# Patient Record
Sex: FEMALE | Race: OTHER | HISPANIC OR LATINO
[De-identification: names, ages, dates, MRNs, and addresses within clinical notes are randomized per-mention and may not be internally consistent; named-entity substitution may affect disease eponyms.]

---

## 2017-01-24 ENCOUNTER — RESULT REVIEW (OUTPATIENT)
Age: 47
End: 2017-01-24

## 2017-01-25 ENCOUNTER — INPATIENT (INPATIENT)
Facility: HOSPITAL | Age: 47
LOS: 2 days | Discharge: HOME HEALTH | End: 2017-01-28
Attending: OBSTETRICS & GYNECOLOGY | Admitting: OBSTETRICS & GYNECOLOGY
Payer: COMMERCIAL

## 2017-01-25 ENCOUNTER — RESULT REVIEW (OUTPATIENT)
Age: 47
End: 2017-01-25

## 2017-01-25 VITALS
TEMPERATURE: 99 F | OXYGEN SATURATION: 100 % | RESPIRATION RATE: 20 BRPM | DIASTOLIC BLOOD PRESSURE: 51 MMHG | HEART RATE: 68 BPM | SYSTOLIC BLOOD PRESSURE: 99 MMHG

## 2017-01-25 DIAGNOSIS — N83.9 NONINFLAMMATORY DISORDER OF OVARY, FALLOPIAN TUBE AND BROAD LIGAMENT, UNSPECIFIED: ICD-10-CM

## 2017-01-25 DIAGNOSIS — K66.1 HEMOPERITONEUM: ICD-10-CM

## 2017-01-25 LAB
ALBUMIN SERPL ELPH-MCNC: 4.1 G/DL — SIGNIFICANT CHANGE UP (ref 3.3–5)
ALP SERPL-CCNC: 100 U/L — SIGNIFICANT CHANGE UP (ref 40–120)
ALT FLD-CCNC: 23 U/L — SIGNIFICANT CHANGE UP (ref 4–33)
AMYLASE P1 CFR SERPL: 39 U/L — SIGNIFICANT CHANGE UP (ref 25–125)
APPEARANCE UR: CLEAR — SIGNIFICANT CHANGE UP
APTT BLD: 29.2 SEC — SIGNIFICANT CHANGE UP (ref 27.5–37.4)
AST SERPL-CCNC: 24 U/L — SIGNIFICANT CHANGE UP (ref 4–32)
BASE EXCESS BLDV CALC-SCNC: -0.5 MMOL/L — SIGNIFICANT CHANGE UP
BASE EXCESS BLDV CALC-SCNC: 2.4 MMOL/L — SIGNIFICANT CHANGE UP
BASOPHILS # BLD AUTO: 0.01 K/UL — SIGNIFICANT CHANGE UP (ref 0–0.2)
BASOPHILS # BLD AUTO: 0.02 K/UL — SIGNIFICANT CHANGE UP (ref 0–0.2)
BASOPHILS NFR BLD AUTO: 0.1 % — SIGNIFICANT CHANGE UP (ref 0–2)
BASOPHILS NFR BLD AUTO: 0.2 % — SIGNIFICANT CHANGE UP (ref 0–2)
BILIRUB SERPL-MCNC: 0.5 MG/DL — SIGNIFICANT CHANGE UP (ref 0.2–1.2)
BILIRUB UR-MCNC: NEGATIVE — SIGNIFICANT CHANGE UP
BLD GP AB SCN SERPL QL: NEGATIVE — SIGNIFICANT CHANGE UP
BLOOD GAS VENOUS - CREATININE: 0.78 MG/DL — SIGNIFICANT CHANGE UP (ref 0.5–1.3)
BLOOD GAS VENOUS - CREATININE: 0.87 MG/DL — SIGNIFICANT CHANGE UP (ref 0.5–1.3)
BLOOD UR QL VISUAL: NEGATIVE — SIGNIFICANT CHANGE UP
BUN SERPL-MCNC: 10 MG/DL — SIGNIFICANT CHANGE UP (ref 7–23)
BUN SERPL-MCNC: 17 MG/DL — SIGNIFICANT CHANGE UP (ref 7–23)
CALCIUM SERPL-MCNC: 8.1 MG/DL — LOW (ref 8.4–10.5)
CALCIUM SERPL-MCNC: 9.1 MG/DL — SIGNIFICANT CHANGE UP (ref 8.4–10.5)
CEA SERPL-MCNC: 1 NG/ML — SIGNIFICANT CHANGE UP (ref 0.2–3.8)
CHLORIDE BLDV-SCNC: 104 MMOL/L — SIGNIFICANT CHANGE UP (ref 96–108)
CHLORIDE BLDV-SCNC: 107 MMOL/L — SIGNIFICANT CHANGE UP (ref 96–108)
CHLORIDE SERPL-SCNC: 100 MMOL/L — SIGNIFICANT CHANGE UP (ref 98–107)
CHLORIDE SERPL-SCNC: 103 MMOL/L — SIGNIFICANT CHANGE UP (ref 98–107)
CO2 SERPL-SCNC: 21 MMOL/L — LOW (ref 22–31)
CO2 SERPL-SCNC: 23 MMOL/L — SIGNIFICANT CHANGE UP (ref 22–31)
COLOR SPEC: YELLOW — SIGNIFICANT CHANGE UP
CREAT SERPL-MCNC: 0.73 MG/DL — SIGNIFICANT CHANGE UP (ref 0.5–1.3)
CREAT SERPL-MCNC: 0.91 MG/DL — SIGNIFICANT CHANGE UP (ref 0.5–1.3)
EOSINOPHIL # BLD AUTO: 0 K/UL — SIGNIFICANT CHANGE UP (ref 0–0.5)
EOSINOPHIL # BLD AUTO: 0.04 K/UL — SIGNIFICANT CHANGE UP (ref 0–0.5)
EOSINOPHIL NFR BLD AUTO: 0 % — SIGNIFICANT CHANGE UP (ref 0–6)
EOSINOPHIL NFR BLD AUTO: 0.3 % — SIGNIFICANT CHANGE UP (ref 0–6)
GAS PNL BLDV: 136 MMOL/L — SIGNIFICANT CHANGE UP (ref 136–146)
GAS PNL BLDV: 137 MMOL/L — SIGNIFICANT CHANGE UP (ref 136–146)
GLUCOSE BLDV-MCNC: 106 — HIGH (ref 70–99)
GLUCOSE BLDV-MCNC: 121 — HIGH (ref 70–99)
GLUCOSE SERPL-MCNC: 124 MG/DL — HIGH (ref 70–99)
GLUCOSE SERPL-MCNC: 130 MG/DL — HIGH (ref 70–99)
GLUCOSE UR-MCNC: NEGATIVE — SIGNIFICANT CHANGE UP
HCG SERPL-ACNC: < 5 MIU/ML — SIGNIFICANT CHANGE UP
HCO3 BLDV-SCNC: 22 MMOL/L — SIGNIFICANT CHANGE UP (ref 20–27)
HCO3 BLDV-SCNC: 25 MMOL/L — SIGNIFICANT CHANGE UP (ref 20–27)
HCT VFR BLD CALC: 36.6 % — SIGNIFICANT CHANGE UP (ref 34.5–45)
HCT VFR BLD CALC: 37.9 % — SIGNIFICANT CHANGE UP (ref 34.5–45)
HCT VFR BLD CALC: 40.2 % — SIGNIFICANT CHANGE UP (ref 34.5–45)
HCT VFR BLDV CALC: 36.6 % — SIGNIFICANT CHANGE UP (ref 34.5–45)
HCT VFR BLDV CALC: 39.1 % — SIGNIFICANT CHANGE UP (ref 34.5–45)
HGB BLD-MCNC: 11.8 G/DL — SIGNIFICANT CHANGE UP (ref 11.5–15.5)
HGB BLD-MCNC: 12.3 G/DL — SIGNIFICANT CHANGE UP (ref 11.5–15.5)
HGB BLD-MCNC: 13.1 G/DL — SIGNIFICANT CHANGE UP (ref 11.5–15.5)
HGB BLDV-MCNC: 11.9 G/DL — SIGNIFICANT CHANGE UP (ref 11.5–15.5)
HGB BLDV-MCNC: 12.7 G/DL — SIGNIFICANT CHANGE UP (ref 11.5–15.5)
IMM GRANULOCYTES NFR BLD AUTO: 0.2 % — SIGNIFICANT CHANGE UP (ref 0–1.5)
IMM GRANULOCYTES NFR BLD AUTO: 0.4 % — SIGNIFICANT CHANGE UP (ref 0–1.5)
INR BLD: 1.16 — SIGNIFICANT CHANGE UP (ref 0.87–1.18)
KETONES UR-MCNC: NEGATIVE — SIGNIFICANT CHANGE UP
LACTATE BLDV-MCNC: 1.4 MMOL/L — SIGNIFICANT CHANGE UP (ref 0.5–2)
LACTATE BLDV-MCNC: 2.1 MMOL/L — HIGH (ref 0.5–2)
LEUKOCYTE ESTERASE UR-ACNC: NEGATIVE — SIGNIFICANT CHANGE UP
LIDOCAIN IGE QN: 26.5 U/L — SIGNIFICANT CHANGE UP (ref 7–60)
LYMPHOCYTES # BLD AUTO: 0.45 K/UL — LOW (ref 1–3.3)
LYMPHOCYTES # BLD AUTO: 0.46 K/UL — LOW (ref 1–3.3)
LYMPHOCYTES # BLD AUTO: 3.8 % — LOW (ref 13–44)
LYMPHOCYTES # BLD AUTO: 5.4 % — LOW (ref 13–44)
MCHC RBC-ENTMCNC: 29.4 PG — SIGNIFICANT CHANGE UP (ref 27–34)
MCHC RBC-ENTMCNC: 29.4 PG — SIGNIFICANT CHANGE UP (ref 27–34)
MCHC RBC-ENTMCNC: 32.5 % — SIGNIFICANT CHANGE UP (ref 32–36)
MCHC RBC-ENTMCNC: 32.6 % — SIGNIFICANT CHANGE UP (ref 32–36)
MCV RBC AUTO: 90.3 FL — SIGNIFICANT CHANGE UP (ref 80–100)
MCV RBC AUTO: 90.5 FL — SIGNIFICANT CHANGE UP (ref 80–100)
MONOCYTES # BLD AUTO: 0.32 K/UL — SIGNIFICANT CHANGE UP (ref 0–0.9)
MONOCYTES # BLD AUTO: 0.39 K/UL — SIGNIFICANT CHANGE UP (ref 0–0.9)
MONOCYTES NFR BLD AUTO: 3.3 % — SIGNIFICANT CHANGE UP (ref 2–14)
MONOCYTES NFR BLD AUTO: 3.8 % — SIGNIFICANT CHANGE UP (ref 2–14)
MUCOUS THREADS # UR AUTO: SIGNIFICANT CHANGE UP
NEUTROPHILS # BLD AUTO: 11.04 K/UL — HIGH (ref 1.8–7.4)
NEUTROPHILS # BLD AUTO: 7.55 K/UL — HIGH (ref 1.8–7.4)
NEUTROPHILS NFR BLD AUTO: 90.5 % — HIGH (ref 43–77)
NEUTROPHILS NFR BLD AUTO: 92 % — HIGH (ref 43–77)
NITRITE UR-MCNC: NEGATIVE — SIGNIFICANT CHANGE UP
PCO2 BLDV: 44 MMHG — SIGNIFICANT CHANGE UP (ref 41–51)
PCO2 BLDV: 50 MMHG — SIGNIFICANT CHANGE UP (ref 41–51)
PH BLDV: 7.32 PH — SIGNIFICANT CHANGE UP (ref 7.32–7.43)
PH BLDV: 7.4 PH — SIGNIFICANT CHANGE UP (ref 7.32–7.43)
PH UR: 8.5 — HIGH (ref 4.6–8)
PLATELET # BLD AUTO: 181 K/UL — SIGNIFICANT CHANGE UP (ref 150–400)
PLATELET # BLD AUTO: 197 K/UL — SIGNIFICANT CHANGE UP (ref 150–400)
PMV BLD: 11.2 FL — SIGNIFICANT CHANGE UP (ref 7–13)
PMV BLD: 11.6 FL — SIGNIFICANT CHANGE UP (ref 7–13)
PO2 BLDV: 24 MMHG — LOW (ref 35–40)
PO2 BLDV: 28 MMHG — LOW (ref 35–40)
POTASSIUM BLDV-SCNC: 3.6 MMOL/L — SIGNIFICANT CHANGE UP (ref 3.4–4.5)
POTASSIUM BLDV-SCNC: 3.8 MMOL/L — SIGNIFICANT CHANGE UP (ref 3.4–4.5)
POTASSIUM SERPL-MCNC: 3.4 MMOL/L — LOW (ref 3.5–5.3)
POTASSIUM SERPL-MCNC: 3.9 MMOL/L — SIGNIFICANT CHANGE UP (ref 3.5–5.3)
POTASSIUM SERPL-SCNC: 3.4 MMOL/L — LOW (ref 3.5–5.3)
POTASSIUM SERPL-SCNC: 3.9 MMOL/L — SIGNIFICANT CHANGE UP (ref 3.5–5.3)
PROT SERPL-MCNC: 6.9 G/DL — SIGNIFICANT CHANGE UP (ref 6–8.3)
PROT UR-MCNC: 30 — HIGH
PROTHROM AB SERPL-ACNC: 13.3 SEC — HIGH (ref 10–13.1)
RBC # BLD: 4.19 M/UL — SIGNIFICANT CHANGE UP (ref 3.8–5.2)
RBC # BLD: 4.45 M/UL — SIGNIFICANT CHANGE UP (ref 3.8–5.2)
RBC # FLD: 13.5 % — SIGNIFICANT CHANGE UP (ref 10.3–14.5)
RBC # FLD: 13.7 % — SIGNIFICANT CHANGE UP (ref 10.3–14.5)
RBC CASTS # UR COMP ASSIST: SIGNIFICANT CHANGE UP (ref 0–?)
RH IG SCN BLD-IMP: POSITIVE — SIGNIFICANT CHANGE UP
RH IG SCN BLD-IMP: POSITIVE — SIGNIFICANT CHANGE UP
SAO2 % BLDV: 33.7 % — LOW (ref 60–85)
SAO2 % BLDV: 47.8 % — LOW (ref 60–85)
SODIUM SERPL-SCNC: 139 MMOL/L — SIGNIFICANT CHANGE UP (ref 135–145)
SODIUM SERPL-SCNC: 140 MMOL/L — SIGNIFICANT CHANGE UP (ref 135–145)
SP GR SPEC: 1.03 — SIGNIFICANT CHANGE UP (ref 1–1.03)
SQUAMOUS # UR AUTO: SIGNIFICANT CHANGE UP
UROBILINOGEN FLD QL: 1 E.U. — SIGNIFICANT CHANGE UP (ref 0.1–0.2)
WBC # BLD: 12 K/UL — HIGH (ref 3.8–10.5)
WBC # BLD: 8.35 K/UL — SIGNIFICANT CHANGE UP (ref 3.8–10.5)
WBC # FLD AUTO: 12 K/UL — HIGH (ref 3.8–10.5)
WBC # FLD AUTO: 8.35 K/UL — SIGNIFICANT CHANGE UP (ref 3.8–10.5)
WBC UR QL: SIGNIFICANT CHANGE UP (ref 0–?)

## 2017-01-25 PROCEDURE — 88331 PATH CONSLTJ SURG 1 BLK 1SPC: CPT | Mod: 26

## 2017-01-25 PROCEDURE — 74177 CT ABD & PELVIS W/CONTRAST: CPT | Mod: 26

## 2017-01-25 PROCEDURE — 71020: CPT | Mod: 26

## 2017-01-25 PROCEDURE — 88305 TISSUE EXAM BY PATHOLOGIST: CPT | Mod: 26

## 2017-01-25 PROCEDURE — 74010: CPT | Mod: 26

## 2017-01-25 PROCEDURE — 88112 CYTOPATH CELL ENHANCE TECH: CPT | Mod: 26

## 2017-01-25 PROCEDURE — 88305 TISSUE EXAM BY PATHOLOGIST: CPT | Mod: 26,59

## 2017-01-25 RX ORDER — SIMETHICONE 80 MG/1
80 TABLET, CHEWABLE ORAL THREE TIMES A DAY
Qty: 0 | Refills: 0 | Status: DISCONTINUED | OUTPATIENT
Start: 2017-01-25 | End: 2017-01-28

## 2017-01-25 RX ORDER — SODIUM CHLORIDE 9 MG/ML
1000 INJECTION, SOLUTION INTRAVENOUS
Qty: 0 | Refills: 0 | Status: DISCONTINUED | OUTPATIENT
Start: 2017-01-25 | End: 2017-01-26

## 2017-01-25 RX ORDER — ONDANSETRON 8 MG/1
4 TABLET, FILM COATED ORAL EVERY 6 HOURS
Qty: 0 | Refills: 0 | Status: DISCONTINUED | OUTPATIENT
Start: 2017-01-25 | End: 2017-01-26

## 2017-01-25 RX ORDER — NALOXONE HYDROCHLORIDE 4 MG/.1ML
0.1 SPRAY NASAL
Qty: 0 | Refills: 0 | Status: DISCONTINUED | OUTPATIENT
Start: 2017-01-25 | End: 2017-01-26

## 2017-01-25 RX ORDER — MORPHINE SULFATE 50 MG/1
4 CAPSULE, EXTENDED RELEASE ORAL ONCE
Qty: 0 | Refills: 0 | Status: DISCONTINUED | OUTPATIENT
Start: 2017-01-25 | End: 2017-01-25

## 2017-01-25 RX ORDER — FENTANYL CITRATE 50 UG/ML
50 INJECTION INTRAVENOUS
Qty: 0 | Refills: 0 | Status: DISCONTINUED | OUTPATIENT
Start: 2017-01-25 | End: 2017-01-26

## 2017-01-25 RX ORDER — SODIUM CHLORIDE 9 MG/ML
1000 INJECTION, SOLUTION INTRAVENOUS ONCE
Qty: 0 | Refills: 0 | Status: COMPLETED | OUTPATIENT
Start: 2017-01-25 | End: 2017-01-25

## 2017-01-25 RX ORDER — ONDANSETRON 8 MG/1
4 TABLET, FILM COATED ORAL ONCE
Qty: 0 | Refills: 0 | Status: COMPLETED | OUTPATIENT
Start: 2017-01-25 | End: 2017-01-25

## 2017-01-25 RX ORDER — ACETAMINOPHEN 500 MG
1000 TABLET ORAL ONCE
Qty: 0 | Refills: 0 | Status: COMPLETED | OUTPATIENT
Start: 2017-01-25 | End: 2017-01-25

## 2017-01-25 RX ORDER — HEPARIN SODIUM 5000 [USP'U]/ML
5000 INJECTION INTRAVENOUS; SUBCUTANEOUS EVERY 12 HOURS
Qty: 0 | Refills: 0 | Status: DISCONTINUED | OUTPATIENT
Start: 2017-01-25 | End: 2017-01-27

## 2017-01-25 RX ORDER — ONDANSETRON 8 MG/1
4 TABLET, FILM COATED ORAL ONCE
Qty: 0 | Refills: 0 | Status: DISCONTINUED | OUTPATIENT
Start: 2017-01-25 | End: 2017-01-28

## 2017-01-25 RX ORDER — HYDROMORPHONE HYDROCHLORIDE 2 MG/ML
30 INJECTION INTRAMUSCULAR; INTRAVENOUS; SUBCUTANEOUS
Qty: 0 | Refills: 0 | Status: DISCONTINUED | OUTPATIENT
Start: 2017-01-25 | End: 2017-01-26

## 2017-01-25 RX ORDER — HYDROMORPHONE HYDROCHLORIDE 2 MG/ML
2 INJECTION INTRAMUSCULAR; INTRAVENOUS; SUBCUTANEOUS ONCE
Qty: 0 | Refills: 0 | Status: DISCONTINUED | OUTPATIENT
Start: 2017-01-25 | End: 2017-01-25

## 2017-01-25 RX ORDER — DOCUSATE SODIUM 100 MG
100 CAPSULE ORAL THREE TIMES A DAY
Qty: 0 | Refills: 0 | Status: DISCONTINUED | OUTPATIENT
Start: 2017-01-25 | End: 2017-01-28

## 2017-01-25 RX ORDER — MORPHINE SULFATE 50 MG/1
6 CAPSULE, EXTENDED RELEASE ORAL ONCE
Qty: 0 | Refills: 0 | Status: DISCONTINUED | OUTPATIENT
Start: 2017-01-25 | End: 2017-01-25

## 2017-01-25 RX ORDER — KETOROLAC TROMETHAMINE 30 MG/ML
30 SYRINGE (ML) INJECTION EVERY 6 HOURS
Qty: 0 | Refills: 0 | Status: DISCONTINUED | OUTPATIENT
Start: 2017-01-25 | End: 2017-01-26

## 2017-01-25 RX ORDER — HYDROMORPHONE HYDROCHLORIDE 2 MG/ML
0.5 INJECTION INTRAMUSCULAR; INTRAVENOUS; SUBCUTANEOUS
Qty: 0 | Refills: 0 | Status: DISCONTINUED | OUTPATIENT
Start: 2017-01-25 | End: 2017-01-26

## 2017-01-25 RX ORDER — SODIUM CHLORIDE 9 MG/ML
1000 INJECTION INTRAMUSCULAR; INTRAVENOUS; SUBCUTANEOUS ONCE
Qty: 0 | Refills: 0 | Status: DISCONTINUED | OUTPATIENT
Start: 2017-01-25 | End: 2017-01-25

## 2017-01-25 RX ORDER — SODIUM CHLORIDE 9 MG/ML
1000 INJECTION INTRAMUSCULAR; INTRAVENOUS; SUBCUTANEOUS ONCE
Qty: 0 | Refills: 0 | Status: COMPLETED | OUTPATIENT
Start: 2017-01-25 | End: 2017-01-25

## 2017-01-25 RX ORDER — SODIUM CHLORIDE 9 MG/ML
2000 INJECTION INTRAMUSCULAR; INTRAVENOUS; SUBCUTANEOUS ONCE
Qty: 0 | Refills: 0 | Status: COMPLETED | OUTPATIENT
Start: 2017-01-25 | End: 2017-01-25

## 2017-01-25 RX ADMIN — SODIUM CHLORIDE 125 MILLILITER(S): 9 INJECTION, SOLUTION INTRAVENOUS at 21:27

## 2017-01-25 RX ADMIN — FENTANYL CITRATE 50 MICROGRAM(S): 50 INJECTION INTRAVENOUS at 21:26

## 2017-01-25 RX ADMIN — MORPHINE SULFATE 4 MILLIGRAM(S): 50 CAPSULE, EXTENDED RELEASE ORAL at 14:06

## 2017-01-25 RX ADMIN — ONDANSETRON 4 MILLIGRAM(S): 8 TABLET, FILM COATED ORAL at 11:06

## 2017-01-25 RX ADMIN — MORPHINE SULFATE 6 MILLIGRAM(S): 50 CAPSULE, EXTENDED RELEASE ORAL at 11:21

## 2017-01-25 RX ADMIN — HYDROMORPHONE HYDROCHLORIDE 2 MILLIGRAM(S): 2 INJECTION INTRAMUSCULAR; INTRAVENOUS; SUBCUTANEOUS at 15:54

## 2017-01-25 RX ADMIN — HYDROMORPHONE HYDROCHLORIDE 2 MILLIGRAM(S): 2 INJECTION INTRAMUSCULAR; INTRAVENOUS; SUBCUTANEOUS at 15:49

## 2017-01-25 RX ADMIN — MORPHINE SULFATE 6 MILLIGRAM(S): 50 CAPSULE, EXTENDED RELEASE ORAL at 11:06

## 2017-01-25 RX ADMIN — Medication 400 MILLIGRAM(S): at 21:26

## 2017-01-25 RX ADMIN — HYDROMORPHONE HYDROCHLORIDE 30 MILLILITER(S): 2 INJECTION INTRAMUSCULAR; INTRAVENOUS; SUBCUTANEOUS at 21:32

## 2017-01-25 RX ADMIN — FENTANYL CITRATE 50 MICROGRAM(S): 50 INJECTION INTRAVENOUS at 21:18

## 2017-01-25 RX ADMIN — SODIUM CHLORIDE 1000 MILLILITER(S): 9 INJECTION, SOLUTION INTRAVENOUS at 15:49

## 2017-01-25 RX ADMIN — MORPHINE SULFATE 4 MILLIGRAM(S): 50 CAPSULE, EXTENDED RELEASE ORAL at 14:15

## 2017-01-25 RX ADMIN — SODIUM CHLORIDE 666.67 MILLILITER(S): 9 INJECTION INTRAMUSCULAR; INTRAVENOUS; SUBCUTANEOUS at 11:36

## 2017-01-25 RX ADMIN — Medication 1000 MILLIGRAM(S): at 21:42

## 2017-01-25 RX ADMIN — HYDROMORPHONE HYDROCHLORIDE 30 MILLILITER(S): 2 INJECTION INTRAMUSCULAR; INTRAVENOUS; SUBCUTANEOUS at 23:16

## 2017-01-25 RX ADMIN — FENTANYL CITRATE 50 MICROGRAM(S): 50 INJECTION INTRAVENOUS at 21:42

## 2017-01-25 RX ADMIN — SODIUM CHLORIDE 2000 MILLILITER(S): 9 INJECTION INTRAMUSCULAR; INTRAVENOUS; SUBCUTANEOUS at 14:06

## 2017-01-25 NOTE — ED ADULT TRIAGE NOTE - CHIEF COMPLAINT QUOTE
c/o abdominal pain c/o feeling nauseous pt c/o "waves of severe abdominal pain" c/o abdominal pain c/o feeling nauseous pt c/o "waves of severe abdominal pain"  pt appears pale crying out in pain during triage

## 2017-01-25 NOTE — H&P ADULT. - ASSESSMENT
47yo P0 with no significant PMH who presents with acute abdomen with CTAP demonstrating hemoperitoneum, ruptured cyst, and findings concerning for malignancy. 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

## 2017-01-25 NOTE — ED PROVIDER NOTE - ABDOMINAL EXAM
POSITIVE FOR GUARDING/diffuse tenderness, with voluntary guarding. Not rigid,/FIRM/no pulsating masses/no organomegaly/tender...

## 2017-01-25 NOTE — H&P ADULT. - HISTORY OF PRESENT ILLNESS
45yo P0 LMP unknown (patient on Depo) presenting with severe abdominal pain which started at 1AM. Pt states pain started acutely and worsened over the past several hours. She admits to subjective fevers and chills, +nausea/vomiting. No CP/SOB. Pt denies any vaginal bleeding. 45yo P0 LMP unknown (patient on Depo) presenting with severe abdominal pain which started at 1AM. Pt states pain started acutely and worsened over the past several hours. Pain is now diffuse and she feels like her abdomen is hard. She says the only thing that helps her pain is to stay still- any movement makes the pain worse. She admits to subjective fevers and chills, +nausea/vomiting. No CP/SOB. Pt denies any vaginal bleeding. 45yo P0 LMP unknown (patient on Depo) with PMH of possible teratoma seen on office sono, presenting with severe abdominal pain which started at 1AM. Pt states pain started acutely and worsened over the past several hours. Pain is now diffuse and she feels like her abdomen is hard. She says the only thing that helps her pain is to stay still- any movement makes the pain worse. She admits to subjective fevers and chills, +nausea/vomiting. No CP/SOB. Pt denies any vaginal bleeding.      Pt had an abnormal mammogram 2 years ago s/p biopsy which was benign per patient. Most recent mammogram normal.  Pt denies any abnormal paps  Pt denies any family history of breast cancer, ovarian cancer, endometrial cancer

## 2017-01-25 NOTE — ED PROVIDER NOTE - PROGRESS NOTE DETAILS
PA Tiberio- Ct results:  "Moderate complex abdominal and pelvic ascites, likely hemoperitoneum from a ruptured 6 to 7 cm hemorrhagic/complex ovarian cyst. Indeterminate   appearance of both ovaries and complexity of the cyst, recommend further   evaluation with contrast-enhanced MRI to exclude superimposed neoplasm".  OBGYVEDA sagastume, called back, will see pt shortly. Patient still in pain. VS noted  systolic HR 80, abdo still very tender. CT abdo shows: "Moderate complex abdominal and pelvic ascites, likely hemoperitoneum from a ruptured 6 to 7 cm hemorrhagic/complex ovarian cyst. Indeterminate appearance of both ovaries and complexity of the cyst, recommend further evaluation with contrast-enhanced MRI to exclude superimposed neoplasm." OB consulted  systolic, pain persists, hgb was 13.1 now 11.8. Seen and admitted by OBGYN

## 2017-01-25 NOTE — ED ADULT NURSE NOTE - CHIEF COMPLAINT QUOTE
c/o abdominal pain c/o feeling nauseous pt c/o "waves of severe abdominal pain"  pt appears pale crying out in pain during triage

## 2017-01-25 NOTE — H&P ADULT. - PROBLEM SELECTOR PLAN 1
- OR for Diagnostic Laparoscopy, plan for ONC on backup given concerning radiology findings  - Monitor VS  - Pain control  - Pre-Op orders

## 2017-01-25 NOTE — ED ADULT NURSE NOTE - OBJECTIVE STATEMENT
c/o severe abdominal pain with bloating and nausea started at 1 am to day patient alert ox3 came in with severe pain, moaning and unable to stay still on the stretcher. denies vomiting . had a normal BM today . labs done as ordered. abdomen tender to touch and patient is guarding. denies fever and chills. awaiting results and re eval.

## 2017-01-25 NOTE — ED PROVIDER NOTE - OBJECTIVE STATEMENT
46F PMH vertigo, syncope, bradycardia, on topomax, meclizine and on depoprovera, c/o severe abdominal pain throughout abdomen since yesterday, Some nausea but no Vomiting or Diarrhea, no fever, no vag bleeding no dysuria.

## 2017-01-25 NOTE — ED PROVIDER NOTE - MEDICAL DECISION MAKING DETAILS
Severe diffuse abdo pain and tenderness, in non pregnant 46F. Plan: analgesia, IV fluids, pre-op labs, urgent CXR, CT abdo.

## 2017-01-25 NOTE — PRE-OP CHECKLIST - 2.
vitals on arrival in [facility] @1425 T98.7F /67 HR 97 RR18 O2 Sat 100% vitals on arrival in [facility] @1625 T98.7F /67 HR 97 RR18 O2 Sat 100%

## 2017-01-26 LAB
BACTERIA UR CULT: SIGNIFICANT CHANGE UP
BASOPHILS # BLD AUTO: 0 K/UL — SIGNIFICANT CHANGE UP (ref 0–0.2)
BASOPHILS NFR BLD AUTO: 0 % — SIGNIFICANT CHANGE UP (ref 0–2)
CANCER AG125 SERPL-ACNC: 186 U/ML — HIGH
CANCER AG19-9 SERPL-ACNC: < 1 U/ML — SIGNIFICANT CHANGE UP
EOSINOPHIL # BLD AUTO: 0 K/UL — SIGNIFICANT CHANGE UP (ref 0–0.5)
EOSINOPHIL NFR BLD AUTO: 0 % — SIGNIFICANT CHANGE UP (ref 0–6)
HCT VFR BLD CALC: 33.2 % — LOW (ref 34.5–45)
HGB BLD-MCNC: 10.8 G/DL — LOW (ref 11.5–15.5)
IMM GRANULOCYTES NFR BLD AUTO: 0.2 % — SIGNIFICANT CHANGE UP (ref 0–1.5)
LYMPHOCYTES # BLD AUTO: 0.29 K/UL — LOW (ref 1–3.3)
LYMPHOCYTES # BLD AUTO: 3 % — LOW (ref 13–44)
MCHC RBC-ENTMCNC: 29.5 PG — SIGNIFICANT CHANGE UP (ref 27–34)
MCHC RBC-ENTMCNC: 32.5 % — SIGNIFICANT CHANGE UP (ref 32–36)
MCV RBC AUTO: 90.7 FL — SIGNIFICANT CHANGE UP (ref 80–100)
MONOCYTES # BLD AUTO: 0.18 K/UL — SIGNIFICANT CHANGE UP (ref 0–0.9)
MONOCYTES NFR BLD AUTO: 1.9 % — LOW (ref 2–14)
NEUTROPHILS # BLD AUTO: 9.08 K/UL — HIGH (ref 1.8–7.4)
NEUTROPHILS NFR BLD AUTO: 94.9 % — HIGH (ref 43–77)
PLATELET # BLD AUTO: 197 K/UL — SIGNIFICANT CHANGE UP (ref 150–400)
PMV BLD: 11.7 FL — SIGNIFICANT CHANGE UP (ref 7–13)
RBC # BLD: 3.66 M/UL — LOW (ref 3.8–5.2)
RBC # FLD: 13.7 % — SIGNIFICANT CHANGE UP (ref 10.3–14.5)
SPECIMEN SOURCE: SIGNIFICANT CHANGE UP
WBC # BLD: 9.57 K/UL — SIGNIFICANT CHANGE UP (ref 3.8–10.5)
WBC # FLD AUTO: 9.57 K/UL — SIGNIFICANT CHANGE UP (ref 3.8–10.5)

## 2017-01-26 RX ORDER — OXYCODONE HYDROCHLORIDE 5 MG/1
1 TABLET ORAL
Qty: 20 | Refills: 0 | OUTPATIENT
Start: 2017-01-26

## 2017-01-26 RX ORDER — ACETAMINOPHEN 500 MG
1 TABLET ORAL
Qty: 0 | Refills: 0 | COMMUNITY
Start: 2017-01-26

## 2017-01-26 RX ORDER — TOPIRAMATE 25 MG
25 TABLET ORAL
Qty: 0 | Refills: 0 | Status: DISCONTINUED | OUTPATIENT
Start: 2017-01-26 | End: 2017-01-28

## 2017-01-26 RX ORDER — ACETAMINOPHEN 500 MG
1000 TABLET ORAL ONCE
Qty: 0 | Refills: 0 | Status: DISCONTINUED | OUTPATIENT
Start: 2017-01-26 | End: 2017-01-28

## 2017-01-26 RX ORDER — OXYCODONE HYDROCHLORIDE 5 MG/1
10 TABLET ORAL
Qty: 0 | Refills: 0 | Status: DISCONTINUED | OUTPATIENT
Start: 2017-01-26 | End: 2017-01-28

## 2017-01-26 RX ORDER — HYDROMORPHONE HYDROCHLORIDE 2 MG/ML
0.8 INJECTION INTRAMUSCULAR; INTRAVENOUS; SUBCUTANEOUS EVERY 4 HOURS
Qty: 0 | Refills: 0 | Status: DISCONTINUED | OUTPATIENT
Start: 2017-01-26 | End: 2017-01-27

## 2017-01-26 RX ORDER — KETOROLAC TROMETHAMINE 30 MG/ML
15 SYRINGE (ML) INJECTION EVERY 6 HOURS
Qty: 0 | Refills: 0 | Status: DISCONTINUED | OUTPATIENT
Start: 2017-01-26 | End: 2017-01-27

## 2017-01-26 RX ORDER — ACETAMINOPHEN 500 MG
2 TABLET ORAL
Qty: 0 | Refills: 0 | COMMUNITY
Start: 2017-01-26

## 2017-01-26 RX ORDER — ACETAMINOPHEN 500 MG
650 TABLET ORAL EVERY 6 HOURS
Qty: 0 | Refills: 0 | Status: COMPLETED | OUTPATIENT
Start: 2017-01-27 | End: 2017-01-28

## 2017-01-26 RX ORDER — MECLIZINE HCL 12.5 MG
12.5 TABLET ORAL DAILY
Qty: 0 | Refills: 0 | Status: DISCONTINUED | OUTPATIENT
Start: 2017-01-26 | End: 2017-01-28

## 2017-01-26 RX ORDER — MECLIZINE HCL 12.5 MG
10 TABLET ORAL DAILY
Qty: 0 | Refills: 0 | Status: DISCONTINUED | OUTPATIENT
Start: 2017-01-26 | End: 2017-01-26

## 2017-01-26 RX ADMIN — Medication 30 MILLIGRAM(S): at 00:34

## 2017-01-26 RX ADMIN — HEPARIN SODIUM 5000 UNIT(S): 5000 INJECTION INTRAVENOUS; SUBCUTANEOUS at 16:17

## 2017-01-26 RX ADMIN — HEPARIN SODIUM 5000 UNIT(S): 5000 INJECTION INTRAVENOUS; SUBCUTANEOUS at 03:24

## 2017-01-26 RX ADMIN — Medication 15 MILLIGRAM(S): at 18:17

## 2017-01-26 RX ADMIN — Medication 25 MILLIGRAM(S): at 18:18

## 2017-01-26 RX ADMIN — Medication 30 MILLIGRAM(S): at 00:49

## 2017-01-26 RX ADMIN — SIMETHICONE 80 MILLIGRAM(S): 80 TABLET, CHEWABLE ORAL at 18:17

## 2017-01-26 RX ADMIN — Medication 30 MILLIGRAM(S): at 06:15

## 2017-01-26 RX ADMIN — Medication 30 MILLIGRAM(S): at 06:30

## 2017-01-26 RX ADMIN — SIMETHICONE 80 MILLIGRAM(S): 80 TABLET, CHEWABLE ORAL at 06:16

## 2017-01-26 RX ADMIN — OXYCODONE HYDROCHLORIDE 10 MILLIGRAM(S): 5 TABLET ORAL at 21:47

## 2017-01-26 RX ADMIN — Medication 15 MILLIGRAM(S): at 12:50

## 2017-01-26 RX ADMIN — SIMETHICONE 80 MILLIGRAM(S): 80 TABLET, CHEWABLE ORAL at 21:47

## 2017-01-26 RX ADMIN — Medication 15 MILLIGRAM(S): at 12:14

## 2017-01-26 RX ADMIN — HYDROMORPHONE HYDROCHLORIDE 30 MILLILITER(S): 2 INJECTION INTRAMUSCULAR; INTRAVENOUS; SUBCUTANEOUS at 07:13

## 2017-01-26 RX ADMIN — Medication 100 MILLIGRAM(S): at 21:49

## 2017-01-26 RX ADMIN — OXYCODONE HYDROCHLORIDE 10 MILLIGRAM(S): 5 TABLET ORAL at 22:30

## 2017-01-26 RX ADMIN — SODIUM CHLORIDE 125 MILLILITER(S): 9 INJECTION, SOLUTION INTRAVENOUS at 07:13

## 2017-01-26 NOTE — DISCHARGE NOTE ADULT - CONDITIONS AT DISCHARGE
vss ,patient evaluated by PT  for ambulation ,home with PT,denies any distress ,tolerating diet ,voiding without trouble

## 2017-01-26 NOTE — BRIEF OPERATIVE NOTE - PRE-OP DX
Generalized abdominal pain  01/26/2017    Active  Liz Preciado  Pelvic mass  01/26/2017    Active  Liz Preciado

## 2017-01-26 NOTE — DISCHARGE NOTE ADULT - PATIENT PORTAL LINK FT
“You can access the Follow[employer] Patient Portal, offered by [facility], by registering with the following website: [email or web address]”

## 2017-01-26 NOTE — PROVIDER CONTACT NOTE (OTHER) - BACKGROUND
S/p Ex Lap Left salpingectomy, ovarian mass, abdominal pain, history of hemoperitoneum, tonsillectomy, bradycardia, nausea and vomiting, vertigo.

## 2017-01-26 NOTE — DISCHARGE NOTE ADULT - INSTRUCTIONS
call md IF HAVING TEMPERATURE ABOVE 101,if having excruciating abdominal pain  nopt relieved by medication ,if not tolerating diet ,or nauseous and vomitting ,if redness or discharge noted from incision site

## 2017-01-26 NOTE — DISCHARGE NOTE ADULT - CARE PLAN
Principal Discharge DX:	Ovarian mass, left  Goal:	recovery  Instructions for follow-up, activity and diet:	Regular diet as tolerated, regular activity as tolerated, no heavy lifting for first two weeks.  Nothing per vagina: no intercourse, tampons or douching.  Call your provider if you experience fevers, chills, worsening abdominal pain, inability to urinate or worsening vaginal bleeding.  Follow up with your provider in 2 weeks.  Secondary Diagnosis:	Hemoperitoneum

## 2017-01-26 NOTE — BRIEF OPERATIVE NOTE - PROCEDURE
Laparoscopy, diagnostic, for peritoneal fluid  01/26/2017  Converted to mini-lap for specimen removal, LSO, evacuation of 700cc of peritoneal fluid  Active  [email or web address]

## 2017-01-26 NOTE — BRIEF OPERATIVE NOTE - OPERATION/FINDINGS
Large amount of peritoneal fluid, enlarged left ovary grossly normal in appearance adherent to anterior uterine surface, small uterus with grossly normal appearing right adnexa, liver, upper abdomen/diaphragm

## 2017-01-26 NOTE — DISCHARGE NOTE ADULT - HOSPITAL COURSE
Pt was admitted with acute abdomen and ruptured L ovarian mass, with findings on CTA/P suspicious for neoplasm. Patient had uncomplicated dx lsc and LSO w/ drainage of 1L of ascites. Frozen pathology showed findings suspicious for malignancy .  Please see operative note for details.  During postoperative course patient's vitals were stable, vaginal bleeding appropriate, and pain well controlled.  Post operation day one hematocrit was 40.2->36.6->37.9->33.2.  On day of discharge patient was ambulating, her pain controlled with oral medications, had adequate oral intake, and was voiding freely.  Discharge instructions and precautions were given.  Will have close follow up with Dr. Moe. Pt was admitted with acute abdomen and ruptured L ovarian mass, with findings on CTA/P suspicious for neoplasm. Patient had uncomplicated dx lsc converted to Ex-Lap and LSO w/ drainage of 1L of ascites. Frozen pathology showed findings suspicious for malignancy .  Please see operative note for details.  During postoperative course patient's vitals were stable, vaginal bleeding appropriate, and pain well controlled.  Post operation day one hematocrit was 40.2->36.6->37.9->33.2.  On day of discharge patient was ambulating, her pain controlled with oral medications, had adequate oral intake, and was voiding freely.  Discharge instructions and precautions were given. Pt discharged on Lovenox. Will have close follow up with Dr. Moe and Dr. Hanley.

## 2017-01-26 NOTE — DISCHARGE NOTE ADULT - CARE PROVIDER_API CALL
Jacqueline Moe), Gynecology Obstetrics  Gynecology Obstetrics and Gynecology  [address]  [place] [address]  Phone: [phone]  Fax: [phone]

## 2017-01-26 NOTE — DISCHARGE NOTE ADULT - MEDICATION SUMMARY - MEDICATIONS TO TAKE
I will START or STAY ON the medications listed below when I get home from the hospital:    Motrin 600 mg oral tablet  -- 1 tab(s) by mouth every 6 hours  -- Indication: For pain    acetaminophen 325 mg oral tablet  -- 2 tab(s) by mouth every 6 hours  -- Indication: For pain    oxyCODONE 10 mg oral tablet  -- 1 tab(s) by mouth every 6 hours, As Needed, Moderate and Severe Pain MDD:4 tabs  -- Indication: For pain    Topamax  --  by mouth   -- Indication: For Home med    meclizine  --  by mouth   -- Indication: For Home med I will START or STAY ON the medications listed below when I get home from the hospital:    Motrin 600 mg oral tablet  -- 1 tab(s) by mouth every 6 hours  -- Indication: For pain    acetaminophen 325 mg oral tablet  -- 2 tab(s) by mouth every 6 hours  -- Indication: For pain    oxyCODONE 10 mg oral tablet  -- 1 tab(s) by mouth every 6 hours, As Needed, Moderate and Severe Pain MDD:4 tabs  -- Indication: For pain    Lovenox 40 mg/0.4 mL injectable solution  -- 40 milligram(s) injectable once a day  -- It is very important that you take or use this exactly as directed.  Do not skip doses or discontinue unless directed by your doctor.    -- Indication: For Clot prevention    Topamax  --  by mouth   -- Indication: For Home med    meclizine  --  by mouth   -- Indication: For Home med

## 2017-01-26 NOTE — DISCHARGE NOTE ADULT - HOME CARE AGENCY
[facility] [phone]. Initial visit will be day after discharge home. A nurse will call prior to home visit

## 2017-01-27 LAB
BASOPHILS # BLD AUTO: 0.01 K/UL — SIGNIFICANT CHANGE UP (ref 0–0.2)
BASOPHILS NFR BLD AUTO: 0.2 % — SIGNIFICANT CHANGE UP (ref 0–2)
EOSINOPHIL # BLD AUTO: 0.03 K/UL — SIGNIFICANT CHANGE UP (ref 0–0.5)
EOSINOPHIL NFR BLD AUTO: 0.5 % — SIGNIFICANT CHANGE UP (ref 0–6)
HCT VFR BLD CALC: 29.1 % — LOW (ref 34.5–45)
HGB BLD-MCNC: 9.4 G/DL — LOW (ref 11.5–15.5)
IMM GRANULOCYTES NFR BLD AUTO: 0.2 % — SIGNIFICANT CHANGE UP (ref 0–1.5)
LYMPHOCYTES # BLD AUTO: 0.96 K/UL — LOW (ref 1–3.3)
LYMPHOCYTES # BLD AUTO: 15 % — SIGNIFICANT CHANGE UP (ref 13–44)
MCHC RBC-ENTMCNC: 29.5 PG — SIGNIFICANT CHANGE UP (ref 27–34)
MCHC RBC-ENTMCNC: 32.3 % — SIGNIFICANT CHANGE UP (ref 32–36)
MCV RBC AUTO: 91.2 FL — SIGNIFICANT CHANGE UP (ref 80–100)
MONOCYTES # BLD AUTO: 0.35 K/UL — SIGNIFICANT CHANGE UP (ref 0–0.9)
MONOCYTES NFR BLD AUTO: 5.5 % — SIGNIFICANT CHANGE UP (ref 2–14)
NEUTROPHILS # BLD AUTO: 5.05 K/UL — SIGNIFICANT CHANGE UP (ref 1.8–7.4)
NEUTROPHILS NFR BLD AUTO: 78.6 % — HIGH (ref 43–77)
PLATELET # BLD AUTO: 144 K/UL — LOW (ref 150–400)
PMV BLD: 12.2 FL — SIGNIFICANT CHANGE UP (ref 7–13)
RBC # BLD: 3.19 M/UL — LOW (ref 3.8–5.2)
RBC # FLD: 13.9 % — SIGNIFICANT CHANGE UP (ref 10.3–14.5)
WBC # BLD: 6.41 K/UL — SIGNIFICANT CHANGE UP (ref 3.8–10.5)
WBC # FLD AUTO: 6.41 K/UL — SIGNIFICANT CHANGE UP (ref 3.8–10.5)

## 2017-01-27 RX ORDER — ENOXAPARIN SODIUM 100 MG/ML
40 INJECTION SUBCUTANEOUS
Qty: 20 | Refills: 0 | OUTPATIENT
Start: 2017-01-27 | End: 2017-02-16

## 2017-01-27 RX ORDER — ENOXAPARIN SODIUM 100 MG/ML
40 INJECTION SUBCUTANEOUS DAILY
Qty: 0 | Refills: 0 | Status: DISCONTINUED | OUTPATIENT
Start: 2017-01-27 | End: 2017-01-28

## 2017-01-27 RX ORDER — DIPHENHYDRAMINE HCL 50 MG
25 CAPSULE ORAL EVERY 6 HOURS
Qty: 0 | Refills: 0 | Status: DISCONTINUED | OUTPATIENT
Start: 2017-01-27 | End: 2017-01-28

## 2017-01-27 RX ADMIN — Medication 12.5 MILLIGRAM(S): at 13:14

## 2017-01-27 RX ADMIN — OXYCODONE HYDROCHLORIDE 10 MILLIGRAM(S): 5 TABLET ORAL at 03:25

## 2017-01-27 RX ADMIN — Medication 15 MILLIGRAM(S): at 18:02

## 2017-01-27 RX ADMIN — OXYCODONE HYDROCHLORIDE 10 MILLIGRAM(S): 5 TABLET ORAL at 02:41

## 2017-01-27 RX ADMIN — Medication 15 MILLIGRAM(S): at 18:31

## 2017-01-27 RX ADMIN — Medication 650 MILLIGRAM(S): at 18:31

## 2017-01-27 RX ADMIN — Medication 15 MILLIGRAM(S): at 00:11

## 2017-01-27 RX ADMIN — OXYCODONE HYDROCHLORIDE 10 MILLIGRAM(S): 5 TABLET ORAL at 16:29

## 2017-01-27 RX ADMIN — Medication 650 MILLIGRAM(S): at 05:19

## 2017-01-27 RX ADMIN — Medication 650 MILLIGRAM(S): at 13:14

## 2017-01-27 RX ADMIN — Medication 650 MILLIGRAM(S): at 00:11

## 2017-01-27 RX ADMIN — Medication 25 MILLIGRAM(S): at 18:02

## 2017-01-27 RX ADMIN — Medication 100 MILLIGRAM(S): at 15:59

## 2017-01-27 RX ADMIN — HEPARIN SODIUM 5000 UNIT(S): 5000 INJECTION INTRAVENOUS; SUBCUTANEOUS at 02:41

## 2017-01-27 RX ADMIN — ENOXAPARIN SODIUM 40 MILLIGRAM(S): 100 INJECTION SUBCUTANEOUS at 13:14

## 2017-01-27 RX ADMIN — Medication 25 MILLIGRAM(S): at 05:21

## 2017-01-27 RX ADMIN — OXYCODONE HYDROCHLORIDE 10 MILLIGRAM(S): 5 TABLET ORAL at 15:59

## 2017-01-27 RX ADMIN — Medication 650 MILLIGRAM(S): at 06:00

## 2017-01-27 RX ADMIN — SIMETHICONE 80 MILLIGRAM(S): 80 TABLET, CHEWABLE ORAL at 22:53

## 2017-01-27 RX ADMIN — Medication 650 MILLIGRAM(S): at 13:44

## 2017-01-27 RX ADMIN — Medication 15 MILLIGRAM(S): at 13:44

## 2017-01-27 RX ADMIN — Medication 15 MILLIGRAM(S): at 13:14

## 2017-01-27 RX ADMIN — Medication 650 MILLIGRAM(S): at 18:01

## 2017-01-27 RX ADMIN — Medication 15 MILLIGRAM(S): at 00:30

## 2017-01-27 RX ADMIN — SIMETHICONE 80 MILLIGRAM(S): 80 TABLET, CHEWABLE ORAL at 13:23

## 2017-01-27 RX ADMIN — Medication 650 MILLIGRAM(S): at 00:50

## 2017-01-27 RX ADMIN — Medication 15 MILLIGRAM(S): at 05:35

## 2017-01-27 RX ADMIN — SIMETHICONE 80 MILLIGRAM(S): 80 TABLET, CHEWABLE ORAL at 05:19

## 2017-01-27 RX ADMIN — Medication 15 MILLIGRAM(S): at 05:19

## 2017-01-27 RX ADMIN — Medication 25 MILLIGRAM(S): at 08:47

## 2017-01-28 VITALS
OXYGEN SATURATION: 100 % | SYSTOLIC BLOOD PRESSURE: 100 MMHG | RESPIRATION RATE: 16 BRPM | DIASTOLIC BLOOD PRESSURE: 53 MMHG | HEART RATE: 63 BPM | TEMPERATURE: 98 F

## 2017-01-28 LAB
BASOPHILS # BLD AUTO: 0.01 K/UL — SIGNIFICANT CHANGE UP (ref 0–0.2)
BASOPHILS NFR BLD AUTO: 0.2 % — SIGNIFICANT CHANGE UP (ref 0–2)
EOSINOPHIL # BLD AUTO: 0.16 K/UL — SIGNIFICANT CHANGE UP (ref 0–0.5)
EOSINOPHIL NFR BLD AUTO: 3.8 % — SIGNIFICANT CHANGE UP (ref 0–6)
HCT VFR BLD CALC: 32.1 % — LOW (ref 34.5–45)
HGB BLD-MCNC: 10.5 G/DL — LOW (ref 11.5–15.5)
IMM GRANULOCYTES NFR BLD AUTO: 0.2 % — SIGNIFICANT CHANGE UP (ref 0–1.5)
LYMPHOCYTES # BLD AUTO: 1.04 K/UL — SIGNIFICANT CHANGE UP (ref 1–3.3)
LYMPHOCYTES # BLD AUTO: 24.5 % — SIGNIFICANT CHANGE UP (ref 13–44)
MCHC RBC-ENTMCNC: 30.2 PG — SIGNIFICANT CHANGE UP (ref 27–34)
MCHC RBC-ENTMCNC: 32.7 % — SIGNIFICANT CHANGE UP (ref 32–36)
MCV RBC AUTO: 92.2 FL — SIGNIFICANT CHANGE UP (ref 80–100)
MONOCYTES # BLD AUTO: 0.26 K/UL — SIGNIFICANT CHANGE UP (ref 0–0.9)
MONOCYTES NFR BLD AUTO: 6.1 % — SIGNIFICANT CHANGE UP (ref 2–14)
NEUTROPHILS # BLD AUTO: 2.77 K/UL — SIGNIFICANT CHANGE UP (ref 1.8–7.4)
NEUTROPHILS NFR BLD AUTO: 65.2 % — SIGNIFICANT CHANGE UP (ref 43–77)
PLATELET # BLD AUTO: 132 K/UL — LOW (ref 150–400)
PMV BLD: 11.5 FL — SIGNIFICANT CHANGE UP (ref 7–13)
RBC # BLD: 3.48 M/UL — LOW (ref 3.8–5.2)
RBC # FLD: 14.1 % — SIGNIFICANT CHANGE UP (ref 10.3–14.5)
WBC # BLD: 4.25 K/UL — SIGNIFICANT CHANGE UP (ref 3.8–10.5)
WBC # FLD AUTO: 4.25 K/UL — SIGNIFICANT CHANGE UP (ref 3.8–10.5)

## 2017-01-28 RX ADMIN — Medication 650 MILLIGRAM(S): at 06:30

## 2017-01-28 RX ADMIN — Medication 650 MILLIGRAM(S): at 01:16

## 2017-01-28 RX ADMIN — Medication 650 MILLIGRAM(S): at 00:46

## 2017-01-28 RX ADMIN — OXYCODONE HYDROCHLORIDE 10 MILLIGRAM(S): 5 TABLET ORAL at 17:10

## 2017-01-28 RX ADMIN — SIMETHICONE 80 MILLIGRAM(S): 80 TABLET, CHEWABLE ORAL at 06:00

## 2017-01-28 RX ADMIN — SIMETHICONE 80 MILLIGRAM(S): 80 TABLET, CHEWABLE ORAL at 14:08

## 2017-01-28 RX ADMIN — Medication 650 MILLIGRAM(S): at 13:40

## 2017-01-28 RX ADMIN — Medication 12.5 MILLIGRAM(S): at 12:52

## 2017-01-28 RX ADMIN — ENOXAPARIN SODIUM 40 MILLIGRAM(S): 100 INJECTION SUBCUTANEOUS at 12:52

## 2017-01-28 RX ADMIN — Medication 25 MILLIGRAM(S): at 06:00

## 2017-01-28 RX ADMIN — Medication 100 MILLIGRAM(S): at 06:06

## 2017-01-28 RX ADMIN — Medication 650 MILLIGRAM(S): at 06:00

## 2017-01-28 RX ADMIN — Medication 650 MILLIGRAM(S): at 12:52

## 2017-01-28 RX ADMIN — OXYCODONE HYDROCHLORIDE 10 MILLIGRAM(S): 5 TABLET ORAL at 01:19

## 2017-01-28 RX ADMIN — OXYCODONE HYDROCHLORIDE 10 MILLIGRAM(S): 5 TABLET ORAL at 08:24

## 2017-01-28 RX ADMIN — Medication 650 MILLIGRAM(S): at 17:09

## 2017-01-28 RX ADMIN — OXYCODONE HYDROCHLORIDE 10 MILLIGRAM(S): 5 TABLET ORAL at 00:49

## 2017-01-28 NOTE — PHYSICAL THERAPY INITIAL EVALUATION ADULT - ADDITIONAL COMMENTS
Patient lives in a home with ELOY; patient lives with her wife and son. Patient's family has been helping her prior to admission

## 2017-01-28 NOTE — PHYSICAL THERAPY INITIAL EVALUATION ADULT - MD ORDER
s/p Laparoscopy, diagnostic, for peritoneal fluid, Converted to mini-lap for specimen removal, LSO, evacuation of 700cc of peritoneal fluid

## 2017-01-30 LAB
BACTERIA BLD CULT: SIGNIFICANT CHANGE UP
BACTERIA BLD CULT: SIGNIFICANT CHANGE UP
NON-GYNECOLOGICAL CYTOLOGY STUDY: SIGNIFICANT CHANGE UP

## 2017-01-31 ENCOUNTER — EMERGENCY (EMERGENCY)
Facility: HOSPITAL | Age: 47
LOS: 1 days | Discharge: HOME | End: 2017-01-31
Attending: EMERGENCY MEDICINE | Admitting: EMERGENCY MEDICINE
Payer: COMMERCIAL

## 2017-01-31 VITALS
RESPIRATION RATE: 16 BRPM | OXYGEN SATURATION: 100 % | DIASTOLIC BLOOD PRESSURE: 61 MMHG | HEART RATE: 65 BPM | SYSTOLIC BLOOD PRESSURE: 108 MMHG

## 2017-01-31 VITALS
OXYGEN SATURATION: 99 % | DIASTOLIC BLOOD PRESSURE: 73 MMHG | RESPIRATION RATE: 18 BRPM | HEART RATE: 73 BPM | SYSTOLIC BLOOD PRESSURE: 114 MMHG | TEMPERATURE: 98 F

## 2017-01-31 DIAGNOSIS — Z90.721 ACQUIRED ABSENCE OF OVARIES, UNILATERAL: Chronic | ICD-10-CM

## 2017-01-31 LAB
ALBUMIN SERPL ELPH-MCNC: 3.9 G/DL — SIGNIFICANT CHANGE UP (ref 3.3–5)
ALP SERPL-CCNC: 156 U/L — HIGH (ref 40–120)
ALT FLD-CCNC: 108 U/L — HIGH (ref 4–33)
APTT BLD: 41.8 SEC — HIGH (ref 27.5–37.4)
AST SERPL-CCNC: 83 U/L — HIGH (ref 4–32)
BASE EXCESS BLDV CALC-SCNC: 1.6 MMOL/L — SIGNIFICANT CHANGE UP
BASOPHILS # BLD AUTO: 0.04 K/UL — SIGNIFICANT CHANGE UP (ref 0–0.2)
BASOPHILS NFR BLD AUTO: 0.8 % — SIGNIFICANT CHANGE UP (ref 0–2)
BILIRUB SERPL-MCNC: 0.4 MG/DL — SIGNIFICANT CHANGE UP (ref 0.2–1.2)
BLOOD GAS VENOUS - CREATININE: 0.96 MG/DL — SIGNIFICANT CHANGE UP (ref 0.5–1.3)
BUN SERPL-MCNC: 17 MG/DL — SIGNIFICANT CHANGE UP (ref 7–23)
CALCIUM SERPL-MCNC: 9.2 MG/DL — SIGNIFICANT CHANGE UP (ref 8.4–10.5)
CHLORIDE BLDV-SCNC: 104 MMOL/L — SIGNIFICANT CHANGE UP (ref 96–108)
CHLORIDE SERPL-SCNC: 99 MMOL/L — SIGNIFICANT CHANGE UP (ref 98–107)
CO2 SERPL-SCNC: 26 MMOL/L — SIGNIFICANT CHANGE UP (ref 22–31)
CREAT SERPL-MCNC: 0.95 MG/DL — SIGNIFICANT CHANGE UP (ref 0.5–1.3)
EOSINOPHIL # BLD AUTO: 0.28 K/UL — SIGNIFICANT CHANGE UP (ref 0–0.5)
EOSINOPHIL NFR BLD AUTO: 5.9 % — SIGNIFICANT CHANGE UP (ref 0–6)
GAS PNL BLDV: 136 MMOL/L — SIGNIFICANT CHANGE UP (ref 136–146)
GLUCOSE BLDV-MCNC: 85 — SIGNIFICANT CHANGE UP (ref 70–99)
GLUCOSE SERPL-MCNC: 84 MG/DL — SIGNIFICANT CHANGE UP (ref 70–99)
HCO3 BLDV-SCNC: 24 MMOL/L — SIGNIFICANT CHANGE UP (ref 20–27)
HCT VFR BLD CALC: 38.1 % — SIGNIFICANT CHANGE UP (ref 34.5–45)
HCT VFR BLDV CALC: 40.2 % — SIGNIFICANT CHANGE UP (ref 34.5–45)
HGB BLD-MCNC: 12.4 G/DL — SIGNIFICANT CHANGE UP (ref 11.5–15.5)
HGB BLDV-MCNC: 13.1 G/DL — SIGNIFICANT CHANGE UP (ref 11.5–15.5)
IMM GRANULOCYTES NFR BLD AUTO: 1.5 % — SIGNIFICANT CHANGE UP (ref 0–1.5)
INR BLD: 1.11 — SIGNIFICANT CHANGE UP (ref 0.87–1.18)
LACTATE BLDV-MCNC: 0.8 MMOL/L — SIGNIFICANT CHANGE UP (ref 0.5–2)
LYMPHOCYTES # BLD AUTO: 1.22 K/UL — SIGNIFICANT CHANGE UP (ref 1–3.3)
LYMPHOCYTES # BLD AUTO: 25.9 % — SIGNIFICANT CHANGE UP (ref 13–44)
MCHC RBC-ENTMCNC: 29.2 PG — SIGNIFICANT CHANGE UP (ref 27–34)
MCHC RBC-ENTMCNC: 32.5 % — SIGNIFICANT CHANGE UP (ref 32–36)
MCV RBC AUTO: 89.6 FL — SIGNIFICANT CHANGE UP (ref 80–100)
MONOCYTES # BLD AUTO: 0.3 K/UL — SIGNIFICANT CHANGE UP (ref 0–0.9)
MONOCYTES NFR BLD AUTO: 6.4 % — SIGNIFICANT CHANGE UP (ref 2–14)
NEUTROPHILS # BLD AUTO: 2.8 K/UL — SIGNIFICANT CHANGE UP (ref 1.8–7.4)
NEUTROPHILS NFR BLD AUTO: 59.5 % — SIGNIFICANT CHANGE UP (ref 43–77)
PCO2 BLDV: 51 MMHG — SIGNIFICANT CHANGE UP (ref 41–51)
PH BLDV: 7.34 PH — SIGNIFICANT CHANGE UP (ref 7.32–7.43)
PLATELET # BLD AUTO: 224 K/UL — SIGNIFICANT CHANGE UP (ref 150–400)
PMV BLD: 11.9 FL — SIGNIFICANT CHANGE UP (ref 7–13)
PO2 BLDV: < 24 MMHG — LOW (ref 35–40)
POTASSIUM BLDV-SCNC: 3.9 MMOL/L — SIGNIFICANT CHANGE UP (ref 3.4–4.5)
POTASSIUM SERPL-MCNC: 4.2 MMOL/L — SIGNIFICANT CHANGE UP (ref 3.5–5.3)
POTASSIUM SERPL-SCNC: 4.2 MMOL/L — SIGNIFICANT CHANGE UP (ref 3.5–5.3)
PROT SERPL-MCNC: 7.2 G/DL — SIGNIFICANT CHANGE UP (ref 6–8.3)
PROTHROM AB SERPL-ACNC: 12.7 SEC — SIGNIFICANT CHANGE UP (ref 10–13.1)
RBC # BLD: 4.25 M/UL — SIGNIFICANT CHANGE UP (ref 3.8–5.2)
RBC # FLD: 13.6 % — SIGNIFICANT CHANGE UP (ref 10.3–14.5)
SAO2 % BLDV: 27 % — LOW (ref 60–85)
SODIUM SERPL-SCNC: 140 MMOL/L — SIGNIFICANT CHANGE UP (ref 135–145)
WBC # BLD: 4.71 K/UL — SIGNIFICANT CHANGE UP (ref 3.8–10.5)
WBC # FLD AUTO: 4.71 K/UL — SIGNIFICANT CHANGE UP (ref 3.8–10.5)

## 2017-01-31 PROCEDURE — 74177 CT ABD & PELVIS W/CONTRAST: CPT | Mod: 26

## 2017-01-31 PROCEDURE — 99285 EMERGENCY DEPT VISIT HI MDM: CPT

## 2017-01-31 RX ORDER — HYDROMORPHONE HYDROCHLORIDE 2 MG/ML
1 INJECTION INTRAMUSCULAR; INTRAVENOUS; SUBCUTANEOUS ONCE
Qty: 0 | Refills: 0 | Status: DISCONTINUED | OUTPATIENT
Start: 2017-01-31 | End: 2017-01-31

## 2017-01-31 RX ORDER — DOCUSATE SODIUM 100 MG
1 CAPSULE ORAL
Qty: 14 | Refills: 0 | OUTPATIENT
Start: 2017-01-31 | End: 2017-02-14

## 2017-01-31 RX ORDER — IBUPROFEN 200 MG
1 TABLET ORAL
Qty: 60 | Refills: 0 | OUTPATIENT
Start: 2017-01-31 | End: 2017-02-10

## 2017-01-31 RX ORDER — ONDANSETRON 8 MG/1
4 TABLET, FILM COATED ORAL ONCE
Qty: 0 | Refills: 0 | Status: COMPLETED | OUTPATIENT
Start: 2017-01-31 | End: 2017-01-31

## 2017-01-31 RX ORDER — SODIUM CHLORIDE 9 MG/ML
1000 INJECTION INTRAMUSCULAR; INTRAVENOUS; SUBCUTANEOUS ONCE
Qty: 0 | Refills: 0 | Status: COMPLETED | OUTPATIENT
Start: 2017-01-31 | End: 2017-01-31

## 2017-01-31 RX ORDER — ACETAMINOPHEN 500 MG
2 TABLET ORAL
Qty: 120 | Refills: 0 | OUTPATIENT
Start: 2017-01-31 | End: 2017-02-10

## 2017-01-31 RX ORDER — ACETAMINOPHEN 500 MG
1000 TABLET ORAL ONCE
Qty: 0 | Refills: 0 | Status: COMPLETED | OUTPATIENT
Start: 2017-01-31 | End: 2017-01-31

## 2017-01-31 RX ORDER — IBUPROFEN 200 MG
600 TABLET ORAL ONCE
Qty: 0 | Refills: 0 | Status: COMPLETED | OUTPATIENT
Start: 2017-01-31 | End: 2017-01-31

## 2017-01-31 RX ORDER — POLYETHYLENE GLYCOL 3350 17 G/17G
17 POWDER, FOR SOLUTION ORAL
Qty: 238 | Refills: 0 | OUTPATIENT
Start: 2017-01-31 | End: 2017-02-14

## 2017-01-31 RX ORDER — FENTANYL CITRATE 50 UG/ML
50 INJECTION INTRAVENOUS ONCE
Qty: 0 | Refills: 0 | Status: DISCONTINUED | OUTPATIENT
Start: 2017-01-31 | End: 2017-01-31

## 2017-01-31 RX ADMIN — Medication 400 MILLIGRAM(S): at 14:00

## 2017-01-31 RX ADMIN — SODIUM CHLORIDE 6000 MILLILITER(S): 9 INJECTION INTRAMUSCULAR; INTRAVENOUS; SUBCUTANEOUS at 11:37

## 2017-01-31 RX ADMIN — Medication 1000 MILLIGRAM(S): at 14:26

## 2017-01-31 RX ADMIN — FENTANYL CITRATE 50 MICROGRAM(S): 50 INJECTION INTRAVENOUS at 12:00

## 2017-01-31 RX ADMIN — ONDANSETRON 4 MILLIGRAM(S): 8 TABLET, FILM COATED ORAL at 11:46

## 2017-01-31 RX ADMIN — FENTANYL CITRATE 50 MICROGRAM(S): 50 INJECTION INTRAVENOUS at 11:46

## 2017-01-31 RX ADMIN — SODIUM CHLORIDE 2000 MILLILITER(S): 9 INJECTION INTRAMUSCULAR; INTRAVENOUS; SUBCUTANEOUS at 11:46

## 2017-01-31 RX ADMIN — Medication 600 MILLIGRAM(S): at 16:01

## 2017-01-31 NOTE — ED PROVIDER NOTE - OBJECTIVE STATEMENT
46yof 1 wk post of left salpingoopherectomy 46yof 1 wk post of left salpingoopherectomy after 46yof 1 wk post op from left salpingoopherectomy p/w worsening abdominal pain and no bowel movement since surgery. Pt has had persistent sharp abdominal pain since surgery that is transiently relieved by oxycodone but never full resolves, primarily RLQ but endorses diffuse abdominal tenderness as well. Incisions are all clean and intact with no purulent drainage. No fevers. Scant vaginal spotting that is unchanged. No weakness, lightheadedness, chest pain, or dyspnea on exertion. Has been able to tolerate PO but feels very bloated and has early satiety. Passing small amount of flatus. No nausea or vomiting.

## 2017-01-31 NOTE — ED PROVIDER NOTE - PROGRESS NOTE DETAILS
ELEANOR EP: 45 y/o female with hx of burst hemorrhagic cyst with cancerous tissue here with constipation and abd pain. Patient had surgery 7 days ago and reports last BM 6 days ago. Patient is taking Percocet forp ain. Exam shows firm exam tenderness diffusely. Denies vomting, nausea, melena, BRBPR, urinary symptoms. Consider SBO, post op pain, constipation. Plan CBC, CMP, CT abd, coags, OBGYN consult, pain control, Reassess. No obstruction on CT. Pain improved, wants to eat and feels well enough to go home. Gyn aware of CT results, awaiting to discuss w/ GynOnc attending. - MD Fabián Cleared by gynsusi to IN home. - MD Fabián

## 2017-01-31 NOTE — ED PROVIDER NOTE - MEDICAL DECISION MAKING DETAILS
46yof s/p ex-lap for left sapingo-oopherectomy, possible teratoma, p/w persistent post op pain and no bowel movement. Concern for SBO vs ileus. Low suspicion for post op infection given absence of fever and systemic symptoms. CT A/P to evaluate for SBO, free fluid or post op complication. Gyn aware.

## 2017-01-31 NOTE — ED ADULT NURSE REASSESSMENT NOTE - NS ED NURSE REASSESS COMMENT FT1
break coverage: pt c/o of pain 9/10. MD Lockwood aware. Pending medication from pharmacy will cont to monitor.

## 2017-01-31 NOTE — ED PROVIDER NOTE - ABDOMINAL EXAM
tender.../nondistended/lower transverse incision clean, dry, no purulence; laparoscopic incisions clean dry and intact./soft

## 2017-01-31 NOTE — ED ADULT NURSE NOTE - OBJECTIVE STATEMENT
received pt A&Ox3 in no apparent distress at this time. #20g IVL to R AC, bloods drawn and sent to the lab. no s/s infiltration noted at this time. IVF infusing as per MD order. cardiac monitor in place. family at bedside. dispo pending

## 2017-01-31 NOTE — ED ADULT TRIAGE NOTE - CHIEF COMPLAINT QUOTE
pt c/o right sided abd pain.  pt is s/p abd surgery on wednesday for ruptured ovarian cyst and was found to have ovarian ca.  pt has not had a BM in 1 week

## 2017-02-03 LAB — SURGICAL PATHOLOGY STUDY: SIGNIFICANT CHANGE UP

## 2017-02-07 ENCOUNTER — APPOINTMENT (OUTPATIENT)
Dept: GYNECOLOGIC ONCOLOGY | Facility: CLINIC | Age: 47
End: 2017-02-07

## 2017-02-07 VITALS
WEIGHT: 140 LBS | BODY MASS INDEX: 28.22 KG/M2 | HEIGHT: 59 IN | SYSTOLIC BLOOD PRESSURE: 100 MMHG | HEART RATE: 72 BPM | DIASTOLIC BLOOD PRESSURE: 60 MMHG

## 2017-02-07 DIAGNOSIS — F17.200 NICOTINE DEPENDENCE, UNSPECIFIED, UNCOMPLICATED: ICD-10-CM

## 2017-02-13 LAB — CORE LAB BIOPSY: NORMAL

## 2017-02-17 ENCOUNTER — OUTPATIENT (OUTPATIENT)
Dept: OUTPATIENT SERVICES | Facility: HOSPITAL | Age: 47
LOS: 1 days | End: 2017-02-17
Payer: COMMERCIAL

## 2017-02-17 VITALS
OXYGEN SATURATION: 99 % | DIASTOLIC BLOOD PRESSURE: 70 MMHG | TEMPERATURE: 98 F | HEART RATE: 64 BPM | HEIGHT: 58.25 IN | WEIGHT: 143.96 LBS | RESPIRATION RATE: 14 BRPM | SYSTOLIC BLOOD PRESSURE: 102 MMHG

## 2017-02-17 DIAGNOSIS — R97.1 ELEVATED CANCER ANTIGEN 125 [CA 125]: ICD-10-CM

## 2017-02-17 DIAGNOSIS — R42 DIZZINESS AND GIDDINESS: ICD-10-CM

## 2017-02-17 DIAGNOSIS — C56.9 MALIGNANT NEOPLASM OF UNSPECIFIED OVARY: ICD-10-CM

## 2017-02-17 DIAGNOSIS — Z90.721 ACQUIRED ABSENCE OF OVARIES, UNILATERAL: Chronic | ICD-10-CM

## 2017-02-17 LAB
ALBUMIN SERPL ELPH-MCNC: 4.1 G/DL — SIGNIFICANT CHANGE UP (ref 3.3–5)
ALP SERPL-CCNC: 99 U/L — SIGNIFICANT CHANGE UP (ref 40–120)
ALT FLD-CCNC: 57 U/L — HIGH (ref 4–33)
APTT BLD: 28.6 SEC — SIGNIFICANT CHANGE UP (ref 27.5–37.4)
AST SERPL-CCNC: 33 U/L — HIGH (ref 4–32)
BILIRUB SERPL-MCNC: 0.2 MG/DL — SIGNIFICANT CHANGE UP (ref 0.2–1.2)
BLD GP AB SCN SERPL QL: NEGATIVE — SIGNIFICANT CHANGE UP
BUN SERPL-MCNC: 15 MG/DL — SIGNIFICANT CHANGE UP (ref 7–23)
CALCIUM SERPL-MCNC: 8.7 MG/DL — SIGNIFICANT CHANGE UP (ref 8.4–10.5)
CHLORIDE SERPL-SCNC: 104 MMOL/L — SIGNIFICANT CHANGE UP (ref 98–107)
CO2 SERPL-SCNC: 23 MMOL/L — SIGNIFICANT CHANGE UP (ref 22–31)
CREAT SERPL-MCNC: 0.92 MG/DL — SIGNIFICANT CHANGE UP (ref 0.5–1.3)
GLUCOSE SERPL-MCNC: 78 MG/DL — SIGNIFICANT CHANGE UP (ref 70–99)
HCT VFR BLD CALC: 40.1 % — SIGNIFICANT CHANGE UP (ref 34.5–45)
HGB BLD-MCNC: 12.8 G/DL — SIGNIFICANT CHANGE UP (ref 11.5–15.5)
INR BLD: 0.97 — SIGNIFICANT CHANGE UP (ref 0.87–1.18)
MCHC RBC-ENTMCNC: 29.3 PG — SIGNIFICANT CHANGE UP (ref 27–34)
MCHC RBC-ENTMCNC: 31.9 % — LOW (ref 32–36)
MCV RBC AUTO: 91.8 FL — SIGNIFICANT CHANGE UP (ref 80–100)
PLATELET # BLD AUTO: 106 K/UL — LOW (ref 150–400)
PMV BLD: 12.3 FL — SIGNIFICANT CHANGE UP (ref 7–13)
POTASSIUM SERPL-MCNC: 4 MMOL/L — SIGNIFICANT CHANGE UP (ref 3.5–5.3)
POTASSIUM SERPL-SCNC: 4 MMOL/L — SIGNIFICANT CHANGE UP (ref 3.5–5.3)
PROT SERPL-MCNC: 6.8 G/DL — SIGNIFICANT CHANGE UP (ref 6–8.3)
PROTHROM AB SERPL-ACNC: 11 SEC — SIGNIFICANT CHANGE UP (ref 10–13.1)
RBC # BLD: 4.37 M/UL — SIGNIFICANT CHANGE UP (ref 3.8–5.2)
RBC # FLD: 13.9 % — SIGNIFICANT CHANGE UP (ref 10.3–14.5)
RH IG SCN BLD-IMP: POSITIVE — SIGNIFICANT CHANGE UP
SODIUM SERPL-SCNC: 143 MMOL/L — SIGNIFICANT CHANGE UP (ref 135–145)
WBC # BLD: 4.53 K/UL — SIGNIFICANT CHANGE UP (ref 3.8–10.5)
WBC # FLD AUTO: 4.53 K/UL — SIGNIFICANT CHANGE UP (ref 3.8–10.5)

## 2017-02-17 PROCEDURE — 93010 ELECTROCARDIOGRAM REPORT: CPT

## 2017-02-17 RX ORDER — MECLIZINE HCL 12.5 MG
1 TABLET ORAL
Qty: 0 | Refills: 0 | COMMUNITY

## 2017-02-17 RX ORDER — SODIUM CHLORIDE 9 MG/ML
1000 INJECTION, SOLUTION INTRAVENOUS
Qty: 0 | Refills: 0 | Status: DISCONTINUED | OUTPATIENT
Start: 2017-02-28 | End: 2017-02-28

## 2017-02-17 RX ORDER — TOPIRAMATE 25 MG
0 TABLET ORAL
Qty: 0 | Refills: 0 | COMMUNITY

## 2017-02-17 RX ORDER — MECLIZINE HCL 12.5 MG
0 TABLET ORAL
Qty: 0 | Refills: 0 | COMMUNITY

## 2017-02-17 NOTE — H&P PST ADULT - CARDIOVASCULAR SYMPTOMS
Pt reports occasional dyspnea when waking fmore than short distances since her surgery 1/25/17/dyspnea on exertion

## 2017-02-17 NOTE — H&P PST ADULT - FAMILY HISTORY
Father  Still living? Unknown  Family history of prostate cancer in father, Age at diagnosis: Age Unknown     Grandparent  Still living? No  Family history of ovarian cancer, Age at diagnosis: Age Unknown

## 2017-02-17 NOTE — H&P PST ADULT - MUSCULOSKELETAL COMMENTS
Pt reports she is walking with a walker since surgery 1/25/17 for support ambulating with walker "for support"

## 2017-02-17 NOTE — H&P PST ADULT - SYMPTOMS
none Pt reports she gets occasional dyspnea when walking more than short distances since her recent surgery.  Denies CP

## 2017-02-17 NOTE — H&P PST ADULT - ASSESSMENT
45 y/o female, LMP unknown (patient on Depo)  presented to [facility] ED with  severe abdominal pain 1/25/17.  Dx with acute abdomen, ruptured left ovarian cyst. Left oophorectomy, malignant on pathology. Now scheduled for Robotic Laparoscopic Left Salpingo oophorectomy, possible Laparoscopic hysterectomy, possible staging 2/28/17. 47 y/o female, LMP unknown (patient on Depo)  presented to [facility] ED with  severe abdominal pain 1/25/17.  Dx with acute abdomen, ruptured left ovarian cyst. Left oophorectomy, malignant on pathology. Now scheduled for Robotic Laparoscopic Right Salpingo oophorectomy, possible Laparoscopic hysterectomy, possible staging 2/28/17.

## 2017-02-17 NOTE — H&P PST ADULT - HISTORY OF PRESENT ILLNESS
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  Dx with ruptured ovarian cyst.  Left oophorectomy, malignant on pathology. Now scheduled for Robotic Laparoscopic Left Salpingo oophorectomy, possible Laparoscopic hysterectomy, possible staging 2/28/17. 45 y/o female, LMP unknown (patient on Depo)  presented to [facility] ED with  severe abdominal pain 1/25/17.  Dx with acute abdomen, ruptured left ovarian cyst. Left oophorectomy, malignant on pathology. Now scheduled for Robotic Laparoscopic Left Salpingo oophorectomy, possible Laparoscopic hysterectomy, possible staging 2/28/17. 45 y/o female, LMP unknown (patient on Depo)  presented to [facility] ED with  severe abdominal pain 1/25/17.  Dx with acute abdomen, ruptured left ovarian cyst. Left oophorectomy, malignant on pathology. Now scheduled for Robotic Laparoscopic Right Salpingo oophorectomy, possible Laparoscopic hysterectomy, possible staging 2/28/17.

## 2017-02-17 NOTE — H&P PST ADULT - GENITOURINARY COMMENTS
45 y/o female, LMP unknown (patient on Depo)  presented to [facility] ED with  severe abdominal pain 1/25/17.  Dx with acute abdomen, ruptured left ovarian cyst. Left oophorectomy, malignant on pathology. Now scheduled for Robotic Laparoscopic Right Salpingo oophorectomy, possible Laparoscopic hysterectomy, possible staging 2/28/17.

## 2017-02-17 NOTE — H&P PST ADULT - NEUROLOGICAL COMMENTS
Hx of dizziness with occasional syncope started 2012.  Pt had cardiology work-up 2015 which was negative.  Dx with vertigo by Dr. Gregorio, Neuro. Takes Topamax and meclizine with improvement. Last vertigo episode was 5 days ago.

## 2017-02-17 NOTE — H&P PST ADULT - NSANTHOSAYNRD_GEN_A_CORE
No. [place] screening performed.  STOP BANG Legend: 0-2 = LOW Risk; 3-4 = INTERMEDIATE Risk; 5-8 = HIGH Risk

## 2017-02-17 NOTE — H&P PST ADULT - PROBLEM SELECTOR PLAN 1
Robotic Laparoscopic Left Salpingo oophorectomy, possible Laparoscopic hysterectomy, possible staging 2/28/17.     CBC CMP PT/PTT T&S EKG    Pt reports Last dose Lovenox (post op oophorectomy) was today 2/17/17.    Pt reports she will see PMD for celarance, requested on chart Robotic Laparoscopic Right  Salpingo oophorectomy, possible Laparoscopic hysterectomy, possible staging 2/28/17.     CBC CMP PT/PTT T&S EKG    Pt reports Last dose Lovenox (post op oophorectomy) was today 2/17/17.    Pt reports she will see PMD for celarance, requested on chart

## 2017-02-17 NOTE — H&P PST ADULT - PSH
History of Tonsillectomy    S/P unilateral salpingo-oophorectomy History of Tonsillectomy    S/P unilateral salpingo-oophorectomy  1/25/17 History of Tonsillectomy    S/P unilateral salpingo-oophorectomy  1/25/17 (L)

## 2017-02-27 ENCOUNTER — RESULT REVIEW (OUTPATIENT)
Age: 47
End: 2017-02-27

## 2017-02-28 ENCOUNTER — TRANSCRIPTION ENCOUNTER (OUTPATIENT)
Age: 47
End: 2017-02-28

## 2017-02-28 ENCOUNTER — INPATIENT (INPATIENT)
Facility: HOSPITAL | Age: 47
LOS: 0 days | Discharge: HOME HEALTH | End: 2017-03-01
Attending: OBSTETRICS & GYNECOLOGY | Admitting: OBSTETRICS & GYNECOLOGY
Payer: COMMERCIAL

## 2017-02-28 ENCOUNTER — APPOINTMENT (OUTPATIENT)
Dept: GYNECOLOGIC ONCOLOGY | Facility: HOSPITAL | Age: 47
End: 2017-02-28

## 2017-02-28 VITALS
HEART RATE: 65 BPM | TEMPERATURE: 99 F | WEIGHT: 143.96 LBS | OXYGEN SATURATION: 100 % | HEIGHT: 58.25 IN | DIASTOLIC BLOOD PRESSURE: 67 MMHG | SYSTOLIC BLOOD PRESSURE: 120 MMHG | RESPIRATION RATE: 16 BRPM

## 2017-02-28 DIAGNOSIS — R97.1 ELEVATED CANCER ANTIGEN 125 [CA 125]: ICD-10-CM

## 2017-02-28 DIAGNOSIS — Z90.721 ACQUIRED ABSENCE OF OVARIES, UNILATERAL: Chronic | ICD-10-CM

## 2017-02-28 LAB — HCG UR QL: NEGATIVE — SIGNIFICANT CHANGE UP

## 2017-02-28 PROCEDURE — 88341 IMHCHEM/IMCYTCHM EA ADD ANTB: CPT | Mod: 26

## 2017-02-28 PROCEDURE — 88342 IMHCHEM/IMCYTCHM 1ST ANTB: CPT | Mod: 26,59

## 2017-02-28 PROCEDURE — 58571 TLH W/T/O 250 G OR LESS: CPT

## 2017-02-28 PROCEDURE — 88112 CYTOPATH CELL ENHANCE TECH: CPT | Mod: 26

## 2017-02-28 PROCEDURE — 38572 LAPAROSCOPY LYMPHADENECTOMY: CPT

## 2017-02-28 PROCEDURE — 88305 TISSUE EXAM BY PATHOLOGIST: CPT | Mod: 26,59

## 2017-02-28 PROCEDURE — 88309 TISSUE EXAM BY PATHOLOGIST: CPT | Mod: 26

## 2017-02-28 PROCEDURE — 88331 PATH CONSLTJ SURG 1 BLK 1SPC: CPT | Mod: 26

## 2017-02-28 PROCEDURE — S2900 ROBOTIC SURGICAL SYSTEM: CPT | Mod: NC

## 2017-02-28 PROCEDURE — 88307 TISSUE EXAM BY PATHOLOGIST: CPT | Mod: 26

## 2017-02-28 PROCEDURE — 88342 IMHCHEM/IMCYTCHM 1ST ANTB: CPT | Mod: 26

## 2017-02-28 PROCEDURE — 49255 REMOVAL OF OMENTUM: CPT

## 2017-02-28 PROCEDURE — 88305 TISSUE EXAM BY PATHOLOGIST: CPT | Mod: 26

## 2017-02-28 RX ORDER — HEPARIN SODIUM 5000 [USP'U]/ML
5000 INJECTION INTRAVENOUS; SUBCUTANEOUS ONCE
Qty: 0 | Refills: 0 | Status: COMPLETED | OUTPATIENT
Start: 2017-02-28 | End: 2017-02-28

## 2017-02-28 RX ORDER — DOCUSATE SODIUM 100 MG
100 CAPSULE ORAL THREE TIMES A DAY
Qty: 0 | Refills: 0 | Status: DISCONTINUED | OUTPATIENT
Start: 2017-02-28 | End: 2017-03-01

## 2017-02-28 RX ORDER — OXYCODONE HYDROCHLORIDE 5 MG/1
5 TABLET ORAL
Qty: 0 | Refills: 0 | Status: DISCONTINUED | OUTPATIENT
Start: 2017-02-28 | End: 2017-03-01

## 2017-02-28 RX ORDER — ONDANSETRON 8 MG/1
4 TABLET, FILM COATED ORAL ONCE
Qty: 0 | Refills: 0 | Status: DISCONTINUED | OUTPATIENT
Start: 2017-02-28 | End: 2017-02-28

## 2017-02-28 RX ORDER — METOCLOPRAMIDE HCL 10 MG
10 TABLET ORAL ONCE
Qty: 0 | Refills: 0 | Status: DISCONTINUED | OUTPATIENT
Start: 2017-02-28 | End: 2017-02-28

## 2017-02-28 RX ORDER — ACETAMINOPHEN 500 MG
1000 TABLET ORAL ONCE
Qty: 0 | Refills: 0 | Status: COMPLETED | OUTPATIENT
Start: 2017-02-28 | End: 2018-01-27

## 2017-02-28 RX ORDER — HYDROMORPHONE HYDROCHLORIDE 2 MG/ML
0.5 INJECTION INTRAMUSCULAR; INTRAVENOUS; SUBCUTANEOUS
Qty: 0 | Refills: 0 | Status: DISCONTINUED | OUTPATIENT
Start: 2017-02-28 | End: 2017-02-28

## 2017-02-28 RX ORDER — MECLIZINE HCL 12.5 MG
25 TABLET ORAL DAILY
Qty: 0 | Refills: 0 | Status: DISCONTINUED | OUTPATIENT
Start: 2017-02-28 | End: 2017-03-01

## 2017-02-28 RX ORDER — HYDROMORPHONE HYDROCHLORIDE 2 MG/ML
1 INJECTION INTRAMUSCULAR; INTRAVENOUS; SUBCUTANEOUS
Qty: 0 | Refills: 0 | Status: DISCONTINUED | OUTPATIENT
Start: 2017-02-28 | End: 2017-02-28

## 2017-02-28 RX ORDER — OXYCODONE HYDROCHLORIDE 5 MG/1
5 TABLET ORAL EVERY 4 HOURS
Qty: 0 | Refills: 0 | Status: DISCONTINUED | OUTPATIENT
Start: 2017-02-28 | End: 2017-03-01

## 2017-02-28 RX ORDER — LIDOCAINE HCL 20 MG/ML
5 VIAL (ML) INJECTION
Qty: 0 | Refills: 0 | Status: DISCONTINUED | OUTPATIENT
Start: 2017-02-28 | End: 2017-03-01

## 2017-02-28 RX ORDER — HEPARIN SODIUM 5000 [USP'U]/ML
5000 INJECTION INTRAVENOUS; SUBCUTANEOUS EVERY 8 HOURS
Qty: 0 | Refills: 0 | Status: DISCONTINUED | OUTPATIENT
Start: 2017-02-28 | End: 2017-03-01

## 2017-02-28 RX ORDER — SIMETHICONE 80 MG/1
80 TABLET, CHEWABLE ORAL AT BEDTIME
Qty: 0 | Refills: 0 | Status: DISCONTINUED | OUTPATIENT
Start: 2017-02-28 | End: 2017-03-01

## 2017-02-28 RX ORDER — KETOROLAC TROMETHAMINE 30 MG/ML
30 SYRINGE (ML) INJECTION EVERY 6 HOURS
Qty: 0 | Refills: 0 | Status: DISCONTINUED | OUTPATIENT
Start: 2017-02-28 | End: 2017-03-01

## 2017-02-28 RX ORDER — SODIUM CHLORIDE 9 MG/ML
1000 INJECTION, SOLUTION INTRAVENOUS
Qty: 0 | Refills: 0 | Status: DISCONTINUED | OUTPATIENT
Start: 2017-02-28 | End: 2017-03-01

## 2017-02-28 RX ORDER — TOPIRAMATE 25 MG
25 TABLET ORAL DAILY
Qty: 0 | Refills: 0 | Status: DISCONTINUED | OUTPATIENT
Start: 2017-02-28 | End: 2017-03-01

## 2017-02-28 RX ADMIN — Medication 30 MILLIGRAM(S): at 23:47

## 2017-02-28 RX ADMIN — SODIUM CHLORIDE 125 MILLILITER(S): 9 INJECTION, SOLUTION INTRAVENOUS at 18:45

## 2017-02-28 RX ADMIN — OXYCODONE HYDROCHLORIDE 5 MILLIGRAM(S): 5 TABLET ORAL at 21:21

## 2017-02-28 RX ADMIN — OXYCODONE HYDROCHLORIDE 5 MILLIGRAM(S): 5 TABLET ORAL at 23:13

## 2017-02-28 RX ADMIN — OXYCODONE HYDROCHLORIDE 5 MILLIGRAM(S): 5 TABLET ORAL at 22:17

## 2017-02-28 RX ADMIN — Medication 30 MILLIGRAM(S): at 23:32

## 2017-02-28 RX ADMIN — OXYCODONE HYDROCHLORIDE 5 MILLIGRAM(S): 5 TABLET ORAL at 21:02

## 2017-02-28 RX ADMIN — HEPARIN SODIUM 5000 UNIT(S): 5000 INJECTION INTRAVENOUS; SUBCUTANEOUS at 21:35

## 2017-02-28 RX ADMIN — SODIUM CHLORIDE 30 MILLILITER(S): 9 INJECTION, SOLUTION INTRAVENOUS at 11:16

## 2017-02-28 RX ADMIN — SIMETHICONE 80 MILLIGRAM(S): 80 TABLET, CHEWABLE ORAL at 22:17

## 2017-02-28 RX ADMIN — HYDROMORPHONE HYDROCHLORIDE 1 MILLIGRAM(S): 2 INJECTION INTRAMUSCULAR; INTRAVENOUS; SUBCUTANEOUS at 18:45

## 2017-02-28 RX ADMIN — HEPARIN SODIUM 5000 UNIT(S): 5000 INJECTION INTRAVENOUS; SUBCUTANEOUS at 11:12

## 2017-02-28 RX ADMIN — HYDROMORPHONE HYDROCHLORIDE 1 MILLIGRAM(S): 2 INJECTION INTRAMUSCULAR; INTRAVENOUS; SUBCUTANEOUS at 18:32

## 2017-02-28 NOTE — ASU PREOP CHECKLIST - 1.
SCIP measures initiated - Form on Chart / colorectal/ Abdominal Hysterectomy Surgery Process Checklist

## 2017-02-28 NOTE — BRIEF OPERATIVE NOTE - OPERATION/FINDINGS
Anteverted uterus, surgically absent left ovary, residual left fallopian tube and IP ligament, grossly normal appearing right adnexa, Omental adhesion to anterior abdominal wall, left and right diaphragm peritoneum lesion, bladder peritoneum nodule, dark sigmoid colon nodule, cul-de-sac nodule, right obturator lymph node dark in appearance which was sent to Frozen (benign). Grossly normal appearing small bowel

## 2017-02-28 NOTE — BRIEF OPERATIVE NOTE - SPECIMENS
Uterus, cervix, right fallopian tube and ovary, left adnexal remnant, left diaphragm peritoneum, right diaphragm peritoneum, blader peritoneum nodule, omentum, right obturator lymph node to frozen, right pelvic lymph node, left common and para-aortic lymph node, sigmoid mesenteric lymph node, left pelvic lymph node, right para-aortic lymph node, cul-de-sac nodule, sigmoid colon nodule

## 2017-02-28 NOTE — BRIEF OPERATIVE NOTE - PROCEDURE
BSO  02/28/2017    Active  [facility]  Omentectomy, laparoscopic, robot-assisted  02/28/2017    Active  [facility]  Hysterectomy, robot-assisted, laparoscopic, with lymphadenectomy  02/28/2017    Active  [facility]

## 2017-03-01 ENCOUNTER — TRANSCRIPTION ENCOUNTER (OUTPATIENT)
Age: 47
End: 2017-03-01

## 2017-03-01 VITALS
HEART RATE: 87 BPM | OXYGEN SATURATION: 96 % | RESPIRATION RATE: 17 BRPM | SYSTOLIC BLOOD PRESSURE: 125 MMHG | TEMPERATURE: 98 F | DIASTOLIC BLOOD PRESSURE: 74 MMHG

## 2017-03-01 LAB
BUN SERPL-MCNC: 11 MG/DL — SIGNIFICANT CHANGE UP (ref 7–23)
CALCIUM SERPL-MCNC: 8.7 MG/DL — SIGNIFICANT CHANGE UP (ref 8.4–10.5)
CHLORIDE SERPL-SCNC: 103 MMOL/L — SIGNIFICANT CHANGE UP (ref 98–107)
CO2 SERPL-SCNC: 25 MMOL/L — SIGNIFICANT CHANGE UP (ref 22–31)
CREAT SERPL-MCNC: 0.83 MG/DL — SIGNIFICANT CHANGE UP (ref 0.5–1.3)
GLUCOSE SERPL-MCNC: 143 MG/DL — HIGH (ref 70–99)
HCT VFR BLD CALC: 34.8 % — SIGNIFICANT CHANGE UP (ref 34.5–45)
HGB BLD-MCNC: 11.3 G/DL — LOW (ref 11.5–15.5)
MAGNESIUM SERPL-MCNC: 1.8 MG/DL — SIGNIFICANT CHANGE UP (ref 1.6–2.6)
MCHC RBC-ENTMCNC: 29.4 PG — SIGNIFICANT CHANGE UP (ref 27–34)
MCHC RBC-ENTMCNC: 32.5 % — SIGNIFICANT CHANGE UP (ref 32–36)
MCV RBC AUTO: 90.4 FL — SIGNIFICANT CHANGE UP (ref 80–100)
PHOSPHATE SERPL-MCNC: 4 MG/DL — SIGNIFICANT CHANGE UP (ref 2.5–4.5)
PLATELET # BLD AUTO: 177 K/UL — SIGNIFICANT CHANGE UP (ref 150–400)
PMV BLD: 11.7 FL — SIGNIFICANT CHANGE UP (ref 7–13)
POTASSIUM SERPL-MCNC: 4.7 MMOL/L — SIGNIFICANT CHANGE UP (ref 3.5–5.3)
POTASSIUM SERPL-SCNC: 4.7 MMOL/L — SIGNIFICANT CHANGE UP (ref 3.5–5.3)
RBC # BLD: 3.85 M/UL — SIGNIFICANT CHANGE UP (ref 3.8–5.2)
RBC # FLD: 13.4 % — SIGNIFICANT CHANGE UP (ref 10.3–14.5)
SODIUM SERPL-SCNC: 141 MMOL/L — SIGNIFICANT CHANGE UP (ref 135–145)
WBC # BLD: 6.88 K/UL — SIGNIFICANT CHANGE UP (ref 3.8–10.5)
WBC # FLD AUTO: 6.88 K/UL — SIGNIFICANT CHANGE UP (ref 3.8–10.5)

## 2017-03-01 RX ORDER — KETOROLAC TROMETHAMINE 30 MG/ML
1 SYRINGE (ML) INJECTION
Qty: 21 | Refills: 0 | OUTPATIENT
Start: 2017-03-01 | End: 2017-03-08

## 2017-03-01 RX ORDER — MAGNESIUM HYDROXIDE 400 MG/1
15 TABLET, CHEWABLE ORAL
Qty: 0 | Refills: 0 | COMMUNITY

## 2017-03-01 RX ORDER — OXYCODONE HYDROCHLORIDE 5 MG/1
1 TABLET ORAL
Qty: 20 | Refills: 0 | OUTPATIENT
Start: 2017-03-01

## 2017-03-01 RX ORDER — IBUPROFEN 200 MG
1 TABLET ORAL
Qty: 0 | Refills: 0 | COMMUNITY

## 2017-03-01 RX ORDER — SODIUM CHLORIDE 9 MG/ML
3 INJECTION INTRAMUSCULAR; INTRAVENOUS; SUBCUTANEOUS EVERY 8 HOURS
Qty: 0 | Refills: 0 | Status: DISCONTINUED | OUTPATIENT
Start: 2017-03-01 | End: 2017-03-01

## 2017-03-01 RX ORDER — KETOROLAC TROMETHAMINE 30 MG/ML
10 SYRINGE (ML) INJECTION
Qty: 20 | Refills: 0 | OUTPATIENT
Start: 2017-03-01

## 2017-03-01 RX ORDER — ACETAMINOPHEN 500 MG
1000 TABLET ORAL ONCE
Qty: 0 | Refills: 0 | Status: COMPLETED | OUTPATIENT
Start: 2017-03-01 | End: 2017-03-01

## 2017-03-01 RX ADMIN — Medication 1000 MILLIGRAM(S): at 03:15

## 2017-03-01 RX ADMIN — OXYCODONE HYDROCHLORIDE 5 MILLIGRAM(S): 5 TABLET ORAL at 09:17

## 2017-03-01 RX ADMIN — OXYCODONE HYDROCHLORIDE 5 MILLIGRAM(S): 5 TABLET ORAL at 13:16

## 2017-03-01 RX ADMIN — OXYCODONE HYDROCHLORIDE 5 MILLIGRAM(S): 5 TABLET ORAL at 08:47

## 2017-03-01 RX ADMIN — Medication 30 MILLIGRAM(S): at 11:24

## 2017-03-01 RX ADMIN — HEPARIN SODIUM 5000 UNIT(S): 5000 INJECTION INTRAVENOUS; SUBCUTANEOUS at 05:28

## 2017-03-01 RX ADMIN — OXYCODONE HYDROCHLORIDE 5 MILLIGRAM(S): 5 TABLET ORAL at 12:46

## 2017-03-01 RX ADMIN — OXYCODONE HYDROCHLORIDE 5 MILLIGRAM(S): 5 TABLET ORAL at 05:28

## 2017-03-01 RX ADMIN — OXYCODONE HYDROCHLORIDE 5 MILLIGRAM(S): 5 TABLET ORAL at 06:28

## 2017-03-01 RX ADMIN — SODIUM CHLORIDE 125 MILLILITER(S): 9 INJECTION, SOLUTION INTRAVENOUS at 08:47

## 2017-03-01 RX ADMIN — Medication 30 MILLIGRAM(S): at 05:43

## 2017-03-01 RX ADMIN — OXYCODONE HYDROCHLORIDE 5 MILLIGRAM(S): 5 TABLET ORAL at 03:31

## 2017-03-01 RX ADMIN — Medication 400 MILLIGRAM(S): at 03:00

## 2017-03-01 RX ADMIN — Medication 25 MILLIGRAM(S): at 11:09

## 2017-03-01 RX ADMIN — Medication 30 MILLIGRAM(S): at 05:28

## 2017-03-01 RX ADMIN — SODIUM CHLORIDE 3 MILLILITER(S): 9 INJECTION INTRAMUSCULAR; INTRAVENOUS; SUBCUTANEOUS at 15:14

## 2017-03-01 RX ADMIN — Medication 30 MILLIGRAM(S): at 11:09

## 2017-03-01 RX ADMIN — OXYCODONE HYDROCHLORIDE 5 MILLIGRAM(S): 5 TABLET ORAL at 04:25

## 2017-03-01 NOTE — DISCHARGE NOTE ADULT - MEDICATION SUMMARY - MEDICATIONS TO TAKE
I will START or STAY ON the medications listed below when I get home from the hospital:    oxyCODONE 5 mg oral tablet  -- 1 tab(s) by mouth every 6 hours, As Needed -for severe pain MDD:4  -- Caution federal law prohibits the transfer of this drug to any person other  than the person for whom it was prescribed.  It is very important that you take or use this exactly as directed.  Do not skip doses or discontinue unless directed by your doctor.  May cause drowsiness.  Alcohol may intensify this effect.  Use care when operating dangerous machinery.  This prescription cannot be refilled.  Using more of this medication than prescribed may cause serious breathing problems.    -- Indication: For pain    Motrin 600 mg oral tablet  -- 1 tab(s) by mouth every 6 hours  -- Indication: For pain    acetaminophen 325 mg oral tablet  -- 1 tab(s) by mouth every 6 hours, As Needed  -- Indication: For pain    ibuprofen 600 mg oral tablet  -- 1 tab(s) by mouth every 6 hours, As Needed last dose 2/18  -- Indication: For Same as motrin    Topamax 25 mg oral tablet  -- 1 tab(s) by mouth 2 times a day  -- Indication: For home med    meclizine 12.5 mg oral tablet  -- 1 tab(s) by mouth once a day, As Needed  -- Indication: For home med    Colace 100 mg oral capsule  -- 1 cap(s) by mouth 3 times a day  -- Indication: For home med    MiraLax oral powder for reconstitution  -- 17 gram(s) by mouth once a day  -- Dilute this medication with liquid before administration.  It is very important that you take or use this exactly as directed.  Do not skip doses or discontinue unless directed by your doctor.    -- Indication: For home med I will START or STAY ON the medications listed below when I get home from the hospital:    oxyCODONE 5 mg oral tablet  -- 1 tab(s) by mouth every 6 hours, As Needed -for severe pain MDD:4  -- Caution federal law prohibits the transfer of this drug to any person other  than the person for whom it was prescribed.  It is very important that you take or use this exactly as directed.  Do not skip doses or discontinue unless directed by your doctor.  May cause drowsiness.  Alcohol may intensify this effect.  Use care when operating dangerous machinery.  This prescription cannot be refilled.  Using more of this medication than prescribed may cause serious breathing problems.    -- Indication: For pain    ketorolac 10 mg oral tablet  -- 1 tab(s) by mouth every 8 hours, As Needed MDD:3  -- It is very important that you take or use this exactly as directed.  Do not skip doses or discontinue unless directed by your doctor.  May cause drowsiness or dizziness.  Obtain medical advice before taking any non-prescription drugs as some may affect the action of this medication.  Take with food or milk.    -- Indication: For pain    acetaminophen 325 mg oral tablet  -- 1 tab(s) by mouth every 6 hours, As Needed  -- Indication: For pain    Topamax 25 mg oral tablet  -- 1 tab(s) by mouth 2 times a day  -- Indication: For home med    meclizine 12.5 mg oral tablet  -- 1 tab(s) by mouth once a day, As Needed  -- Indication: For home med    Colace 100 mg oral capsule  -- 1 cap(s) by mouth 3 times a day  -- Indication: For home med

## 2017-03-01 NOTE — DISCHARGE NOTE ADULT - INSTRUCTIONS
Regular Diet as Constance  Activity as Constance Eat well balanced diet ,drink 8-10 glasses of water each day .IF temp above 100.4,bleeding from the surgical site notify MD

## 2017-03-01 NOTE — DISCHARGE NOTE ADULT - HOSPITAL COURSE
Patient is a 47 y/o F admitted on 2/28/17 for scheduled surgery for Ovarian Cancer. Patient underwent uncomplicated RA TLH, BSO, PPALND, Omentectomy  with Dr. CLAIRE SHORT X. See operative report for full details. Post operative course was uncomplicated. WBC trended: X. Hct trended: X. At time of discharge patient pain well controlled, tolerating regular diet, ambulating and vital signs stable. Patient will have close interval follow up with Dr. NICHOLS Patient is a 47 y/o F admitted on 2/28/17 for scheduled surgery for Ovarian Cancer. Patient underwent uncomplicated RA TLH, BSO, PPALND, Omentectomy  with Dr. Hanley . See operative report for full details. Post operative course was uncomplicated. WBC trended: 4.53->6.88. Hct trended: 40.1->34.8. At time of discharge patient pain well controlled, tolerating regular diet, ambulating and vital signs stable. Patient will have close interval follow up with Dr. Hanley..

## 2017-03-01 NOTE — DISCHARGE NOTE ADULT - MEDICATION SUMMARY - MEDICATIONS TO STOP TAKING
I will STOP taking the medications listed below when I get home from the hospital:    Lovenox 40 mg/0.4 mL injectable solution  -- 40 milligram(s) injectable once a day  -- It is very important that you take or use this exactly as directed.  Do not skip doses or discontinue unless directed by your doctor.    ibuprofen 600 mg oral tablet  -- 1 tab(s) by mouth every 6 hours, As Needed last dose 2/18 I will STOP taking the medications listed below when I get home from the hospital:    Motrin 600 mg oral tablet  -- 1 tab(s) by mouth every 6 hours    Lovenox 40 mg/0.4 mL injectable solution  -- 40 milligram(s) injectable once a day  -- It is very important that you take or use this exactly as directed.  Do not skip doses or discontinue unless directed by your doctor.    ibuprofen 600 mg oral tablet  -- 1 tab(s) by mouth every 6 hours, As Needed last dose 2/18

## 2017-03-01 NOTE — DISCHARGE NOTE ADULT - PATIENT PORTAL LINK FT
“You can access the Follow[employer] Patient Portal, offered by [facility], by registering with the following website: [email or web address]”

## 2017-03-01 NOTE — DISCHARGE NOTE ADULT - CARE PROVIDER_API CALL
Osiris Hanley), Gynecologic Oncology; Obstetrics and Gynecology  [address] [place] [address]  Phone: [phone]  Fax: [phone]

## 2017-03-01 NOTE — DISCHARGE NOTE ADULT - CARE PROVIDERS DIRECT ADDRESSES
[email or web address]

## 2017-03-01 NOTE — DISCHARGE NOTE ADULT - PLAN OF CARE
return to baseline state of health regular diet, ambulating, pain control Return to activities of daily living.

## 2017-03-01 NOTE — DISCHARGE NOTE ADULT - CARE PLAN
Principal Discharge DX:	Malignant neoplasm of ovary, unspecified laterality  Goal:	return to baseline state of health  Instructions for follow-up, activity and diet:	regular diet, ambulating, pain control Principal Discharge DX:	Malignant neoplasm of ovary, unspecified laterality  Goal:	Return to activities of daily living.  Instructions for follow-up, activity and diet:	regular diet, ambulating, pain control

## 2017-03-06 LAB — NON-GYNECOLOGICAL CYTOLOGY STUDY: SIGNIFICANT CHANGE UP

## 2017-03-08 ENCOUNTER — APPOINTMENT (OUTPATIENT)
Dept: GYNECOLOGIC ONCOLOGY | Facility: CLINIC | Age: 47
End: 2017-03-08

## 2017-03-08 LAB — SURGICAL PATHOLOGY STUDY: SIGNIFICANT CHANGE UP

## 2017-03-09 LAB
ANION GAP SERPL CALC-SCNC: 13 MMOL/L
BACTERIA UR CULT: NORMAL
BASOPHILS # BLD AUTO: 0.03 K/UL
BASOPHILS NFR BLD AUTO: 0.5 %
BUN SERPL-MCNC: 17 MG/DL
CALCIUM SERPL-MCNC: 9.4 MG/DL
CHLORIDE SERPL-SCNC: 104 MMOL/L
CO2 SERPL-SCNC: 22 MMOL/L
CREAT SERPL-MCNC: 0.95 MG/DL
EOSINOPHIL # BLD AUTO: 0.31 K/UL
EOSINOPHIL NFR BLD AUTO: 5.4 %
GLUCOSE SERPL-MCNC: 98 MG/DL
HCT VFR BLD CALC: 36.8 %
HGB BLD-MCNC: 12 G/DL
IMM GRANULOCYTES NFR BLD AUTO: 0.5 %
LYMPHOCYTES # BLD AUTO: 0.73 K/UL
LYMPHOCYTES NFR BLD AUTO: 12.7 %
MAN DIFF?: NORMAL
MCHC RBC-ENTMCNC: 29.1 PG
MCHC RBC-ENTMCNC: 32.6 GM/DL
MCV RBC AUTO: 89.3 FL
MONOCYTES # BLD AUTO: 0.29 K/UL
MONOCYTES NFR BLD AUTO: 5 %
NEUTROPHILS # BLD AUTO: 4.38 K/UL
NEUTROPHILS NFR BLD AUTO: 75.9 %
PLATELET # BLD AUTO: 317 K/UL
POTASSIUM SERPL-SCNC: 4.8 MMOL/L
RBC # BLD: 4.12 M/UL
RBC # FLD: 13.5 %
SODIUM SERPL-SCNC: 139 MMOL/L
WBC # FLD AUTO: 5.77 K/UL

## 2017-03-11 ENCOUNTER — EMERGENCY (EMERGENCY)
Facility: HOSPITAL | Age: 47
LOS: 1 days | Discharge: HOME | End: 2017-03-11
Attending: EMERGENCY MEDICINE | Admitting: EMERGENCY MEDICINE
Payer: COMMERCIAL

## 2017-03-11 VITALS
HEART RATE: 67 BPM | DIASTOLIC BLOOD PRESSURE: 76 MMHG | TEMPERATURE: 98 F | OXYGEN SATURATION: 100 % | RESPIRATION RATE: 15 BRPM | SYSTOLIC BLOOD PRESSURE: 114 MMHG

## 2017-03-11 DIAGNOSIS — Z90.721 ACQUIRED ABSENCE OF OVARIES, UNILATERAL: Chronic | ICD-10-CM

## 2017-03-11 PROCEDURE — 99285 EMERGENCY DEPT VISIT HI MDM: CPT | Mod: 25

## 2017-03-11 NOTE — ED ADULT TRIAGE NOTE - CHIEF COMPLAINT QUOTE
Pt c/o rectal bleeding that started today.  Large amount with clots noted.  Denies any use of blood thinners.  Hysterectomy 2/28/17.  Chemo to start 3/21/17.  Pt c/o pain to surgical site, normal to surgery.  PMHx:  ovarian CA, vertigo

## 2017-03-12 VITALS
DIASTOLIC BLOOD PRESSURE: 46 MMHG | SYSTOLIC BLOOD PRESSURE: 98 MMHG | TEMPERATURE: 99 F | HEART RATE: 51 BPM | RESPIRATION RATE: 16 BRPM | OXYGEN SATURATION: 100 %

## 2017-03-12 LAB
ALBUMIN SERPL ELPH-MCNC: 3.7 G/DL — SIGNIFICANT CHANGE UP (ref 3.3–5)
ALP SERPL-CCNC: 92 U/L — SIGNIFICANT CHANGE UP (ref 40–120)
ALT FLD-CCNC: 23 U/L — SIGNIFICANT CHANGE UP (ref 4–33)
APPEARANCE UR: CLEAR — SIGNIFICANT CHANGE UP
APTT BLD: 29.2 SEC — SIGNIFICANT CHANGE UP (ref 27.5–37.4)
AST SERPL-CCNC: 18 U/L — SIGNIFICANT CHANGE UP (ref 4–32)
BASOPHILS # BLD AUTO: 0.02 K/UL — SIGNIFICANT CHANGE UP (ref 0–0.2)
BASOPHILS # BLD AUTO: 0.04 K/UL — SIGNIFICANT CHANGE UP (ref 0–0.2)
BASOPHILS NFR BLD AUTO: 0.3 % — SIGNIFICANT CHANGE UP (ref 0–2)
BASOPHILS NFR BLD AUTO: 0.7 % — SIGNIFICANT CHANGE UP (ref 0–2)
BILIRUB SERPL-MCNC: 0.2 MG/DL — SIGNIFICANT CHANGE UP (ref 0.2–1.2)
BILIRUB UR-MCNC: NEGATIVE — SIGNIFICANT CHANGE UP
BLD GP AB SCN SERPL QL: NEGATIVE — SIGNIFICANT CHANGE UP
BLOOD UR QL VISUAL: NEGATIVE — SIGNIFICANT CHANGE UP
BUN SERPL-MCNC: 17 MG/DL — SIGNIFICANT CHANGE UP (ref 7–23)
CALCIUM SERPL-MCNC: 8.2 MG/DL — LOW (ref 8.4–10.5)
CHLORIDE SERPL-SCNC: 103 MMOL/L — SIGNIFICANT CHANGE UP (ref 98–107)
CO2 SERPL-SCNC: 23 MMOL/L — SIGNIFICANT CHANGE UP (ref 22–31)
COLOR SPEC: SIGNIFICANT CHANGE UP
CREAT SERPL-MCNC: 0.88 MG/DL — SIGNIFICANT CHANGE UP (ref 0.5–1.3)
EOSINOPHIL # BLD AUTO: 0.01 K/UL — SIGNIFICANT CHANGE UP (ref 0–0.5)
EOSINOPHIL # BLD AUTO: 0.07 K/UL — SIGNIFICANT CHANGE UP (ref 0–0.5)
EOSINOPHIL NFR BLD AUTO: 0.2 % — SIGNIFICANT CHANGE UP (ref 0–6)
EOSINOPHIL NFR BLD AUTO: 1.3 % — SIGNIFICANT CHANGE UP (ref 0–6)
GLUCOSE SERPL-MCNC: 92 MG/DL — SIGNIFICANT CHANGE UP (ref 70–99)
GLUCOSE UR-MCNC: NEGATIVE — SIGNIFICANT CHANGE UP
HCT VFR BLD CALC: 34.7 % — SIGNIFICANT CHANGE UP (ref 34.5–45)
HCT VFR BLD CALC: 34.8 % — SIGNIFICANT CHANGE UP (ref 34.5–45)
HGB BLD-MCNC: 11.3 G/DL — LOW (ref 11.5–15.5)
HGB BLD-MCNC: 11.4 G/DL — LOW (ref 11.5–15.5)
IMM GRANULOCYTES NFR BLD AUTO: 0.8 % — SIGNIFICANT CHANGE UP (ref 0–1.5)
IMM GRANULOCYTES NFR BLD AUTO: 0.9 % — SIGNIFICANT CHANGE UP (ref 0–1.5)
INR BLD: 1.01 — SIGNIFICANT CHANGE UP (ref 0.87–1.18)
KETONES UR-MCNC: NEGATIVE — SIGNIFICANT CHANGE UP
LEUKOCYTE ESTERASE UR-ACNC: NEGATIVE — SIGNIFICANT CHANGE UP
LYMPHOCYTES # BLD AUTO: 0.71 K/UL — LOW (ref 1–3.3)
LYMPHOCYTES # BLD AUTO: 0.84 K/UL — LOW (ref 1–3.3)
LYMPHOCYTES # BLD AUTO: 10.8 % — LOW (ref 13–44)
LYMPHOCYTES # BLD AUTO: 15 % — SIGNIFICANT CHANGE UP (ref 13–44)
MCHC RBC-ENTMCNC: 29 PG — SIGNIFICANT CHANGE UP (ref 27–34)
MCHC RBC-ENTMCNC: 29.8 PG — SIGNIFICANT CHANGE UP (ref 27–34)
MCHC RBC-ENTMCNC: 32.5 % — SIGNIFICANT CHANGE UP (ref 32–36)
MCHC RBC-ENTMCNC: 32.9 % — SIGNIFICANT CHANGE UP (ref 32–36)
MCV RBC AUTO: 89.2 FL — SIGNIFICANT CHANGE UP (ref 80–100)
MCV RBC AUTO: 90.6 FL — SIGNIFICANT CHANGE UP (ref 80–100)
MONOCYTES # BLD AUTO: 0.14 K/UL — SIGNIFICANT CHANGE UP (ref 0–0.9)
MONOCYTES # BLD AUTO: 0.23 K/UL — SIGNIFICANT CHANGE UP (ref 0–0.9)
MONOCYTES NFR BLD AUTO: 2.1 % — SIGNIFICANT CHANGE UP (ref 2–14)
MONOCYTES NFR BLD AUTO: 4.1 % — SIGNIFICANT CHANGE UP (ref 2–14)
MUCOUS THREADS # UR AUTO: SIGNIFICANT CHANGE UP
NEUTROPHILS # BLD AUTO: 4.37 K/UL — SIGNIFICANT CHANGE UP (ref 1.8–7.4)
NEUTROPHILS # BLD AUTO: 5.67 K/UL — SIGNIFICANT CHANGE UP (ref 1.8–7.4)
NEUTROPHILS NFR BLD AUTO: 78 % — HIGH (ref 43–77)
NEUTROPHILS NFR BLD AUTO: 85.8 % — HIGH (ref 43–77)
NITRITE UR-MCNC: NEGATIVE — SIGNIFICANT CHANGE UP
PH UR: 6 — SIGNIFICANT CHANGE UP (ref 4.6–8)
PLATELET # BLD AUTO: 272 K/UL — SIGNIFICANT CHANGE UP (ref 150–400)
PLATELET # BLD AUTO: 277 K/UL — SIGNIFICANT CHANGE UP (ref 150–400)
PMV BLD: 11.3 FL — SIGNIFICANT CHANGE UP (ref 7–13)
PMV BLD: 11.4 FL — SIGNIFICANT CHANGE UP (ref 7–13)
POTASSIUM SERPL-MCNC: 4.7 MMOL/L — SIGNIFICANT CHANGE UP (ref 3.5–5.3)
POTASSIUM SERPL-SCNC: 4.7 MMOL/L — SIGNIFICANT CHANGE UP (ref 3.5–5.3)
PROT SERPL-MCNC: 6.3 G/DL — SIGNIFICANT CHANGE UP (ref 6–8.3)
PROT UR-MCNC: 10 — SIGNIFICANT CHANGE UP
PROTHROM AB SERPL-ACNC: 11.5 SEC — SIGNIFICANT CHANGE UP (ref 10–13.1)
RBC # BLD: 3.83 M/UL — SIGNIFICANT CHANGE UP (ref 3.8–5.2)
RBC # BLD: 3.9 M/UL — SIGNIFICANT CHANGE UP (ref 3.8–5.2)
RBC # FLD: 13.9 % — SIGNIFICANT CHANGE UP (ref 10.3–14.5)
RBC # FLD: 14 % — SIGNIFICANT CHANGE UP (ref 10.3–14.5)
RH IG SCN BLD-IMP: POSITIVE — SIGNIFICANT CHANGE UP
SODIUM SERPL-SCNC: 143 MMOL/L — SIGNIFICANT CHANGE UP (ref 135–145)
SP GR SPEC: 1.02 — SIGNIFICANT CHANGE UP (ref 1–1.03)
SQUAMOUS # UR AUTO: SIGNIFICANT CHANGE UP
UROBILINOGEN FLD QL: NORMAL E.U. — SIGNIFICANT CHANGE UP (ref 0.1–0.2)
WBC # BLD: 5.6 K/UL — SIGNIFICANT CHANGE UP (ref 3.8–10.5)
WBC # BLD: 6.6 K/UL — SIGNIFICANT CHANGE UP (ref 3.8–10.5)
WBC # FLD AUTO: 5.6 K/UL — SIGNIFICANT CHANGE UP (ref 3.8–10.5)
WBC # FLD AUTO: 6.6 K/UL — SIGNIFICANT CHANGE UP (ref 3.8–10.5)

## 2017-03-12 PROCEDURE — 74177 CT ABD & PELVIS W/CONTRAST: CPT | Mod: 26

## 2017-03-12 RX ORDER — PIPERACILLIN AND TAZOBACTAM 4; .5 G/20ML; G/20ML
3.38 INJECTION, POWDER, LYOPHILIZED, FOR SOLUTION INTRAVENOUS ONCE
Qty: 0 | Refills: 0 | Status: DISCONTINUED | OUTPATIENT
Start: 2017-03-12 | End: 2017-03-12

## 2017-03-12 RX ORDER — MORPHINE SULFATE 50 MG/1
4 CAPSULE, EXTENDED RELEASE ORAL ONCE
Qty: 0 | Refills: 0 | Status: DISCONTINUED | OUTPATIENT
Start: 2017-03-12 | End: 2017-03-12

## 2017-03-12 RX ORDER — DIPHENHYDRAMINE HCL 50 MG
25 CAPSULE ORAL ONCE
Qty: 0 | Refills: 0 | Status: COMPLETED | OUTPATIENT
Start: 2017-03-12 | End: 2017-03-12

## 2017-03-12 RX ORDER — VANCOMYCIN HCL 1 G
1000 VIAL (EA) INTRAVENOUS ONCE
Qty: 0 | Refills: 0 | Status: DISCONTINUED | OUTPATIENT
Start: 2017-03-12 | End: 2017-03-12

## 2017-03-12 RX ADMIN — MORPHINE SULFATE 4 MILLIGRAM(S): 50 CAPSULE, EXTENDED RELEASE ORAL at 01:44

## 2017-03-12 RX ADMIN — Medication 25 MILLIGRAM(S): at 02:25

## 2017-03-12 RX ADMIN — MORPHINE SULFATE 4 MILLIGRAM(S): 50 CAPSULE, EXTENDED RELEASE ORAL at 02:00

## 2017-03-12 NOTE — ED PROVIDER NOTE - OBJECTIVE STATEMENT
46 F s/p ALDA and staging for ovarian CA, complaining of increased pelvic/abdominal pain, also labial pain / swelling, also episode of bright red blood in stool today.  Patient had surgery on 2/28, Dr. Hanley.  Patient also states that she had discolorations to abdomen as well.  No fever/chills.

## 2017-03-12 NOTE — ED PROVIDER NOTE - PROGRESS NOTE DETAILS
surgery consulted given CT findings, GYN also aware, coming to see patient.  TORI PGY4 Seen by surgery and OBGYN, not concerning exam or CT per surgery and OB.  Stable for DC home and will FU with her GYNONC this week.  TORI PGY4

## 2017-03-12 NOTE — ED ADULT NURSE NOTE - OBJECTIVE STATEMENT
alert s/p ALDA in feb c/o large amount of rectal bleeding x 1 tonight states she has hematoma on left side of vagina   abd is tender to touch with positive bowel sounds steri strips intact

## 2017-03-12 NOTE — ED PROVIDER NOTE - DETAILS:
46F presents with abdominal pain and rectal bleeding.  Recent diagnosis of stage 2 ovarian cancer s/p ALDA and b/l salpingo-oophorectomy with staging 2wks ago.  Reports over the last several days had had increased abdominal pain and labial swelling.  Was diagnosed with a UTI earlier in the week and started on cipro but then noted some rash over abdominal wall so PCP stopped the antibiotics.  No dysuria or hematuria.  No n/v.  Today one episode of rectal bleeding, large blood amount of blood.  No lightheadedness or palpitations, no SOB or CP. On exam well appearing, nad, mmm, lungs clear, rrr, abd faint ecchymosis over L abd, incision well healed, no surrounding erythema, no crepitus + mild diffuse abd ttp, L labia mild swelling, rectal no hemorrhoid, no stool in vault, no blood on exam, no erythema, no edema, 2+ pulses, no focal neuro deficits.  CT abd with inflammation in soft tissue with small subcutaneous gas, seen by surgery and ob/gyn, thought to be due to robotic surgery, unlikely to be nec fasc given patient appears well without other indicators of infection.  No further rectal bleeding in ED.  Repeat hct reassuring.  Will d/c home with close followup.  Given GI followup for rectal bleeding.

## 2017-03-13 LAB
BACTERIA UR CULT: SIGNIFICANT CHANGE UP
SPECIMEN SOURCE: SIGNIFICANT CHANGE UP

## 2017-03-15 ENCOUNTER — TRANSCRIPTION ENCOUNTER (OUTPATIENT)
Age: 47
End: 2017-03-15

## 2017-03-17 LAB
BACTERIA BLD CULT: SIGNIFICANT CHANGE UP
BACTERIA BLD CULT: SIGNIFICANT CHANGE UP

## 2017-03-21 ENCOUNTER — APPOINTMENT (OUTPATIENT)
Dept: GYNECOLOGIC ONCOLOGY | Facility: CLINIC | Age: 47
End: 2017-03-21

## 2017-03-21 DIAGNOSIS — R55 SYNCOPE AND COLLAPSE: ICD-10-CM

## 2017-03-21 DIAGNOSIS — R30.0 DYSURIA: ICD-10-CM

## 2017-03-21 DIAGNOSIS — R39.9 UNSPECIFIED SYMPTOMS AND SIGNS INVOLVING THE GENITOURINARY SYSTEM: ICD-10-CM

## 2017-03-21 RX ORDER — CIPROFLOXACIN HYDROCHLORIDE 500 MG/1
500 TABLET, FILM COATED ORAL
Qty: 14 | Refills: 0 | Status: DISCONTINUED | COMMUNITY
Start: 2017-03-08 | End: 2017-03-21

## 2017-03-21 RX ORDER — MECLIZINE HYDROCHLORIDE 25 MG/1
TABLET ORAL
Refills: 0 | Status: DISCONTINUED | COMMUNITY
End: 2017-03-21

## 2017-03-21 RX ORDER — ENOXAPARIN SODIUM 300 MG/3ML
INJECTION INTRAVENOUS; SUBCUTANEOUS
Refills: 0 | Status: DISCONTINUED | COMMUNITY
End: 2017-03-21

## 2017-03-21 RX ORDER — IBUPROFEN 600 MG/1
600 TABLET, FILM COATED ORAL
Refills: 0 | Status: DISCONTINUED | COMMUNITY
End: 2017-03-21

## 2017-03-23 ENCOUNTER — RX RENEWAL (OUTPATIENT)
Age: 47
End: 2017-03-23

## 2017-03-27 ENCOUNTER — RESULT REVIEW (OUTPATIENT)
Age: 47
End: 2017-03-27

## 2017-03-27 ENCOUNTER — OUTPATIENT (OUTPATIENT)
Dept: OUTPATIENT SERVICES | Facility: HOSPITAL | Age: 47
LOS: 1 days | Discharge: HOME | End: 2017-03-27

## 2017-03-27 DIAGNOSIS — C56.9 MALIGNANT NEOPLASM OF UNSPECIFIED OVARY: ICD-10-CM

## 2017-03-27 DIAGNOSIS — Z90.721 ACQUIRED ABSENCE OF OVARIES, UNILATERAL: Chronic | ICD-10-CM

## 2017-03-28 ENCOUNTER — APPOINTMENT (OUTPATIENT)
Dept: HEMATOLOGY ONCOLOGY | Facility: CLINIC | Age: 47
End: 2017-03-28

## 2017-03-28 VITALS
HEIGHT: 59.06 IN | BODY MASS INDEX: 28.44 KG/M2 | WEIGHT: 141.1 LBS | OXYGEN SATURATION: 97 % | TEMPERATURE: 98.6 F | HEART RATE: 59 BPM | DIASTOLIC BLOOD PRESSURE: 70 MMHG | SYSTOLIC BLOOD PRESSURE: 110 MMHG | RESPIRATION RATE: 16 BRPM

## 2017-03-28 DIAGNOSIS — R42 DIZZINESS AND GIDDINESS: ICD-10-CM

## 2017-03-28 LAB
HCT VFR BLD CALC: 37.4 % — SIGNIFICANT CHANGE UP (ref 34.5–45)
HGB BLD-MCNC: 12.8 G/DL — SIGNIFICANT CHANGE UP (ref 11.5–15.5)
MCHC RBC-ENTMCNC: 30.2 PG — SIGNIFICANT CHANGE UP (ref 27–34)
MCHC RBC-ENTMCNC: 34.2 G/DL — SIGNIFICANT CHANGE UP (ref 32–36)
MCV RBC AUTO: 88.3 FL — SIGNIFICANT CHANGE UP (ref 80–100)
PLATELET # BLD AUTO: 105 K/UL — LOW (ref 150–400)
RBC # BLD: 4.23 M/UL — SIGNIFICANT CHANGE UP (ref 3.8–5.2)
RBC # FLD: 12.1 % — SIGNIFICANT CHANGE UP (ref 10.3–14.5)
WBC # BLD: 5.5 K/UL — SIGNIFICANT CHANGE UP (ref 3.8–10.5)
WBC # FLD AUTO: 5.5 K/UL — SIGNIFICANT CHANGE UP (ref 3.8–10.5)

## 2017-03-30 ENCOUNTER — APPOINTMENT (OUTPATIENT)
Dept: INFUSION THERAPY | Facility: HOSPITAL | Age: 47
End: 2017-03-30

## 2017-04-03 DIAGNOSIS — C56.2 MALIGNANT NEOPLASM OF LEFT OVARY: ICD-10-CM

## 2017-04-04 ENCOUNTER — FORM ENCOUNTER (OUTPATIENT)
Age: 47
End: 2017-04-04

## 2017-04-05 ENCOUNTER — RESULT REVIEW (OUTPATIENT)
Age: 47
End: 2017-04-05

## 2017-04-05 ENCOUNTER — OUTPATIENT (OUTPATIENT)
Dept: OUTPATIENT SERVICES | Facility: HOSPITAL | Age: 47
LOS: 1 days | End: 2017-04-05
Payer: COMMERCIAL

## 2017-04-05 DIAGNOSIS — Z90.721 ACQUIRED ABSENCE OF OVARIES, UNILATERAL: Chronic | ICD-10-CM

## 2017-04-05 DIAGNOSIS — C56.9 MALIGNANT NEOPLASM OF UNSPECIFIED OVARY: ICD-10-CM

## 2017-04-05 PROCEDURE — 77001 FLUOROGUIDE FOR VEIN DEVICE: CPT | Mod: 26

## 2017-04-05 PROCEDURE — C1894: CPT

## 2017-04-05 PROCEDURE — 76937 US GUIDE VASCULAR ACCESS: CPT | Mod: 26

## 2017-04-05 PROCEDURE — C1769: CPT

## 2017-04-05 PROCEDURE — 36561 INSERT TUNNELED CV CATH: CPT

## 2017-04-05 PROCEDURE — 76937 US GUIDE VASCULAR ACCESS: CPT

## 2017-04-05 PROCEDURE — 77001 FLUOROGUIDE FOR VEIN DEVICE: CPT

## 2017-04-05 PROCEDURE — C1788: CPT

## 2017-04-06 ENCOUNTER — LABORATORY RESULT (OUTPATIENT)
Age: 47
End: 2017-04-06

## 2017-04-06 ENCOUNTER — APPOINTMENT (OUTPATIENT)
Dept: INFUSION THERAPY | Facility: HOSPITAL | Age: 47
End: 2017-04-06

## 2017-04-06 LAB
HCT VFR BLD CALC: 37.4 % — SIGNIFICANT CHANGE UP (ref 34.5–45)
HGB BLD-MCNC: 12.7 G/DL — SIGNIFICANT CHANGE UP (ref 11.5–15.5)
MCHC RBC-ENTMCNC: 30.1 PG — SIGNIFICANT CHANGE UP (ref 27–34)
MCHC RBC-ENTMCNC: 33.8 G/DL — SIGNIFICANT CHANGE UP (ref 32–36)
MCV RBC AUTO: 88.8 FL — SIGNIFICANT CHANGE UP (ref 80–100)
PLATELET # BLD AUTO: 198 K/UL — SIGNIFICANT CHANGE UP (ref 150–400)
RBC # BLD: 4.21 M/UL — SIGNIFICANT CHANGE UP (ref 3.8–5.2)
RBC # FLD: 12 % — SIGNIFICANT CHANGE UP (ref 10.3–14.5)
WBC # BLD: 4.6 K/UL — SIGNIFICANT CHANGE UP (ref 3.8–10.5)
WBC # FLD AUTO: 4.6 K/UL — SIGNIFICANT CHANGE UP (ref 3.8–10.5)

## 2017-04-07 DIAGNOSIS — R11.2 NAUSEA WITH VOMITING, UNSPECIFIED: ICD-10-CM

## 2017-04-07 DIAGNOSIS — Z51.11 ENCOUNTER FOR ANTINEOPLASTIC CHEMOTHERAPY: ICD-10-CM

## 2017-04-10 DIAGNOSIS — Z45.2 ENCOUNTER FOR ADJUSTMENT AND MANAGEMENT OF VASCULAR ACCESS DEVICE: ICD-10-CM

## 2017-04-10 DIAGNOSIS — C56.9 MALIGNANT NEOPLASM OF UNSPECIFIED OVARY: ICD-10-CM

## 2017-04-12 ENCOUNTER — RESULT REVIEW (OUTPATIENT)
Age: 47
End: 2017-04-12

## 2017-04-13 ENCOUNTER — RX RENEWAL (OUTPATIENT)
Age: 47
End: 2017-04-13

## 2017-04-13 ENCOUNTER — APPOINTMENT (OUTPATIENT)
Dept: INFUSION THERAPY | Facility: HOSPITAL | Age: 47
End: 2017-04-13

## 2017-04-13 LAB
BASOPHILS # BLD AUTO: 0 K/UL — SIGNIFICANT CHANGE UP (ref 0–0.2)
BASOPHILS NFR BLD AUTO: 0.5 % — SIGNIFICANT CHANGE UP (ref 0–2)
EOSINOPHIL # BLD AUTO: 0.2 K/UL — SIGNIFICANT CHANGE UP (ref 0–0.5)
EOSINOPHIL NFR BLD AUTO: 5.3 % — SIGNIFICANT CHANGE UP (ref 0–6)
HCT VFR BLD CALC: 40.8 % — SIGNIFICANT CHANGE UP (ref 34.5–45)
HGB BLD-MCNC: 13.8 G/DL — SIGNIFICANT CHANGE UP (ref 11.5–15.5)
LYMPHOCYTES # BLD AUTO: 0.7 K/UL — LOW (ref 1–3.3)
LYMPHOCYTES # BLD AUTO: 21.9 % — SIGNIFICANT CHANGE UP (ref 13–44)
MCHC RBC-ENTMCNC: 29.4 PG — SIGNIFICANT CHANGE UP (ref 27–34)
MCHC RBC-ENTMCNC: 33.7 G/DL — SIGNIFICANT CHANGE UP (ref 32–36)
MCV RBC AUTO: 87.2 FL — SIGNIFICANT CHANGE UP (ref 80–100)
MONOCYTES # BLD AUTO: 0.1 K/UL — SIGNIFICANT CHANGE UP (ref 0–0.9)
MONOCYTES NFR BLD AUTO: 2.2 % — SIGNIFICANT CHANGE UP (ref 2–14)
NEUTROPHILS # BLD AUTO: 2.2 K/UL — SIGNIFICANT CHANGE UP (ref 1.8–7.4)
NEUTROPHILS NFR BLD AUTO: 70.1 % — SIGNIFICANT CHANGE UP (ref 43–77)
PLATELET # BLD AUTO: 135 K/UL — LOW (ref 150–400)
RBC # BLD: 4.68 M/UL — SIGNIFICANT CHANGE UP (ref 3.8–5.2)
RBC # FLD: 11.8 % — SIGNIFICANT CHANGE UP (ref 10.3–14.5)
WBC # BLD: 3.2 K/UL — LOW (ref 3.8–10.5)
WBC # FLD AUTO: 3.2 K/UL — LOW (ref 3.8–10.5)

## 2017-04-19 ENCOUNTER — RESULT REVIEW (OUTPATIENT)
Age: 47
End: 2017-04-19

## 2017-04-20 ENCOUNTER — APPOINTMENT (OUTPATIENT)
Dept: HEMATOLOGY ONCOLOGY | Facility: CLINIC | Age: 47
End: 2017-04-20

## 2017-04-20 ENCOUNTER — APPOINTMENT (OUTPATIENT)
Dept: INFUSION THERAPY | Facility: HOSPITAL | Age: 47
End: 2017-04-20

## 2017-04-20 ENCOUNTER — LABORATORY RESULT (OUTPATIENT)
Age: 47
End: 2017-04-20

## 2017-04-20 LAB
BASOPHILS # BLD AUTO: 0 K/UL — SIGNIFICANT CHANGE UP (ref 0–0.2)
BASOPHILS NFR BLD AUTO: 1 % — SIGNIFICANT CHANGE UP (ref 0–2)
EOSINOPHIL # BLD AUTO: 0 K/UL — SIGNIFICANT CHANGE UP (ref 0–0.5)
EOSINOPHIL NFR BLD AUTO: 2 % — SIGNIFICANT CHANGE UP (ref 0–6)
HCT VFR BLD CALC: 34.9 % — SIGNIFICANT CHANGE UP (ref 34.5–45)
HGB BLD-MCNC: 12 G/DL — SIGNIFICANT CHANGE UP (ref 11.5–15.5)
LYMPHOCYTES # BLD AUTO: 0.6 K/UL — LOW (ref 1–3.3)
LYMPHOCYTES # BLD AUTO: 36 % — SIGNIFICANT CHANGE UP (ref 13–44)
MCHC RBC-ENTMCNC: 29.5 PG — SIGNIFICANT CHANGE UP (ref 27–34)
MCHC RBC-ENTMCNC: 34.3 G/DL — SIGNIFICANT CHANGE UP (ref 32–36)
MCV RBC AUTO: 86 FL — SIGNIFICANT CHANGE UP (ref 80–100)
MONOCYTES # BLD AUTO: 0.1 K/UL — SIGNIFICANT CHANGE UP (ref 0–0.9)
MONOCYTES NFR BLD AUTO: 6 % — SIGNIFICANT CHANGE UP (ref 2–14)
NEUTROPHILS # BLD AUTO: 0.8 K/UL — LOW (ref 1.8–7.4)
NEUTROPHILS NFR BLD AUTO: 55 % — SIGNIFICANT CHANGE UP (ref 43–77)
PLAT MORPH BLD: NORMAL — SIGNIFICANT CHANGE UP
PLATELET # BLD AUTO: 132 K/UL — LOW (ref 150–400)
RBC # BLD: 4.06 M/UL — SIGNIFICANT CHANGE UP (ref 3.8–5.2)
RBC # FLD: 11.4 % — SIGNIFICANT CHANGE UP (ref 10.3–14.5)
RBC BLD AUTO: SIGNIFICANT CHANGE UP
WBC # BLD: 1.6 K/UL — LOW (ref 3.8–10.5)
WBC # FLD AUTO: 1.6 K/UL — LOW (ref 3.8–10.5)

## 2017-04-20 RX ORDER — AZITHROMYCIN 250 MG/1
250 TABLET, FILM COATED ORAL
Qty: 6 | Refills: 0 | Status: COMPLETED | COMMUNITY
Start: 2017-04-06 | End: 2017-04-20

## 2017-04-20 RX ORDER — KETOROLAC TROMETHAMINE 10 MG/1
10 TABLET, FILM COATED ORAL
Qty: 20 | Refills: 0 | Status: COMPLETED | COMMUNITY
Start: 2017-03-01

## 2017-04-20 RX ORDER — MEDROXYPROGESTERONE ACETATE 150 MG/ML
150 INJECTION, SUSPENSION INTRAMUSCULAR
Qty: 1 | Refills: 0 | Status: COMPLETED | COMMUNITY
Start: 2016-11-16

## 2017-04-20 RX ORDER — METHYLPREDNISOLONE 4 MG/1
4 TABLET ORAL
Qty: 21 | Refills: 0 | Status: COMPLETED | COMMUNITY
Start: 2017-03-09

## 2017-04-20 RX ORDER — FAMCICLOVIR 500 MG/1
500 TABLET, FILM COATED ORAL
Qty: 60 | Refills: 0 | Status: COMPLETED | COMMUNITY
Start: 2016-11-28

## 2017-04-20 RX ORDER — ENOXAPARIN SODIUM 100 MG/ML
40 INJECTION SUBCUTANEOUS
Qty: 8 | Refills: 0 | Status: COMPLETED | COMMUNITY
Start: 2017-01-27

## 2017-04-20 RX ORDER — MEGESTROL ACETATE 125 MG/ML
625 SUSPENSION ORAL
Qty: 150 | Refills: 0 | Status: COMPLETED | COMMUNITY
Start: 2017-03-10

## 2017-04-25 ENCOUNTER — OUTPATIENT (OUTPATIENT)
Dept: OUTPATIENT SERVICES | Facility: HOSPITAL | Age: 47
LOS: 1 days | Discharge: HOME | End: 2017-04-25

## 2017-04-25 DIAGNOSIS — C56.2 MALIGNANT NEOPLASM OF LEFT OVARY: ICD-10-CM

## 2017-04-25 DIAGNOSIS — Z90.721 ACQUIRED ABSENCE OF OVARIES, UNILATERAL: Chronic | ICD-10-CM

## 2017-04-26 ENCOUNTER — RESULT REVIEW (OUTPATIENT)
Age: 47
End: 2017-04-26

## 2017-04-27 ENCOUNTER — APPOINTMENT (OUTPATIENT)
Dept: INFUSION THERAPY | Facility: HOSPITAL | Age: 47
End: 2017-04-27

## 2017-04-27 LAB
BASOPHILS # BLD AUTO: 0 K/UL — SIGNIFICANT CHANGE UP (ref 0–0.2)
BASOPHILS NFR BLD AUTO: 1 % — SIGNIFICANT CHANGE UP (ref 0–2)
EOSINOPHIL # BLD AUTO: 0.1 K/UL — SIGNIFICANT CHANGE UP (ref 0–0.5)
EOSINOPHIL NFR BLD AUTO: 2 % — SIGNIFICANT CHANGE UP (ref 0–6)
HCT VFR BLD CALC: 33.9 % — LOW (ref 34.5–45)
HGB BLD-MCNC: 11.8 G/DL — SIGNIFICANT CHANGE UP (ref 11.5–15.5)
LYMPHOCYTES # BLD AUTO: 0.7 K/UL — LOW (ref 1–3.3)
LYMPHOCYTES # BLD AUTO: 43 % — SIGNIFICANT CHANGE UP (ref 13–44)
MCHC RBC-ENTMCNC: 30.1 PG — SIGNIFICANT CHANGE UP (ref 27–34)
MCHC RBC-ENTMCNC: 34.9 G/DL — SIGNIFICANT CHANGE UP (ref 32–36)
MCV RBC AUTO: 86.2 FL — SIGNIFICANT CHANGE UP (ref 80–100)
MONOCYTES # BLD AUTO: 0.1 K/UL — SIGNIFICANT CHANGE UP (ref 0–0.9)
MONOCYTES NFR BLD AUTO: 8 % — SIGNIFICANT CHANGE UP (ref 2–14)
NEUTROPHILS # BLD AUTO: 0.8 K/UL — LOW (ref 1.8–7.4)
NEUTROPHILS NFR BLD AUTO: 46 % — SIGNIFICANT CHANGE UP (ref 43–77)
PLAT MORPH BLD: NORMAL — SIGNIFICANT CHANGE UP
PLATELET # BLD AUTO: 75 K/UL — LOW (ref 150–400)
RBC # BLD: 3.93 M/UL — SIGNIFICANT CHANGE UP (ref 3.8–5.2)
RBC # FLD: 12.2 % — SIGNIFICANT CHANGE UP (ref 10.3–14.5)
RBC BLD AUTO: SIGNIFICANT CHANGE UP
WBC # BLD: 1.7 K/UL — LOW (ref 3.8–10.5)
WBC # FLD AUTO: 1.7 K/UL — LOW (ref 3.8–10.5)

## 2017-05-04 ENCOUNTER — RESULT REVIEW (OUTPATIENT)
Age: 47
End: 2017-05-04

## 2017-05-04 ENCOUNTER — APPOINTMENT (OUTPATIENT)
Dept: INFUSION THERAPY | Facility: HOSPITAL | Age: 47
End: 2017-05-04

## 2017-05-04 LAB
BASOPHILS # BLD AUTO: 0 K/UL — SIGNIFICANT CHANGE UP (ref 0–0.2)
EOSINOPHIL # BLD AUTO: 0 K/UL — SIGNIFICANT CHANGE UP (ref 0–0.5)
EOSINOPHIL NFR BLD AUTO: 1 % — SIGNIFICANT CHANGE UP (ref 0–6)
HCT VFR BLD CALC: 35.7 % — SIGNIFICANT CHANGE UP (ref 34.5–45)
HGB BLD-MCNC: 12 G/DL — SIGNIFICANT CHANGE UP (ref 11.5–15.5)
LYMPHOCYTES # BLD AUTO: 0.7 K/UL — LOW (ref 1–3.3)
LYMPHOCYTES # BLD AUTO: 38 % — SIGNIFICANT CHANGE UP (ref 13–44)
MCHC RBC-ENTMCNC: 29.4 PG — SIGNIFICANT CHANGE UP (ref 27–34)
MCHC RBC-ENTMCNC: 33.6 G/DL — SIGNIFICANT CHANGE UP (ref 32–36)
MCV RBC AUTO: 87.5 FL — SIGNIFICANT CHANGE UP (ref 80–100)
MONOCYTES # BLD AUTO: 0.2 K/UL — SIGNIFICANT CHANGE UP (ref 0–0.9)
MONOCYTES NFR BLD AUTO: 11 % — SIGNIFICANT CHANGE UP (ref 2–14)
NEUTROPHILS # BLD AUTO: 0.9 K/UL — LOW (ref 1.8–7.4)
NEUTROPHILS NFR BLD AUTO: 50 % — SIGNIFICANT CHANGE UP (ref 43–77)
PLAT MORPH BLD: NORMAL — SIGNIFICANT CHANGE UP
PLATELET # BLD AUTO: 249 K/UL — SIGNIFICANT CHANGE UP (ref 150–400)
RBC # BLD: 4.08 M/UL — SIGNIFICANT CHANGE UP (ref 3.8–5.2)
RBC # FLD: 13.5 % — SIGNIFICANT CHANGE UP (ref 10.3–14.5)
RBC BLD AUTO: SIGNIFICANT CHANGE UP
WBC # BLD: 2 K/UL — LOW (ref 3.8–10.5)
WBC # FLD AUTO: 2 K/UL — LOW (ref 3.8–10.5)

## 2017-05-05 ENCOUNTER — APPOINTMENT (OUTPATIENT)
Dept: INFUSION THERAPY | Facility: HOSPITAL | Age: 47
End: 2017-05-05

## 2017-05-05 DIAGNOSIS — Z51.11 ENCOUNTER FOR ANTINEOPLASTIC CHEMOTHERAPY: ICD-10-CM

## 2017-05-05 DIAGNOSIS — R11.2 NAUSEA WITH VOMITING, UNSPECIFIED: ICD-10-CM

## 2017-05-06 ENCOUNTER — APPOINTMENT (OUTPATIENT)
Dept: INFUSION THERAPY | Facility: HOSPITAL | Age: 47
End: 2017-05-06

## 2017-05-08 DIAGNOSIS — Z51.89 ENCOUNTER FOR OTHER SPECIFIED AFTERCARE: ICD-10-CM

## 2017-05-09 DIAGNOSIS — D70.9 NEUTROPENIA, UNSPECIFIED: ICD-10-CM

## 2017-05-11 ENCOUNTER — APPOINTMENT (OUTPATIENT)
Dept: HEMATOLOGY ONCOLOGY | Facility: CLINIC | Age: 47
End: 2017-05-11

## 2017-05-11 ENCOUNTER — RESULT REVIEW (OUTPATIENT)
Age: 47
End: 2017-05-11

## 2017-05-11 ENCOUNTER — APPOINTMENT (OUTPATIENT)
Dept: INFUSION THERAPY | Facility: HOSPITAL | Age: 47
End: 2017-05-11

## 2017-05-11 LAB
BASOPHILS # BLD AUTO: 0 K/UL — SIGNIFICANT CHANGE UP (ref 0–0.2)
BASOPHILS NFR BLD AUTO: 0.4 % — SIGNIFICANT CHANGE UP (ref 0–2)
EOSINOPHIL # BLD AUTO: 0.1 K/UL — SIGNIFICANT CHANGE UP (ref 0–0.5)
EOSINOPHIL NFR BLD AUTO: 3.1 % — SIGNIFICANT CHANGE UP (ref 0–6)
HCT VFR BLD CALC: 34 % — LOW (ref 34.5–45)
HGB BLD-MCNC: 11.9 G/DL — SIGNIFICANT CHANGE UP (ref 11.5–15.5)
LYMPHOCYTES # BLD AUTO: 0.9 K/UL — LOW (ref 1–3.3)
LYMPHOCYTES # BLD AUTO: 32.1 % — SIGNIFICANT CHANGE UP (ref 13–44)
MCHC RBC-ENTMCNC: 30.3 PG — SIGNIFICANT CHANGE UP (ref 27–34)
MCHC RBC-ENTMCNC: 35.1 G/DL — SIGNIFICANT CHANGE UP (ref 32–36)
MCV RBC AUTO: 86.3 FL — SIGNIFICANT CHANGE UP (ref 80–100)
MONOCYTES # BLD AUTO: 0.3 K/UL — SIGNIFICANT CHANGE UP (ref 0–0.9)
MONOCYTES NFR BLD AUTO: 9.3 % — SIGNIFICANT CHANGE UP (ref 2–14)
NEUTROPHILS # BLD AUTO: 1.6 K/UL — LOW (ref 1.8–7.4)
NEUTROPHILS NFR BLD AUTO: 55.1 % — SIGNIFICANT CHANGE UP (ref 43–77)
PLATELET # BLD AUTO: 198 K/UL — SIGNIFICANT CHANGE UP (ref 150–400)
RBC # BLD: 3.94 M/UL — SIGNIFICANT CHANGE UP (ref 3.8–5.2)
RBC # FLD: 13.2 % — SIGNIFICANT CHANGE UP (ref 10.3–14.5)
WBC # BLD: 2.8 K/UL — LOW (ref 3.8–10.5)
WBC # FLD AUTO: 2.8 K/UL — LOW (ref 3.8–10.5)

## 2017-05-18 ENCOUNTER — LABORATORY RESULT (OUTPATIENT)
Age: 47
End: 2017-05-18

## 2017-05-18 ENCOUNTER — RESULT REVIEW (OUTPATIENT)
Age: 47
End: 2017-05-18

## 2017-05-18 ENCOUNTER — APPOINTMENT (OUTPATIENT)
Dept: INFUSION THERAPY | Facility: HOSPITAL | Age: 47
End: 2017-05-18

## 2017-05-18 ENCOUNTER — APPOINTMENT (OUTPATIENT)
Dept: HEMATOLOGY ONCOLOGY | Facility: CLINIC | Age: 47
End: 2017-05-18

## 2017-05-18 VITALS
HEART RATE: 72 BPM | TEMPERATURE: 98.1 F | DIASTOLIC BLOOD PRESSURE: 63 MMHG | RESPIRATION RATE: 18 BRPM | SYSTOLIC BLOOD PRESSURE: 94 MMHG

## 2017-05-18 LAB
BASOPHILS # BLD AUTO: 0 K/UL — SIGNIFICANT CHANGE UP (ref 0–0.2)
BASOPHILS NFR BLD AUTO: 0.2 % — SIGNIFICANT CHANGE UP (ref 0–2)
EOSINOPHIL # BLD AUTO: 0.1 K/UL — SIGNIFICANT CHANGE UP (ref 0–0.5)
EOSINOPHIL NFR BLD AUTO: 4 % — SIGNIFICANT CHANGE UP (ref 0–6)
HCT VFR BLD CALC: 34.8 % — SIGNIFICANT CHANGE UP (ref 34.5–45)
HGB BLD-MCNC: 12.3 G/DL — SIGNIFICANT CHANGE UP (ref 11.5–15.5)
LYMPHOCYTES # BLD AUTO: 1 K/UL — SIGNIFICANT CHANGE UP (ref 1–3.3)
LYMPHOCYTES # BLD AUTO: 32.5 % — SIGNIFICANT CHANGE UP (ref 13–44)
MCHC RBC-ENTMCNC: 30.5 PG — SIGNIFICANT CHANGE UP (ref 27–34)
MCHC RBC-ENTMCNC: 35.5 G/DL — SIGNIFICANT CHANGE UP (ref 32–36)
MCV RBC AUTO: 86.1 FL — SIGNIFICANT CHANGE UP (ref 80–100)
MONOCYTES # BLD AUTO: 0.4 K/UL — SIGNIFICANT CHANGE UP (ref 0–0.9)
MONOCYTES NFR BLD AUTO: 13 % — SIGNIFICANT CHANGE UP (ref 2–14)
NEUTROPHILS # BLD AUTO: 1.5 K/UL — LOW (ref 1.8–7.4)
NEUTROPHILS NFR BLD AUTO: 50.3 % — SIGNIFICANT CHANGE UP (ref 43–77)
PLATELET # BLD AUTO: 133 K/UL — LOW (ref 150–400)
RBC # BLD: 4.04 M/UL — SIGNIFICANT CHANGE UP (ref 3.8–5.2)
RBC # FLD: 13.4 % — SIGNIFICANT CHANGE UP (ref 10.3–14.5)
WBC # BLD: 3 K/UL — LOW (ref 3.8–10.5)
WBC # FLD AUTO: 3 K/UL — LOW (ref 3.8–10.5)

## 2017-05-23 ENCOUNTER — EMERGENCY (EMERGENCY)
Facility: HOSPITAL | Age: 47
LOS: 1 days | Discharge: HOME | End: 2017-05-23
Attending: EMERGENCY MEDICINE | Admitting: EMERGENCY MEDICINE
Payer: COMMERCIAL

## 2017-05-23 VITALS
OXYGEN SATURATION: 100 % | TEMPERATURE: 99 F | HEART RATE: 87 BPM | RESPIRATION RATE: 16 BRPM | SYSTOLIC BLOOD PRESSURE: 111 MMHG | DIASTOLIC BLOOD PRESSURE: 73 MMHG

## 2017-05-23 DIAGNOSIS — Z90.721 ACQUIRED ABSENCE OF OVARIES, UNILATERAL: Chronic | ICD-10-CM

## 2017-05-23 LAB
ALBUMIN SERPL ELPH-MCNC: 3.9 G/DL — SIGNIFICANT CHANGE UP (ref 3.3–5)
ALP SERPL-CCNC: 99 U/L — SIGNIFICANT CHANGE UP (ref 40–120)
ALT FLD-CCNC: 40 U/L — HIGH (ref 4–33)
AST SERPL-CCNC: 29 U/L — SIGNIFICANT CHANGE UP (ref 4–32)
BASE EXCESS BLDV CALC-SCNC: 5 MMOL/L — SIGNIFICANT CHANGE UP
BASOPHILS # BLD AUTO: 0.01 K/UL — SIGNIFICANT CHANGE UP (ref 0–0.2)
BASOPHILS NFR BLD AUTO: 0.5 % — SIGNIFICANT CHANGE UP (ref 0–2)
BILIRUB SERPL-MCNC: 0.2 MG/DL — SIGNIFICANT CHANGE UP (ref 0.2–1.2)
BLOOD GAS VENOUS - CREATININE: 0.74 MG/DL — SIGNIFICANT CHANGE UP (ref 0.5–1.3)
BUN SERPL-MCNC: 14 MG/DL — SIGNIFICANT CHANGE UP (ref 7–23)
CALCIUM SERPL-MCNC: 9.1 MG/DL — SIGNIFICANT CHANGE UP (ref 8.4–10.5)
CHLORIDE BLDV-SCNC: 103 MMOL/L — SIGNIFICANT CHANGE UP (ref 96–108)
CHLORIDE SERPL-SCNC: 103 MMOL/L — SIGNIFICANT CHANGE UP (ref 98–107)
CO2 SERPL-SCNC: 27 MMOL/L — SIGNIFICANT CHANGE UP (ref 22–31)
CREAT SERPL-MCNC: 0.83 MG/DL — SIGNIFICANT CHANGE UP (ref 0.5–1.3)
EOSINOPHIL # BLD AUTO: 0.01 K/UL — SIGNIFICANT CHANGE UP (ref 0–0.5)
EOSINOPHIL NFR BLD AUTO: 0.5 % — SIGNIFICANT CHANGE UP (ref 0–6)
GAS PNL BLDV: 140 MMOL/L — SIGNIFICANT CHANGE UP (ref 136–146)
GLUCOSE BLDV-MCNC: 97 — SIGNIFICANT CHANGE UP (ref 70–99)
GLUCOSE SERPL-MCNC: 92 MG/DL — SIGNIFICANT CHANGE UP (ref 70–99)
HCO3 BLDV-SCNC: 27 MMOL/L — SIGNIFICANT CHANGE UP (ref 20–27)
HCT VFR BLD CALC: 33.7 % — LOW (ref 34.5–45)
HCT VFR BLDV CALC: 31.1 % — LOW (ref 34.5–45)
HGB BLD-MCNC: 11.1 G/DL — LOW (ref 11.5–15.5)
HGB BLDV-MCNC: 10.1 G/DL — LOW (ref 11.5–15.5)
IMM GRANULOCYTES NFR BLD AUTO: 1.1 % — SIGNIFICANT CHANGE UP (ref 0–1.5)
LACTATE BLDV-MCNC: 1.4 MMOL/L — SIGNIFICANT CHANGE UP (ref 0.5–2)
LYMPHOCYTES # BLD AUTO: 0.72 K/UL — LOW (ref 1–3.3)
LYMPHOCYTES # BLD AUTO: 38.1 % — SIGNIFICANT CHANGE UP (ref 13–44)
MCHC RBC-ENTMCNC: 29.4 PG — SIGNIFICANT CHANGE UP (ref 27–34)
MCHC RBC-ENTMCNC: 32.9 % — SIGNIFICANT CHANGE UP (ref 32–36)
MCV RBC AUTO: 89.4 FL — SIGNIFICANT CHANGE UP (ref 80–100)
MONOCYTES # BLD AUTO: 0.2 K/UL — SIGNIFICANT CHANGE UP (ref 0–0.9)
MONOCYTES NFR BLD AUTO: 10.6 % — SIGNIFICANT CHANGE UP (ref 2–14)
NEUTROPHILS # BLD AUTO: 0.93 K/UL — LOW (ref 1.8–7.4)
NEUTROPHILS NFR BLD AUTO: 49.2 % — SIGNIFICANT CHANGE UP (ref 43–77)
PCO2 BLDV: 55 MMHG — HIGH (ref 41–51)
PH BLDV: 7.36 PH — SIGNIFICANT CHANGE UP (ref 7.32–7.43)
PLATELET # BLD AUTO: 50 K/UL — LOW (ref 150–400)
PMV BLD: 12.5 FL — SIGNIFICANT CHANGE UP (ref 7–13)
PO2 BLDV: < 24 MMHG — LOW (ref 35–40)
POTASSIUM BLDV-SCNC: 4.1 MMOL/L — SIGNIFICANT CHANGE UP (ref 3.4–4.5)
POTASSIUM SERPL-MCNC: 4.5 MMOL/L — SIGNIFICANT CHANGE UP (ref 3.5–5.3)
POTASSIUM SERPL-SCNC: 4.5 MMOL/L — SIGNIFICANT CHANGE UP (ref 3.5–5.3)
PROT SERPL-MCNC: 6.7 G/DL — SIGNIFICANT CHANGE UP (ref 6–8.3)
RBC # BLD: 3.77 M/UL — LOW (ref 3.8–5.2)
RBC # FLD: 14.8 % — HIGH (ref 10.3–14.5)
SAO2 % BLDV: 20.5 % — LOW (ref 60–85)
SODIUM SERPL-SCNC: 142 MMOL/L — SIGNIFICANT CHANGE UP (ref 135–145)
WBC # BLD: 1.89 K/UL — LOW (ref 3.8–10.5)
WBC # FLD AUTO: 1.89 K/UL — LOW (ref 3.8–10.5)

## 2017-05-23 PROCEDURE — 71010: CPT | Mod: 26

## 2017-05-23 PROCEDURE — 99284 EMERGENCY DEPT VISIT MOD MDM: CPT

## 2017-05-23 RX ORDER — SODIUM CHLORIDE 9 MG/ML
1000 INJECTION INTRAMUSCULAR; INTRAVENOUS; SUBCUTANEOUS ONCE
Qty: 0 | Refills: 0 | Status: COMPLETED | OUTPATIENT
Start: 2017-05-23 | End: 2017-05-23

## 2017-05-23 RX ADMIN — SODIUM CHLORIDE 2000 MILLILITER(S): 9 INJECTION INTRAMUSCULAR; INTRAVENOUS; SUBCUTANEOUS at 23:02

## 2017-05-23 NOTE — ED ADULT TRIAGE NOTE - CHIEF COMPLAINT QUOTE
C/o productive cough, increased lethargy with sob and chest pain since Sunday. Last chemo Thursday, currently being treated Ovarian CA with mets to bladder and colon.      650mg Tylenol at 6pm

## 2017-05-23 NOTE — ED PROVIDER NOTE - SHIFT CHANGE DETAILS
I have received sign out on this patient, briefly: 46 F h/o ovarian carcinoma c/o cough and SOB; on chemo; leukopenic but not neutropenic; currently awaiting viral rvp - if positive for a viral infection, consider dc if patient feeling better.  -Willy

## 2017-05-23 NOTE — ED PROVIDER NOTE - PROGRESS NOTE DETAILS
Pt feeling better, found to have + parainfluenza viral on panel.  Fever, symptmos c/w parainflu infection.  Rsults explained to patient, who would like to go home, but is concerned about infection interfering with chemotherpay schedule.  Explained to patient that she must call her oncologist tomorrow, and d/w onc plan: likely to depend on fever and symptoms then.  jackie Newman

## 2017-05-23 NOTE — ED PROVIDER NOTE - ATTENDING CONTRIBUTION TO CARE
Shaista Shaista-47 y/o female hx ovarian CA (dx jan '17 - currently undergoing chemotherapy) presents to ED c/o cough fever sob x 2 days. Pt. states 3-4 days ago started off with uri symptoms - nasal congestion, sore throat, runny nose which has progressed to prodcutive cough with yellow sputum.  Souse with similar uri sx  pe ncat  s1s2 rrr lungs cta  abd soft  imp fever cough likely uri, will ro neutropeni or pna ---signed out to dr dumont

## 2017-05-24 ENCOUNTER — OUTPATIENT (OUTPATIENT)
Dept: OUTPATIENT SERVICES | Facility: HOSPITAL | Age: 47
LOS: 1 days | Discharge: HOME | End: 2017-05-24

## 2017-05-24 VITALS
SYSTOLIC BLOOD PRESSURE: 103 MMHG | DIASTOLIC BLOOD PRESSURE: 63 MMHG | RESPIRATION RATE: 18 BRPM | TEMPERATURE: 98 F | HEART RATE: 80 BPM | OXYGEN SATURATION: 100 %

## 2017-05-24 DIAGNOSIS — C56.9 MALIGNANT NEOPLASM OF UNSPECIFIED OVARY: ICD-10-CM

## 2017-05-24 DIAGNOSIS — Z90.721 ACQUIRED ABSENCE OF OVARIES, UNILATERAL: Chronic | ICD-10-CM

## 2017-05-24 LAB
B PERT DNA SPEC QL NAA+PROBE: SIGNIFICANT CHANGE UP
BASOPHILS NFR SPEC: 0 % — SIGNIFICANT CHANGE UP (ref 0–2)
C PNEUM DNA SPEC QL NAA+PROBE: NOT DETECTED — SIGNIFICANT CHANGE UP
EOSINOPHIL NFR FLD: 0 % — SIGNIFICANT CHANGE UP (ref 0–6)
FLUAV H1 2009 PAND RNA SPEC QL NAA+PROBE: NOT DETECTED — SIGNIFICANT CHANGE UP
FLUAV H1 RNA SPEC QL NAA+PROBE: NOT DETECTED — SIGNIFICANT CHANGE UP
FLUAV H3 RNA SPEC QL NAA+PROBE: NOT DETECTED — SIGNIFICANT CHANGE UP
FLUAV SUBTYP SPEC NAA+PROBE: SIGNIFICANT CHANGE UP
FLUBV RNA SPEC QL NAA+PROBE: NOT DETECTED — SIGNIFICANT CHANGE UP
HADV DNA SPEC QL NAA+PROBE: NOT DETECTED — SIGNIFICANT CHANGE UP
HCOV 229E RNA SPEC QL NAA+PROBE: NOT DETECTED — SIGNIFICANT CHANGE UP
HCOV HKU1 RNA SPEC QL NAA+PROBE: NOT DETECTED — SIGNIFICANT CHANGE UP
HCOV NL63 RNA SPEC QL NAA+PROBE: NOT DETECTED — SIGNIFICANT CHANGE UP
HCOV OC43 RNA SPEC QL NAA+PROBE: NOT DETECTED — SIGNIFICANT CHANGE UP
HMPV RNA SPEC QL NAA+PROBE: NOT DETECTED — SIGNIFICANT CHANGE UP
HPIV1 RNA SPEC QL NAA+PROBE: POSITIVE — HIGH
HPIV2 RNA SPEC QL NAA+PROBE: NOT DETECTED — SIGNIFICANT CHANGE UP
HPIV3 RNA SPEC QL NAA+PROBE: NOT DETECTED — SIGNIFICANT CHANGE UP
HPIV4 RNA SPEC QL NAA+PROBE: NOT DETECTED — SIGNIFICANT CHANGE UP
LYMPHOCYTES NFR SPEC AUTO: 40 % — SIGNIFICANT CHANGE UP (ref 13–44)
M PNEUMO DNA SPEC QL NAA+PROBE: NOT DETECTED — SIGNIFICANT CHANGE UP
MACROCYTES BLD QL: SLIGHT — SIGNIFICANT CHANGE UP
MANUAL SMEAR VERIFICATION: SIGNIFICANT CHANGE UP
MONOCYTES NFR BLD: 8 % — SIGNIFICANT CHANGE UP (ref 2–9)
NEUTROPHIL AB SER-ACNC: 52 % — SIGNIFICANT CHANGE UP (ref 43–77)
PLATELET CLUMP BLD QL SMEAR: SIGNIFICANT CHANGE UP
PLATELET COUNT - ESTIMATE: SIGNIFICANT CHANGE UP
POLYCHROMASIA BLD QL SMEAR: SLIGHT — SIGNIFICANT CHANGE UP
RSV RNA SPEC QL NAA+PROBE: NOT DETECTED — SIGNIFICANT CHANGE UP
RV+EV RNA SPEC QL NAA+PROBE: NOT DETECTED — SIGNIFICANT CHANGE UP
SPECIMEN SOURCE: SIGNIFICANT CHANGE UP
SPECIMEN SOURCE: SIGNIFICANT CHANGE UP

## 2017-05-25 ENCOUNTER — APPOINTMENT (OUTPATIENT)
Dept: INFUSION THERAPY | Facility: HOSPITAL | Age: 47
End: 2017-05-25

## 2017-05-25 ENCOUNTER — RESULT REVIEW (OUTPATIENT)
Age: 47
End: 2017-05-25

## 2017-05-25 LAB
EOSINOPHIL # BLD AUTO: 0.1 K/UL — SIGNIFICANT CHANGE UP (ref 0–0.5)
EOSINOPHIL NFR BLD AUTO: 1 % — SIGNIFICANT CHANGE UP (ref 0–6)
HCT VFR BLD CALC: 35 % — SIGNIFICANT CHANGE UP (ref 34.5–45)
HGB BLD-MCNC: 12.1 G/DL — SIGNIFICANT CHANGE UP (ref 11.5–15.5)
LYMPHOCYTES # BLD AUTO: 0.6 K/UL — LOW (ref 1–3.3)
LYMPHOCYTES # BLD AUTO: 43 % — SIGNIFICANT CHANGE UP (ref 13–44)
MCHC RBC-ENTMCNC: 30.3 PG — SIGNIFICANT CHANGE UP (ref 27–34)
MCHC RBC-ENTMCNC: 34.6 G/DL — SIGNIFICANT CHANGE UP (ref 32–36)
MCV RBC AUTO: 87.6 FL — SIGNIFICANT CHANGE UP (ref 80–100)
MONOCYTES # BLD AUTO: 0.2 K/UL — SIGNIFICANT CHANGE UP (ref 0–0.9)
MONOCYTES NFR BLD AUTO: 9 % — SIGNIFICANT CHANGE UP (ref 2–14)
NEUTROPHILS # BLD AUTO: 0.7 K/UL — LOW (ref 1.8–7.4)
NEUTROPHILS NFR BLD AUTO: 47 % — SIGNIFICANT CHANGE UP (ref 43–77)
PLAT MORPH BLD: NORMAL — SIGNIFICANT CHANGE UP
PLATELET # BLD AUTO: 69 K/UL — LOW (ref 150–400)
RBC # BLD: 4 M/UL — SIGNIFICANT CHANGE UP (ref 3.8–5.2)
RBC # FLD: 14.1 % — SIGNIFICANT CHANGE UP (ref 10.3–14.5)
RBC BLD AUTO: SIGNIFICANT CHANGE UP
WBC # BLD: 1.7 K/UL — LOW (ref 3.8–10.5)
WBC # FLD AUTO: 1.7 K/UL — LOW (ref 3.8–10.5)

## 2017-05-28 LAB
BACTERIA BLD CULT: SIGNIFICANT CHANGE UP
BACTERIA BLD CULT: SIGNIFICANT CHANGE UP

## 2017-06-01 ENCOUNTER — RESULT REVIEW (OUTPATIENT)
Age: 47
End: 2017-06-01

## 2017-06-01 ENCOUNTER — APPOINTMENT (OUTPATIENT)
Dept: INFUSION THERAPY | Facility: HOSPITAL | Age: 47
End: 2017-06-01

## 2017-06-01 LAB
HCT VFR BLD CALC: 34.3 % — LOW (ref 34.5–45)
HGB BLD-MCNC: 11.9 G/DL — SIGNIFICANT CHANGE UP (ref 11.5–15.5)
MCHC RBC-ENTMCNC: 30.6 PG — SIGNIFICANT CHANGE UP (ref 27–34)
MCHC RBC-ENTMCNC: 34.6 G/DL — SIGNIFICANT CHANGE UP (ref 32–36)
MCV RBC AUTO: 88.4 FL — SIGNIFICANT CHANGE UP (ref 80–100)
PLATELET # BLD AUTO: 136 K/UL — LOW (ref 150–400)
RBC # BLD: 3.88 M/UL — SIGNIFICANT CHANGE UP (ref 3.8–5.2)
RBC # FLD: 15.1 % — HIGH (ref 10.3–14.5)
WBC # BLD: 4.8 K/UL — SIGNIFICANT CHANGE UP (ref 3.8–10.5)
WBC # FLD AUTO: 4.8 K/UL — SIGNIFICANT CHANGE UP (ref 3.8–10.5)

## 2017-06-02 DIAGNOSIS — Z51.11 ENCOUNTER FOR ANTINEOPLASTIC CHEMOTHERAPY: ICD-10-CM

## 2017-06-02 DIAGNOSIS — R11.2 NAUSEA WITH VOMITING, UNSPECIFIED: ICD-10-CM

## 2017-06-08 ENCOUNTER — RESULT REVIEW (OUTPATIENT)
Age: 47
End: 2017-06-08

## 2017-06-08 ENCOUNTER — APPOINTMENT (OUTPATIENT)
Dept: INFUSION THERAPY | Facility: HOSPITAL | Age: 47
End: 2017-06-08

## 2017-06-08 LAB
BASO STIPL BLD QL SMEAR: PRESENT — SIGNIFICANT CHANGE UP
BASOPHILS # BLD AUTO: 0 K/UL — SIGNIFICANT CHANGE UP (ref 0–0.2)
EOSINOPHIL # BLD AUTO: 0.1 K/UL — SIGNIFICANT CHANGE UP (ref 0–0.5)
EOSINOPHIL NFR BLD AUTO: 4 % — SIGNIFICANT CHANGE UP (ref 0–6)
HCT VFR BLD CALC: 29.8 % — LOW (ref 34.5–45)
HGB BLD-MCNC: 10.3 G/DL — LOW (ref 11.5–15.5)
LYMPHOCYTES # BLD AUTO: 0.6 K/UL — LOW (ref 1–3.3)
LYMPHOCYTES # BLD AUTO: 33 % — SIGNIFICANT CHANGE UP (ref 13–44)
MCHC RBC-ENTMCNC: 30.2 PG — SIGNIFICANT CHANGE UP (ref 27–34)
MCHC RBC-ENTMCNC: 34.5 G/DL — SIGNIFICANT CHANGE UP (ref 32–36)
MCV RBC AUTO: 87.5 FL — SIGNIFICANT CHANGE UP (ref 80–100)
MONOCYTES # BLD AUTO: 0.1 K/UL — SIGNIFICANT CHANGE UP (ref 0–0.9)
MONOCYTES NFR BLD AUTO: 5 % — SIGNIFICANT CHANGE UP (ref 2–14)
NEUTROPHILS # BLD AUTO: 1.2 K/UL — LOW (ref 1.8–7.4)
NEUTROPHILS NFR BLD AUTO: 58 % — SIGNIFICANT CHANGE UP (ref 43–77)
PLAT MORPH BLD: NORMAL — SIGNIFICANT CHANGE UP
PLATELET # BLD AUTO: 130 K/UL — LOW (ref 150–400)
RBC # BLD: 3.4 M/UL — LOW (ref 3.8–5.2)
RBC # FLD: 14.6 % — HIGH (ref 10.3–14.5)
RBC BLD AUTO: SIGNIFICANT CHANGE UP
WBC # BLD: 2 K/UL — LOW (ref 3.8–10.5)
WBC # FLD AUTO: 2 K/UL — LOW (ref 3.8–10.5)

## 2017-06-15 ENCOUNTER — LABORATORY RESULT (OUTPATIENT)
Age: 47
End: 2017-06-15

## 2017-06-15 ENCOUNTER — RESULT REVIEW (OUTPATIENT)
Age: 47
End: 2017-06-15

## 2017-06-15 ENCOUNTER — APPOINTMENT (OUTPATIENT)
Dept: HEMATOLOGY ONCOLOGY | Facility: CLINIC | Age: 47
End: 2017-06-15

## 2017-06-15 ENCOUNTER — APPOINTMENT (OUTPATIENT)
Dept: INFUSION THERAPY | Facility: HOSPITAL | Age: 47
End: 2017-06-15

## 2017-06-15 LAB
BASOPHILS # BLD AUTO: 0 K/UL — SIGNIFICANT CHANGE UP (ref 0–0.2)
BASOPHILS NFR BLD AUTO: 1 % — SIGNIFICANT CHANGE UP (ref 0–2)
EOSINOPHIL # BLD AUTO: 0 K/UL — SIGNIFICANT CHANGE UP (ref 0–0.5)
EOSINOPHIL NFR BLD AUTO: 4 % — SIGNIFICANT CHANGE UP (ref 0–6)
HCT VFR BLD CALC: 27.9 % — LOW (ref 34.5–45)
HGB BLD-MCNC: 9.9 G/DL — LOW (ref 11.5–15.5)
LYMPHOCYTES # BLD AUTO: 0.5 K/UL — LOW (ref 1–3.3)
LYMPHOCYTES # BLD AUTO: 43 % — SIGNIFICANT CHANGE UP (ref 13–44)
MCHC RBC-ENTMCNC: 31.5 PG — SIGNIFICANT CHANGE UP (ref 27–34)
MCHC RBC-ENTMCNC: 35.6 G/DL — SIGNIFICANT CHANGE UP (ref 32–36)
MCV RBC AUTO: 88.6 FL — SIGNIFICANT CHANGE UP (ref 80–100)
MONOCYTES # BLD AUTO: 0.2 K/UL — SIGNIFICANT CHANGE UP (ref 0–0.9)
MONOCYTES NFR BLD AUTO: 16 % — HIGH (ref 2–14)
NEUTROPHILS # BLD AUTO: 0.4 K/UL — LOW (ref 1.8–7.4)
NEUTROPHILS NFR BLD AUTO: 36 % — LOW (ref 43–77)
PLAT MORPH BLD: NORMAL — SIGNIFICANT CHANGE UP
PLATELET # BLD AUTO: 107 K/UL — LOW (ref 150–400)
RBC # BLD: 3.15 M/UL — LOW (ref 3.8–5.2)
RBC # FLD: 15.3 % — HIGH (ref 10.3–14.5)
RBC BLD AUTO: SIGNIFICANT CHANGE UP
WBC # BLD: 1.1 K/UL — LOW (ref 3.8–10.5)
WBC # FLD AUTO: 1.1 K/UL — LOW (ref 3.8–10.5)

## 2017-06-16 ENCOUNTER — APPOINTMENT (OUTPATIENT)
Dept: INFUSION THERAPY | Facility: HOSPITAL | Age: 47
End: 2017-06-16

## 2017-06-19 DIAGNOSIS — Z51.89 ENCOUNTER FOR OTHER SPECIFIED AFTERCARE: ICD-10-CM

## 2017-06-22 ENCOUNTER — RESULT REVIEW (OUTPATIENT)
Age: 47
End: 2017-06-22

## 2017-06-22 ENCOUNTER — APPOINTMENT (OUTPATIENT)
Dept: INFUSION THERAPY | Facility: HOSPITAL | Age: 47
End: 2017-06-22

## 2017-06-22 LAB
BASOPHILS # BLD AUTO: 0 K/UL — SIGNIFICANT CHANGE UP (ref 0–0.2)
EOSINOPHIL # BLD AUTO: 0.1 K/UL — SIGNIFICANT CHANGE UP (ref 0–0.5)
EOSINOPHIL NFR BLD AUTO: 2 % — SIGNIFICANT CHANGE UP (ref 0–6)
HCT VFR BLD CALC: 28.3 % — LOW (ref 34.5–45)
HGB BLD-MCNC: 10.1 G/DL — LOW (ref 11.5–15.5)
LYMPHOCYTES # BLD AUTO: 0.7 K/UL — LOW (ref 1–3.3)
LYMPHOCYTES # BLD AUTO: 37 % — SIGNIFICANT CHANGE UP (ref 13–44)
MCHC RBC-ENTMCNC: 32.3 PG — SIGNIFICANT CHANGE UP (ref 27–34)
MCHC RBC-ENTMCNC: 35.5 G/DL — SIGNIFICANT CHANGE UP (ref 32–36)
MCV RBC AUTO: 91 FL — SIGNIFICANT CHANGE UP (ref 80–100)
METAMYELOCYTES # FLD: 1 % — HIGH (ref 0–0)
MONOCYTES # BLD AUTO: 0.3 K/UL — SIGNIFICANT CHANGE UP (ref 0–0.9)
MONOCYTES NFR BLD AUTO: 16 % — HIGH (ref 2–14)
MYELOCYTES NFR BLD: 2 % — HIGH (ref 0–0)
NEUTROPHILS # BLD AUTO: 0.7 K/UL — LOW (ref 1.8–7.4)
NEUTROPHILS NFR BLD AUTO: 42 % — LOW (ref 43–77)
PLAT MORPH BLD: NORMAL — SIGNIFICANT CHANGE UP
PLATELET # BLD AUTO: 73 K/UL — LOW (ref 150–400)
RBC # BLD: 3.12 M/UL — LOW (ref 3.8–5.2)
RBC # FLD: 16.9 % — HIGH (ref 10.3–14.5)
RBC BLD AUTO: SIGNIFICANT CHANGE UP
WBC # BLD: 1.8 K/UL — LOW (ref 3.8–10.5)
WBC # FLD AUTO: 1.8 K/UL — LOW (ref 3.8–10.5)

## 2017-06-23 ENCOUNTER — OUTPATIENT (OUTPATIENT)
Dept: OUTPATIENT SERVICES | Facility: HOSPITAL | Age: 47
LOS: 1 days | Discharge: HOME | End: 2017-06-23

## 2017-06-23 DIAGNOSIS — C56.2 MALIGNANT NEOPLASM OF LEFT OVARY: ICD-10-CM

## 2017-06-23 DIAGNOSIS — Z90.721 ACQUIRED ABSENCE OF OVARIES, UNILATERAL: Chronic | ICD-10-CM

## 2017-06-29 ENCOUNTER — RESULT REVIEW (OUTPATIENT)
Age: 47
End: 2017-06-29

## 2017-06-29 ENCOUNTER — APPOINTMENT (OUTPATIENT)
Dept: INFUSION THERAPY | Facility: HOSPITAL | Age: 47
End: 2017-06-29

## 2017-06-29 ENCOUNTER — LABORATORY RESULT (OUTPATIENT)
Age: 47
End: 2017-06-29

## 2017-06-29 ENCOUNTER — APPOINTMENT (OUTPATIENT)
Dept: HEMATOLOGY ONCOLOGY | Facility: CLINIC | Age: 47
End: 2017-06-29

## 2017-06-29 VITALS
OXYGEN SATURATION: 100 % | WEIGHT: 138.23 LBS | DIASTOLIC BLOOD PRESSURE: 75 MMHG | BODY MASS INDEX: 27.87 KG/M2 | HEART RATE: 69 BPM | RESPIRATION RATE: 16 BRPM | TEMPERATURE: 98.6 F | SYSTOLIC BLOOD PRESSURE: 114 MMHG

## 2017-06-29 LAB
BASOPHILS # BLD AUTO: 0 K/UL — SIGNIFICANT CHANGE UP (ref 0–0.2)
BASOPHILS NFR BLD AUTO: 0.8 % — SIGNIFICANT CHANGE UP (ref 0–2)
EOSINOPHIL # BLD AUTO: 0.1 K/UL — SIGNIFICANT CHANGE UP (ref 0–0.5)
EOSINOPHIL NFR BLD AUTO: 1.6 % — SIGNIFICANT CHANGE UP (ref 0–6)
HCT VFR BLD CALC: 33.9 % — LOW (ref 34.5–45)
HGB BLD-MCNC: 11.9 G/DL — SIGNIFICANT CHANGE UP (ref 11.5–15.5)
LYMPHOCYTES # BLD AUTO: 0.8 K/UL — LOW (ref 1–3.3)
LYMPHOCYTES # BLD AUTO: 21.7 % — SIGNIFICANT CHANGE UP (ref 13–44)
MCHC RBC-ENTMCNC: 33 PG — SIGNIFICANT CHANGE UP (ref 27–34)
MCHC RBC-ENTMCNC: 35.2 G/DL — SIGNIFICANT CHANGE UP (ref 32–36)
MCV RBC AUTO: 93.6 FL — SIGNIFICANT CHANGE UP (ref 80–100)
MONOCYTES # BLD AUTO: 0.5 K/UL — SIGNIFICANT CHANGE UP (ref 0–0.9)
MONOCYTES NFR BLD AUTO: 12.6 % — SIGNIFICANT CHANGE UP (ref 2–14)
NEUTROPHILS # BLD AUTO: 2.3 K/UL — SIGNIFICANT CHANGE UP (ref 1.8–7.4)
NEUTROPHILS NFR BLD AUTO: 63.3 % — SIGNIFICANT CHANGE UP (ref 43–77)
PLATELET # BLD AUTO: 168 K/UL — SIGNIFICANT CHANGE UP (ref 150–400)
RBC # BLD: 3.62 M/UL — LOW (ref 3.8–5.2)
RBC # FLD: 17.1 % — HIGH (ref 10.3–14.5)
WBC # BLD: 3.6 K/UL — LOW (ref 3.8–10.5)
WBC # FLD AUTO: 3.6 K/UL — LOW (ref 3.8–10.5)

## 2017-06-30 DIAGNOSIS — Z51.11 ENCOUNTER FOR ANTINEOPLASTIC CHEMOTHERAPY: ICD-10-CM

## 2017-06-30 DIAGNOSIS — R11.2 NAUSEA WITH VOMITING, UNSPECIFIED: ICD-10-CM

## 2017-07-06 ENCOUNTER — RESULT REVIEW (OUTPATIENT)
Age: 47
End: 2017-07-06

## 2017-07-06 ENCOUNTER — APPOINTMENT (OUTPATIENT)
Dept: HEMATOLOGY ONCOLOGY | Facility: CLINIC | Age: 47
End: 2017-07-06

## 2017-07-06 VITALS
HEART RATE: 86 BPM | WEIGHT: 138.67 LBS | TEMPERATURE: 99.3 F | DIASTOLIC BLOOD PRESSURE: 75 MMHG | OXYGEN SATURATION: 98 % | SYSTOLIC BLOOD PRESSURE: 113 MMHG | BODY MASS INDEX: 27.96 KG/M2 | RESPIRATION RATE: 16 BRPM

## 2017-07-06 LAB
EOSINOPHIL # BLD AUTO: 0 K/UL — SIGNIFICANT CHANGE UP (ref 0–0.5)
EOSINOPHIL NFR BLD AUTO: 1 % — SIGNIFICANT CHANGE UP (ref 0–6)
HCT VFR BLD CALC: 30.2 % — LOW (ref 34.5–45)
HGB BLD-MCNC: 10.7 G/DL — LOW (ref 11.5–15.5)
LYMPHOCYTES # BLD AUTO: 0.6 K/UL — LOW (ref 1–3.3)
LYMPHOCYTES # BLD AUTO: 42 % — SIGNIFICANT CHANGE UP (ref 13–44)
MCHC RBC-ENTMCNC: 33.3 PG — SIGNIFICANT CHANGE UP (ref 27–34)
MCHC RBC-ENTMCNC: 35.3 G/DL — SIGNIFICANT CHANGE UP (ref 32–36)
MCV RBC AUTO: 94.2 FL — SIGNIFICANT CHANGE UP (ref 80–100)
MONOCYTES # BLD AUTO: 0.2 K/UL — SIGNIFICANT CHANGE UP (ref 0–0.9)
MONOCYTES NFR BLD AUTO: 5 % — SIGNIFICANT CHANGE UP (ref 2–14)
NEUTROPHILS # BLD AUTO: 1 K/UL — LOW (ref 1.8–7.4)
NEUTROPHILS NFR BLD AUTO: 52 % — SIGNIFICANT CHANGE UP (ref 43–77)
PLAT MORPH BLD: NORMAL — SIGNIFICANT CHANGE UP
PLATELET # BLD AUTO: 158 K/UL — SIGNIFICANT CHANGE UP (ref 150–400)
RBC # BLD: 3.2 M/UL — LOW (ref 3.8–5.2)
RBC # FLD: 16.1 % — HIGH (ref 10.3–14.5)
RBC BLD AUTO: SIGNIFICANT CHANGE UP
WBC # BLD: 2 K/UL — LOW (ref 3.8–10.5)
WBC # FLD AUTO: 2 K/UL — LOW (ref 3.8–10.5)

## 2017-07-07 LAB
ALBUMIN SERPL ELPH-MCNC: 4.2 G/DL
ALP BLD-CCNC: 99 U/L
ALT SERPL-CCNC: 62 U/L
ANION GAP SERPL CALC-SCNC: 13 MMOL/L
AST SERPL-CCNC: 31 U/L
BILIRUB SERPL-MCNC: 0.2 MG/DL
BUN SERPL-MCNC: 12 MG/DL
CALCIUM SERPL-MCNC: 9.4 MG/DL
CANCER AG125 SERPL-ACNC: 41 U/ML
CHLORIDE SERPL-SCNC: 104 MMOL/L
CO2 SERPL-SCNC: 25 MMOL/L
CREAT SERPL-MCNC: 0.8 MG/DL
GLUCOSE SERPL-MCNC: 90 MG/DL
POTASSIUM SERPL-SCNC: 4.4 MMOL/L
PROT SERPL-MCNC: 6.7 G/DL
SODIUM SERPL-SCNC: 142 MMOL/L

## 2017-07-20 ENCOUNTER — APPOINTMENT (OUTPATIENT)
Dept: INFUSION THERAPY | Facility: HOSPITAL | Age: 47
End: 2017-07-20

## 2017-07-20 ENCOUNTER — OTHER (OUTPATIENT)
Age: 47
End: 2017-07-20

## 2017-07-20 ENCOUNTER — RESULT REVIEW (OUTPATIENT)
Age: 47
End: 2017-07-20

## 2017-07-20 LAB
BASOPHILS # BLD AUTO: 0 K/UL — SIGNIFICANT CHANGE UP (ref 0–0.2)
BASOPHILS NFR BLD AUTO: 1 % — SIGNIFICANT CHANGE UP (ref 0–2)
EOSINOPHIL # BLD AUTO: 0.1 K/UL — SIGNIFICANT CHANGE UP (ref 0–0.5)
EOSINOPHIL NFR BLD AUTO: 1.7 % — SIGNIFICANT CHANGE UP (ref 0–6)
HCT VFR BLD CALC: 36.7 % — SIGNIFICANT CHANGE UP (ref 34.5–45)
HGB BLD-MCNC: 12.6 G/DL — SIGNIFICANT CHANGE UP (ref 11.5–15.5)
LYMPHOCYTES # BLD AUTO: 1.1 K/UL — SIGNIFICANT CHANGE UP (ref 1–3.3)
LYMPHOCYTES # BLD AUTO: 29.9 % — SIGNIFICANT CHANGE UP (ref 13–44)
MCHC RBC-ENTMCNC: 32.2 PG — SIGNIFICANT CHANGE UP (ref 27–34)
MCHC RBC-ENTMCNC: 34.3 G/DL — SIGNIFICANT CHANGE UP (ref 32–36)
MCV RBC AUTO: 93.9 FL — SIGNIFICANT CHANGE UP (ref 80–100)
MONOCYTES # BLD AUTO: 0.5 K/UL — SIGNIFICANT CHANGE UP (ref 0–0.9)
MONOCYTES NFR BLD AUTO: 12.4 % — SIGNIFICANT CHANGE UP (ref 2–14)
NEUTROPHILS # BLD AUTO: 2.1 K/UL — SIGNIFICANT CHANGE UP (ref 1.8–7.4)
NEUTROPHILS NFR BLD AUTO: 55 % — SIGNIFICANT CHANGE UP (ref 43–77)
PLATELET # BLD AUTO: 134 K/UL — LOW (ref 150–400)
RBC # BLD: 3.91 M/UL — SIGNIFICANT CHANGE UP (ref 3.8–5.2)
RBC # FLD: 16.6 % — HIGH (ref 10.3–14.5)
WBC # BLD: 3.8 K/UL — SIGNIFICANT CHANGE UP (ref 3.8–10.5)
WBC # FLD AUTO: 3.8 K/UL — SIGNIFICANT CHANGE UP (ref 3.8–10.5)

## 2017-07-24 ENCOUNTER — APPOINTMENT (OUTPATIENT)
Dept: HEMATOLOGY ONCOLOGY | Facility: CLINIC | Age: 47
End: 2017-07-24

## 2017-07-28 ENCOUNTER — OUTPATIENT (OUTPATIENT)
Dept: OUTPATIENT SERVICES | Facility: HOSPITAL | Age: 47
LOS: 1 days | Discharge: HOME | End: 2017-07-28

## 2017-07-28 DIAGNOSIS — C56.2 MALIGNANT NEOPLASM OF LEFT OVARY: ICD-10-CM

## 2017-07-28 DIAGNOSIS — Z90.721 ACQUIRED ABSENCE OF OVARIES, UNILATERAL: Chronic | ICD-10-CM

## 2017-08-03 ENCOUNTER — APPOINTMENT (OUTPATIENT)
Dept: HEMATOLOGY ONCOLOGY | Facility: CLINIC | Age: 47
End: 2017-08-03
Payer: COMMERCIAL

## 2017-08-03 ENCOUNTER — RESULT REVIEW (OUTPATIENT)
Age: 47
End: 2017-08-03

## 2017-08-03 VITALS
TEMPERATURE: 98.2 F | OXYGEN SATURATION: 96 % | HEART RATE: 68 BPM | RESPIRATION RATE: 16 BRPM | WEIGHT: 141.54 LBS | SYSTOLIC BLOOD PRESSURE: 118 MMHG | BODY MASS INDEX: 28.53 KG/M2 | DIASTOLIC BLOOD PRESSURE: 70 MMHG

## 2017-08-03 LAB
ALBUMIN SERPL ELPH-MCNC: 4.5 G/DL
ALP BLD-CCNC: 106 U/L
ALT SERPL-CCNC: 40 U/L
ANION GAP SERPL CALC-SCNC: 16 MMOL/L
AST SERPL-CCNC: 19 U/L
BASOPHILS # BLD AUTO: 0 K/UL — SIGNIFICANT CHANGE UP (ref 0–0.2)
BILIRUB SERPL-MCNC: <0.2 MG/DL
BUN SERPL-MCNC: 18 MG/DL
CALCIUM SERPL-MCNC: 9.6 MG/DL
CHLORIDE SERPL-SCNC: 103 MMOL/L
CO2 SERPL-SCNC: 23 MMOL/L
CREAT SERPL-MCNC: 0.94 MG/DL
EOSINOPHIL # BLD AUTO: 0.1 K/UL — SIGNIFICANT CHANGE UP (ref 0–0.5)
EOSINOPHIL NFR BLD AUTO: 3 % — SIGNIFICANT CHANGE UP (ref 0–6)
GLUCOSE SERPL-MCNC: 92 MG/DL
HCT VFR BLD CALC: 32.5 % — LOW (ref 34.5–45)
HGB BLD-MCNC: 11.1 G/DL — LOW (ref 11.5–15.5)
LYMPHOCYTES # BLD AUTO: 0.6 K/UL — LOW (ref 1–3.3)
LYMPHOCYTES # BLD AUTO: 38 % — SIGNIFICANT CHANGE UP (ref 13–44)
MCHC RBC-ENTMCNC: 33 PG — SIGNIFICANT CHANGE UP (ref 27–34)
MCHC RBC-ENTMCNC: 34.2 G/DL — SIGNIFICANT CHANGE UP (ref 32–36)
MCV RBC AUTO: 96.7 FL — SIGNIFICANT CHANGE UP (ref 80–100)
MONOCYTES # BLD AUTO: 0.3 K/UL — SIGNIFICANT CHANGE UP (ref 0–0.9)
MONOCYTES NFR BLD AUTO: 11 % — SIGNIFICANT CHANGE UP (ref 2–14)
NEUTROPHILS # BLD AUTO: 0.9 K/UL — LOW (ref 1.8–7.4)
NEUTROPHILS NFR BLD AUTO: 48 % — SIGNIFICANT CHANGE UP (ref 43–77)
PLAT MORPH BLD: NORMAL — SIGNIFICANT CHANGE UP
PLATELET # BLD AUTO: 124 K/UL — LOW (ref 150–400)
POTASSIUM SERPL-SCNC: 4.5 MMOL/L
PROT SERPL-MCNC: 7.3 G/DL
RBC # BLD: 3.36 M/UL — LOW (ref 3.8–5.2)
RBC # FLD: 15.9 % — HIGH (ref 10.3–14.5)
RBC BLD AUTO: SIGNIFICANT CHANGE UP
SODIUM SERPL-SCNC: 142 MMOL/L
WBC # BLD: 1.8 K/UL — LOW (ref 3.8–10.5)
WBC # FLD AUTO: 1.8 K/UL — LOW (ref 3.8–10.5)

## 2017-08-03 PROCEDURE — 99214 OFFICE O/P EST MOD 30 MIN: CPT

## 2017-08-04 LAB — CANCER AG125 SERPL-ACNC: 20 U/ML

## 2017-08-10 ENCOUNTER — RESULT REVIEW (OUTPATIENT)
Age: 47
End: 2017-08-10

## 2017-08-10 ENCOUNTER — APPOINTMENT (OUTPATIENT)
Dept: INFUSION THERAPY | Facility: HOSPITAL | Age: 47
End: 2017-08-10

## 2017-08-10 LAB
BASOPHILS NFR BLD AUTO: 1 % — SIGNIFICANT CHANGE UP (ref 0–2)
EOSINOPHIL # BLD AUTO: 0.2 K/UL — SIGNIFICANT CHANGE UP (ref 0–0.5)
HCT VFR BLD CALC: 39.7 % — SIGNIFICANT CHANGE UP (ref 34.5–45)
HGB BLD-MCNC: 12.9 G/DL — SIGNIFICANT CHANGE UP (ref 11.5–15.5)
LYMPHOCYTES # BLD AUTO: 0.8 K/UL — LOW (ref 1–3.3)
LYMPHOCYTES # BLD AUTO: 42 % — SIGNIFICANT CHANGE UP (ref 13–44)
MCHC RBC-ENTMCNC: 31.3 PG — SIGNIFICANT CHANGE UP (ref 27–34)
MCHC RBC-ENTMCNC: 32.4 G/DL — SIGNIFICANT CHANGE UP (ref 32–36)
MCV RBC AUTO: 96.6 FL — SIGNIFICANT CHANGE UP (ref 80–100)
MONOCYTES # BLD AUTO: 0.5 K/UL — SIGNIFICANT CHANGE UP (ref 0–0.9)
MONOCYTES NFR BLD AUTO: 18 % — HIGH (ref 2–14)
NEUTROPHILS # BLD AUTO: 1.2 K/UL — LOW (ref 1.8–7.4)
NEUTROPHILS NFR BLD AUTO: 39 % — LOW (ref 43–77)
PLAT MORPH BLD: NORMAL — SIGNIFICANT CHANGE UP
PLATELET # BLD AUTO: 99 K/UL — LOW (ref 150–400)
RBC # BLD: 4.11 M/UL — SIGNIFICANT CHANGE UP (ref 3.8–5.2)
RBC # FLD: 13.6 % — SIGNIFICANT CHANGE UP (ref 10.3–14.5)
RBC BLD AUTO: SIGNIFICANT CHANGE UP
WBC # BLD: 2.6 K/UL — LOW (ref 3.8–10.5)
WBC # FLD AUTO: 2.6 K/UL — LOW (ref 3.8–10.5)

## 2017-08-11 DIAGNOSIS — R11.2 NAUSEA WITH VOMITING, UNSPECIFIED: ICD-10-CM

## 2017-08-11 DIAGNOSIS — Z51.11 ENCOUNTER FOR ANTINEOPLASTIC CHEMOTHERAPY: ICD-10-CM

## 2017-08-14 ENCOUNTER — APPOINTMENT (OUTPATIENT)
Dept: HEMATOLOGY ONCOLOGY | Facility: CLINIC | Age: 47
End: 2017-08-14

## 2017-08-17 ENCOUNTER — RESULT REVIEW (OUTPATIENT)
Age: 47
End: 2017-08-17

## 2017-08-17 ENCOUNTER — APPOINTMENT (OUTPATIENT)
Dept: HEMATOLOGY ONCOLOGY | Facility: CLINIC | Age: 47
End: 2017-08-17
Payer: COMMERCIAL

## 2017-08-17 ENCOUNTER — APPOINTMENT (OUTPATIENT)
Dept: HEMATOLOGY ONCOLOGY | Facility: CLINIC | Age: 47
End: 2017-08-17

## 2017-08-17 VITALS
OXYGEN SATURATION: 98 % | RESPIRATION RATE: 16 BRPM | BODY MASS INDEX: 28 KG/M2 | HEART RATE: 74 BPM | TEMPERATURE: 98.3 F | DIASTOLIC BLOOD PRESSURE: 70 MMHG | SYSTOLIC BLOOD PRESSURE: 110 MMHG | WEIGHT: 138.89 LBS

## 2017-08-17 LAB
BASOPHILS # BLD AUTO: 0 K/UL — SIGNIFICANT CHANGE UP (ref 0–0.2)
EOSINOPHIL # BLD AUTO: 0 K/UL — SIGNIFICANT CHANGE UP (ref 0–0.5)
EOSINOPHIL NFR BLD AUTO: 1 % — SIGNIFICANT CHANGE UP (ref 0–6)
HCT VFR BLD CALC: 32.3 % — LOW (ref 34.5–45)
HGB BLD-MCNC: 10.9 G/DL — LOW (ref 11.5–15.5)
LYMPHOCYTES # BLD AUTO: 0.7 K/UL — LOW (ref 1–3.3)
LYMPHOCYTES # BLD AUTO: 39 % — SIGNIFICANT CHANGE UP (ref 13–44)
MCHC RBC-ENTMCNC: 32.8 PG — SIGNIFICANT CHANGE UP (ref 27–34)
MCHC RBC-ENTMCNC: 33.9 G/DL — SIGNIFICANT CHANGE UP (ref 32–36)
MCV RBC AUTO: 96.9 FL — SIGNIFICANT CHANGE UP (ref 80–100)
MONOCYTES # BLD AUTO: 0.1 K/UL — SIGNIFICANT CHANGE UP (ref 0–0.9)
MONOCYTES NFR BLD AUTO: 6 % — SIGNIFICANT CHANGE UP (ref 2–14)
NEUTROPHILS # BLD AUTO: 1 K/UL — LOW (ref 1.8–7.4)
NEUTROPHILS NFR BLD AUTO: 53 % — SIGNIFICANT CHANGE UP (ref 43–77)
NEUTS BAND # BLD: 1 % — SIGNIFICANT CHANGE UP (ref 0–8)
PLAT MORPH BLD: NORMAL — SIGNIFICANT CHANGE UP
PLATELET # BLD AUTO: 122 K/UL — LOW (ref 150–400)
RBC # BLD: 3.33 M/UL — LOW (ref 3.8–5.2)
RBC # FLD: 14.4 % — SIGNIFICANT CHANGE UP (ref 10.3–14.5)
RBC BLD AUTO: SIGNIFICANT CHANGE UP
WBC # BLD: 1.8 K/UL — LOW (ref 3.8–10.5)
WBC # FLD AUTO: 1.8 K/UL — LOW (ref 3.8–10.5)

## 2017-08-17 PROCEDURE — 99213 OFFICE O/P EST LOW 20 MIN: CPT

## 2017-08-18 LAB
ALBUMIN SERPL ELPH-MCNC: 4.5 G/DL
ALP BLD-CCNC: 98 U/L
ALT SERPL-CCNC: 34 U/L
ANION GAP SERPL CALC-SCNC: 15 MMOL/L
AST SERPL-CCNC: 26 U/L
BILIRUB SERPL-MCNC: <0.2 MG/DL
BUN SERPL-MCNC: 11 MG/DL
CALCIUM SERPL-MCNC: 9.3 MG/DL
CANCER AG125 SERPL-ACNC: 18 U/ML
CHLORIDE SERPL-SCNC: 105 MMOL/L
CO2 SERPL-SCNC: 25 MMOL/L
CREAT SERPL-MCNC: 0.78 MG/DL
GLUCOSE SERPL-MCNC: 93 MG/DL
POTASSIUM SERPL-SCNC: 4.8 MMOL/L
PROT SERPL-MCNC: 7.4 G/DL
SODIUM SERPL-SCNC: 145 MMOL/L

## 2017-08-19 ENCOUNTER — EMERGENCY (EMERGENCY)
Facility: HOSPITAL | Age: 47
LOS: 1 days | Discharge: HOME | End: 2017-08-19
Attending: EMERGENCY MEDICINE | Admitting: EMERGENCY MEDICINE
Payer: COMMERCIAL

## 2017-08-19 VITALS
SYSTOLIC BLOOD PRESSURE: 129 MMHG | OXYGEN SATURATION: 100 % | DIASTOLIC BLOOD PRESSURE: 85 MMHG | RESPIRATION RATE: 18 BRPM | TEMPERATURE: 98 F | HEART RATE: 70 BPM

## 2017-08-19 VITALS
SYSTOLIC BLOOD PRESSURE: 118 MMHG | OXYGEN SATURATION: 99 % | HEART RATE: 62 BPM | RESPIRATION RATE: 14 BRPM | DIASTOLIC BLOOD PRESSURE: 63 MMHG

## 2017-08-19 DIAGNOSIS — Z90.721 ACQUIRED ABSENCE OF OVARIES, UNILATERAL: Chronic | ICD-10-CM

## 2017-08-19 LAB
ALBUMIN SERPL ELPH-MCNC: 4.3 G/DL — SIGNIFICANT CHANGE UP (ref 3.3–5)
ALP SERPL-CCNC: 91 U/L — SIGNIFICANT CHANGE UP (ref 40–120)
ALT FLD-CCNC: 27 U/L — SIGNIFICANT CHANGE UP (ref 4–33)
ANISOCYTOSIS BLD QL: SLIGHT — SIGNIFICANT CHANGE UP
AST SERPL-CCNC: 24 U/L — SIGNIFICANT CHANGE UP (ref 4–32)
BASE EXCESS BLDV CALC-SCNC: 1.1 MMOL/L — SIGNIFICANT CHANGE UP
BASOPHILS # BLD AUTO: 0.02 K/UL — SIGNIFICANT CHANGE UP (ref 0–0.2)
BASOPHILS NFR BLD AUTO: 0.6 % — SIGNIFICANT CHANGE UP (ref 0–2)
BASOPHILS NFR SPEC: 0 % — SIGNIFICANT CHANGE UP (ref 0–2)
BILIRUB SERPL-MCNC: < 0.2 MG/DL — LOW (ref 0.2–1.2)
BLOOD GAS VENOUS - CREATININE: 0.87 MG/DL — SIGNIFICANT CHANGE UP (ref 0.5–1.3)
BUN SERPL-MCNC: 13 MG/DL — SIGNIFICANT CHANGE UP (ref 7–23)
CALCIUM SERPL-MCNC: 9.8 MG/DL — SIGNIFICANT CHANGE UP (ref 8.4–10.5)
CHLORIDE BLDV-SCNC: 106 MMOL/L — SIGNIFICANT CHANGE UP (ref 96–108)
CHLORIDE SERPL-SCNC: 104 MMOL/L — SIGNIFICANT CHANGE UP (ref 98–107)
CO2 SERPL-SCNC: 25 MMOL/L — SIGNIFICANT CHANGE UP (ref 22–31)
CREAT SERPL-MCNC: 0.82 MG/DL — SIGNIFICANT CHANGE UP (ref 0.5–1.3)
EOSINOPHIL # BLD AUTO: 0.02 K/UL — SIGNIFICANT CHANGE UP (ref 0–0.5)
EOSINOPHIL NFR BLD AUTO: 0.6 % — SIGNIFICANT CHANGE UP (ref 0–6)
EOSINOPHIL NFR FLD: 0 % — SIGNIFICANT CHANGE UP (ref 0–6)
GAS PNL BLDV: 141 MMOL/L — SIGNIFICANT CHANGE UP (ref 136–146)
GLUCOSE BLDV-MCNC: 93 — SIGNIFICANT CHANGE UP (ref 70–99)
GLUCOSE SERPL-MCNC: 92 MG/DL — SIGNIFICANT CHANGE UP (ref 70–99)
HCO3 BLDV-SCNC: 25 MMOL/L — SIGNIFICANT CHANGE UP (ref 20–27)
HCT VFR BLD CALC: 31.4 % — LOW (ref 34.5–45)
HCT VFR BLDV CALC: 32.9 % — LOW (ref 34.5–45)
HGB BLD-MCNC: 10.5 G/DL — LOW (ref 11.5–15.5)
HGB BLDV-MCNC: 10.7 G/DL — LOW (ref 11.5–15.5)
IMM GRANULOCYTES # BLD AUTO: 0.06 # — SIGNIFICANT CHANGE UP
IMM GRANULOCYTES NFR BLD AUTO: 1.9 % — HIGH (ref 0–1.5)
LACTATE BLDV-MCNC: 1 MMOL/L — SIGNIFICANT CHANGE UP (ref 0.5–2)
LYMPHOCYTES # BLD AUTO: 0.85 K/UL — LOW (ref 1–3.3)
LYMPHOCYTES # BLD AUTO: 27.1 % — SIGNIFICANT CHANGE UP (ref 13–44)
LYMPHOCYTES NFR SPEC AUTO: 23 % — SIGNIFICANT CHANGE UP (ref 13–44)
MACROCYTES BLD QL: SLIGHT — SIGNIFICANT CHANGE UP
MANUAL SMEAR VERIFICATION: SIGNIFICANT CHANGE UP
MCHC RBC-ENTMCNC: 32 PG — SIGNIFICANT CHANGE UP (ref 27–34)
MCHC RBC-ENTMCNC: 33.4 % — SIGNIFICANT CHANGE UP (ref 32–36)
MCV RBC AUTO: 95.7 FL — SIGNIFICANT CHANGE UP (ref 80–100)
MONOCYTES # BLD AUTO: 0.23 K/UL — SIGNIFICANT CHANGE UP (ref 0–0.9)
MONOCYTES NFR BLD AUTO: 7.3 % — SIGNIFICANT CHANGE UP (ref 2–14)
MONOCYTES NFR BLD: 6 % — SIGNIFICANT CHANGE UP (ref 2–9)
NEUTROPHIL AB SER-ACNC: 70 % — SIGNIFICANT CHANGE UP (ref 43–77)
NEUTROPHILS # BLD AUTO: 1.96 K/UL — SIGNIFICANT CHANGE UP (ref 1.8–7.4)
NEUTROPHILS NFR BLD AUTO: 62.5 % — SIGNIFICANT CHANGE UP (ref 43–77)
NRBC # FLD: 0 — SIGNIFICANT CHANGE UP
PCO2 BLDV: 47 MMHG — SIGNIFICANT CHANGE UP (ref 41–51)
PH BLDV: 7.36 PH — SIGNIFICANT CHANGE UP (ref 7.32–7.43)
PLATELET # BLD AUTO: 108 K/UL — LOW (ref 150–400)
PLATELET CLUMP BLD QL SMEAR: SIGNIFICANT CHANGE UP
PLATELET COUNT - ESTIMATE: SIGNIFICANT CHANGE UP
PMV BLD: 13.5 FL — HIGH (ref 7–13)
PO2 BLDV: 40 MMHG — SIGNIFICANT CHANGE UP (ref 35–40)
POLYCHROMASIA BLD QL SMEAR: SLIGHT — SIGNIFICANT CHANGE UP
POTASSIUM BLDV-SCNC: 3.4 MMOL/L — SIGNIFICANT CHANGE UP (ref 3.4–4.5)
POTASSIUM SERPL-MCNC: 3.7 MMOL/L — SIGNIFICANT CHANGE UP (ref 3.5–5.3)
POTASSIUM SERPL-SCNC: 3.7 MMOL/L — SIGNIFICANT CHANGE UP (ref 3.5–5.3)
PROT SERPL-MCNC: 7 G/DL — SIGNIFICANT CHANGE UP (ref 6–8.3)
RBC # BLD: 3.28 M/UL — LOW (ref 3.8–5.2)
RBC # FLD: 13.1 % — SIGNIFICANT CHANGE UP (ref 10.3–14.5)
SAO2 % BLDV: 65.2 % — SIGNIFICANT CHANGE UP (ref 60–85)
SODIUM SERPL-SCNC: 142 MMOL/L — SIGNIFICANT CHANGE UP (ref 135–145)
VARIANT LYMPHS # BLD: 1 % — SIGNIFICANT CHANGE UP
WBC # BLD: 3.14 K/UL — LOW (ref 3.8–10.5)
WBC # FLD AUTO: 3.14 K/UL — LOW (ref 3.8–10.5)

## 2017-08-19 PROCEDURE — 99285 EMERGENCY DEPT VISIT HI MDM: CPT

## 2017-08-19 PROCEDURE — 70496 CT ANGIOGRAPHY HEAD: CPT | Mod: 26

## 2017-08-19 PROCEDURE — 70498 CT ANGIOGRAPHY NECK: CPT | Mod: 26,52

## 2017-08-19 NOTE — ED PROVIDER NOTE - MEDICAL DECISION MAKING DETAILS
46F h/o ovarian ca p/w difficulty speaking, decreased responsiveness, last normal yesterday, concerning for medication adverse effect vs TIA vs electrolyte abn  -labs, CT, FSG

## 2017-08-19 NOTE — ED PROVIDER NOTE - OBJECTIVE STATEMENT
46F h/o ovarian CA (dx jan '17, last chemo 8/10/17, carbo/taxol) presenting with medication adverse effect. Has chronic knee pain, went back to work this week for first time since diagnosis and has been feeling exhausted. Pt took gabapentin 300mg last night, took gabapentin extra 300mg along with tylenol 975mg today. Denies F, CP/SOB, abd pain, N/V/D. 46F h/o ovarian CA (dx jan '17, last chemo 8/10/17, carbo/taxol) presenting with decreased responsiveness. Has chronic knee pain, went back to work this week for first time since diagnosis and has been feeling exhausted. Pt took gabapentin 300mg last night, took gabapentin extra 300mg along with tylenol 975mg today. Found to have difficulty speaking and decreased responsiveness this morning at 11am, last normal yesterday. Symptoms improving, pt now AOx3 and conversational. Denies F, CP/SOB, abd pain, N/V/D.

## 2017-08-19 NOTE — ED ADULT TRIAGE NOTE - CHIEF COMPLAINT QUOTE
pt BIBA from home, as per EMS, pt took a total of 600mg of gabapentin today to "help with her chronic pain"  pt is prescribed 300mg once a day.  pt is drowsy, denies SI.  pt is awake and alert in triage

## 2017-08-19 NOTE — ED ADULT NURSE NOTE - OBJECTIVE STATEMENT
47 y/o female pt accompanied by wife, received in tr A, aox3, ambulatory, presents to the ed after taking extra doses of Gabapentin today. As per wife, pt went to work yesterday, came back home with severe pain, pt sts pain was "all over her body" and took an extra dose of Gabapentin 300mg at 2am, another 300mg at 7am with Tylenol 975mg, prescribed dose is 300mg of Gabapentin at bedtime. Pt was found to be unresponsive with "eyes rolled back" by the neighbor who went to check on her. Pt awake and responsive in the ed but falls asleep easily during the interview, denies any pain at this time, no other complaints offered. Pt with ovarian CA, last chemo 8/10. 18G IV placed by EMS on R hand. MD swain ongoing, VS as noted, NAD, wife at bedside, will cont ot monitor.

## 2017-08-19 NOTE — ED PROVIDER NOTE - ENMT, MLM
Patient is resting comfortably.   Airway patent, Nasal mucosa clear. Mouth with normal mucosa. Throat has no vesicles, no oropharyngeal exudates and uvula is midline.

## 2017-08-19 NOTE — ED PROVIDER NOTE - ATTENDING CONTRIBUTION TO CARE
I performed a face to face bedside interview with patient regarding history of present illness, review of symptoms and past medical history. I completed an independent physical exam.  I have discussed patient's plan of care.   I agree with note as stated above, having amended the EMR as needed to reflect my findings. I have discussed the assessment and plan of care.  This includes during the time I functioned as the attending physician for this patient.  Attending Contribution to Care: agree with plan of resident. pt p/w symptoms of possible gabapentin OD with noticed slurred speech by wife. pt abcd2 at 2. pt cta neg. will f/u with neuro if symptoms return. pt most likely OD with symptomatic resolution

## 2017-08-19 NOTE — ED PROVIDER NOTE - PROGRESS NOTE DETAILS
Labs and imaging unremarkable, ambulating in ED, back to baseline per family, will d/c and advise PMD f/u

## 2017-09-13 ENCOUNTER — FORM ENCOUNTER (OUTPATIENT)
Age: 47
End: 2017-09-13

## 2017-09-14 ENCOUNTER — APPOINTMENT (OUTPATIENT)
Dept: RADIOLOGY | Facility: IMAGING CENTER | Age: 47
End: 2017-09-14
Payer: COMMERCIAL

## 2017-09-14 ENCOUNTER — OUTPATIENT (OUTPATIENT)
Dept: OUTPATIENT SERVICES | Facility: HOSPITAL | Age: 47
LOS: 1 days | End: 2017-09-14
Payer: COMMERCIAL

## 2017-09-14 ENCOUNTER — APPOINTMENT (OUTPATIENT)
Dept: CT IMAGING | Facility: IMAGING CENTER | Age: 47
End: 2017-09-14
Payer: COMMERCIAL

## 2017-09-14 DIAGNOSIS — Z90.721 ACQUIRED ABSENCE OF OVARIES, UNILATERAL: Chronic | ICD-10-CM

## 2017-09-14 DIAGNOSIS — C56.2 MALIGNANT NEOPLASM OF LEFT OVARY: ICD-10-CM

## 2017-09-14 PROCEDURE — 71020: CPT | Mod: 26

## 2017-09-14 PROCEDURE — 74177 CT ABD & PELVIS W/CONTRAST: CPT

## 2017-09-14 PROCEDURE — 74177 CT ABD & PELVIS W/CONTRAST: CPT | Mod: 26

## 2017-09-14 PROCEDURE — 71046 X-RAY EXAM CHEST 2 VIEWS: CPT

## 2017-09-19 ENCOUNTER — OUTPATIENT (OUTPATIENT)
Dept: OUTPATIENT SERVICES | Facility: HOSPITAL | Age: 47
LOS: 1 days | Discharge: HOME | End: 2017-09-19

## 2017-09-19 DIAGNOSIS — Z90.721 ACQUIRED ABSENCE OF OVARIES, UNILATERAL: Chronic | ICD-10-CM

## 2017-09-19 DIAGNOSIS — C56.2 MALIGNANT NEOPLASM OF LEFT OVARY: ICD-10-CM

## 2017-09-21 ENCOUNTER — APPOINTMENT (OUTPATIENT)
Dept: INFUSION THERAPY | Facility: HOSPITAL | Age: 47
End: 2017-09-21

## 2017-09-21 ENCOUNTER — LABORATORY RESULT (OUTPATIENT)
Age: 47
End: 2017-09-21

## 2017-09-21 ENCOUNTER — RESULT REVIEW (OUTPATIENT)
Age: 47
End: 2017-09-21

## 2017-09-21 ENCOUNTER — APPOINTMENT (OUTPATIENT)
Dept: HEMATOLOGY ONCOLOGY | Facility: CLINIC | Age: 47
End: 2017-09-21
Payer: COMMERCIAL

## 2017-09-21 VITALS
DIASTOLIC BLOOD PRESSURE: 72 MMHG | RESPIRATION RATE: 16 BRPM | TEMPERATURE: 97.8 F | SYSTOLIC BLOOD PRESSURE: 103 MMHG | HEART RATE: 59 BPM | BODY MASS INDEX: 28.31 KG/M2 | WEIGHT: 140.43 LBS | OXYGEN SATURATION: 100 %

## 2017-09-21 LAB
HCT VFR BLD CALC: 35.2 % — SIGNIFICANT CHANGE UP (ref 34.5–45)
HGB BLD-MCNC: 12 G/DL — SIGNIFICANT CHANGE UP (ref 11.5–15.5)
LG PLATELETS BLD QL AUTO: SLIGHT — SIGNIFICANT CHANGE UP
LYMPHOCYTES # BLD AUTO: 38 % — SIGNIFICANT CHANGE UP (ref 13–44)
MCHC RBC-ENTMCNC: 32.6 PG — SIGNIFICANT CHANGE UP (ref 27–34)
MCHC RBC-ENTMCNC: 34.2 G/DL — SIGNIFICANT CHANGE UP (ref 32–36)
MCV RBC AUTO: 95.5 FL — SIGNIFICANT CHANGE UP (ref 80–100)
METAMYELOCYTES # FLD: 3 % — HIGH (ref 0–0)
MONOCYTES NFR BLD AUTO: 4 % — SIGNIFICANT CHANGE UP (ref 2–14)
MYELOCYTES NFR BLD: 2 % — HIGH (ref 0–0)
NEUTROPHILS # BLD AUTO: LOW K/UL (ref 1.8–7.4)
NEUTROPHILS NFR BLD AUTO: 52 % — SIGNIFICANT CHANGE UP (ref 43–77)
NEUTS BAND # BLD: 1 % — SIGNIFICANT CHANGE UP (ref 0–8)
PLAT MORPH BLD: NORMAL — SIGNIFICANT CHANGE UP
PLATELET # BLD AUTO: SIGNIFICANT CHANGE UP K/UL (ref 150–400)
RBC # BLD: 3.68 M/UL — LOW (ref 3.8–5.2)
RBC # FLD: 12.4 % — SIGNIFICANT CHANGE UP (ref 10.3–14.5)
RBC BLD AUTO: SIGNIFICANT CHANGE UP
WBC # BLD: 2.8 K/UL — LOW (ref 3.8–10.5)
WBC # FLD AUTO: 2.8 K/UL — LOW (ref 3.8–10.5)

## 2017-09-21 PROCEDURE — 99214 OFFICE O/P EST MOD 30 MIN: CPT

## 2017-09-21 RX ORDER — AMOXICILLIN 875 MG/1
875 TABLET, FILM COATED ORAL
Qty: 14 | Refills: 0 | Status: COMPLETED | COMMUNITY
Start: 2017-08-29

## 2017-09-21 RX ORDER — ERYTHROMYCIN 5 MG/G
5 OINTMENT OPHTHALMIC
Qty: 4 | Refills: 0 | Status: COMPLETED | COMMUNITY
Start: 2017-08-29

## 2017-10-09 ENCOUNTER — APPOINTMENT (OUTPATIENT)
Dept: GERIATRICS | Facility: CLINIC | Age: 47
End: 2017-10-09
Payer: COMMERCIAL

## 2017-10-09 VITALS
HEART RATE: 74 BPM | SYSTOLIC BLOOD PRESSURE: 120 MMHG | RESPIRATION RATE: 16 BRPM | TEMPERATURE: 97.9 F | DIASTOLIC BLOOD PRESSURE: 70 MMHG | WEIGHT: 141.07 LBS | OXYGEN SATURATION: 98 % | BODY MASS INDEX: 28.44 KG/M2

## 2017-10-09 PROCEDURE — 99204 OFFICE O/P NEW MOD 45 MIN: CPT

## 2017-10-09 RX ORDER — GABAPENTIN 300 MG/1
300 CAPSULE ORAL
Qty: 30 | Refills: 3 | Status: DISCONTINUED | COMMUNITY
Start: 2017-05-18 | End: 2017-10-09

## 2017-10-09 RX ORDER — OXYCODONE 5 MG/1
5 TABLET ORAL
Qty: 45 | Refills: 0 | Status: DISCONTINUED | COMMUNITY
Start: 2017-04-13 | End: 2017-10-09

## 2017-10-09 RX ORDER — GABAPENTIN 600 MG/1
600 TABLET, COATED ORAL
Qty: 180 | Refills: 3 | Status: DISCONTINUED | COMMUNITY
Start: 2017-07-14 | End: 2017-10-09

## 2017-10-09 RX ORDER — ALPRAZOLAM 0.25 MG/1
0.25 TABLET ORAL
Qty: 90 | Refills: 0 | Status: DISCONTINUED | COMMUNITY
Start: 2017-06-15 | End: 2017-10-09

## 2017-10-09 RX ORDER — GABAPENTIN 100 MG/1
100 CAPSULE ORAL
Qty: 30 | Refills: 1 | Status: DISCONTINUED | COMMUNITY
Start: 2017-05-04 | End: 2017-10-09

## 2017-10-09 RX ORDER — GABAPENTIN 300 MG/1
300 CAPSULE ORAL
Qty: 60 | Refills: 0 | Status: DISCONTINUED | COMMUNITY
Start: 2017-06-15 | End: 2017-10-09

## 2017-10-09 RX ORDER — NORTRIPTYLINE HYDROCHLORIDE 10 MG/1
10 CAPSULE ORAL
Qty: 30 | Refills: 0 | Status: DISCONTINUED | COMMUNITY
Start: 2017-01-01 | End: 2017-10-09

## 2017-10-09 RX ORDER — ESCITALOPRAM OXALATE 20 MG/1
20 TABLET ORAL
Qty: 90 | Refills: 0 | Status: DISCONTINUED | COMMUNITY
Start: 2016-11-17 | End: 2017-10-09

## 2017-10-09 RX ORDER — OXYCODONE 10 MG/1
10 TABLET ORAL
Refills: 0 | Status: DISCONTINUED | COMMUNITY
End: 2017-10-09

## 2017-11-22 ENCOUNTER — OUTPATIENT (OUTPATIENT)
Dept: OUTPATIENT SERVICES | Facility: HOSPITAL | Age: 47
LOS: 1 days | Discharge: HOME | End: 2017-11-22

## 2017-11-22 DIAGNOSIS — Z90.721 ACQUIRED ABSENCE OF OVARIES, UNILATERAL: Chronic | ICD-10-CM

## 2017-11-22 DIAGNOSIS — C56.2 MALIGNANT NEOPLASM OF LEFT OVARY: ICD-10-CM

## 2017-11-29 ENCOUNTER — LABORATORY RESULT (OUTPATIENT)
Age: 47
End: 2017-11-29

## 2017-11-29 ENCOUNTER — RESULT REVIEW (OUTPATIENT)
Age: 47
End: 2017-11-29

## 2017-11-29 ENCOUNTER — APPOINTMENT (OUTPATIENT)
Dept: INFUSION THERAPY | Facility: HOSPITAL | Age: 47
End: 2017-11-29

## 2017-11-29 ENCOUNTER — APPOINTMENT (OUTPATIENT)
Dept: HEMATOLOGY ONCOLOGY | Facility: CLINIC | Age: 47
End: 2017-11-29
Payer: COMMERCIAL

## 2017-11-29 VITALS
WEIGHT: 139.99 LBS | RESPIRATION RATE: 16 BRPM | OXYGEN SATURATION: 97 % | BODY MASS INDEX: 28.22 KG/M2 | HEART RATE: 65 BPM | SYSTOLIC BLOOD PRESSURE: 109 MMHG | DIASTOLIC BLOOD PRESSURE: 75 MMHG | TEMPERATURE: 98.7 F

## 2017-11-29 LAB
BASOPHILS # BLD AUTO: 0 K/UL — SIGNIFICANT CHANGE UP (ref 0–0.2)
BASOPHILS NFR BLD AUTO: 1.3 % — SIGNIFICANT CHANGE UP (ref 0–2)
EOSINOPHIL # BLD AUTO: 0.1 K/UL — SIGNIFICANT CHANGE UP (ref 0–0.5)
EOSINOPHIL NFR BLD AUTO: 5 % — SIGNIFICANT CHANGE UP (ref 0–6)
HCT VFR BLD CALC: 37.1 % — SIGNIFICANT CHANGE UP (ref 34.5–45)
HGB BLD-MCNC: 12.7 G/DL — SIGNIFICANT CHANGE UP (ref 11.5–15.5)
LYMPHOCYTES # BLD AUTO: 0.8 K/UL — LOW (ref 1–3.3)
LYMPHOCYTES # BLD AUTO: 27.5 % — SIGNIFICANT CHANGE UP (ref 13–44)
MCHC RBC-ENTMCNC: 31.9 PG — SIGNIFICANT CHANGE UP (ref 27–34)
MCHC RBC-ENTMCNC: 34.3 G/DL — SIGNIFICANT CHANGE UP (ref 32–36)
MCV RBC AUTO: 93.3 FL — SIGNIFICANT CHANGE UP (ref 80–100)
MONOCYTES # BLD AUTO: 0.2 K/UL — SIGNIFICANT CHANGE UP (ref 0–0.9)
MONOCYTES NFR BLD AUTO: 6.3 % — SIGNIFICANT CHANGE UP (ref 2–14)
NEUTROPHILS # BLD AUTO: 1.7 K/UL — LOW (ref 1.8–7.4)
NEUTROPHILS NFR BLD AUTO: 59.8 % — SIGNIFICANT CHANGE UP (ref 43–77)
PLATELET # BLD AUTO: SIGNIFICANT CHANGE UP K/UL (ref 150–400)
RBC # BLD: 3.98 M/UL — SIGNIFICANT CHANGE UP (ref 3.8–5.2)
RBC # FLD: 12 % — SIGNIFICANT CHANGE UP (ref 10.3–14.5)
WBC # BLD: 2.8 K/UL — LOW (ref 3.8–10.5)
WBC # FLD AUTO: 2.8 K/UL — LOW (ref 3.8–10.5)

## 2017-11-29 PROCEDURE — 99214 OFFICE O/P EST MOD 30 MIN: CPT

## 2017-11-29 RX ORDER — ONDANSETRON 8 MG/1
8 TABLET ORAL
Qty: 30 | Refills: 2 | Status: DISCONTINUED | COMMUNITY
Start: 2017-03-29 | End: 2017-11-29

## 2017-11-29 RX ORDER — APREPITANT 80 MG/1
80 CAPSULE ORAL
Qty: 2 | Refills: 5 | Status: DISCONTINUED | COMMUNITY
Start: 2017-03-29 | End: 2017-11-29

## 2017-11-29 RX ORDER — PROCHLORPERAZINE MALEATE 10 MG/1
10 TABLET ORAL EVERY 6 HOURS
Qty: 30 | Refills: 2 | Status: DISCONTINUED | COMMUNITY
Start: 2017-03-29 | End: 2017-11-29

## 2017-12-15 ENCOUNTER — FORM ENCOUNTER (OUTPATIENT)
Age: 47
End: 2017-12-15

## 2017-12-16 ENCOUNTER — APPOINTMENT (OUTPATIENT)
Dept: CT IMAGING | Facility: IMAGING CENTER | Age: 47
End: 2017-12-16
Payer: COMMERCIAL

## 2017-12-16 ENCOUNTER — OUTPATIENT (OUTPATIENT)
Dept: OUTPATIENT SERVICES | Facility: HOSPITAL | Age: 47
LOS: 1 days | End: 2017-12-16
Payer: COMMERCIAL

## 2017-12-16 DIAGNOSIS — C56.2 MALIGNANT NEOPLASM OF LEFT OVARY: ICD-10-CM

## 2017-12-16 DIAGNOSIS — Z90.721 ACQUIRED ABSENCE OF OVARIES, UNILATERAL: Chronic | ICD-10-CM

## 2017-12-16 PROCEDURE — 74177 CT ABD & PELVIS W/CONTRAST: CPT | Mod: 26

## 2017-12-16 PROCEDURE — 74177 CT ABD & PELVIS W/CONTRAST: CPT

## 2017-12-16 PROCEDURE — 74176 CT ABD & PELVIS W/O CONTRAST: CPT

## 2017-12-23 NOTE — ED PROVIDER NOTE - ATTENDING CONTRIBUTION TO CARE
23-Dec-2017 17:46
TRI Romero MD: Patient seen and evaluated s/p surgery for ruptured ovarian cyst and diagnosed with ovarian cancer, presents with persistent pain and no BM, passing flatus. no vomiting able to eat, using oxycodone. PE diffuse tenderness with guarding, will get Gyn eval and CT of abd, pain control, labs, reassess.

## 2018-01-17 ENCOUNTER — APPOINTMENT (OUTPATIENT)
Dept: GYNECOLOGIC ONCOLOGY | Facility: CLINIC | Age: 48
End: 2018-01-17
Payer: COMMERCIAL

## 2018-01-17 ENCOUNTER — FORM ENCOUNTER (OUTPATIENT)
Age: 48
End: 2018-01-17

## 2018-01-17 VITALS
DIASTOLIC BLOOD PRESSURE: 76 MMHG | SYSTOLIC BLOOD PRESSURE: 114 MMHG | WEIGHT: 140 LBS | BODY MASS INDEX: 28.22 KG/M2 | HEIGHT: 59 IN

## 2018-01-17 DIAGNOSIS — Z92.89 PERSONAL HISTORY OF OTHER MEDICAL TREATMENT: ICD-10-CM

## 2018-01-17 DIAGNOSIS — Z86.69 PERSONAL HISTORY OF OTHER DISEASES OF THE NERVOUS SYSTEM AND SENSE ORGANS: ICD-10-CM

## 2018-01-17 DIAGNOSIS — Z87.09 PERSONAL HISTORY OF OTHER DISEASES OF THE RESPIRATORY SYSTEM: ICD-10-CM

## 2018-01-17 PROCEDURE — 99214 OFFICE O/P EST MOD 30 MIN: CPT

## 2018-01-18 ENCOUNTER — APPOINTMENT (OUTPATIENT)
Dept: ULTRASOUND IMAGING | Facility: IMAGING CENTER | Age: 48
End: 2018-01-18
Payer: COMMERCIAL

## 2018-01-18 ENCOUNTER — RESULT REVIEW (OUTPATIENT)
Age: 48
End: 2018-01-18

## 2018-01-18 ENCOUNTER — OUTPATIENT (OUTPATIENT)
Dept: OUTPATIENT SERVICES | Facility: HOSPITAL | Age: 48
LOS: 1 days | End: 2018-01-18
Payer: COMMERCIAL

## 2018-01-18 DIAGNOSIS — Z90.721 ACQUIRED ABSENCE OF OVARIES, UNILATERAL: Chronic | ICD-10-CM

## 2018-01-18 DIAGNOSIS — Z00.8 ENCOUNTER FOR OTHER GENERAL EXAMINATION: ICD-10-CM

## 2018-01-18 PROCEDURE — 10022: CPT

## 2018-01-18 PROCEDURE — 88305 TISSUE EXAM BY PATHOLOGIST: CPT

## 2018-01-18 PROCEDURE — 76942 ECHO GUIDE FOR BIOPSY: CPT | Mod: 26

## 2018-01-18 PROCEDURE — 88172 CYTP DX EVAL FNA 1ST EA SITE: CPT

## 2018-01-18 PROCEDURE — 88305 TISSUE EXAM BY PATHOLOGIST: CPT | Mod: 26

## 2018-01-18 PROCEDURE — 76942 ECHO GUIDE FOR BIOPSY: CPT

## 2018-01-18 PROCEDURE — 88173 CYTOPATH EVAL FNA REPORT: CPT

## 2018-01-18 PROCEDURE — 88173 CYTOPATH EVAL FNA REPORT: CPT | Mod: 26

## 2018-01-19 LAB — NON-GYNECOLOGICAL CYTOLOGY STUDY: SIGNIFICANT CHANGE UP

## 2018-01-25 ENCOUNTER — APPOINTMENT (OUTPATIENT)
Dept: HEMATOLOGY ONCOLOGY | Facility: CLINIC | Age: 48
End: 2018-01-25

## 2018-01-26 ENCOUNTER — RESULT REVIEW (OUTPATIENT)
Age: 48
End: 2018-01-26

## 2018-01-29 ENCOUNTER — OUTPATIENT (OUTPATIENT)
Dept: OUTPATIENT SERVICES | Facility: HOSPITAL | Age: 48
LOS: 1 days | Discharge: HOME | End: 2018-01-29

## 2018-01-29 DIAGNOSIS — C56.2 MALIGNANT NEOPLASM OF LEFT OVARY: ICD-10-CM

## 2018-01-29 DIAGNOSIS — Z90.721 ACQUIRED ABSENCE OF OVARIES, UNILATERAL: Chronic | ICD-10-CM

## 2018-02-01 ENCOUNTER — APPOINTMENT (OUTPATIENT)
Dept: INFUSION THERAPY | Facility: HOSPITAL | Age: 48
End: 2018-02-01

## 2018-02-01 ENCOUNTER — LABORATORY RESULT (OUTPATIENT)
Age: 48
End: 2018-02-01

## 2018-02-01 ENCOUNTER — RESULT REVIEW (OUTPATIENT)
Age: 48
End: 2018-02-01

## 2018-02-01 ENCOUNTER — APPOINTMENT (OUTPATIENT)
Dept: HEMATOLOGY ONCOLOGY | Facility: CLINIC | Age: 48
End: 2018-02-01
Payer: COMMERCIAL

## 2018-02-01 VITALS
TEMPERATURE: 97.1 F | SYSTOLIC BLOOD PRESSURE: 118 MMHG | HEART RATE: 65 BPM | WEIGHT: 141.1 LBS | OXYGEN SATURATION: 97 % | DIASTOLIC BLOOD PRESSURE: 70 MMHG | BODY MASS INDEX: 28.5 KG/M2 | RESPIRATION RATE: 16 BRPM

## 2018-02-01 LAB
HCT VFR BLD CALC: 37.4 % — SIGNIFICANT CHANGE UP (ref 34.5–45)
HGB BLD-MCNC: 13.2 G/DL — SIGNIFICANT CHANGE UP (ref 11.5–15.5)
MCHC RBC-ENTMCNC: 31.3 PG — SIGNIFICANT CHANGE UP (ref 27–34)
MCHC RBC-ENTMCNC: 35.2 G/DL — SIGNIFICANT CHANGE UP (ref 32–36)
MCV RBC AUTO: 88.9 FL — SIGNIFICANT CHANGE UP (ref 80–100)
PLATELET # BLD AUTO: 168 K/UL — SIGNIFICANT CHANGE UP (ref 150–400)
RBC # BLD: 4.21 M/UL — SIGNIFICANT CHANGE UP (ref 3.8–5.2)
RBC # FLD: 11.9 % — SIGNIFICANT CHANGE UP (ref 10.3–14.5)
WBC # BLD: 4.1 K/UL — SIGNIFICANT CHANGE UP (ref 3.8–10.5)
WBC # FLD AUTO: 4.1 K/UL — SIGNIFICANT CHANGE UP (ref 3.8–10.5)

## 2018-02-01 PROCEDURE — 99215 OFFICE O/P EST HI 40 MIN: CPT

## 2018-02-02 DIAGNOSIS — C56.1 MALIGNANT NEOPLASM OF RIGHT OVARY: ICD-10-CM

## 2018-02-08 NOTE — ASU PATIENT PROFILE, ADULT - FALL HARM RISK CONCLUSION
Physical Therapy Daily Treatment     Visit Count: 2   Plan of Care Dates: Initial: 2/6/2018 Through: 5/1/2018  Insurance Information:   THERAPY BENEFITS:TBD    [facility]  VISIT LIMIT: 48 VISITS PER CALENDAR YEAR FOR PT  AUTHORIZATION NEEDED: NO  NOTES: 4 VISITS USED   Next Referring Provider Visit: TBD     Referred by: Jer Ron MD  Medical Diagnosis (from order): Patellar instability of right knee [M25.361]  Treatment Diagnosis: Knee Symptoms with Pain, Impaired Motor Function/Muscle Performance and Impaired Gait/Locomotion Deficits       Date of Surgery: 4/27/17; Surgery performed: RIGHT KNEE ARTHROSCOPY WITH CHONDROPLASTY; Physician Guidelines: no  Diagnosis Precautions: none  Chart reviewed: Relevant co-morbidities, allergies, tests and medications     2 x patellar dislocation, 2 surgeries to correct- most dislocations occurred with R LE planted and rotation away.    SUBJECTIVE     Current Pain: 0/10.  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    OBJECTIVE   R calf tightness noted  Fair eccentric control of hip ER in stance  Mild pronation noted    Treatment   Therapeutic Exercise:   Stationary bike seat 5 x 6 minutes, level 3  Standing calf stretch 30 sec   SLR, glut medius A , hip abduction 2 x 15  R LE loading activities with hand on glut for awareness      Neuromuscular Reeducation:      Gait Training:      Manual Therapy:       Therapeutic Activity:       Current Home Program (not performed this date except as noted above):     Standing calf stretch against wall  SLR and hip abduction 3 sets of 10 daily- changed to 2 x 15  Glut medius A 2 x 15  Stopped side lying IR of femur  Static lunge resisting varus with orange band    ASSESSMENT   Weakness in gluts, mild pronation, needs to work more for gluteal endurance    Pain after treatment: 0/10  Result of above outlined education:  Verbalizes understanding, Demonstrates understanding and Needs reinforcement    Goals:        To be obtained by end of this plan of care:  1. Patient independent with modified and progressed home exercise program.  2. Patient will increase involved hip strength to 4+/5 to aid in normalization of gait for independent living, stair ambulation, age appropriate activities and squatting for lifting for household independent living.  3. Patient will demonstrate good running mechanics at self-selected speed with proper hip, knee, and ankle alignment  to aid in improved running tolerance and avoiding re-injury.   4. Patient will demonstrate proper squat body mechanics to aid in safe squatting for lifting for household independent living and return to age appropriate activities.        PLAN   Look at shoe wear, glut and quad strengthening in WB ing positron, posterior leg flexibility program    THERAPY DAILY BILLING   Primary Insurance:  DANNIE/AMANDA  Secondary Insurance: N/A    Evaluation Procedures:  No evaluation codes were used on this date of service    Timed Procedures:  Therapeutic Exercise, 40 minutes    Untimed Procedures:  No untimed codes were used on this date of service    Total Treatment Time: 40 minutes       Universal Safety Interventions

## 2018-02-09 ENCOUNTER — FORM ENCOUNTER (OUTPATIENT)
Age: 48
End: 2018-02-09

## 2018-02-10 ENCOUNTER — APPOINTMENT (OUTPATIENT)
Dept: NUCLEAR MEDICINE | Facility: IMAGING CENTER | Age: 48
End: 2018-02-10
Payer: COMMERCIAL

## 2018-02-10 ENCOUNTER — OUTPATIENT (OUTPATIENT)
Dept: OUTPATIENT SERVICES | Facility: HOSPITAL | Age: 48
LOS: 1 days | End: 2018-02-10
Payer: COMMERCIAL

## 2018-02-10 DIAGNOSIS — C56.2 MALIGNANT NEOPLASM OF LEFT OVARY: ICD-10-CM

## 2018-02-10 DIAGNOSIS — Z90.721 ACQUIRED ABSENCE OF OVARIES, UNILATERAL: Chronic | ICD-10-CM

## 2018-02-10 PROCEDURE — 78815 PET IMAGE W/CT SKULL-THIGH: CPT | Mod: 26,PS

## 2018-02-10 PROCEDURE — A9552: CPT

## 2018-02-10 PROCEDURE — 78815 PET IMAGE W/CT SKULL-THIGH: CPT

## 2018-02-16 ENCOUNTER — APPOINTMENT (OUTPATIENT)
Dept: GYNECOLOGIC ONCOLOGY | Facility: CLINIC | Age: 48
End: 2018-02-16
Payer: COMMERCIAL

## 2018-02-16 VITALS
DIASTOLIC BLOOD PRESSURE: 80 MMHG | BODY MASS INDEX: 28.22 KG/M2 | HEIGHT: 59 IN | SYSTOLIC BLOOD PRESSURE: 120 MMHG | WEIGHT: 140 LBS

## 2018-02-16 PROCEDURE — 99214 OFFICE O/P EST MOD 30 MIN: CPT

## 2018-03-06 ENCOUNTER — APPOINTMENT (OUTPATIENT)
Dept: GYNECOLOGIC ONCOLOGY | Facility: CLINIC | Age: 48
End: 2018-03-06

## 2018-03-08 ENCOUNTER — OUTPATIENT (OUTPATIENT)
Dept: OUTPATIENT SERVICES | Facility: HOSPITAL | Age: 48
LOS: 1 days | End: 2018-03-08

## 2018-03-08 ENCOUNTER — OTHER (OUTPATIENT)
Age: 48
End: 2018-03-08

## 2018-03-08 VITALS
RESPIRATION RATE: 16 BRPM | TEMPERATURE: 97 F | WEIGHT: 139.99 LBS | OXYGEN SATURATION: 98 % | HEIGHT: 59 IN | HEART RATE: 60 BPM | SYSTOLIC BLOOD PRESSURE: 120 MMHG | DIASTOLIC BLOOD PRESSURE: 70 MMHG

## 2018-03-08 DIAGNOSIS — Z98.890 OTHER SPECIFIED POSTPROCEDURAL STATES: Chronic | ICD-10-CM

## 2018-03-08 DIAGNOSIS — Z90.721 ACQUIRED ABSENCE OF OVARIES, UNILATERAL: Chronic | ICD-10-CM

## 2018-03-08 DIAGNOSIS — C56.2 MALIGNANT NEOPLASM OF LEFT OVARY: ICD-10-CM

## 2018-03-08 DIAGNOSIS — Z95.828 PRESENCE OF OTHER VASCULAR IMPLANTS AND GRAFTS: Chronic | ICD-10-CM

## 2018-03-08 DIAGNOSIS — C56.9 MALIGNANT NEOPLASM OF UNSPECIFIED OVARY: ICD-10-CM

## 2018-03-08 LAB
BLD GP AB SCN SERPL QL: NEGATIVE — SIGNIFICANT CHANGE UP
BUN SERPL-MCNC: 15 MG/DL — SIGNIFICANT CHANGE UP (ref 7–23)
CALCIUM SERPL-MCNC: 9 MG/DL — SIGNIFICANT CHANGE UP (ref 8.4–10.5)
CHLORIDE SERPL-SCNC: 103 MMOL/L — SIGNIFICANT CHANGE UP (ref 98–107)
CO2 SERPL-SCNC: 27 MMOL/L — SIGNIFICANT CHANGE UP (ref 22–31)
CREAT SERPL-MCNC: 0.93 MG/DL — SIGNIFICANT CHANGE UP (ref 0.5–1.3)
GLUCOSE SERPL-MCNC: 81 MG/DL — SIGNIFICANT CHANGE UP (ref 70–99)
HCT VFR BLD CALC: 40.9 % — SIGNIFICANT CHANGE UP (ref 34.5–45)
HGB BLD-MCNC: 13 G/DL — SIGNIFICANT CHANGE UP (ref 11.5–15.5)
MANUAL SMEAR VERIFICATION: SIGNIFICANT CHANGE UP
MCHC RBC-ENTMCNC: 28.8 PG — SIGNIFICANT CHANGE UP (ref 27–34)
MCHC RBC-ENTMCNC: 31.8 % — LOW (ref 32–36)
MCV RBC AUTO: 90.5 FL — SIGNIFICANT CHANGE UP (ref 80–100)
NRBC # FLD: 0 — SIGNIFICANT CHANGE UP
PLATELET # BLD AUTO: 65 K/UL — LOW (ref 150–400)
PLATELET CLUMP BLD QL SMEAR: SIGNIFICANT CHANGE UP
PLATELET COUNT - ESTIMATE: NORMAL — SIGNIFICANT CHANGE UP
PMV BLD: 11.9 FL — SIGNIFICANT CHANGE UP (ref 7–13)
POTASSIUM SERPL-MCNC: 4.2 MMOL/L — SIGNIFICANT CHANGE UP (ref 3.5–5.3)
POTASSIUM SERPL-SCNC: 4.2 MMOL/L — SIGNIFICANT CHANGE UP (ref 3.5–5.3)
RBC # BLD: 4.52 M/UL — SIGNIFICANT CHANGE UP (ref 3.8–5.2)
RBC # FLD: 12.9 % — SIGNIFICANT CHANGE UP (ref 10.3–14.5)
RH IG SCN BLD-IMP: POSITIVE — SIGNIFICANT CHANGE UP
SODIUM SERPL-SCNC: 143 MMOL/L — SIGNIFICANT CHANGE UP (ref 135–145)
WBC # BLD: 3.83 K/UL — SIGNIFICANT CHANGE UP (ref 3.8–10.5)
WBC # FLD AUTO: 3.83 K/UL — SIGNIFICANT CHANGE UP (ref 3.8–10.5)

## 2018-03-08 RX ORDER — DOCUSATE SODIUM 100 MG
1 CAPSULE ORAL
Qty: 0 | Refills: 0 | COMMUNITY

## 2018-03-08 RX ORDER — SODIUM CHLORIDE 9 MG/ML
1000 INJECTION, SOLUTION INTRAVENOUS
Qty: 0 | Refills: 0 | Status: DISCONTINUED | OUTPATIENT
Start: 2018-03-09 | End: 2018-03-09

## 2018-03-08 RX ORDER — MECLIZINE HCL 12.5 MG
1 TABLET ORAL
Qty: 0 | Refills: 0 | COMMUNITY

## 2018-03-08 NOTE — H&P PST ADULT - FAMILY HISTORY
Family history of prostate cancer in father     Grandparent  Still living? No  Family history of ovarian cancer, Age at diagnosis: Age Unknown

## 2018-03-08 NOTE — H&P PST ADULT - MUSCULOSKELETAL
details… detailed exam no calf tenderness/normal strength ROM intact/normal strength/no joint erythema/no joint warmth/no calf tenderness/no joint swelling

## 2018-03-08 NOTE — H&P PST ADULT - RS GEN PE MLT RESP DETAILS PC
respirations non-labored/no rales/no rhonchi/clear to auscultation bilaterally/no wheezes no rhonchi/no wheezes/good air movement/respirations non-labored/no chest wall tenderness/no intercostal retractions/airway patent/breath sounds equal/clear to auscultation bilaterally/no rales

## 2018-03-08 NOTE — H&P PST ADULT - HISTORY OF PRESENT ILLNESS
47 year old female presents to presurgical testing with diagnosis of malignant neoplasm of left ovary scheduled for resection of abdominal wall for 3/9/18. Pt with Hx fo recurrent cancer 6 months after completion of chemotherapy, with 2 abdominal wall metastasis on CT scan, s/p biopsy. Pt with Hx of ovarian cancer s/p left ovarian salpingectomy in 2017, and total laparoscopic hysterectomy right salpingo oophorectomy in 2/28/17. 47 year old female presents to presurgical testing with diagnosis of malignant neoplasm of left ovary scheduled for resection of abdominal wall for 3/9/18. Pt with Hx fo recurrent cancer 6 months after completion of chemotherapy, with 2 abdominal wall metastasis on CT scan, s/p biopsy. Pt with Hx of ovarian cancer s/p left salpingo oophorectomy in 2017, and total laparoscopic hysterectomy right salpingo oophorectomy in 2/28/17.

## 2018-03-08 NOTE — H&P PST ADULT - NEGATIVE NEUROLOGICAL SYMPTOMS
no focal seizures/no syncope/no transient paralysis/no paresthesias/no tremors/no headache/no weakness/no generalized seizures/no loss of sensation

## 2018-03-08 NOTE — H&P PST ADULT - NEGATIVE OPHTHALMOLOGIC SYMPTOMS
no blurred vision L/no blurred vision R/no discharge R/no diplopia/no pain L/no discharge L/no pain R/no loss of vision L/no loss of vision R

## 2018-03-08 NOTE — H&P PST ADULT - PROBLEM SELECTOR PLAN 1
Robotic Laparoscopic Right  Salpingo oophorectomy, possible Laparoscopic hysterectomy, possible staging 2/28/17.     CBC CMP PT/PTT T&S EKG    Pt reports Last dose Lovenox (post op oophorectomy) was today 2/17/17.    Pt reports she will see PMD for celarance, requested on chart Pt is scheduled for resection of abdominal wall for 3/9/18. Preop instructions, pepcid, surgical scrub provided. Pt stated understanding. Pt instructed to take cymbalta lexapro and topamax on the morning of procedure for anxiety Pt is scheduled for resection of abdominal wall for 3/9/18. Preop instructions, pepcid, surgical scrub provided. Pt stated understanding. Pt instructed to take cymbalta lexapro and topamax on the morning of procedure for anxiety. Case discussed with Dr Cuevas anesthesiologist

## 2018-03-08 NOTE — ASU PATIENT PROFILE, ADULT - PSH
H/O right breast biopsy  2015  H/O: hysterectomy  robotic total laparoscopic hysterectomy, right salpingo oophorectomy 2/2017  History of Tonsillectomy    Port-a-cath in place  Right 4/2017  S/P unilateral salpingo-oophorectomy  1/25/17 (L)

## 2018-03-08 NOTE — H&P PST ADULT - NEGATIVE ENMT SYMPTOMS
no ear pain/no hearing difficulty/no tinnitus/no sinus symptoms/no nose bleeds/no throat pain/no dysphagia

## 2018-03-08 NOTE — H&P PST ADULT - PSH
H/O right breast biopsy  2015  H/O: hysterectomy  robotic total laparoscopic hysterectomy, right salpingo oophorectomy 2/2017  History of Tonsillectomy    S/P unilateral salpingo-oophorectomy  1/25/17 (L) H/O right breast biopsy  2015  H/O: hysterectomy  robotic total laparoscopic hysterectomy, right salpingo oophorectomy 2/2017  History of Tonsillectomy    Port-a-cath in place  Right 4/2017  S/P unilateral salpingo-oophorectomy  1/25/17 (L)

## 2018-03-09 ENCOUNTER — TRANSCRIPTION ENCOUNTER (OUTPATIENT)
Age: 48
End: 2018-03-09

## 2018-03-09 ENCOUNTER — INPATIENT (INPATIENT)
Facility: HOSPITAL | Age: 48
LOS: 0 days | Discharge: HOME | End: 2018-03-10
Attending: OBSTETRICS & GYNECOLOGY | Admitting: OBSTETRICS & GYNECOLOGY
Payer: COMMERCIAL

## 2018-03-09 ENCOUNTER — RESULT REVIEW (OUTPATIENT)
Age: 48
End: 2018-03-09

## 2018-03-09 ENCOUNTER — APPOINTMENT (OUTPATIENT)
Dept: GYNECOLOGIC ONCOLOGY | Facility: HOSPITAL | Age: 48
End: 2018-03-09

## 2018-03-09 VITALS
RESPIRATION RATE: 17 BRPM | SYSTOLIC BLOOD PRESSURE: 116 MMHG | HEIGHT: 59 IN | HEART RATE: 64 BPM | OXYGEN SATURATION: 99 % | TEMPERATURE: 98 F | DIASTOLIC BLOOD PRESSURE: 70 MMHG | WEIGHT: 139.99 LBS

## 2018-03-09 DIAGNOSIS — Z98.890 OTHER SPECIFIED POSTPROCEDURAL STATES: Chronic | ICD-10-CM

## 2018-03-09 DIAGNOSIS — C56.2 MALIGNANT NEOPLASM OF LEFT OVARY: ICD-10-CM

## 2018-03-09 DIAGNOSIS — Z90.721 ACQUIRED ABSENCE OF OVARIES, UNILATERAL: Chronic | ICD-10-CM

## 2018-03-09 DIAGNOSIS — Z95.828 PRESENCE OF OTHER VASCULAR IMPLANTS AND GRAFTS: Chronic | ICD-10-CM

## 2018-03-09 LAB
HCT VFR BLD CALC: 39.7 % — SIGNIFICANT CHANGE UP (ref 34.5–45)
HGB BLD-MCNC: 13.4 G/DL — SIGNIFICANT CHANGE UP (ref 11.5–15.5)
MANUAL SMEAR VERIFICATION: SIGNIFICANT CHANGE UP
MCHC RBC-ENTMCNC: 30.7 PG — SIGNIFICANT CHANGE UP (ref 27–34)
MCHC RBC-ENTMCNC: 33.8 % — SIGNIFICANT CHANGE UP (ref 32–36)
MCV RBC AUTO: 90.8 FL — SIGNIFICANT CHANGE UP (ref 80–100)
NRBC # FLD: 0 — SIGNIFICANT CHANGE UP
PLATELET # BLD AUTO: 121 K/UL — LOW (ref 150–400)
PLATELET CLUMP BLD QL SMEAR: SIGNIFICANT CHANGE UP
PLATELET COUNT - ESTIMATE: NORMAL — SIGNIFICANT CHANGE UP
PMV BLD: 11.4 FL — SIGNIFICANT CHANGE UP (ref 7–13)
RBC # BLD: 4.37 M/UL — SIGNIFICANT CHANGE UP (ref 3.8–5.2)
RBC # FLD: 12.9 % — SIGNIFICANT CHANGE UP (ref 10.3–14.5)
WBC # BLD: 3.15 K/UL — LOW (ref 3.8–10.5)
WBC # FLD AUTO: 3.15 K/UL — LOW (ref 3.8–10.5)

## 2018-03-09 PROCEDURE — 88305 TISSUE EXAM BY PATHOLOGIST: CPT | Mod: 26

## 2018-03-09 PROCEDURE — 22904 RADICAL RESECT ABD TUMOR<5CM: CPT

## 2018-03-09 PROCEDURE — 88341 IMHCHEM/IMCYTCHM EA ADD ANTB: CPT | Mod: 26

## 2018-03-09 PROCEDURE — 88307 TISSUE EXAM BY PATHOLOGIST: CPT | Mod: 26

## 2018-03-09 PROCEDURE — 88342 IMHCHEM/IMCYTCHM 1ST ANTB: CPT | Mod: 26

## 2018-03-09 PROCEDURE — 88331 PATH CONSLTJ SURG 1 BLK 1SPC: CPT | Mod: 26

## 2018-03-09 RX ORDER — ACETAMINOPHEN 500 MG
3 TABLET ORAL
Qty: 0 | Refills: 0 | COMMUNITY
Start: 2018-03-09

## 2018-03-09 RX ORDER — HYDROMORPHONE HYDROCHLORIDE 2 MG/ML
0.5 INJECTION INTRAMUSCULAR; INTRAVENOUS; SUBCUTANEOUS
Qty: 0 | Refills: 0 | Status: DISCONTINUED | OUTPATIENT
Start: 2018-03-09 | End: 2018-03-09

## 2018-03-09 RX ORDER — ACETAMINOPHEN 500 MG
975 TABLET ORAL EVERY 6 HOURS
Qty: 0 | Refills: 0 | Status: DISCONTINUED | OUTPATIENT
Start: 2018-03-09 | End: 2018-03-10

## 2018-03-09 RX ORDER — NORTRIPTYLINE HYDROCHLORIDE 10 MG/1
1 CAPSULE ORAL
Qty: 0 | Refills: 0 | COMMUNITY

## 2018-03-09 RX ORDER — IBUPROFEN 200 MG
1 TABLET ORAL
Qty: 0 | Refills: 0 | COMMUNITY
Start: 2018-03-09

## 2018-03-09 RX ORDER — OXYCODONE HYDROCHLORIDE 5 MG/1
5 TABLET ORAL
Qty: 0 | Refills: 0 | Status: DISCONTINUED | OUTPATIENT
Start: 2018-03-09 | End: 2018-03-10

## 2018-03-09 RX ORDER — FLUTICASONE PROPIONATE 220 MCG
1 AEROSOL WITH ADAPTER (GRAM) INHALATION
Qty: 0 | Refills: 0 | COMMUNITY

## 2018-03-09 RX ORDER — ENOXAPARIN SODIUM 100 MG/ML
40 INJECTION SUBCUTANEOUS DAILY
Qty: 0 | Refills: 0 | Status: DISCONTINUED | OUTPATIENT
Start: 2018-03-09 | End: 2018-03-10

## 2018-03-09 RX ORDER — DULOXETINE HYDROCHLORIDE 30 MG/1
1 CAPSULE, DELAYED RELEASE ORAL
Qty: 0 | Refills: 0 | COMMUNITY

## 2018-03-09 RX ORDER — HEPARIN SODIUM 5000 [USP'U]/ML
5000 INJECTION INTRAVENOUS; SUBCUTANEOUS ONCE
Qty: 0 | Refills: 0 | Status: COMPLETED | OUTPATIENT
Start: 2018-03-09 | End: 2018-03-09

## 2018-03-09 RX ORDER — ESCITALOPRAM OXALATE 10 MG/1
1 TABLET, FILM COATED ORAL
Qty: 0 | Refills: 0 | COMMUNITY

## 2018-03-09 RX ORDER — MECLIZINE HCL 12.5 MG
1 TABLET ORAL
Qty: 0 | Refills: 0 | COMMUNITY

## 2018-03-09 RX ORDER — TOPIRAMATE 25 MG
1 TABLET ORAL
Qty: 0 | Refills: 0 | COMMUNITY

## 2018-03-09 RX ORDER — SODIUM CHLORIDE 9 MG/ML
1000 INJECTION, SOLUTION INTRAVENOUS
Qty: 0 | Refills: 0 | Status: DISCONTINUED | OUTPATIENT
Start: 2018-03-09 | End: 2018-03-10

## 2018-03-09 RX ORDER — OXYCODONE HYDROCHLORIDE 5 MG/1
1 TABLET ORAL
Qty: 8 | Refills: 0 | OUTPATIENT
Start: 2018-03-09 | End: 2018-03-10

## 2018-03-09 RX ORDER — IBUPROFEN 200 MG
600 TABLET ORAL EVERY 6 HOURS
Qty: 0 | Refills: 0 | Status: DISCONTINUED | OUTPATIENT
Start: 2018-03-09 | End: 2018-03-10

## 2018-03-09 RX ORDER — ONDANSETRON 8 MG/1
4 TABLET, FILM COATED ORAL ONCE
Qty: 0 | Refills: 0 | Status: DISCONTINUED | OUTPATIENT
Start: 2018-03-09 | End: 2018-03-09

## 2018-03-09 RX ADMIN — SODIUM CHLORIDE 125 MILLILITER(S): 9 INJECTION, SOLUTION INTRAVENOUS at 22:44

## 2018-03-09 RX ADMIN — OXYCODONE HYDROCHLORIDE 5 MILLIGRAM(S): 5 TABLET ORAL at 23:10

## 2018-03-09 RX ADMIN — SODIUM CHLORIDE 30 MILLILITER(S): 9 INJECTION, SOLUTION INTRAVENOUS at 12:42

## 2018-03-09 RX ADMIN — SODIUM CHLORIDE 125 MILLILITER(S): 9 INJECTION, SOLUTION INTRAVENOUS at 19:10

## 2018-03-09 RX ADMIN — OXYCODONE HYDROCHLORIDE 5 MILLIGRAM(S): 5 TABLET ORAL at 22:44

## 2018-03-09 RX ADMIN — HEPARIN SODIUM 5000 UNIT(S): 5000 INJECTION INTRAVENOUS; SUBCUTANEOUS at 12:41

## 2018-03-09 NOTE — BRIEF OPERATIVE NOTE - PROCEDURE
<<-----Click on this checkbox to enter Procedure Component separation of abdominal wall  03/09/2018    Active  [email or web address]  Reconstruction, abdominal wall, open, using mesh  03/09/2018    Active  [email or web address]  Surgical removal of soft tissue mass of abdominal wall  03/09/2018  x 3  Active  [email or web address]

## 2018-03-09 NOTE — DISCHARGE NOTE ADULT - NS AS ACTIVITY OBS
Return to Work/School allowed/Sex allowed/No Heavy lifting/straining/Do not make important decisions/Showering allowed/Driving allowed/Walking-Indoors allowed/Stairs allowed/Do not drive or operate machinery/Bathing allowed

## 2018-03-09 NOTE — PROVIDER CONTACT NOTE (CRITICAL VALUE NOTIFICATION) - BACKGROUND
CBC drawn in pre op today and initial result- Platelet 121.  Lab called and corrected the Platelet result to clumping and no number value.

## 2018-03-09 NOTE — PROVIDER CONTACT NOTE (CRITICAL VALUE NOTIFICATION) - SITUATION
CBC ordered for pre op ASU due to platelet clumping on 3/8 CBC results and not having a platelet value.

## 2018-03-09 NOTE — PROVIDER CONTACT NOTE (CRITICAL VALUE NOTIFICATION) - ACTION/TREATMENT ORDERED:
"that's okay" per Dr Hanley "that's okay" per Dr Hanley. Dr Saldana aware of all the above as well. "okay" per MD. no orders received from either MD.

## 2018-03-09 NOTE — DISCHARGE NOTE ADULT - CONDITIONS AT DISCHARGE
Pt ambulated and voids without difficulty. Pt tolerates diet. Pt's VSS. pt is A+OX4. Pt received oral pain meds with relief. Pt has lower right abd quadrant CARLINE. Drain is draining moderate amounts of sero-sanguineous fluid. Pt received CARLINE care teaching as well as supplies. Pt completed correct return demonstration of CARLINE care. Pt also verbalized understanding of drain care. Pt has lower transverse incision with steri strips. Incision is well approximated without redness, bleeding or swelling.

## 2018-03-09 NOTE — DISCHARGE NOTE ADULT - CARE PROVIDER_API CALL
Osiris Hanley), Gynecologic Oncology; Obstetrics and Gynecology  [address] [place] [address]  Phone: [phone]  Fax: [phone]

## 2018-03-09 NOTE — CHART NOTE - NSCHARTNOTEFT_GEN_A_CORE
GYN/ONC PA Post Op Note     SUBJECTIVE: Pt seen s/p resection of abdominal wall masses, abdominal wall reconstruction with mesh.  Pt doing well.  Denies any pain at this time, adequate pain control on current regimen.   Tolerating a regular diet. Denies N/V. -/- GI function.  Has not ambulated or voided after OR (DTV @ 3am).  No acute complaints.  Denies chest pain, SOB, dizziness.    Vital Signs Last 24 Hrs  T(C): 36.8 (09 Mar 2018 23:40), Max: 36.8 (09 Mar 2018 11:53)  T(F): 98.2 (09 Mar 2018 23:40), Max: 98.2 (09 Mar 2018 11:53)  HR: 66 (09 Mar 2018 23:40) (52 - 85)  BP: 109/68 (09 Mar 2018 23:40) (96/69 - 117/70)  BP(mean): --  RR: 15 (09 Mar 2018 23:40) (9 - 19)  SpO2: 100% (09 Mar 2018 23:40) (92% - 100%)  Valentine:  NGT:  I&O's Summary    09 Mar 2018 07:01  -  09 Mar 2018 23:59  --------------------------------------------------------  IN: 610 mL / OUT: 80 mL / NET: 530 mL      I&O's Detail    09 Mar 2018 07:01  -  09 Mar 2018 23:59  --------------------------------------------------------  IN:    lactated ringers.: 500 mL    Oral Fluid: 110 mL  Total IN: 610 mL    OUT:    Bulb: 80 mL  Total OUT: 80 mL    Total NET: 530 mL          PHYSICAL EXAM:  Constitutional: NAD  Eyes:  PERRL, EOMI, Conjunctiva clear.  Gastrointestinal: Abdomen soft, non distended, nontender, RLQ CARLINE serosang  Wound: dressing C/D/I  Extremities:  No edema  Neurological: AxAxOx3        LABS:                        13.4   3.15  )-----------( --       ( 09 Mar 2018 11:59 )             39.7     03-08    143  |  103  |  15  ----------------------------<  81  4.2   |  27  |  0.93    Ca    9.0      08 Mar 2018 07:30    A/P: 47F s/p resection of abdominal wall masses, abdominal wall reconstruction with mesh  -Neuro: pain controlled on current medications  -Pulm: encourage ambulation, IS  -CV: monitor vital signs q4hrs  -GI: tolerating a regular diet, consider IVL after voids  -: DTV @ 3am, strict I&O  -Heme: lovenox for VTE ppx, monitor drain output GYN/ONC PA Post Op Note     SUBJECTIVE: Pt seen s/p resection of abdominal wall masses, abdominal wall reconstruction with mesh.  Pt doing well.  Denies any pain at this time, adequate pain control on current regimen.   Tolerating a regular diet. Denies N/V. -/- GI function.  Has not ambulated or voided after OR (DTV @ 3am).  No acute complaints.  Denies chest pain, SOB, dizziness.    Vital Signs Last 24 Hrs  T(C): 36.8 (09 Mar 2018 23:40), Max: 36.8 (09 Mar 2018 11:53)  T(F): 98.2 (09 Mar 2018 23:40), Max: 98.2 (09 Mar 2018 11:53)  HR: 66 (09 Mar 2018 23:40) (52 - 85)  BP: 109/68 (09 Mar 2018 23:40) (96/69 - 117/70)  BP(mean): --  RR: 15 (09 Mar 2018 23:40) (9 - 19)  SpO2: 100% (09 Mar 2018 23:40) (92% - 100%)  Valentine:  NGT:  I&O's Summary    09 Mar 2018 07:01  -  09 Mar 2018 23:59  --------------------------------------------------------  IN: 610 mL / OUT: 80 mL / NET: 530 mL      I&O's Detail    09 Mar 2018 07:01  -  09 Mar 2018 23:59  --------------------------------------------------------  IN:    lactated ringers.: 500 mL    Oral Fluid: 110 mL  Total IN: 610 mL    OUT:    Bulb: 80 mL  Total OUT: 80 mL    Total NET: 530 mL          PHYSICAL EXAM:  Constitutional: NAD  Eyes:  PERRL, EOMI, Conjunctiva clear.  Gastrointestinal: Abdomen soft, non distended, nontender, RLQ CARLINE serosang  Wound: dressing C/D/I  Extremities:  No edema  Neurological: AxAxOx3        LABS:                        13.4   3.15  )-----------( --       ( 09 Mar 2018 11:59 )             39.7     03-08    143  |  103  |  15  ----------------------------<  81  4.2   |  27  |  0.93    Ca    9.0      08 Mar 2018 07:30    A/P: 47F s/p resection of abdominal wall masses, abdominal wall reconstruction with mesh  -Neuro: pain controlled on current medications  -Pulm: encourage ambulation, IS  -CV: monitor vital signs q4hrs  -GI: tolerating a regular diet, consider IVL after voids  -: DTV @ 3am, strict I&O  -Heme: lovenox for VTE ppx  -monitor drain output  -Position bed at 30 degree angle

## 2018-03-09 NOTE — DISCHARGE NOTE ADULT - INSTRUCTIONS
Notify MD if expereincing fever, nausea, increased pain, bleeding, redness or swelling from incision site. Pt has lower right quadrant CARLINE drain. Empty drains as instructed.

## 2018-03-09 NOTE — BRIEF OPERATIVE NOTE - OPERATION/FINDINGS
two abdominal masses combined sent for frozen  another mass sent for permanent  reconstructed with surgimend

## 2018-03-09 NOTE — DISCHARGE NOTE ADULT - MEDICATION SUMMARY - MEDICATIONS TO TAKE
I will START or STAY ON the medications listed below when I get home from the hospital:    acetaminophen 325 mg oral tablet  -- 3 tab(s) by mouth every 6 hours, As needed, Mild Pain (1 - 3)  -- Indication: For Pain    ibuprofen 600 mg oral tablet  -- 1 tab(s) by mouth every 6 hours, As needed, moderate pain  -- Indication: For Pain    oxyCODONE 5 mg oral tablet  -- 1 tab(s) by mouth every 6 hours, As Needed -Severe Pain (7 - 10) MDD:4  -- Indication: For Pain I will START or STAY ON the medications listed below when I get home from the hospital:    acetaminophen 325 mg oral tablet  -- 3 tab(s) by mouth every 6 hours, As needed, Mild Pain (1 - 3)  -- Indication: For Pain    ibuprofen 600 mg oral tablet  -- 1 tab(s) by mouth every 6 hours, As needed, moderate pain  -- Indication: For Pain    oxyCODONE 5 mg oral tablet  -- 1 tab(s) by mouth every 6 hours, As Needed -Severe Pain (7 - 10) MDD:4  -- Indication: For Postop pain I will START or STAY ON the medications listed below when I get home from the hospital:    ibuprofen 600 mg oral tablet  -- 1 tab(s) by mouth every 6 hours, As needed, moderate pain  -- Indication: For Pain    oxyCODONE-acetaminophen 5 mg-325 mg oral tablet  -- 1 tab(s) by mouth every 4 hours MDD:6  -- Caution federal law prohibits the transfer of this drug to any person other  than the person for whom it was prescribed.  May cause drowsiness.  Alcohol may intensify this effect.  Use care when operating dangerous machinery.  This prescription cannot be refilled.  This product contains acetaminophen.  Do not use  with any other product containing acetaminophen to prevent possible liver damage.  Using more of this medication than prescribed may cause serious breathing problems.    -- Indication: For Pain I will START or STAY ON the medications listed below when I get home from the hospital:    ibuprofen 600 mg oral tablet  -- 1 tab(s) by mouth every 6 hours, As needed, moderate pain  -- Indication: For Pain    oxyCODONE-acetaminophen 5 mg-325 mg oral tablet  -- 1 tab(s) by mouth every 4 hours MDD:6  -- Caution federal law prohibits the transfer of this drug to any person other  than the person for whom it was prescribed.  May cause drowsiness.  Alcohol may intensify this effect.  Use care when operating dangerous machinery.  This prescription cannot be refilled.  This product contains acetaminophen.  Do not use  with any other product containing acetaminophen to prevent possible liver damage.  Using more of this medication than prescribed may cause serious breathing problems.    -- Indication: For Pain     oxyCODONE-acetaminophen 5 mg-325 mg oral tablet  -- 1 - 2 tab(s) by mouth every 4 hours, As Needed -for moderate pain - for severe pain MDD:8 tabs   -- Caution federal law prohibits the transfer of this drug to any person other  than the person for whom it was prescribed.  May cause drowsiness.  Alcohol may intensify this effect.  Use care when operating dangerous machinery.  This prescription cannot be refilled.  This product contains acetaminophen.  Do not use  with any other product containing acetaminophen to prevent possible liver damage.  Using more of this medication than prescribed may cause serious breathing problems.    -- Indication: For New pain Rx I will START or STAY ON the medications listed below when I get home from the hospital:    ibuprofen 600 mg oral tablet  -- 1 tab(s) by mouth every 6 hours, As needed, moderate pain  -- Indication: For Pain    oxyCODONE-acetaminophen 5 mg-325 mg oral tablet  -- 1 - 2 tab(s) by mouth every 4 hours, As Needed -for moderate pain- for severe pain MDD:8 tabs  -- Caution federal law prohibits the transfer of this drug to any person other  than the person for whom it was prescribed.  May cause drowsiness.  Alcohol may intensify this effect.  Use care when operating dangerous machinery.  This prescription cannot be refilled.  This product contains acetaminophen.  Do not use  with any other product containing acetaminophen to prevent possible liver damage.  Using more of this medication than prescribed may cause serious breathing problems.    -- Indication: For Post op pain med

## 2018-03-09 NOTE — DISCHARGE NOTE ADULT - CARE PLAN
Principal Discharge DX:	Ovarian cancer  Goal:	recovery  Assessment and plan of treatment:	nothing in vagina (sex, tampons, douching) no heavy lifting (>10 lbs) for 6 weeks. Please return to ER or call your doctors office if pain is worsening, you are unable to keep down food or drink, fever >101, heavy vaginal bleeding. You are able to take a shower regularly.

## 2018-03-09 NOTE — DISCHARGE NOTE ADULT - HOSPITAL COURSE
48yo s/p resection of abdominal wall mass, abdominal wall reconstruction with mesh, See operative note for details of procedure.  POD#0, patient was advanced to a regular diet. Valentine was discontinued and patient voided spontaneously.  Patient was discharged on POD#1 in stable condition with adequate pain control, ambulating, tolerating PO and voiding spontaneously.  Patient to follow up with Dr. Hanley   in 2 weeks. 46yo s/p resection of abdominal wall mass, abdominal wall reconstruction with mesh, See operative note for details of procedure.  POD#0, patient was advanced to a regular diet. Valentine was discontinued and patient voided spontaneously.  Patient was discharged on POD#1 in stable condition with adequate pain control, ambulating, tolerating PO and voiding spontaneously.  Patient to follow up with Dr. Hanley  in 2 weeks.

## 2018-03-09 NOTE — DISCHARGE NOTE ADULT - PATIENT PORTAL LINK FT
You can access the [facility] Patient Portal, offered by [facility], by registering with the following website: [email or web address]

## 2018-03-10 VITALS
OXYGEN SATURATION: 99 % | DIASTOLIC BLOOD PRESSURE: 59 MMHG | TEMPERATURE: 98 F | SYSTOLIC BLOOD PRESSURE: 111 MMHG | RESPIRATION RATE: 18 BRPM

## 2018-03-10 DIAGNOSIS — Z98.890 OTHER SPECIFIED POSTPROCEDURAL STATES: ICD-10-CM

## 2018-03-10 RX ORDER — OXYCODONE HYDROCHLORIDE 5 MG/1
1 TABLET ORAL
Qty: 8 | Refills: 0 | OUTPATIENT
Start: 2018-03-10 | End: 2018-03-11

## 2018-03-10 RX ORDER — SODIUM CHLORIDE 9 MG/ML
3 INJECTION INTRAMUSCULAR; INTRAVENOUS; SUBCUTANEOUS EVERY 8 HOURS
Qty: 0 | Refills: 0 | Status: DISCONTINUED | OUTPATIENT
Start: 2018-03-10 | End: 2018-03-10

## 2018-03-10 RX ADMIN — OXYCODONE HYDROCHLORIDE 5 MILLIGRAM(S): 5 TABLET ORAL at 08:41

## 2018-03-10 RX ADMIN — Medication 975 MILLIGRAM(S): at 07:44

## 2018-03-10 RX ADMIN — OXYCODONE HYDROCHLORIDE 5 MILLIGRAM(S): 5 TABLET ORAL at 05:30

## 2018-03-10 RX ADMIN — OXYCODONE HYDROCHLORIDE 5 MILLIGRAM(S): 5 TABLET ORAL at 09:10

## 2018-03-10 RX ADMIN — OXYCODONE HYDROCHLORIDE 5 MILLIGRAM(S): 5 TABLET ORAL at 06:29

## 2018-03-10 RX ADMIN — OXYCODONE HYDROCHLORIDE 5 MILLIGRAM(S): 5 TABLET ORAL at 13:47

## 2018-03-10 RX ADMIN — OXYCODONE HYDROCHLORIDE 5 MILLIGRAM(S): 5 TABLET ORAL at 19:56

## 2018-03-10 RX ADMIN — SODIUM CHLORIDE 3 MILLILITER(S): 9 INJECTION INTRAMUSCULAR; INTRAVENOUS; SUBCUTANEOUS at 13:37

## 2018-03-10 RX ADMIN — ENOXAPARIN SODIUM 40 MILLIGRAM(S): 100 INJECTION SUBCUTANEOUS at 05:30

## 2018-03-10 RX ADMIN — Medication 975 MILLIGRAM(S): at 08:41

## 2018-03-10 RX ADMIN — OXYCODONE HYDROCHLORIDE 5 MILLIGRAM(S): 5 TABLET ORAL at 14:17

## 2018-03-10 NOTE — PROGRESS NOTE ADULT - PROBLEM SELECTOR PLAN 1
CV: Hemodynamically stable  Pulm: Saturating well on RA. Increase incentive spirometry.  GI: advance to regular diet  : voiding spontaneously, no issues  Heme: Continue HSQ/Lovenox/Venodynes for DVT ppx. Increase OOB.    Neuro: IV Tylenol administered. F/u pain control with routine Tylenol, Motrin, Oxy prn  Dispo: re-eval pm today, anticipate discharge if pain adequately controlled and pt stable      TERELL Ochoa, PGY2

## 2018-03-10 NOTE — PROGRESS NOTE ADULT - SUBJECTIVE AND OBJECTIVE BOX
Gyn ONC Progress Note POD #    Subjective:   Pt seen and examined at bedside. No events overnight. Pain well controlled. Patient ambulating. Passing flatus. Tolerating regular diet. Pt denies fever, chills, chest pain, SOB, nausea, vomiting, lightheadedness, dizziness.      Objective:  T(F): 98 (03-10-18 @ 02:00), Max: 98.2 (03-09-18 @ 11:53)  HR: 63 (03-10-18 @ 02:00) (52 - 85)  BP: 97/53 (03-10-18 @ 02:00) (96/69 - 117/70)  RR: 16 (03-10-18 @ 02:00) (9 - 19)  SpO2: 100% (03-10-18 @ 02:00) (92% - 100%)  Wt(kg): --  I&O's Summary    09 Mar 2018 07:01  -  10 Mar 2018 07:00  --------------------------------------------------------  IN: 860 mL / OUT: 1775 mL / NET: -915 mL      CAPILLARY BLOOD GLUCOSE          MEDICATIONS  (STANDING):  enoxaparin Injectable 40 milliGRAM(s) SubCutaneous daily  lactated ringers. 1000 milliLiter(s) (125 mL/Hr) IV Continuous <Continuous>    MEDICATIONS  (PRN):  acetaminophen   Tablet. 975 milliGRAM(s) Oral every 6 hours PRN Mild Pain (1 - 3)  ibuprofen  Tablet 600 milliGRAM(s) Oral every 6 hours PRN moderate pain  oxyCODONE    IR 5 milliGRAM(s) Oral every 3 hours PRN Severe Pain (7 - 10)      Physical Exam:  Constitutional: NAD, A+O x3  CV: RRR  Lungs: clear to auscultation bilaterally  Abdomen: soft, nondistended, no guarding, no rebound, normal bowel sounds  Incision: clean, dry, intact  Extremities: no lower extremity edema or calf tenderness bilaterally; venodynes in place    LABS:                    Assessment/Plan: 47y female POD# , s/p     CV: Hemodynamically stable  Pulm: Saturating well on RA. Increase incentive spirometry.  GI: Advance diet as tolerated  : Valentine in place. Adequate UOP  Heme: Continue HSQ/Lovenox/Venodynes for DVT ppx. Increase OOB.    Neuro: PCA for pain control. // Continue oral meds for pain control. Gyn ONC Progress Note POD #1    Subjective:   Pt seen and examined at bedside. Reports abdominal pain with movement, PCA moderately controls it. Patient ambulating to bathroom. Passing flatus. Tolerating clear liquids. Pt denies fever, chills, chest pain, SOB, nausea, vomiting, lightheadedness, dizziness.      Objective:  T(F): 98 (03-10-18 @ 02:00), Max: 98.2 (03-09-18 @ 11:53)  HR: 63 (03-10-18 @ 02:00) (52 - 85)  BP: 97/53 (03-10-18 @ 02:00) (96/69 - 117/70)  RR: 16 (03-10-18 @ 02:00) (9 - 19)  SpO2: 100% (03-10-18 @ 02:00) (92% - 100%)  I&O's Summary    09 Mar 2018 07:01  -  10 Mar 2018 07:00  --------------------------------------------------------  IN: 860 mL / OUT: 1775 mL / NET: -915 mL      MEDICATIONS  (STANDING):  enoxaparin Injectable 40 milliGRAM(s) SubCutaneous daily  lactated ringers. 1000 milliLiter(s) (125 mL/Hr) IV Continuous <Continuous>    MEDICATIONS  (PRN):  acetaminophen   Tablet. 975 milliGRAM(s) Oral every 6 hours PRN Mild Pain (1 - 3)  ibuprofen  Tablet 600 milliGRAM(s) Oral every 6 hours PRN moderate pain  oxyCODONE    IR 5 milliGRAM(s) Oral every 3 hours PRN Severe Pain (7 - 10)      Physical Exam:  Constitutional: AAO x3  CV: RRR  Lungs: clear to auscultation bilaterally  Abdomen: +BS, soft, nondistended, diffuse TTP around incision, no guarding, no rebound. RLQ CARLINE drain with serosanguinous discharge  Incision: clean, dry, intact  Extremities: no lower extremity edema or calf tenderness bilaterally

## 2018-03-10 NOTE — PROGRESS NOTE ADULT - SUBJECTIVE AND OBJECTIVE BOX
A TEAM SURGERY PROGRESS NOTE    SUBJECTIVE: Pt seen at bedside. Denies SOB/Nausea/Vomiting.  Pain controlled with medications.     Vital Signs Last 24 Hrs  T(C): 37 (10 Mar 2018 10:53), Max: 37 (10 Mar 2018 10:53)  T(F): 98.6 (10 Mar 2018 10:53), Max: 98.6 (10 Mar 2018 10:53)  HR: 52 (10 Mar 2018 10:53) (52 - 85)  BP: 97/48 (10 Mar 2018 10:53) (96/69 - 117/70)  BP(mean): --  RR: 16 (10 Mar 2018 10:53) (9 - 19)  SpO2: 99% (10 Mar 2018 10:53) (92% - 100%)    Physical Exam  General: awake, alert,    Pulm: respirations unlabored, no increased WOB  Abdomen: soft, mildly distended, appropriately tender, incisions C/D/I,  no rebound, no guarding  Extremities: Grossly symmetric  CARLINE drain with SS output    I&O's Summary    09 Mar 2018 07:01  -  10 Mar 2018 07:00  --------------------------------------------------------  IN: 860 mL / OUT: 1775 mL / NET: -915 mL    10 Mar 2018 06:01  -  10 Mar 2018 11:08  --------------------------------------------------------  IN: 0 mL / OUT: 250 mL / NET: -250 mL      I&O's Detail    09 Mar 2018 07:01  -  10 Mar 2018 07:00  --------------------------------------------------------  IN:    lactated ringers.: 750 mL    Oral Fluid: 110 mL  Total IN: 860 mL    OUT:    Bulb: 125 mL    Voided: 1650 mL  Total OUT: 1775 mL    Total NET: -915 mL      10 Mar 2018 06:01  -  10 Mar 2018 11:08  --------------------------------------------------------  IN:  Total IN: 0 mL    OUT:    Voided: 250 mL  Total OUT: 250 mL    Total NET: -250 mL          MEDICATIONS  (STANDING):  enoxaparin Injectable 40 milliGRAM(s) SubCutaneous daily  sodium chloride 0.9% lock flush 3 milliLiter(s) IV Push every 8 hours    MEDICATIONS  (PRN):  acetaminophen   Tablet. 975 milliGRAM(s) Oral every 6 hours PRN Mild Pain (1 - 3)  ibuprofen  Tablet 600 milliGRAM(s) Oral every 6 hours PRN moderate pain  oxyCODONE    IR 5 milliGRAM(s) Oral every 3 hours PRN Severe Pain (7 - 10)      LABS:                        13.4   3.15  )-----------( --       ( 09 Mar 2018 11:59 )             39.7                 RADIOLOGY & ADDITIONAL STUDIES:

## 2018-03-10 NOTE — PROGRESS NOTE ADULT - ATTENDING COMMENTS
Pt seen and examined, agree with gyn housetaff  POD#1  Routine postoperative care  Possible d/c home in PM

## 2018-03-10 NOTE — CHART NOTE - NSCHARTNOTEFT_GEN_A_CORE
Procedure: Removal of abdominal wall mass with component separation and recon with mesh    Diagnosis/Indication: abdominal wall mass    Surgeon: Dr. Saldana    S: patient seen on the floor, very pleasant and conversational. Patient has minimal pain and well controlled with PO pain meds. Patient already had some regular food after surgery with no issues. denies nausea, chest pain, shortness of breath.     O:  T(C): 36.8 (03-09-18 @ 23:40), Max: 36.8 (03-09-18 @ 23:40)  T(F): 98.2 (03-09-18 @ 23:40), Max: 98.2 (03-09-18 @ 23:40)  HR: 66 (03-09-18 @ 23:40) (59 - 68)  BP: 109/68 (03-09-18 @ 23:40) (103/59 - 109/68)  RR: 15 (03-09-18 @ 23:40) (14 - 15)  SpO2: 100% (03-09-18 @ 23:40) (100% - 100%)  Wt(kg): --    Gen: resting in bed, conversational   Pulm: breathing well on room air  Abd: soft, non-distended, appropriately tender over incision, dressing intact clean with minimal strike through    A/P: 47yFemale s/p above procedure with uncomplicated course of recovery, pain well controlled and tolerated regular food. Plan to ambulate tomorrow morning    Diet: Regular  IVF: 125 cc/h LR  Pain/nausea control  DVT ppx: lovenox  Dispo plan: primary care per OB/GYN

## 2018-03-10 NOTE — PROGRESS NOTE ADULT - ASSESSMENT
A/P: 47F s/p resection of abdominal wall masses, abdominal wall reconstruction with mesh    -Neuro: pain controlled on current medications  -Pulm: encourage ambulation, IS  -CV: monitor vital signs q4hrs  -GI: tolerating a regular diet, consider IVL after voids  -Heme: lovenox for VTE ppx  -monitor drain output  - F/u with Dr. Saldana after discharge in 5- 7 days. Call [phone] for appointment A/P: 47F s/p resection of abdominal wall masses, abdominal wall reconstruction with mesh    -Neuro: pain controlled on current medications  -Pulm: encourage ambulation, IS  -CV: monitor vital signs q4hrs  -GI: tolerating a regular diet, consider IVL after voids  -Heme: lovenox for VTE ppx  -monitor drain output, dc home with ashly drain in place  - F/u with Dr. REBECA Saldana after discharge in 1-2 weeks. Call [phone] for appointment

## 2018-03-10 NOTE — PROGRESS NOTE ADULT - ASSESSMENT
47y female with Stage 3B clear cell ovarian cancer s/p RA-TLH/BSO, omentectomy, staging (2/2017) and adjuvant chemo, with recurrence now POD#1 s/p resection of abdominal wall masses, and reconstruction with mesh  Postop course c/b inadequate analgesia, otherwise meeting postop milestones

## 2018-03-13 ENCOUNTER — TRANSCRIPTION ENCOUNTER (OUTPATIENT)
Age: 48
End: 2018-03-13

## 2018-04-10 ENCOUNTER — APPOINTMENT (OUTPATIENT)
Dept: GYNECOLOGIC ONCOLOGY | Facility: CLINIC | Age: 48
End: 2018-04-10
Payer: COMMERCIAL

## 2018-04-10 PROCEDURE — 99024 POSTOP FOLLOW-UP VISIT: CPT

## 2018-04-13 ENCOUNTER — OUTPATIENT (OUTPATIENT)
Dept: OUTPATIENT SERVICES | Facility: HOSPITAL | Age: 48
LOS: 1 days | Discharge: HOME | End: 2018-04-13

## 2018-04-13 DIAGNOSIS — Z98.890 OTHER SPECIFIED POSTPROCEDURAL STATES: Chronic | ICD-10-CM

## 2018-04-13 DIAGNOSIS — Z95.828 PRESENCE OF OTHER VASCULAR IMPLANTS AND GRAFTS: Chronic | ICD-10-CM

## 2018-04-13 DIAGNOSIS — C56.2 MALIGNANT NEOPLASM OF LEFT OVARY: ICD-10-CM

## 2018-04-13 DIAGNOSIS — Z90.721 ACQUIRED ABSENCE OF OVARIES, UNILATERAL: Chronic | ICD-10-CM

## 2018-04-19 ENCOUNTER — LABORATORY RESULT (OUTPATIENT)
Age: 48
End: 2018-04-19

## 2018-04-19 ENCOUNTER — APPOINTMENT (OUTPATIENT)
Dept: HEMATOLOGY ONCOLOGY | Facility: CLINIC | Age: 48
End: 2018-04-19
Payer: COMMERCIAL

## 2018-04-19 ENCOUNTER — APPOINTMENT (OUTPATIENT)
Dept: INFUSION THERAPY | Facility: HOSPITAL | Age: 48
End: 2018-04-19

## 2018-04-19 PROCEDURE — 99214 OFFICE O/P EST MOD 30 MIN: CPT

## 2018-04-26 ENCOUNTER — APPOINTMENT (OUTPATIENT)
Dept: ULTRASOUND IMAGING | Facility: IMAGING CENTER | Age: 48
End: 2018-04-26
Payer: COMMERCIAL

## 2018-04-26 ENCOUNTER — OUTPATIENT (OUTPATIENT)
Dept: OUTPATIENT SERVICES | Facility: HOSPITAL | Age: 48
LOS: 1 days | End: 2018-04-26
Payer: COMMERCIAL

## 2018-04-26 DIAGNOSIS — Z95.828 PRESENCE OF OTHER VASCULAR IMPLANTS AND GRAFTS: Chronic | ICD-10-CM

## 2018-04-26 DIAGNOSIS — Z90.721 ACQUIRED ABSENCE OF OVARIES, UNILATERAL: Chronic | ICD-10-CM

## 2018-04-26 DIAGNOSIS — Z98.890 OTHER SPECIFIED POSTPROCEDURAL STATES: Chronic | ICD-10-CM

## 2018-04-26 DIAGNOSIS — Z00.8 ENCOUNTER FOR OTHER GENERAL EXAMINATION: ICD-10-CM

## 2018-04-26 PROCEDURE — 76705 ECHO EXAM OF ABDOMEN: CPT | Mod: 26

## 2018-04-26 PROCEDURE — 76705 ECHO EXAM OF ABDOMEN: CPT

## 2018-05-04 ENCOUNTER — APPOINTMENT (OUTPATIENT)
Dept: CT IMAGING | Facility: IMAGING CENTER | Age: 48
End: 2018-05-04
Payer: COMMERCIAL

## 2018-05-04 ENCOUNTER — OUTPATIENT (OUTPATIENT)
Dept: OUTPATIENT SERVICES | Facility: HOSPITAL | Age: 48
LOS: 1 days | End: 2018-05-04
Payer: COMMERCIAL

## 2018-05-04 DIAGNOSIS — Z00.8 ENCOUNTER FOR OTHER GENERAL EXAMINATION: ICD-10-CM

## 2018-05-04 DIAGNOSIS — Z90.721 ACQUIRED ABSENCE OF OVARIES, UNILATERAL: Chronic | ICD-10-CM

## 2018-05-04 DIAGNOSIS — Z95.828 PRESENCE OF OTHER VASCULAR IMPLANTS AND GRAFTS: Chronic | ICD-10-CM

## 2018-05-04 DIAGNOSIS — Z98.890 OTHER SPECIFIED POSTPROCEDURAL STATES: Chronic | ICD-10-CM

## 2018-05-04 PROCEDURE — 74177 CT ABD & PELVIS W/CONTRAST: CPT | Mod: 26

## 2018-05-04 PROCEDURE — 74177 CT ABD & PELVIS W/CONTRAST: CPT

## 2018-05-09 ENCOUNTER — LABORATORY RESULT (OUTPATIENT)
Age: 48
End: 2018-05-09

## 2018-05-09 ENCOUNTER — RESULT REVIEW (OUTPATIENT)
Age: 48
End: 2018-05-09

## 2018-05-09 ENCOUNTER — APPOINTMENT (OUTPATIENT)
Dept: HEMATOLOGY ONCOLOGY | Facility: CLINIC | Age: 48
End: 2018-05-09

## 2018-05-09 LAB
HCT VFR BLD CALC: 35.5 % — SIGNIFICANT CHANGE UP (ref 34.5–45)
HGB BLD-MCNC: 12.1 G/DL — SIGNIFICANT CHANGE UP (ref 11.5–15.5)
MCHC RBC-ENTMCNC: 30.2 PG — SIGNIFICANT CHANGE UP (ref 27–34)
MCHC RBC-ENTMCNC: 34 G/DL — SIGNIFICANT CHANGE UP (ref 32–36)
MCV RBC AUTO: 88.8 FL — SIGNIFICANT CHANGE UP (ref 80–100)
PLATELET # BLD AUTO: 149 K/UL — LOW (ref 150–400)
RBC # BLD: 4 M/UL — SIGNIFICANT CHANGE UP (ref 3.8–5.2)
RBC # FLD: 12.1 % — SIGNIFICANT CHANGE UP (ref 10.3–14.5)
WBC # BLD: 4 K/UL — SIGNIFICANT CHANGE UP (ref 3.8–10.5)
WBC # FLD AUTO: 4 K/UL — SIGNIFICANT CHANGE UP (ref 3.8–10.5)

## 2018-05-10 ENCOUNTER — FORM ENCOUNTER (OUTPATIENT)
Age: 48
End: 2018-05-10

## 2018-05-11 ENCOUNTER — OUTPATIENT (OUTPATIENT)
Dept: OUTPATIENT SERVICES | Facility: HOSPITAL | Age: 48
LOS: 1 days | End: 2018-05-11
Payer: COMMERCIAL

## 2018-05-11 DIAGNOSIS — Z95.828 PRESENCE OF OTHER VASCULAR IMPLANTS AND GRAFTS: Chronic | ICD-10-CM

## 2018-05-11 DIAGNOSIS — K65.9 PERITONITIS, UNSPECIFIED: ICD-10-CM

## 2018-05-11 DIAGNOSIS — Z98.890 OTHER SPECIFIED POSTPROCEDURAL STATES: Chronic | ICD-10-CM

## 2018-05-11 DIAGNOSIS — Z90.721 ACQUIRED ABSENCE OF OVARIES, UNILATERAL: Chronic | ICD-10-CM

## 2018-05-11 DIAGNOSIS — R59.9 ENLARGED LYMPH NODES, UNSPECIFIED: ICD-10-CM

## 2018-05-11 LAB
GRAM STN WND: SIGNIFICANT CHANGE UP
SPECIMEN SOURCE: SIGNIFICANT CHANGE UP

## 2018-05-11 PROCEDURE — 10030 IMG GID FLU COLL DRG SFT TIS: CPT

## 2018-05-13 LAB — SPECIMEN SOURCE: SIGNIFICANT CHANGE UP

## 2018-05-15 ENCOUNTER — OUTPATIENT (OUTPATIENT)
Dept: OUTPATIENT SERVICES | Facility: HOSPITAL | Age: 48
LOS: 1 days | Discharge: HOME | End: 2018-05-15

## 2018-05-15 DIAGNOSIS — Z98.890 OTHER SPECIFIED POSTPROCEDURAL STATES: Chronic | ICD-10-CM

## 2018-05-15 DIAGNOSIS — C56.2 MALIGNANT NEOPLASM OF LEFT OVARY: ICD-10-CM

## 2018-05-15 DIAGNOSIS — Z90.721 ACQUIRED ABSENCE OF OVARIES, UNILATERAL: Chronic | ICD-10-CM

## 2018-05-15 DIAGNOSIS — Z95.828 PRESENCE OF OTHER VASCULAR IMPLANTS AND GRAFTS: Chronic | ICD-10-CM

## 2018-05-16 DIAGNOSIS — T81.89XA OTHER COMPLICATIONS OF PROCEDURES, NOT ELSEWHERE CLASSIFIED, INITIAL ENCOUNTER: ICD-10-CM

## 2018-05-17 ENCOUNTER — APPOINTMENT (OUTPATIENT)
Dept: INFUSION THERAPY | Facility: HOSPITAL | Age: 48
End: 2018-05-17

## 2018-05-17 LAB — CULTURE - SURGICAL SITE: SIGNIFICANT CHANGE UP

## 2018-05-18 ENCOUNTER — MEDICATION RENEWAL (OUTPATIENT)
Age: 48
End: 2018-05-18

## 2018-05-20 ENCOUNTER — FORM ENCOUNTER (OUTPATIENT)
Age: 48
End: 2018-05-20

## 2018-05-21 ENCOUNTER — OUTPATIENT (OUTPATIENT)
Dept: OUTPATIENT SERVICES | Facility: HOSPITAL | Age: 48
LOS: 1 days | End: 2018-05-21
Payer: COMMERCIAL

## 2018-05-21 ENCOUNTER — APPOINTMENT (OUTPATIENT)
Dept: CT IMAGING | Facility: HOSPITAL | Age: 48
End: 2018-05-21

## 2018-05-21 DIAGNOSIS — Z95.828 PRESENCE OF OTHER VASCULAR IMPLANTS AND GRAFTS: Chronic | ICD-10-CM

## 2018-05-21 DIAGNOSIS — Z98.890 OTHER SPECIFIED POSTPROCEDURAL STATES: Chronic | ICD-10-CM

## 2018-05-21 DIAGNOSIS — Z90.721 ACQUIRED ABSENCE OF OVARIES, UNILATERAL: Chronic | ICD-10-CM

## 2018-05-21 DIAGNOSIS — R18.8 OTHER ASCITES: ICD-10-CM

## 2018-05-21 PROCEDURE — 72192 CT PELVIS W/O DYE: CPT | Mod: 26

## 2018-05-21 PROCEDURE — 75984 XRAY CONTROL CATHETER CHANGE: CPT | Mod: 26

## 2018-05-21 PROCEDURE — 49423 EXCHANGE DRAINAGE CATHETER: CPT

## 2018-05-22 ENCOUNTER — APPOINTMENT (OUTPATIENT)
Dept: HEMATOLOGY ONCOLOGY | Facility: CLINIC | Age: 48
End: 2018-05-22
Payer: COMMERCIAL

## 2018-05-22 VITALS
BODY MASS INDEX: 28.59 KG/M2 | WEIGHT: 141.51 LBS | HEART RATE: 70 BPM | RESPIRATION RATE: 18 BRPM | TEMPERATURE: 98.2 F | SYSTOLIC BLOOD PRESSURE: 106 MMHG | DIASTOLIC BLOOD PRESSURE: 68 MMHG | OXYGEN SATURATION: 98 %

## 2018-05-22 PROCEDURE — 99214 OFFICE O/P EST MOD 30 MIN: CPT

## 2018-05-24 DIAGNOSIS — T85.698A OTHER MECHANICAL COMPLICATION OF OTHER SPECIFIED INTERNAL PROSTHETIC DEVICES, IMPLANTS AND GRAFTS, INITIAL ENCOUNTER: ICD-10-CM

## 2018-05-24 DIAGNOSIS — Z85.43 PERSONAL HISTORY OF MALIGNANT NEOPLASM OF OVARY: ICD-10-CM

## 2018-05-28 ENCOUNTER — FORM ENCOUNTER (OUTPATIENT)
Age: 48
End: 2018-05-28

## 2018-05-29 ENCOUNTER — APPOINTMENT (OUTPATIENT)
Dept: CT IMAGING | Facility: HOSPITAL | Age: 48
End: 2018-05-29

## 2018-05-29 ENCOUNTER — OUTPATIENT (OUTPATIENT)
Dept: OUTPATIENT SERVICES | Facility: HOSPITAL | Age: 48
LOS: 1 days | End: 2018-05-29
Payer: COMMERCIAL

## 2018-05-29 DIAGNOSIS — Z95.828 PRESENCE OF OTHER VASCULAR IMPLANTS AND GRAFTS: Chronic | ICD-10-CM

## 2018-05-29 DIAGNOSIS — R18.8 OTHER ASCITES: ICD-10-CM

## 2018-05-29 DIAGNOSIS — Z98.890 OTHER SPECIFIED POSTPROCEDURAL STATES: Chronic | ICD-10-CM

## 2018-05-29 DIAGNOSIS — Z90.721 ACQUIRED ABSENCE OF OVARIES, UNILATERAL: Chronic | ICD-10-CM

## 2018-05-29 PROCEDURE — 74150 CT ABDOMEN W/O CONTRAST: CPT | Mod: 26

## 2018-05-29 PROCEDURE — 49424 ASSESS CYST CONTRAST INJECT: CPT

## 2018-05-29 PROCEDURE — 76080 X-RAY EXAM OF FISTULA: CPT | Mod: 26

## 2018-06-01 DIAGNOSIS — Z43.4 ENCOUNTER FOR ATTENTION TO OTHER ARTIFICIAL OPENINGS OF DIGESTIVE TRACT: ICD-10-CM

## 2018-06-01 DIAGNOSIS — R18.8 OTHER ASCITES: ICD-10-CM

## 2018-06-01 DIAGNOSIS — L02.211 CUTANEOUS ABSCESS OF ABDOMINAL WALL: ICD-10-CM

## 2018-06-04 ENCOUNTER — FORM ENCOUNTER (OUTPATIENT)
Age: 48
End: 2018-06-04

## 2018-06-05 ENCOUNTER — OUTPATIENT (OUTPATIENT)
Dept: OUTPATIENT SERVICES | Facility: HOSPITAL | Age: 48
LOS: 1 days | End: 2018-06-05
Payer: MEDICAID

## 2018-06-05 ENCOUNTER — APPOINTMENT (OUTPATIENT)
Dept: CT IMAGING | Facility: HOSPITAL | Age: 48
End: 2018-06-05

## 2018-06-05 DIAGNOSIS — Z95.828 PRESENCE OF OTHER VASCULAR IMPLANTS AND GRAFTS: Chronic | ICD-10-CM

## 2018-06-05 DIAGNOSIS — Z98.890 OTHER SPECIFIED POSTPROCEDURAL STATES: Chronic | ICD-10-CM

## 2018-06-05 DIAGNOSIS — Z90.721 ACQUIRED ABSENCE OF OVARIES, UNILATERAL: Chronic | ICD-10-CM

## 2018-06-05 DIAGNOSIS — R18.8 OTHER ASCITES: ICD-10-CM

## 2018-06-05 PROCEDURE — 74150 CT ABDOMEN W/O CONTRAST: CPT | Mod: 26

## 2018-06-05 PROCEDURE — 76080 X-RAY EXAM OF FISTULA: CPT | Mod: 26

## 2018-06-05 PROCEDURE — 49419 INSERT TUN IP CATH W/PORT: CPT

## 2018-06-11 LAB — FUNGUS SPEC QL CULT: SIGNIFICANT CHANGE UP

## 2018-06-15 DIAGNOSIS — Z43.4 ENCOUNTER FOR ATTENTION TO OTHER ARTIFICIAL OPENINGS OF DIGESTIVE TRACT: ICD-10-CM

## 2018-06-15 DIAGNOSIS — T81.4XXD INFECTION FOLLOWING A PROCEDURE, SUBSEQUENT ENCOUNTER: ICD-10-CM

## 2018-06-17 ENCOUNTER — EMERGENCY (EMERGENCY)
Facility: HOSPITAL | Age: 48
LOS: 1 days | Discharge: HOME | End: 2018-06-17
Attending: EMERGENCY MEDICINE | Admitting: EMERGENCY MEDICINE
Payer: COMMERCIAL

## 2018-06-17 VITALS
SYSTOLIC BLOOD PRESSURE: 113 MMHG | OXYGEN SATURATION: 100 % | DIASTOLIC BLOOD PRESSURE: 72 MMHG | RESPIRATION RATE: 15 BRPM | TEMPERATURE: 98 F | HEART RATE: 66 BPM

## 2018-06-17 VITALS
TEMPERATURE: 99 F | SYSTOLIC BLOOD PRESSURE: 118 MMHG | RESPIRATION RATE: 16 BRPM | HEART RATE: 84 BPM | DIASTOLIC BLOOD PRESSURE: 56 MMHG | OXYGEN SATURATION: 100 %

## 2018-06-17 DIAGNOSIS — Z95.828 PRESENCE OF OTHER VASCULAR IMPLANTS AND GRAFTS: Chronic | ICD-10-CM

## 2018-06-17 DIAGNOSIS — Z98.890 OTHER SPECIFIED POSTPROCEDURAL STATES: Chronic | ICD-10-CM

## 2018-06-17 DIAGNOSIS — R10.30 LOWER ABDOMINAL PAIN, UNSPECIFIED: ICD-10-CM

## 2018-06-17 DIAGNOSIS — Z90.721 ACQUIRED ABSENCE OF OVARIES, UNILATERAL: Chronic | ICD-10-CM

## 2018-06-17 LAB
ALBUMIN SERPL ELPH-MCNC: 4.2 G/DL — SIGNIFICANT CHANGE UP (ref 3.3–5)
ALP SERPL-CCNC: 149 U/L — HIGH (ref 40–120)
ALT FLD-CCNC: 21 U/L — SIGNIFICANT CHANGE UP (ref 4–33)
APPEARANCE UR: CLEAR — SIGNIFICANT CHANGE UP
AST SERPL-CCNC: 18 U/L — SIGNIFICANT CHANGE UP (ref 4–32)
BASE EXCESS BLDV CALC-SCNC: 4.9 MMOL/L — SIGNIFICANT CHANGE UP
BASOPHILS # BLD AUTO: 0.03 K/UL — SIGNIFICANT CHANGE UP (ref 0–0.2)
BASOPHILS NFR BLD AUTO: 0.6 % — SIGNIFICANT CHANGE UP (ref 0–2)
BASOPHILS NFR SPEC: 0 % — SIGNIFICANT CHANGE UP (ref 0–2)
BILIRUB SERPL-MCNC: 0.2 MG/DL — SIGNIFICANT CHANGE UP (ref 0.2–1.2)
BILIRUB UR-MCNC: NEGATIVE — SIGNIFICANT CHANGE UP
BLOOD GAS VENOUS - CREATININE: 0.82 MG/DL — SIGNIFICANT CHANGE UP (ref 0.5–1.3)
BLOOD UR QL VISUAL: NEGATIVE — SIGNIFICANT CHANGE UP
BUN SERPL-MCNC: 14 MG/DL — SIGNIFICANT CHANGE UP (ref 7–23)
CALCIUM SERPL-MCNC: 9.1 MG/DL — SIGNIFICANT CHANGE UP (ref 8.4–10.5)
CHLORIDE BLDV-SCNC: 107 MMOL/L — SIGNIFICANT CHANGE UP (ref 96–108)
CHLORIDE SERPL-SCNC: 101 MMOL/L — SIGNIFICANT CHANGE UP (ref 98–107)
CO2 SERPL-SCNC: 27 MMOL/L — SIGNIFICANT CHANGE UP (ref 22–31)
COLOR SPEC: SIGNIFICANT CHANGE UP
CREAT SERPL-MCNC: 0.92 MG/DL — SIGNIFICANT CHANGE UP (ref 0.5–1.3)
EOSINOPHIL # BLD AUTO: 0.09 K/UL — SIGNIFICANT CHANGE UP (ref 0–0.5)
EOSINOPHIL NFR BLD AUTO: 1.7 % — SIGNIFICANT CHANGE UP (ref 0–6)
EOSINOPHIL NFR FLD: 0.9 % — SIGNIFICANT CHANGE UP (ref 0–6)
GAS PNL BLDV: 138 MMOL/L — SIGNIFICANT CHANGE UP (ref 136–146)
GIANT PLATELETS BLD QL SMEAR: PRESENT — SIGNIFICANT CHANGE UP
GLUCOSE BLDV-MCNC: 78 — SIGNIFICANT CHANGE UP (ref 70–99)
GLUCOSE SERPL-MCNC: 83 MG/DL — SIGNIFICANT CHANGE UP (ref 70–99)
GLUCOSE UR-MCNC: NEGATIVE — SIGNIFICANT CHANGE UP
HCO3 BLDV-SCNC: 26 MMOL/L — SIGNIFICANT CHANGE UP (ref 20–27)
HCT VFR BLD CALC: 36.7 % — SIGNIFICANT CHANGE UP (ref 34.5–45)
HCT VFR BLDV CALC: 38.9 % — SIGNIFICANT CHANGE UP (ref 34.5–45)
HGB BLD-MCNC: 11.6 G/DL — SIGNIFICANT CHANGE UP (ref 11.5–15.5)
HGB BLDV-MCNC: 12.6 G/DL — SIGNIFICANT CHANGE UP (ref 11.5–15.5)
IMM GRANULOCYTES # BLD AUTO: 0.02 # — SIGNIFICANT CHANGE UP
IMM GRANULOCYTES NFR BLD AUTO: 0.4 % — SIGNIFICANT CHANGE UP (ref 0–1.5)
KETONES UR-MCNC: NEGATIVE — SIGNIFICANT CHANGE UP
LACTATE BLDV-MCNC: 2.7 MMOL/L — HIGH (ref 0.5–2)
LEUKOCYTE ESTERASE UR-ACNC: SIGNIFICANT CHANGE UP
LIDOCAIN IGE QN: 26 U/L — SIGNIFICANT CHANGE UP (ref 7–60)
LYMPHOCYTES # BLD AUTO: 0.97 K/UL — LOW (ref 1–3.3)
LYMPHOCYTES # BLD AUTO: 18.4 % — SIGNIFICANT CHANGE UP (ref 13–44)
LYMPHOCYTES NFR SPEC AUTO: 14.3 % — SIGNIFICANT CHANGE UP (ref 13–44)
MACROCYTES BLD QL: SIGNIFICANT CHANGE UP
MCHC RBC-ENTMCNC: 28.5 PG — SIGNIFICANT CHANGE UP (ref 27–34)
MCHC RBC-ENTMCNC: 31.6 % — LOW (ref 32–36)
MCV RBC AUTO: 90.2 FL — SIGNIFICANT CHANGE UP (ref 80–100)
MICROCYTES BLD QL: SLIGHT — SIGNIFICANT CHANGE UP
MONOCYTES # BLD AUTO: 0.45 K/UL — SIGNIFICANT CHANGE UP (ref 0–0.9)
MONOCYTES NFR BLD AUTO: 8.5 % — SIGNIFICANT CHANGE UP (ref 2–14)
MONOCYTES NFR BLD: 4.5 % — SIGNIFICANT CHANGE UP (ref 2–9)
MUCOUS THREADS # UR AUTO: SIGNIFICANT CHANGE UP
NEUTROPHIL AB SER-ACNC: 75 % — SIGNIFICANT CHANGE UP (ref 43–77)
NEUTROPHILS # BLD AUTO: 3.72 K/UL — SIGNIFICANT CHANGE UP (ref 1.8–7.4)
NEUTROPHILS NFR BLD AUTO: 70.4 % — SIGNIFICANT CHANGE UP (ref 43–77)
NITRITE UR-MCNC: NEGATIVE — SIGNIFICANT CHANGE UP
NON-SQ EPI CELLS # UR AUTO: <1 — SIGNIFICANT CHANGE UP
NRBC # FLD: 0 — SIGNIFICANT CHANGE UP
OVALOCYTES BLD QL SMEAR: SLIGHT — SIGNIFICANT CHANGE UP
PCO2 BLDV: 59 MMHG — HIGH (ref 41–51)
PH BLDV: 7.33 PH — SIGNIFICANT CHANGE UP (ref 7.32–7.43)
PH UR: 7.5 — SIGNIFICANT CHANGE UP (ref 4.6–8)
PLATELET # BLD AUTO: 51 K/UL — LOW (ref 150–400)
PLATELET COUNT - ESTIMATE: SIGNIFICANT CHANGE UP
PMV BLD: 11.7 FL — SIGNIFICANT CHANGE UP (ref 7–13)
PO2 BLDV: < 24 MMHG — LOW (ref 35–40)
POTASSIUM BLDV-SCNC: 3.8 MMOL/L — SIGNIFICANT CHANGE UP (ref 3.4–4.5)
POTASSIUM SERPL-MCNC: 4.1 MMOL/L — SIGNIFICANT CHANGE UP (ref 3.5–5.3)
POTASSIUM SERPL-SCNC: 4.1 MMOL/L — SIGNIFICANT CHANGE UP (ref 3.5–5.3)
PROT SERPL-MCNC: 7.3 G/DL — SIGNIFICANT CHANGE UP (ref 6–8.3)
PROT UR-MCNC: NEGATIVE MG/DL — SIGNIFICANT CHANGE UP
RBC # BLD: 4.07 M/UL — SIGNIFICANT CHANGE UP (ref 3.8–5.2)
RBC # FLD: 13.1 % — SIGNIFICANT CHANGE UP (ref 10.3–14.5)
RBC CASTS # UR COMP ASSIST: SIGNIFICANT CHANGE UP (ref 0–?)
SAO2 % BLDV: 26.4 % — LOW (ref 60–85)
SODIUM SERPL-SCNC: 142 MMOL/L — SIGNIFICANT CHANGE UP (ref 135–145)
SP GR SPEC: 1.01 — SIGNIFICANT CHANGE UP (ref 1–1.04)
SQUAMOUS # UR AUTO: SIGNIFICANT CHANGE UP
UROBILINOGEN FLD QL: NORMAL MG/DL — SIGNIFICANT CHANGE UP
VARIANT LYMPHS # BLD: 5.3 % — SIGNIFICANT CHANGE UP
WBC # BLD: 5.28 K/UL — SIGNIFICANT CHANGE UP (ref 3.8–10.5)
WBC # FLD AUTO: 5.28 K/UL — SIGNIFICANT CHANGE UP (ref 3.8–10.5)
WBC UR QL: SIGNIFICANT CHANGE UP (ref 0–?)

## 2018-06-17 PROCEDURE — 99285 EMERGENCY DEPT VISIT HI MDM: CPT

## 2018-06-17 PROCEDURE — 74177 CT ABD & PELVIS W/CONTRAST: CPT | Mod: 26

## 2018-06-17 RX ORDER — MORPHINE SULFATE 50 MG/1
4 CAPSULE, EXTENDED RELEASE ORAL ONCE
Qty: 0 | Refills: 0 | Status: DISCONTINUED | OUTPATIENT
Start: 2018-06-17 | End: 2018-06-17

## 2018-06-17 RX ORDER — HYDROMORPHONE HYDROCHLORIDE 2 MG/ML
1 INJECTION INTRAMUSCULAR; INTRAVENOUS; SUBCUTANEOUS
Qty: 30 | Refills: 0 | OUTPATIENT
Start: 2018-06-17

## 2018-06-17 RX ORDER — SODIUM CHLORIDE 9 MG/ML
1000 INJECTION INTRAMUSCULAR; INTRAVENOUS; SUBCUTANEOUS ONCE
Qty: 0 | Refills: 0 | Status: COMPLETED | OUTPATIENT
Start: 2018-06-17 | End: 2018-06-17

## 2018-06-17 RX ADMIN — MORPHINE SULFATE 4 MILLIGRAM(S): 50 CAPSULE, EXTENDED RELEASE ORAL at 19:57

## 2018-06-17 RX ADMIN — SODIUM CHLORIDE 1000 MILLILITER(S): 9 INJECTION INTRAMUSCULAR; INTRAVENOUS; SUBCUTANEOUS at 17:00

## 2018-06-17 RX ADMIN — MORPHINE SULFATE 4 MILLIGRAM(S): 50 CAPSULE, EXTENDED RELEASE ORAL at 15:22

## 2018-06-17 RX ADMIN — MORPHINE SULFATE 4 MILLIGRAM(S): 50 CAPSULE, EXTENDED RELEASE ORAL at 15:51

## 2018-06-17 NOTE — ED PROVIDER NOTE - ABDOMINAL EXAM
soft/no pulsating masses/no redness, swelling or fluctuance around CARLINE drain site/no organomegaly/nondistended/tender...

## 2018-06-17 NOTE — ED ADULT NURSE REASSESSMENT NOTE - NS ED NURSE REASSESS COMMENT FT1
patient indicated pain was back 8 out of 10, MD made aware, patient medicated as ordered, will monitor closely.

## 2018-06-17 NOTE — CONSULT NOTE ADULT - PROBLEM SELECTOR RECOMMENDATION 9
Decreased drain output and abdominal pain likely 2/2 clogged drain  Drain flushed with 5cc sterile saline and was able to clear tube  No evidence of systemic infection WBC 5 and afebrile   Please obtain CTAP to evaluate collection and placement of tube  If tube placement correct can be evaluated for tube removal on outpatient basis    Will follow up results    Radha R3  Dw Dr Hanley

## 2018-06-17 NOTE — ED ADULT TRIAGE NOTE - CHIEF COMPLAINT QUOTE
Hx of ovarian cancer, has right cw port. Arrives with CARLINE drain to ovarian. Co severe abdominal pain since yesterday. States CARLINE drain is not draining. Denies n/v/d.

## 2018-06-17 NOTE — ED PROVIDER NOTE - PROGRESS NOTE DETAILS
Nicole PGY4: Pt with temporary relief from morphine. CT with new peritoneal implants; CARLINE drain in place, abscess decreased in size. d/w pt about admission vs. outpt pain control. Initially pt requesting admission, however after discussion with gynonc attending Dr. Castro pt stating that there is an insurance issue and is concerned about affording meds. Via [employer] Dilaudid 2mg 100tabs costs $42. Will rx 30 tabs and pt will f/u in office. Pt agreeable to plan. Ready for d/c. Given copy of results.

## 2018-06-17 NOTE — CONSULT NOTE ADULT - SUBJECTIVE AND OBJECTIVE BOX
47 y/o G0 with known Stage IIB clear cell carcinoma of the ovary. She has RLQ CARLINE in place since 18 for subcutaneous collection with likely superinfection. Has had numerous tube checks, most recently with decrease in size of collection and adjustment of tube . Pt states that CARLINE typically puts out approx 25-30cc/ day. Yesterday it stopped draining fluid. Last night and into this am she began experiencing significant pain in the abdomen and decided to come in to the ED. Eating well, no change in bowel habits, denies fever, chills, chest pain, SOB. No vaginal bleeding or discharge.      Presented to the ED at [facility] on 17 with a complaints of abdominal pain. Had imaging that showed a suspicious pelvic mass. Taken emergently to the OR by general gyn out of concern for acute abdominal pain. Had a laparoscopy with conversion to ELAP via PFAN for left salpingo-oophorectomy. 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    Patient subsequently underwent completion surgery on 17. She is s/p RTLH, RSO, resection left adnexal remnant, omentectomy, P&PA LND, resection of pelvic nodules, staging.   Received Carboplatin and weekly Taxol 17- 17(three cycles) and Carboplatin and Taxol q3w 17- 8/10/17  CT 2017 revealed recurrence in rectus muscle, taken to OR 3/9 for resection of abdominal wall tumors and reconstruction with Hakeem Hanley and Shana         OB/GYN HISTORY: G0  PAST MEDICAL & SURGICAL HISTORY:  Anxiety  Vertigo  Ovarian cyst: (L) 2017  Ovarian cancer  Deviated Nasal Septum  Port-a-cath in place: Right 2017  H/O right breast biopsy:   H/O: hysterectomy: robotic total laparoscopic hysterectomy, right salpingo oophorectomy 2017  S/P unilateral salpingo-oophorectomy: 17 (L)  History of Tonsillectomy    Allergies    No Known Allergies    Intolerances      FAMILY HISTORY:  Family history of ovarian cancer (Grandparent)  Family history of prostate cancer in father    SOCIAL HISTORY: denies    Name of GYN Physician:  Dr Hanley       Vital Signs Last 24 Hrs  T(C): 36.9 (2018 19:21), Max: 37.1 (2018 14:10)  T(F): 98.4 (2018 19:21), Max: 98.8 (2018 14:10)  HR: 66 (2018 19:21) (66 - 84)  BP: 113/72 (2018 19:21) (101/51 - 118/56)  BP(mean): --  RR: 15 (2018 19:21) (15 - 16)  SpO2: 100% (2018 19:21) (100% - 100%)    PHYSICAL EXAM:      Constitutional: alert and oriented x 3    Respiratory: clear to ascultation bilaterally     Cardiovascular: regular rate and rhythm, no murmur    Gastrointestinal: soft, +tender suprapubic and area of RLQ CARLINE, +distended in area,  no rebound/ +guarding, + bowel sounds. No organomegaly, no palpable masses    Genitourinary: Deferred  Rectal: Deferred    Extremities: Non-tender bilaterally, No edema    Neurological: Grossly intact            LABS:                        11.6   5.28  )-----------( --       ( 2018 15:05 )             36.7     06-17    142  |  101  |  14  ----------------------------<  83  4.1   |  27  |  0.92    Ca    9.1      2018 15:05    TPro  7.3  /  Alb  4.2  /  TBili  0.2  /  DBili  x   /  AST  18  /  ALT  21  /  AlkPhos  149<H>  06-17      Urinalysis Basic - ( 2018 16:57 )    Color: PLYEL / Appearance: CLEAR / S.011 / pH: 7.5  Gluc: NEGATIVE / Ketone: NEGATIVE  / Bili: NEGATIVE / Urobili: NORMAL mg/dL   Blood: NEGATIVE / Protein: NEGATIVE mg/dL / Nitrite: NEGATIVE   Leuk Esterase: TRACE / RBC: 0-2 / WBC 2-5   Sq Epi: OCC / Non Sq Epi: x / Bacteria: x            RADIOLOGY & ADDITIONAL STUDIES: CT pending

## 2018-06-17 NOTE — ED PROVIDER NOTE - OBJECTIVE STATEMENT
Pt is a 48 y/o F nonsmoker PMhx ovarian cancer (last chemo 8/2017), h/o surgical removal of mass (3/9/18) p/w abdominal pain since yesterday.  Pt notes since surgery she has had mild generalized abdominal pain, which would improve with tylenol.  Pt notes she has had a CARLINE drain in place, with decreased output since yesterday with which pt has had gradual onset worsening aching pain to suprapubic, LLQ and RLQ region.  Last BM yesterday.  Pt passing gas today.  Denies any fevers, chills, nausea, vomiting, dysuria, cloudy urine, melena, brbpr, vaginal bleeding, vaginal discharge.

## 2018-06-17 NOTE — ED ADULT NURSE NOTE - OBJECTIVE STATEMENT
Facilitator RN:  received pt in room 23, c/o severe abdominal pain, especially around CARLINE drain area.  Pt reports CARLINE drain is not draining.  Minimal drainage noted in collection bag.  Pt AAOx3, respirations even and unlabored.  Area around drain is hard to palpation.  Labs drawn and sent; peripheral IV access obtained.  CT of abdomen pending. Facilitator RN:  received pt in room 23, c/o severe right-sided abdominal pain since yesterday, especially around CARLINE drain area.  Pt reports right CARLINE drain is not draining.  Minimal drainage noted in collection bubble.  Pt AAOx3, respirations even and unlabored.  Area around drain is hard to palpation.  Labs drawn and sent; peripheral IV access obtained.  CT of abdomen pending.  Report given to primary RN Lindy.

## 2018-06-17 NOTE — ED PROVIDER NOTE - CHPI ED SYMPTOMS NEG
no vomiting/no hematuria/no blood in stool/no dysuria/no burning urination/no diarrhea/no fever/no nausea/no abdominal distension/no chills

## 2018-06-17 NOTE — ED PROVIDER NOTE - ATTENDING CONTRIBUTION TO CARE
Seen and examined, pt. with ovarian Ca and mets, recent tx of abscess with drain, now noted dec. output of drain and inc. in abd pain, no fever/chills, no N/V. Clear lungs, heart reg, abd soft, diffusely tender to palp, no elías/guarding, drain in place with no redness/discharge at skin site. Seen and examined, pt. with ovarian Ca and mets, recent tx of abscess with drain, now noted dec. output of drain and inc. in abd pain, no fever/chills, no N/V. Clear lungs, heart reg, abd soft, diffusely tender to palp, no elías/guarding, drain in place with no redness/discharge at skin site.    Care signed over to resident Dr. Rivera for further management.

## 2018-06-19 ENCOUNTER — OUTPATIENT (OUTPATIENT)
Dept: OUTPATIENT SERVICES | Facility: HOSPITAL | Age: 48
LOS: 1 days | Discharge: HOME | End: 2018-06-19

## 2018-06-19 DIAGNOSIS — Z95.828 PRESENCE OF OTHER VASCULAR IMPLANTS AND GRAFTS: Chronic | ICD-10-CM

## 2018-06-19 DIAGNOSIS — Z98.890 OTHER SPECIFIED POSTPROCEDURAL STATES: Chronic | ICD-10-CM

## 2018-06-19 DIAGNOSIS — C56.2 MALIGNANT NEOPLASM OF LEFT OVARY: ICD-10-CM

## 2018-06-19 DIAGNOSIS — Z90.721 ACQUIRED ABSENCE OF OVARIES, UNILATERAL: Chronic | ICD-10-CM

## 2018-06-19 LAB
BACTERIA UR CULT: SIGNIFICANT CHANGE UP
SPECIMEN SOURCE: SIGNIFICANT CHANGE UP

## 2018-06-20 ENCOUNTER — CLINICAL ADVICE (OUTPATIENT)
Age: 48
End: 2018-06-20

## 2018-06-21 ENCOUNTER — APPOINTMENT (OUTPATIENT)
Dept: INFUSION THERAPY | Facility: HOSPITAL | Age: 48
End: 2018-06-21

## 2018-06-21 ENCOUNTER — RESULT REVIEW (OUTPATIENT)
Age: 48
End: 2018-06-21

## 2018-06-21 ENCOUNTER — LABORATORY RESULT (OUTPATIENT)
Age: 48
End: 2018-06-21

## 2018-06-21 ENCOUNTER — APPOINTMENT (OUTPATIENT)
Dept: HEMATOLOGY ONCOLOGY | Facility: CLINIC | Age: 48
End: 2018-06-21
Payer: COMMERCIAL

## 2018-06-21 VITALS
OXYGEN SATURATION: 100 % | HEART RATE: 76 BPM | SYSTOLIC BLOOD PRESSURE: 140 MMHG | WEIGHT: 143.3 LBS | RESPIRATION RATE: 16 BRPM | TEMPERATURE: 97.9 F | BODY MASS INDEX: 28.94 KG/M2 | DIASTOLIC BLOOD PRESSURE: 62 MMHG

## 2018-06-21 LAB
HCT VFR BLD CALC: 35.6 % — SIGNIFICANT CHANGE UP (ref 34.5–45)
HGB BLD-MCNC: 12.2 G/DL — SIGNIFICANT CHANGE UP (ref 11.5–15.5)
MCHC RBC-ENTMCNC: 30 PG — SIGNIFICANT CHANGE UP (ref 27–34)
MCHC RBC-ENTMCNC: 34.4 G/DL — SIGNIFICANT CHANGE UP (ref 32–36)
MCV RBC AUTO: 87.2 FL — SIGNIFICANT CHANGE UP (ref 80–100)
PLATELET # BLD AUTO: 187 K/UL — SIGNIFICANT CHANGE UP (ref 150–400)
RBC # BLD: 4.08 M/UL — SIGNIFICANT CHANGE UP (ref 3.8–5.2)
RBC # FLD: 12.2 % — SIGNIFICANT CHANGE UP (ref 10.3–14.5)
WBC # BLD: 4.2 K/UL — SIGNIFICANT CHANGE UP (ref 3.8–10.5)
WBC # FLD AUTO: 4.2 K/UL — SIGNIFICANT CHANGE UP (ref 3.8–10.5)

## 2018-06-21 PROCEDURE — 99215 OFFICE O/P EST HI 40 MIN: CPT

## 2018-06-24 ENCOUNTER — FORM ENCOUNTER (OUTPATIENT)
Age: 48
End: 2018-06-24

## 2018-06-25 ENCOUNTER — OUTPATIENT (OUTPATIENT)
Dept: OUTPATIENT SERVICES | Facility: HOSPITAL | Age: 48
LOS: 1 days | End: 2018-06-25
Payer: COMMERCIAL

## 2018-06-25 ENCOUNTER — APPOINTMENT (OUTPATIENT)
Dept: CT IMAGING | Facility: HOSPITAL | Age: 48
End: 2018-06-25

## 2018-06-25 DIAGNOSIS — Z95.828 PRESENCE OF OTHER VASCULAR IMPLANTS AND GRAFTS: Chronic | ICD-10-CM

## 2018-06-25 DIAGNOSIS — Z90.721 ACQUIRED ABSENCE OF OVARIES, UNILATERAL: Chronic | ICD-10-CM

## 2018-06-25 DIAGNOSIS — Z98.890 OTHER SPECIFIED POSTPROCEDURAL STATES: Chronic | ICD-10-CM

## 2018-06-25 DIAGNOSIS — R18.8 OTHER ASCITES: ICD-10-CM

## 2018-06-25 PROCEDURE — 74150 CT ABDOMEN W/O CONTRAST: CPT | Mod: 26

## 2018-06-25 PROCEDURE — 49424 ASSESS CYST CONTRAST INJECT: CPT | Mod: 78

## 2018-06-25 PROCEDURE — 76080 X-RAY EXAM OF FISTULA: CPT | Mod: 26

## 2018-06-26 ENCOUNTER — APPOINTMENT (OUTPATIENT)
Dept: GYNECOLOGIC ONCOLOGY | Facility: CLINIC | Age: 48
End: 2018-06-26
Payer: COMMERCIAL

## 2018-06-26 VITALS
HEIGHT: 59 IN | BODY MASS INDEX: 28.83 KG/M2 | DIASTOLIC BLOOD PRESSURE: 72 MMHG | SYSTOLIC BLOOD PRESSURE: 128 MMHG | WEIGHT: 143 LBS

## 2018-06-26 DIAGNOSIS — Z86.59 PERSONAL HISTORY OF OTHER MENTAL AND BEHAVIORAL DISORDERS: ICD-10-CM

## 2018-06-26 PROCEDURE — 99215 OFFICE O/P EST HI 40 MIN: CPT

## 2018-06-28 ENCOUNTER — RX RENEWAL (OUTPATIENT)
Age: 48
End: 2018-06-28

## 2018-06-28 DIAGNOSIS — R18.8 OTHER ASCITES: ICD-10-CM

## 2018-06-28 DIAGNOSIS — Z43.4 ENCOUNTER FOR ATTENTION TO OTHER ARTIFICIAL OPENINGS OF DIGESTIVE TRACT: ICD-10-CM

## 2018-06-28 DIAGNOSIS — L02.211 CUTANEOUS ABSCESS OF ABDOMINAL WALL: ICD-10-CM

## 2018-06-29 PROBLEM — Z86.59 HISTORY OF ANXIETY: Status: RESOLVED | Noted: 2017-06-15 | Resolved: 2018-06-29

## 2018-07-09 ENCOUNTER — APPOINTMENT (OUTPATIENT)
Dept: INFUSION THERAPY | Facility: HOSPITAL | Age: 48
End: 2018-07-09

## 2018-07-10 DIAGNOSIS — Z51.11 ENCOUNTER FOR ANTINEOPLASTIC CHEMOTHERAPY: ICD-10-CM

## 2018-07-20 ENCOUNTER — RX RENEWAL (OUTPATIENT)
Age: 48
End: 2018-07-20

## 2018-07-20 PROBLEM — F41.9 ANXIETY DISORDER, UNSPECIFIED: Chronic | Status: ACTIVE | Noted: 2018-03-08

## 2018-07-20 PROBLEM — C56.9 MALIGNANT NEOPLASM OF UNSPECIFIED OVARY: Chronic | Status: ACTIVE | Noted: 2017-02-17

## 2018-07-20 PROBLEM — R42 DIZZINESS AND GIDDINESS: Chronic | Status: ACTIVE | Noted: 2017-02-17

## 2018-07-20 PROBLEM — N83.209 UNSPECIFIED OVARIAN CYST, UNSPECIFIED SIDE: Chronic | Status: ACTIVE | Noted: 2017-02-17

## 2018-07-24 ENCOUNTER — OUTPATIENT (OUTPATIENT)
Dept: OUTPATIENT SERVICES | Facility: HOSPITAL | Age: 48
LOS: 1 days | Discharge: HOME | End: 2018-07-24

## 2018-07-24 DIAGNOSIS — Z95.828 PRESENCE OF OTHER VASCULAR IMPLANTS AND GRAFTS: Chronic | ICD-10-CM

## 2018-07-24 DIAGNOSIS — Z90.721 ACQUIRED ABSENCE OF OVARIES, UNILATERAL: Chronic | ICD-10-CM

## 2018-07-24 DIAGNOSIS — Z98.890 OTHER SPECIFIED POSTPROCEDURAL STATES: Chronic | ICD-10-CM

## 2018-07-24 DIAGNOSIS — C56.2 MALIGNANT NEOPLASM OF LEFT OVARY: ICD-10-CM

## 2018-07-30 ENCOUNTER — APPOINTMENT (OUTPATIENT)
Dept: GERIATRICS | Facility: CLINIC | Age: 48
End: 2018-07-30
Payer: COMMERCIAL

## 2018-07-30 VITALS
HEART RATE: 73 BPM | WEIGHT: 145.48 LBS | SYSTOLIC BLOOD PRESSURE: 120 MMHG | OXYGEN SATURATION: 99 % | BODY MASS INDEX: 29.39 KG/M2 | RESPIRATION RATE: 18 BRPM | TEMPERATURE: 98.2 F | DIASTOLIC BLOOD PRESSURE: 70 MMHG

## 2018-07-30 PROCEDURE — 99215 OFFICE O/P EST HI 40 MIN: CPT

## 2018-08-01 ENCOUNTER — OUTPATIENT (OUTPATIENT)
Dept: OUTPATIENT SERVICES | Facility: HOSPITAL | Age: 48
LOS: 1 days | Discharge: HOME | End: 2018-08-01

## 2018-08-01 DIAGNOSIS — Z98.890 OTHER SPECIFIED POSTPROCEDURAL STATES: Chronic | ICD-10-CM

## 2018-08-01 DIAGNOSIS — Z95.828 PRESENCE OF OTHER VASCULAR IMPLANTS AND GRAFTS: Chronic | ICD-10-CM

## 2018-08-01 DIAGNOSIS — C56.2 MALIGNANT NEOPLASM OF LEFT OVARY: ICD-10-CM

## 2018-08-01 DIAGNOSIS — Z90.721 ACQUIRED ABSENCE OF OVARIES, UNILATERAL: Chronic | ICD-10-CM

## 2018-08-02 ENCOUNTER — RESULT REVIEW (OUTPATIENT)
Age: 48
End: 2018-08-02

## 2018-08-02 ENCOUNTER — LABORATORY RESULT (OUTPATIENT)
Age: 48
End: 2018-08-02

## 2018-08-02 ENCOUNTER — APPOINTMENT (OUTPATIENT)
Dept: INFUSION THERAPY | Facility: HOSPITAL | Age: 48
End: 2018-08-02

## 2018-08-02 ENCOUNTER — APPOINTMENT (OUTPATIENT)
Dept: HEMATOLOGY ONCOLOGY | Facility: CLINIC | Age: 48
End: 2018-08-02
Payer: MEDICAID

## 2018-08-02 VITALS
TEMPERATURE: 98.8 F | SYSTOLIC BLOOD PRESSURE: 117 MMHG | DIASTOLIC BLOOD PRESSURE: 81 MMHG | OXYGEN SATURATION: 97 % | BODY MASS INDEX: 29.83 KG/M2 | WEIGHT: 147.71 LBS | HEART RATE: 97 BPM | RESPIRATION RATE: 16 BRPM

## 2018-08-02 LAB
BASOPHILS # BLD AUTO: 0 K/UL — SIGNIFICANT CHANGE UP (ref 0–0.2)
EOSINOPHIL # BLD AUTO: 0.5 K/UL — SIGNIFICANT CHANGE UP (ref 0–0.5)
EOSINOPHIL NFR BLD AUTO: 2 % — SIGNIFICANT CHANGE UP (ref 0–6)
HCT VFR BLD CALC: 38.5 % — SIGNIFICANT CHANGE UP (ref 34.5–45)
HGB BLD-MCNC: 12.9 G/DL — SIGNIFICANT CHANGE UP (ref 11.5–15.5)
LYMPHOCYTES # BLD AUTO: 1.1 K/UL — SIGNIFICANT CHANGE UP (ref 1–3.3)
LYMPHOCYTES # BLD AUTO: 32 % — SIGNIFICANT CHANGE UP (ref 13–44)
MCHC RBC-ENTMCNC: 29.1 PG — SIGNIFICANT CHANGE UP (ref 27–34)
MCHC RBC-ENTMCNC: 33.4 G/DL — SIGNIFICANT CHANGE UP (ref 32–36)
MCV RBC AUTO: 87.1 FL — SIGNIFICANT CHANGE UP (ref 80–100)
MONOCYTES # BLD AUTO: 0.3 K/UL — SIGNIFICANT CHANGE UP (ref 0–0.9)
MONOCYTES NFR BLD AUTO: 3 % — SIGNIFICANT CHANGE UP (ref 2–14)
NEUTROPHILS # BLD AUTO: 2.5 K/UL — SIGNIFICANT CHANGE UP (ref 1.8–7.4)
NEUTROPHILS NFR BLD AUTO: 63 % — SIGNIFICANT CHANGE UP (ref 43–77)
PLAT MORPH BLD: NORMAL — SIGNIFICANT CHANGE UP
PLATELET # BLD AUTO: 180 K/UL — SIGNIFICANT CHANGE UP (ref 150–400)
RBC # BLD: 4.42 M/UL — SIGNIFICANT CHANGE UP (ref 3.8–5.2)
RBC # FLD: 12.3 % — SIGNIFICANT CHANGE UP (ref 10.3–14.5)
RBC BLD AUTO: NORMAL — SIGNIFICANT CHANGE UP
WBC # BLD: 4.4 K/UL — SIGNIFICANT CHANGE UP (ref 3.8–10.5)
WBC # FLD AUTO: 4.4 K/UL — SIGNIFICANT CHANGE UP (ref 3.8–10.5)

## 2018-08-02 PROCEDURE — 99213 OFFICE O/P EST LOW 20 MIN: CPT

## 2018-08-02 RX ORDER — HYDROMORPHONE HYDROCHLORIDE 2 MG/1
2 TABLET ORAL
Qty: 120 | Refills: 0 | Status: DISCONTINUED | COMMUNITY
Start: 2018-06-28 | End: 2018-08-02

## 2018-08-02 RX ORDER — IBUPROFEN 600 MG/1
600 TABLET, FILM COATED ORAL
Qty: 20 | Refills: 0 | Status: DISCONTINUED | COMMUNITY
Start: 2018-03-29 | End: 2018-08-02

## 2018-08-02 RX ORDER — HYDROMORPHONE HYDROCHLORIDE 2 MG/1
2 TABLET ORAL
Qty: 120 | Refills: 0 | Status: DISCONTINUED | COMMUNITY
Start: 2018-07-20 | End: 2018-08-02

## 2018-08-02 RX ORDER — IBUPROFEN 600 MG/1
600 TABLET, FILM COATED ORAL 3 TIMES DAILY
Qty: 30 | Refills: 1 | Status: DISCONTINUED | COMMUNITY
Start: 2018-04-10 | End: 2018-08-02

## 2018-08-03 DIAGNOSIS — Z51.11 ENCOUNTER FOR ANTINEOPLASTIC CHEMOTHERAPY: ICD-10-CM

## 2018-08-03 DIAGNOSIS — C56.1 MALIGNANT NEOPLASM OF RIGHT OVARY: ICD-10-CM

## 2018-08-06 ENCOUNTER — APPOINTMENT (OUTPATIENT)
Dept: GERIATRICS | Facility: CLINIC | Age: 48
End: 2018-08-06
Payer: MEDICAID

## 2018-08-06 VITALS
WEIGHT: 149.89 LBS | TEMPERATURE: 98.3 F | SYSTOLIC BLOOD PRESSURE: 110 MMHG | OXYGEN SATURATION: 99 % | HEART RATE: 72 BPM | BODY MASS INDEX: 30.28 KG/M2 | RESPIRATION RATE: 18 BRPM | DIASTOLIC BLOOD PRESSURE: 70 MMHG

## 2018-08-06 PROCEDURE — 99214 OFFICE O/P EST MOD 30 MIN: CPT

## 2018-08-22 ENCOUNTER — FORM ENCOUNTER (OUTPATIENT)
Age: 48
End: 2018-08-22

## 2018-08-23 ENCOUNTER — APPOINTMENT (OUTPATIENT)
Dept: CT IMAGING | Facility: IMAGING CENTER | Age: 48
End: 2018-08-23
Payer: MEDICAID

## 2018-08-23 ENCOUNTER — LABORATORY RESULT (OUTPATIENT)
Age: 48
End: 2018-08-23

## 2018-08-23 ENCOUNTER — APPOINTMENT (OUTPATIENT)
Dept: HEMATOLOGY ONCOLOGY | Facility: CLINIC | Age: 48
End: 2018-08-23
Payer: MEDICAID

## 2018-08-23 ENCOUNTER — RESULT REVIEW (OUTPATIENT)
Age: 48
End: 2018-08-23

## 2018-08-23 ENCOUNTER — OUTPATIENT (OUTPATIENT)
Dept: OUTPATIENT SERVICES | Facility: HOSPITAL | Age: 48
LOS: 1 days | End: 2018-08-23
Payer: MEDICAID

## 2018-08-23 ENCOUNTER — APPOINTMENT (OUTPATIENT)
Dept: INFUSION THERAPY | Facility: HOSPITAL | Age: 48
End: 2018-08-23

## 2018-08-23 VITALS
DIASTOLIC BLOOD PRESSURE: 69 MMHG | WEIGHT: 150.99 LBS | HEART RATE: 75 BPM | RESPIRATION RATE: 16 BRPM | OXYGEN SATURATION: 97 % | SYSTOLIC BLOOD PRESSURE: 101 MMHG | BODY MASS INDEX: 30.5 KG/M2 | TEMPERATURE: 98.4 F

## 2018-08-23 DIAGNOSIS — C56.2 MALIGNANT NEOPLASM OF LEFT OVARY: ICD-10-CM

## 2018-08-23 DIAGNOSIS — Z98.890 OTHER SPECIFIED POSTPROCEDURAL STATES: Chronic | ICD-10-CM

## 2018-08-23 DIAGNOSIS — Z90.721 ACQUIRED ABSENCE OF OVARIES, UNILATERAL: Chronic | ICD-10-CM

## 2018-08-23 DIAGNOSIS — Z95.828 PRESENCE OF OTHER VASCULAR IMPLANTS AND GRAFTS: Chronic | ICD-10-CM

## 2018-08-23 LAB
HCT VFR BLD CALC: 34.3 % — LOW (ref 34.5–45)
HGB BLD-MCNC: 11.8 G/DL — SIGNIFICANT CHANGE UP (ref 11.5–15.5)
MCHC RBC-ENTMCNC: 29.5 PG — SIGNIFICANT CHANGE UP (ref 27–34)
MCHC RBC-ENTMCNC: 34.4 G/DL — SIGNIFICANT CHANGE UP (ref 32–36)
MCV RBC AUTO: 85.8 FL — SIGNIFICANT CHANGE UP (ref 80–100)
PLATELET # BLD AUTO: 134 K/UL — SIGNIFICANT CHANGE UP (ref 150–400)
RBC # BLD: 3.99 M/UL — SIGNIFICANT CHANGE UP (ref 3.8–5.2)
RBC # FLD: 12.8 % — SIGNIFICANT CHANGE UP (ref 10.3–14.5)
WBC # BLD: 4 K/UL — SIGNIFICANT CHANGE UP (ref 3.8–10.5)
WBC # FLD AUTO: 4 K/UL — SIGNIFICANT CHANGE UP (ref 3.8–10.5)

## 2018-08-23 PROCEDURE — 70450 CT HEAD/BRAIN W/O DYE: CPT

## 2018-08-23 PROCEDURE — 99215 OFFICE O/P EST HI 40 MIN: CPT

## 2018-08-23 PROCEDURE — 70450 CT HEAD/BRAIN W/O DYE: CPT | Mod: 26

## 2018-08-26 ENCOUNTER — FORM ENCOUNTER (OUTPATIENT)
Age: 48
End: 2018-08-26

## 2018-08-27 ENCOUNTER — APPOINTMENT (OUTPATIENT)
Dept: CT IMAGING | Facility: IMAGING CENTER | Age: 48
End: 2018-08-27
Payer: MEDICAID

## 2018-08-27 ENCOUNTER — OUTPATIENT (OUTPATIENT)
Dept: OUTPATIENT SERVICES | Facility: HOSPITAL | Age: 48
LOS: 1 days | End: 2018-08-27
Payer: MEDICAID

## 2018-08-27 DIAGNOSIS — C56.2 MALIGNANT NEOPLASM OF LEFT OVARY: ICD-10-CM

## 2018-08-27 DIAGNOSIS — Z90.721 ACQUIRED ABSENCE OF OVARIES, UNILATERAL: Chronic | ICD-10-CM

## 2018-08-27 DIAGNOSIS — Z95.828 PRESENCE OF OTHER VASCULAR IMPLANTS AND GRAFTS: Chronic | ICD-10-CM

## 2018-08-27 DIAGNOSIS — Z98.890 OTHER SPECIFIED POSTPROCEDURAL STATES: Chronic | ICD-10-CM

## 2018-08-27 PROCEDURE — 71260 CT THORAX DX C+: CPT

## 2018-08-27 PROCEDURE — 71260 CT THORAX DX C+: CPT | Mod: 26

## 2018-08-27 PROCEDURE — 74177 CT ABD & PELVIS W/CONTRAST: CPT

## 2018-08-27 PROCEDURE — 74177 CT ABD & PELVIS W/CONTRAST: CPT | Mod: 26

## 2018-09-05 NOTE — ASU PATIENT PROFILE, ADULT - HEALTHCARE QUESTIONS, PROFILE
mellitus ([address]     Hyperlipidemia     Hypertension     Vertigo          SURGICAL HISTORY       Past Surgical History:   Procedure Laterality Date    APPENDECTOMY      BARIATRIC SURGERY      CHOLECYSTECTOMY      COLONOSCOPY      JOINT REPLACEMENT Left     knee    [facility]  [address] IND N/A 5/16/2017    COLONOSCOPY performed by Danya Potter MD at [address]       Discharge Medication List as of 9/5/2018 11:29 AM      CONTINUE these medications which have NOT CHANGED    Details   oxybutynin (DITROPAN) 5 MG tablet Take 5 mg by mouth 3 times dailyHistorical Med      metFORMIN (GLUCOPHAGE) 500 MG tablet Take 500 mg by mouth 2 times daily (with meals)Historical Med      fenofibrate (LIPOFEN) 150 MG CAPS capsule Take 150 mg by mouth dailyHistorical Med      amLODIPine (NORVASC) 5 MG tablet Take 5 mg by mouth dailyHistorical Med      aspirin 81 MG tablet Take 81 mg by mouth dailyHistorical Med      atenolol (TENORMIN) 50 MG tablet Take 50 mg by mouth dailyHistorical Med      cyclobenzaprine (FLEXERIL) 10 MG tablet Take 10 mg by mouth 3 times daily as needed for Muscle spasmsHistorical Med      gabapentin (NEURONTIN) 800 MG tablet Take 800 mg by mouth 3 times dailyHistorical Med      hydrochlorothiazide (HYDRODIURIL) 25 MG tablet Take 25 mg by mouth dailyHistorical Med      lisinopril (PRINIVIL;ZESTRIL) 20 MG tablet Take 20 mg by mouth dailyHistorical Med      omeprazole (PRILOSEC) 20 MG delayed release capsule Take 20 mg by mouth dailyHistorical Med      simvastatin (ZOCOR) 20 MG tablet Take 20 mg by mouth nightlyHistorical Med      tolterodine (DETROL LA) 4 MG extended release capsule Take 4 mg by mouth dailyHistorical Med      pantoprazole (PROTONIX) 20 MG tablet Take 20 mg by mouth dailyHistorical Med      traZODone (DESYREL) 50 MG tablet Take 50 mg by mouth nightlyHistorical Med      Cholecalciferol (VITAMIN D3) 2000 UNITS CAPS Take by mouthHistorical Med alert and oriented to person, place, and time. No cranial nerve deficit. Skin: Skin is warm and dry. No rash noted. She is not diaphoretic. Psychiatric: She has a normal mood and affect. Her behavior is normal.       DIAGNOSTIC RESULTS     EKG: All EKG's are interpreted by the Emergency Department Physician who either signs or Co-signs this chart in the absence of a cardiologist.        RADIOLOGY:   Non-plain film images such as CT, Ultrasound and MRI are read by the radiologist. Plain radiographic images are visualized and preliminarily interpreted by the emergency physician with the below findings:    Chest x-ray shows no acute disease    Left knee x-ray is negative for any fractures or effusions    Interpretation per the Radiologist below, if available at the time of this note:    XR CHEST STANDARD (2 VW)   Final Result   NO ACUTE CHEST DISEASE. XR KNEE LEFT (3 VIEWS)   Final Result      NOTHING ACUTE DEMONSTRATED. UNREMARKABLE LEFT KNEE PROSTHESIS. ED BEDSIDE ULTRASOUND:   Performed by ED Physician - none    LABS:  Labs Reviewed - No data to display    All other labs were within normal range or not returned as of this dictation. EMERGENCY DEPARTMENT COURSE and DIFFERENTIAL DIAGNOSIS/MDM:   Vitals:    Vitals:    09/05/18 1013   BP: 125/72   Pulse: 60   Resp: 17   Temp: 98.3 °F (36.8 °C)   TempSrc: Oral   SpO2: 97%   Weight: 240 lb (108.9 kg)   Height: 5' (1.524 m)       Patient fell 2 days ago and has some pain in her ribs and her knee. She has no fractures no pneumothorax    MDM      REASSESSMENT          CRITICAL CARE TIME   Total Critical Care time was 0 minutes, excluding separately reportable procedures. There was a high probability of clinically significant/life threatening deterioration in the patient's condition which required my urgent intervention.       CONSULTS:  None    PROCEDURES:  Unless otherwise noted below, none     Procedures    FINAL IMPRESSION      1. Contusion of knee, unspecified laterality, initial encounter    2. Back contusion, left, initial encounter          DISPOSITION/PLAN   DISPOSITION Decision To Discharge 09/05/2018 11:27:24 AM      PATIENT REFERRED TO:  Kusum Chambers MD  [address] [place] [address]  [phone]    Schedule an appointment as soon as possible for a visit         DISCHARGE MEDICATIONS:  Discharge Medication List as of 9/5/2018 11:29 AM      START taking these medications    Details   traMADol (ULTRAM) 50 MG tablet Take 1 tablet by mouth every 6 hours as needed for Pain for up to 3 days. Intended supply: 3 days.  Take lowest dose possible to manage pain., Disp-12 tablet, R-0Print                (Please note that portions of this note were completed with a voice recognition program.  Efforts were made to edit the dictations but occasionally words are mis-transcribed.)    Radha Archibald DO (electronically signed)  Attending Emergency Physician          Radha Archibald DO  09/05/18 2921 none

## 2018-09-06 ENCOUNTER — OUTPATIENT (OUTPATIENT)
Dept: OUTPATIENT SERVICES | Facility: HOSPITAL | Age: 48
LOS: 1 days | Discharge: HOME | End: 2018-09-06

## 2018-09-06 DIAGNOSIS — C56.2 MALIGNANT NEOPLASM OF LEFT OVARY: ICD-10-CM

## 2018-09-06 DIAGNOSIS — Z90.721 ACQUIRED ABSENCE OF OVARIES, UNILATERAL: Chronic | ICD-10-CM

## 2018-09-06 DIAGNOSIS — Z95.828 PRESENCE OF OTHER VASCULAR IMPLANTS AND GRAFTS: Chronic | ICD-10-CM

## 2018-09-06 DIAGNOSIS — Z98.890 OTHER SPECIFIED POSTPROCEDURAL STATES: Chronic | ICD-10-CM

## 2018-09-12 ENCOUNTER — APPOINTMENT (OUTPATIENT)
Dept: GYNECOLOGIC ONCOLOGY | Facility: CLINIC | Age: 48
End: 2018-09-12
Payer: COMMERCIAL

## 2018-09-12 VITALS
DIASTOLIC BLOOD PRESSURE: 72 MMHG | WEIGHT: 140 LBS | HEIGHT: 59 IN | BODY MASS INDEX: 28.22 KG/M2 | SYSTOLIC BLOOD PRESSURE: 110 MMHG

## 2018-09-12 PROCEDURE — 99214 OFFICE O/P EST MOD 30 MIN: CPT

## 2018-09-13 ENCOUNTER — APPOINTMENT (OUTPATIENT)
Dept: INFUSION THERAPY | Facility: HOSPITAL | Age: 48
End: 2018-09-13

## 2018-09-13 ENCOUNTER — LABORATORY RESULT (OUTPATIENT)
Age: 48
End: 2018-09-13

## 2018-09-13 ENCOUNTER — APPOINTMENT (OUTPATIENT)
Dept: HEMATOLOGY ONCOLOGY | Facility: CLINIC | Age: 48
End: 2018-09-13
Payer: COMMERCIAL

## 2018-09-13 ENCOUNTER — RESULT REVIEW (OUTPATIENT)
Age: 48
End: 2018-09-13

## 2018-09-13 VITALS
SYSTOLIC BLOOD PRESSURE: 106 MMHG | HEART RATE: 78 BPM | TEMPERATURE: 98.8 F | DIASTOLIC BLOOD PRESSURE: 70 MMHG | BODY MASS INDEX: 30.06 KG/M2 | WEIGHT: 148.81 LBS | OXYGEN SATURATION: 99 % | RESPIRATION RATE: 16 BRPM

## 2018-09-13 LAB
BASOPHILS # BLD AUTO: 0 K/UL — SIGNIFICANT CHANGE UP (ref 0–0.2)
BASOPHILS NFR BLD AUTO: 1.2 % — SIGNIFICANT CHANGE UP (ref 0–2)
EOSINOPHIL # BLD AUTO: 0.3 K/UL — SIGNIFICANT CHANGE UP (ref 0–0.5)
EOSINOPHIL NFR BLD AUTO: 7.1 % — HIGH (ref 0–6)
HCT VFR BLD CALC: 35.9 % — SIGNIFICANT CHANGE UP (ref 34.5–45)
HGB BLD-MCNC: 11.9 G/DL — SIGNIFICANT CHANGE UP (ref 11.5–15.5)
LYMPHOCYTES # BLD AUTO: 0.9 K/UL — LOW (ref 1–3.3)
LYMPHOCYTES # BLD AUTO: 25 % — SIGNIFICANT CHANGE UP (ref 13–44)
MCHC RBC-ENTMCNC: 28.6 PG — SIGNIFICANT CHANGE UP (ref 27–34)
MCHC RBC-ENTMCNC: 33.1 G/DL — SIGNIFICANT CHANGE UP (ref 32–36)
MCV RBC AUTO: 86.4 FL — SIGNIFICANT CHANGE UP (ref 80–100)
MONOCYTES # BLD AUTO: 0.6 K/UL — SIGNIFICANT CHANGE UP (ref 0–0.9)
MONOCYTES NFR BLD AUTO: 15.9 % — HIGH (ref 2–14)
NEUTROPHILS # BLD AUTO: 1.8 K/UL — SIGNIFICANT CHANGE UP (ref 1.8–7.4)
NEUTROPHILS NFR BLD AUTO: 50.8 % — SIGNIFICANT CHANGE UP (ref 43–77)
PLATELET # BLD AUTO: 137 K/UL — LOW (ref 150–400)
RBC # BLD: 4.15 M/UL — SIGNIFICANT CHANGE UP (ref 3.8–5.2)
RBC # FLD: 13 % — SIGNIFICANT CHANGE UP (ref 10.3–14.5)
WBC # BLD: 3.6 K/UL — LOW (ref 3.8–10.5)
WBC # FLD AUTO: 3.6 K/UL — LOW (ref 3.8–10.5)

## 2018-09-13 PROCEDURE — 99214 OFFICE O/P EST MOD 30 MIN: CPT

## 2018-09-17 ENCOUNTER — APPOINTMENT (OUTPATIENT)
Dept: GERIATRICS | Facility: CLINIC | Age: 48
End: 2018-09-17
Payer: COMMERCIAL

## 2018-09-17 VITALS
WEIGHT: 149.25 LBS | RESPIRATION RATE: 17 BRPM | BODY MASS INDEX: 30.15 KG/M2 | SYSTOLIC BLOOD PRESSURE: 111 MMHG | OXYGEN SATURATION: 97 % | DIASTOLIC BLOOD PRESSURE: 76 MMHG | HEART RATE: 85 BPM | TEMPERATURE: 98.4 F

## 2018-09-17 PROCEDURE — 99215 OFFICE O/P EST HI 40 MIN: CPT

## 2018-09-17 RX ORDER — DULOXETINE HYDROCHLORIDE 20 MG/1
20 CAPSULE, DELAYED RELEASE PELLETS ORAL
Qty: 90 | Refills: 2 | Status: DISCONTINUED | COMMUNITY
Start: 2017-10-09 | End: 2018-09-17

## 2018-09-20 ENCOUNTER — APPOINTMENT (OUTPATIENT)
Dept: HEMATOLOGY ONCOLOGY | Facility: CLINIC | Age: 48
End: 2018-09-20

## 2018-09-20 ENCOUNTER — APPOINTMENT (OUTPATIENT)
Dept: INFUSION THERAPY | Facility: HOSPITAL | Age: 48
End: 2018-09-20

## 2018-09-20 DIAGNOSIS — R11.2 NAUSEA WITH VOMITING, UNSPECIFIED: ICD-10-CM

## 2018-09-20 DIAGNOSIS — Z51.11 ENCOUNTER FOR ANTINEOPLASTIC CHEMOTHERAPY: ICD-10-CM

## 2018-09-24 ENCOUNTER — APPOINTMENT (OUTPATIENT)
Dept: CV DIAGNOSITCS | Facility: HOSPITAL | Age: 48
End: 2018-09-24

## 2018-09-24 ENCOUNTER — OUTPATIENT (OUTPATIENT)
Dept: OUTPATIENT SERVICES | Facility: HOSPITAL | Age: 48
LOS: 1 days | End: 2018-09-24
Payer: COMMERCIAL

## 2018-09-24 DIAGNOSIS — I25.10 ATHEROSCLEROTIC HEART DISEASE OF NATIVE CORONARY ARTERY WITHOUT ANGINA PECTORIS: ICD-10-CM

## 2018-09-24 DIAGNOSIS — Z90.721 ACQUIRED ABSENCE OF OVARIES, UNILATERAL: Chronic | ICD-10-CM

## 2018-09-24 DIAGNOSIS — Z95.828 PRESENCE OF OTHER VASCULAR IMPLANTS AND GRAFTS: Chronic | ICD-10-CM

## 2018-09-24 DIAGNOSIS — Z98.890 OTHER SPECIFIED POSTPROCEDURAL STATES: Chronic | ICD-10-CM

## 2018-09-24 PROCEDURE — 0399T: CPT

## 2018-09-24 PROCEDURE — 93306 TTE W/DOPPLER COMPLETE: CPT | Mod: 26

## 2018-09-24 PROCEDURE — 93306 TTE W/DOPPLER COMPLETE: CPT

## 2018-09-24 NOTE — PATIENT PROFILE ADULT. - PATIENT REPRESENTATIVE NAME
+ ADMITS TO A GLASS OF WINE TODAY FOR LUNCH, DRINKS EVERYDAY NON SMOKER
Michi, PATIENT TOLERATED WELL. URINE DARK ORANGE/CHERRY RED, CLEAR. SENT TO LAB.
Parvin Shetty (wife)

## 2018-09-27 ENCOUNTER — APPOINTMENT (OUTPATIENT)
Dept: HEMATOLOGY ONCOLOGY | Facility: CLINIC | Age: 48
End: 2018-09-27
Payer: COMMERCIAL

## 2018-09-27 ENCOUNTER — APPOINTMENT (OUTPATIENT)
Dept: INFUSION THERAPY | Facility: HOSPITAL | Age: 48
End: 2018-09-27

## 2018-09-27 ENCOUNTER — RESULT REVIEW (OUTPATIENT)
Age: 48
End: 2018-09-27

## 2018-09-27 ENCOUNTER — APPOINTMENT (OUTPATIENT)
Dept: CV DIAGNOSITCS | Facility: HOSPITAL | Age: 48
End: 2018-09-27

## 2018-09-27 VITALS
WEIGHT: 152.76 LBS | TEMPERATURE: 98.4 F | HEART RATE: 71 BPM | DIASTOLIC BLOOD PRESSURE: 75 MMHG | BODY MASS INDEX: 30.85 KG/M2 | SYSTOLIC BLOOD PRESSURE: 112 MMHG | RESPIRATION RATE: 16 BRPM | OXYGEN SATURATION: 98 %

## 2018-09-27 LAB
BASOPHILS # BLD AUTO: 0 K/UL — SIGNIFICANT CHANGE UP (ref 0–0.2)
BASOPHILS NFR BLD AUTO: 1 % — SIGNIFICANT CHANGE UP (ref 0–2)
EOSINOPHIL # BLD AUTO: 0.3 K/UL — SIGNIFICANT CHANGE UP (ref 0–0.5)
EOSINOPHIL NFR BLD AUTO: 9.3 % — HIGH (ref 0–6)
HCT VFR BLD CALC: 33 % — LOW (ref 34.5–45)
HGB BLD-MCNC: 11.1 G/DL — LOW (ref 11.5–15.5)
LYMPHOCYTES # BLD AUTO: 1 K/UL — SIGNIFICANT CHANGE UP (ref 1–3.3)
LYMPHOCYTES # BLD AUTO: 26.1 % — SIGNIFICANT CHANGE UP (ref 13–44)
MCHC RBC-ENTMCNC: 29.2 PG — SIGNIFICANT CHANGE UP (ref 27–34)
MCHC RBC-ENTMCNC: 33.8 G/DL — SIGNIFICANT CHANGE UP (ref 32–36)
MCV RBC AUTO: 86.3 FL — SIGNIFICANT CHANGE UP (ref 80–100)
MONOCYTES # BLD AUTO: 0.4 K/UL — SIGNIFICANT CHANGE UP (ref 0–0.9)
MONOCYTES NFR BLD AUTO: 10.2 % — SIGNIFICANT CHANGE UP (ref 2–14)
NEUTROPHILS # BLD AUTO: 2 K/UL — SIGNIFICANT CHANGE UP (ref 1.8–7.4)
NEUTROPHILS NFR BLD AUTO: 53.4 % — SIGNIFICANT CHANGE UP (ref 43–77)
PLATELET # BLD AUTO: 148 K/UL — LOW (ref 150–400)
RBC # BLD: 3.82 M/UL — SIGNIFICANT CHANGE UP (ref 3.8–5.2)
RBC # FLD: 12.6 % — SIGNIFICANT CHANGE UP (ref 10.3–14.5)
WBC # BLD: 3.7 K/UL — LOW (ref 3.8–10.5)
WBC # FLD AUTO: 3.7 K/UL — LOW (ref 3.8–10.5)

## 2018-09-27 PROCEDURE — 99215 OFFICE O/P EST HI 40 MIN: CPT

## 2018-10-03 ENCOUNTER — OUTPATIENT (OUTPATIENT)
Dept: OUTPATIENT SERVICES | Facility: HOSPITAL | Age: 48
LOS: 1 days | Discharge: HOME | End: 2018-10-03

## 2018-10-03 DIAGNOSIS — Z95.828 PRESENCE OF OTHER VASCULAR IMPLANTS AND GRAFTS: Chronic | ICD-10-CM

## 2018-10-03 DIAGNOSIS — C56.2 MALIGNANT NEOPLASM OF LEFT OVARY: ICD-10-CM

## 2018-10-03 DIAGNOSIS — Z90.721 ACQUIRED ABSENCE OF OVARIES, UNILATERAL: Chronic | ICD-10-CM

## 2018-10-03 DIAGNOSIS — Z98.890 OTHER SPECIFIED POSTPROCEDURAL STATES: Chronic | ICD-10-CM

## 2018-10-11 ENCOUNTER — RESULT REVIEW (OUTPATIENT)
Age: 48
End: 2018-10-11

## 2018-10-11 ENCOUNTER — APPOINTMENT (OUTPATIENT)
Dept: HEMATOLOGY ONCOLOGY | Facility: CLINIC | Age: 48
End: 2018-10-11

## 2018-10-11 LAB
BASOPHILS # BLD AUTO: 0 K/UL — SIGNIFICANT CHANGE UP (ref 0–0.2)
BASOPHILS NFR BLD AUTO: 0.8 % — SIGNIFICANT CHANGE UP (ref 0–2)
EOSINOPHIL # BLD AUTO: 0.2 K/UL — SIGNIFICANT CHANGE UP (ref 0–0.5)
EOSINOPHIL NFR BLD AUTO: 5.2 % — SIGNIFICANT CHANGE UP (ref 0–6)
HCT VFR BLD CALC: 32.6 % — LOW (ref 34.5–45)
HGB BLD-MCNC: 11.4 G/DL — LOW (ref 11.5–15.5)
LYMPHOCYTES # BLD AUTO: 0.8 K/UL — LOW (ref 1–3.3)
LYMPHOCYTES # BLD AUTO: 25 % — SIGNIFICANT CHANGE UP (ref 13–44)
MCHC RBC-ENTMCNC: 29.9 PG — SIGNIFICANT CHANGE UP (ref 27–34)
MCHC RBC-ENTMCNC: 34.9 G/DL — SIGNIFICANT CHANGE UP (ref 32–36)
MCV RBC AUTO: 85.7 FL — SIGNIFICANT CHANGE UP (ref 80–100)
MONOCYTES # BLD AUTO: 0.3 K/UL — SIGNIFICANT CHANGE UP (ref 0–0.9)
MONOCYTES NFR BLD AUTO: 8 % — SIGNIFICANT CHANGE UP (ref 2–14)
NEUTROPHILS # BLD AUTO: 2 K/UL — SIGNIFICANT CHANGE UP (ref 1.8–7.4)
NEUTROPHILS NFR BLD AUTO: 61 % — SIGNIFICANT CHANGE UP (ref 43–77)
PLATELET # BLD AUTO: 115 K/UL — LOW (ref 150–400)
RBC # BLD: 3.8 M/UL — SIGNIFICANT CHANGE UP (ref 3.8–5.2)
RBC # FLD: 13 % — SIGNIFICANT CHANGE UP (ref 10.3–14.5)
WBC # BLD: 3.4 K/UL — LOW (ref 3.8–10.5)
WBC # FLD AUTO: 3.4 K/UL — LOW (ref 3.8–10.5)

## 2018-10-25 ENCOUNTER — LABORATORY RESULT (OUTPATIENT)
Age: 48
End: 2018-10-25

## 2018-10-25 ENCOUNTER — APPOINTMENT (OUTPATIENT)
Dept: HEMATOLOGY ONCOLOGY | Facility: CLINIC | Age: 48
End: 2018-10-25
Payer: MEDICAID

## 2018-10-25 ENCOUNTER — RESULT REVIEW (OUTPATIENT)
Age: 48
End: 2018-10-25

## 2018-10-25 ENCOUNTER — APPOINTMENT (OUTPATIENT)
Dept: INFUSION THERAPY | Facility: HOSPITAL | Age: 48
End: 2018-10-25

## 2018-10-25 VITALS
HEART RATE: 71 BPM | OXYGEN SATURATION: 99 % | BODY MASS INDEX: 29.61 KG/M2 | SYSTOLIC BLOOD PRESSURE: 120 MMHG | RESPIRATION RATE: 16 BRPM | DIASTOLIC BLOOD PRESSURE: 74 MMHG | WEIGHT: 146.61 LBS | TEMPERATURE: 98.2 F

## 2018-10-25 LAB
BASOPHILS # BLD AUTO: 0 K/UL — SIGNIFICANT CHANGE UP (ref 0–0.2)
BASOPHILS NFR BLD AUTO: 0.9 % — SIGNIFICANT CHANGE UP (ref 0–2)
EOSINOPHIL # BLD AUTO: 0.2 K/UL — SIGNIFICANT CHANGE UP (ref 0–0.5)
EOSINOPHIL NFR BLD AUTO: 5.7 % — SIGNIFICANT CHANGE UP (ref 0–6)
HCT VFR BLD CALC: 37 % — SIGNIFICANT CHANGE UP (ref 34.5–45)
HGB BLD-MCNC: 12.6 G/DL — SIGNIFICANT CHANGE UP (ref 11.5–15.5)
LYMPHOCYTES # BLD AUTO: 0.8 K/UL — LOW (ref 1–3.3)
LYMPHOCYTES # BLD AUTO: 23.6 % — SIGNIFICANT CHANGE UP (ref 13–44)
MCHC RBC-ENTMCNC: 28.9 PG — SIGNIFICANT CHANGE UP (ref 27–34)
MCHC RBC-ENTMCNC: 33.9 G/DL — SIGNIFICANT CHANGE UP (ref 32–36)
MCV RBC AUTO: 85.3 FL — SIGNIFICANT CHANGE UP (ref 80–100)
MONOCYTES # BLD AUTO: 0.5 K/UL — SIGNIFICANT CHANGE UP (ref 0–0.9)
MONOCYTES NFR BLD AUTO: 15.2 % — HIGH (ref 2–14)
NEUTROPHILS # BLD AUTO: 1.8 K/UL — SIGNIFICANT CHANGE UP (ref 1.8–7.4)
NEUTROPHILS NFR BLD AUTO: 54.5 % — SIGNIFICANT CHANGE UP (ref 43–77)
PLATELET # BLD AUTO: 171 K/UL — SIGNIFICANT CHANGE UP (ref 150–400)
RBC # BLD: 4.35 M/UL — SIGNIFICANT CHANGE UP (ref 3.8–5.2)
RBC # FLD: 13 % — SIGNIFICANT CHANGE UP (ref 10.3–14.5)
WBC # BLD: 3.4 K/UL — LOW (ref 3.8–10.5)
WBC # FLD AUTO: 3.4 K/UL — LOW (ref 3.8–10.5)

## 2018-10-25 PROCEDURE — 99214 OFFICE O/P EST MOD 30 MIN: CPT

## 2018-10-26 DIAGNOSIS — Z51.11 ENCOUNTER FOR ANTINEOPLASTIC CHEMOTHERAPY: ICD-10-CM

## 2018-10-26 DIAGNOSIS — R11.2 NAUSEA WITH VOMITING, UNSPECIFIED: ICD-10-CM

## 2018-11-08 ENCOUNTER — APPOINTMENT (OUTPATIENT)
Dept: GERIATRICS | Facility: CLINIC | Age: 48
End: 2018-11-08
Payer: MEDICAID

## 2018-11-08 ENCOUNTER — LABORATORY RESULT (OUTPATIENT)
Age: 48
End: 2018-11-08

## 2018-11-08 VITALS
HEART RATE: 80 BPM | DIASTOLIC BLOOD PRESSURE: 60 MMHG | OXYGEN SATURATION: 99 % | WEIGHT: 144 LBS | TEMPERATURE: 98.1 F | BODY MASS INDEX: 29.03 KG/M2 | SYSTOLIC BLOOD PRESSURE: 80 MMHG | HEIGHT: 59 IN | RESPIRATION RATE: 16 BRPM

## 2018-11-08 DIAGNOSIS — K21.9 GASTRO-ESOPHAGEAL REFLUX DISEASE W/OUT ESOPHAGITIS: ICD-10-CM

## 2018-11-08 DIAGNOSIS — L30.8 OTHER SPECIFIED DERMATITIS: ICD-10-CM

## 2018-11-08 PROCEDURE — 99215 OFFICE O/P EST HI 40 MIN: CPT

## 2018-11-13 ENCOUNTER — OUTPATIENT (OUTPATIENT)
Dept: OUTPATIENT SERVICES | Facility: HOSPITAL | Age: 48
LOS: 1 days | Discharge: HOME | End: 2018-11-13

## 2018-11-13 DIAGNOSIS — Z98.890 OTHER SPECIFIED POSTPROCEDURAL STATES: Chronic | ICD-10-CM

## 2018-11-13 DIAGNOSIS — Z95.828 PRESENCE OF OTHER VASCULAR IMPLANTS AND GRAFTS: Chronic | ICD-10-CM

## 2018-11-13 DIAGNOSIS — Z90.721 ACQUIRED ABSENCE OF OVARIES, UNILATERAL: Chronic | ICD-10-CM

## 2018-11-13 DIAGNOSIS — C56.2 MALIGNANT NEOPLASM OF LEFT OVARY: ICD-10-CM

## 2018-11-21 ENCOUNTER — LABORATORY RESULT (OUTPATIENT)
Age: 48
End: 2018-11-21

## 2018-11-21 ENCOUNTER — RESULT REVIEW (OUTPATIENT)
Age: 48
End: 2018-11-21

## 2018-11-21 ENCOUNTER — APPOINTMENT (OUTPATIENT)
Dept: INFUSION THERAPY | Facility: HOSPITAL | Age: 48
End: 2018-11-21

## 2018-11-21 ENCOUNTER — APPOINTMENT (OUTPATIENT)
Dept: HEMATOLOGY ONCOLOGY | Facility: CLINIC | Age: 48
End: 2018-11-21
Payer: MEDICAID

## 2018-11-21 VITALS
HEART RATE: 89 BPM | SYSTOLIC BLOOD PRESSURE: 112 MMHG | WEIGHT: 143.98 LBS | DIASTOLIC BLOOD PRESSURE: 75 MMHG | OXYGEN SATURATION: 98 % | RESPIRATION RATE: 16 BRPM | BODY MASS INDEX: 29.08 KG/M2 | TEMPERATURE: 98.1 F

## 2018-11-21 LAB
BASOPHILS # BLD AUTO: 0 K/UL — SIGNIFICANT CHANGE UP (ref 0–0.2)
BASOPHILS NFR BLD AUTO: 4 % — HIGH (ref 0–2)
EOSINOPHIL # BLD AUTO: 0.2 K/UL — SIGNIFICANT CHANGE UP (ref 0–0.5)
EOSINOPHIL NFR BLD AUTO: 4 % — SIGNIFICANT CHANGE UP (ref 0–6)
HCT VFR BLD CALC: 35.1 % — SIGNIFICANT CHANGE UP (ref 34.5–45)
HGB BLD-MCNC: 12 G/DL — SIGNIFICANT CHANGE UP (ref 11.5–15.5)
LYMPHOCYTES # BLD AUTO: 0.9 K/UL — LOW (ref 1–3.3)
LYMPHOCYTES # BLD AUTO: 36 % — SIGNIFICANT CHANGE UP (ref 13–44)
MCHC RBC-ENTMCNC: 29.2 PG — SIGNIFICANT CHANGE UP (ref 27–34)
MCHC RBC-ENTMCNC: 34.1 G/DL — SIGNIFICANT CHANGE UP (ref 32–36)
MCV RBC AUTO: 85.6 FL — SIGNIFICANT CHANGE UP (ref 80–100)
MONOCYTES # BLD AUTO: SIGNIFICANT CHANGE UP (ref 0–0.9)
MONOCYTES NFR BLD AUTO: 6 % — SIGNIFICANT CHANGE UP (ref 2–14)
NEUTROPHILS # BLD AUTO: 2.1 K/UL — SIGNIFICANT CHANGE UP (ref 1.8–7.4)
NEUTROPHILS NFR BLD AUTO: 50 % — SIGNIFICANT CHANGE UP (ref 43–77)
PLAT MORPH BLD: NORMAL — SIGNIFICANT CHANGE UP
PLATELET # BLD AUTO: 205 K/UL — SIGNIFICANT CHANGE UP (ref 150–400)
RBC # BLD: 4.1 M/UL — SIGNIFICANT CHANGE UP (ref 3.8–5.2)
RBC # FLD: 13.3 % — SIGNIFICANT CHANGE UP (ref 10.3–14.5)
RBC BLD AUTO: NORMAL — SIGNIFICANT CHANGE UP
WBC # BLD: 3.9 K/UL — SIGNIFICANT CHANGE UP (ref 3.8–10.5)
WBC # FLD AUTO: 3.9 K/UL — SIGNIFICANT CHANGE UP (ref 3.8–10.5)

## 2018-11-21 PROCEDURE — 99214 OFFICE O/P EST MOD 30 MIN: CPT

## 2018-11-21 NOTE — PHYSICAL EXAM
[Ambulatory and capable of all self care but unable to carry out any work activities] : Status 2- Ambulatory and capable of all self care but unable to carry out any work activities. Up and about more than 50% of waking hours [Normal] : affect appropriate

## 2018-11-23 ENCOUNTER — APPOINTMENT (OUTPATIENT)
Dept: INFUSION THERAPY | Facility: HOSPITAL | Age: 48
End: 2018-11-23

## 2018-11-23 LAB
ALBUMIN SERPL ELPH-MCNC: 4.1 G/DL
ALP BLD-CCNC: 140 U/L
ALT SERPL-CCNC: 40 U/L
ANION GAP SERPL CALC-SCNC: 11 MMOL/L
AST SERPL-CCNC: 26 U/L
BILIRUB SERPL-MCNC: 0.3 MG/DL
BUN SERPL-MCNC: 12 MG/DL
CALCIUM SERPL-MCNC: 9.1 MG/DL
CHLORIDE SERPL-SCNC: 103 MMOL/L
CO2 SERPL-SCNC: 26 MMOL/L
CREAT SERPL-MCNC: 0.76 MG/DL
GLUCOSE SERPL-MCNC: 107 MG/DL
MAGNESIUM SERPL-MCNC: 1.8 MG/DL
POTASSIUM SERPL-SCNC: 4.6 MMOL/L
PROT SERPL-MCNC: 6.6 G/DL
SODIUM SERPL-SCNC: 140 MMOL/L

## 2018-11-26 DIAGNOSIS — R11.2 NAUSEA WITH VOMITING, UNSPECIFIED: ICD-10-CM

## 2018-11-26 DIAGNOSIS — Z51.11 ENCOUNTER FOR ANTINEOPLASTIC CHEMOTHERAPY: ICD-10-CM

## 2018-11-30 ENCOUNTER — RX RENEWAL (OUTPATIENT)
Age: 48
End: 2018-11-30

## 2018-11-30 ENCOUNTER — APPOINTMENT (OUTPATIENT)
Dept: GERIATRICS | Facility: CLINIC | Age: 48
End: 2018-11-30
Payer: MEDICAID

## 2018-11-30 VITALS
RESPIRATION RATE: 16 BRPM | OXYGEN SATURATION: 99 % | SYSTOLIC BLOOD PRESSURE: 122 MMHG | HEART RATE: 86 BPM | DIASTOLIC BLOOD PRESSURE: 83 MMHG | TEMPERATURE: 99 F | BODY MASS INDEX: 29.69 KG/M2 | WEIGHT: 146.98 LBS

## 2018-11-30 DIAGNOSIS — R21 RASH AND OTHER NONSPECIFIC SKIN ERUPTION: ICD-10-CM

## 2018-11-30 PROCEDURE — 99214 OFFICE O/P EST MOD 30 MIN: CPT

## 2018-12-01 ENCOUNTER — OUTPATIENT (OUTPATIENT)
Dept: OUTPATIENT SERVICES | Facility: HOSPITAL | Age: 48
LOS: 1 days | End: 2018-12-01
Payer: MEDICAID

## 2018-12-01 DIAGNOSIS — Z98.890 OTHER SPECIFIED POSTPROCEDURAL STATES: Chronic | ICD-10-CM

## 2018-12-01 DIAGNOSIS — Z95.828 PRESENCE OF OTHER VASCULAR IMPLANTS AND GRAFTS: Chronic | ICD-10-CM

## 2018-12-01 DIAGNOSIS — Z90.721 ACQUIRED ABSENCE OF OVARIES, UNILATERAL: Chronic | ICD-10-CM

## 2018-12-03 NOTE — REASON FOR VISIT
[Follow-Up Visit] : a follow-up [Spouse] : spouse [Friend] : friend [FreeTextEntry2] : clear cell of the ovary.

## 2018-12-03 NOTE — ASSESSMENT
[Palliative] : Goals of care discussed with patient: Palliative [Palliative Care Plan] : not applicable at this time [FreeTextEntry1] : 47 year old woman with relapsed ovarian cancer on Doxil.\par She has completed 2 cycles.\par Mucositis/skin rash/itching - will decrease Doxil with third cycle.  Recommend Pepcid BID and moisturizing cream to skin 2-3 times per day.\par Will get CT scans after third cycle, prescription given.

## 2018-12-03 NOTE — HISTORY OF PRESENT ILLNESS
[Disease: _____________________] : Disease: [unfilled] [AJCC Stage: ____] : AJCC Stage: [unfilled] [Cardiovascular] : Cardiovascular [Constitutional] : Constitutional [ENT] : ENT [Dermatologic] : Dermatologic [Endocrine] : Endocrine [Gastrointestinal] : Gastrointestinal [Genitourinary] : Genitourinary [Gynecologic] : Gynecologic [Infectious] : Infectious [Musculoskeletal] : Musculoskeletal [Neurologic] : Neurologic [Pain] : Pain [Pulmonary] : Pulmonary [Hematologic] : Hematologic [IIB] : IIB [de-identified] : \par 47 y/o G0 presents for evaluation of newly diagnosed ovarian cancer. \par \victoriano Presented to the ED at [facility] on 1/25/17 with a complaints of abdominal pain. Had imaging that showed a suspicious pelvic mass. Taken emergently to the OR by general gyn out of concern for acute abdominal pain. Had a laparoscopy with conversion to ELAP via PFAN for left salpingo-oophorectomy. 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 \par \par 1/25/2017- CA-125 = 186, CA 19-9 < 1\par 1/31/2017- CT Abd/pelvis- no bowel obstruction (sent to the ED for evaluation of abdominal pain). \par \par Had been on Depo- provera for approximately 4 years, reportedly for management of endometriosis. \par Reports a family history of ovarian cancer. \par Patient subsequently underwent completion surgery on 2/28/17. She is s/p RTLH, RSO, resection left adnexal remnant, omentectomy, P&PA LND, resection of pelvic nodules, staging . \par Final Pathology: Clear cell ovarian cancer, stage II B. [de-identified] : DOS: 2/28/17\par Carboplatin and weekly Taxol 4/6/17- 6/1/17(three cycles)\par Carboplatin and Taxol q3w 6/29/17- 8/10/17( three cycles)\par Keytruda 7/9/18 -8/23/18 (3 cycles) [FreeTextEntry1] : Doxil\par C1 - 9/27/18\par C2 - 10/25/18 [de-identified] : Patient here for follow up.  She complains of itching and dyspepsia/reflux symptoms making it difficulty and painful to eat.  She also complains of loose bowel movements about 3-4 times per day.  Her joint pain and swelling are completely resolved.  She denies fever, chills, chest pain, SOB, abdominal pain, bloating, nausea, vomiting, bleeding.

## 2018-12-05 ENCOUNTER — FORM ENCOUNTER (OUTPATIENT)
Age: 48
End: 2018-12-05

## 2018-12-06 ENCOUNTER — APPOINTMENT (OUTPATIENT)
Dept: CT IMAGING | Facility: IMAGING CENTER | Age: 48
End: 2018-12-06
Payer: MEDICAID

## 2018-12-06 ENCOUNTER — OUTPATIENT (OUTPATIENT)
Dept: OUTPATIENT SERVICES | Facility: HOSPITAL | Age: 48
LOS: 1 days | End: 2018-12-06
Payer: MEDICAID

## 2018-12-06 DIAGNOSIS — C56.2 MALIGNANT NEOPLASM OF LEFT OVARY: ICD-10-CM

## 2018-12-06 DIAGNOSIS — Z95.828 PRESENCE OF OTHER VASCULAR IMPLANTS AND GRAFTS: Chronic | ICD-10-CM

## 2018-12-06 DIAGNOSIS — Z90.721 ACQUIRED ABSENCE OF OVARIES, UNILATERAL: Chronic | ICD-10-CM

## 2018-12-06 DIAGNOSIS — Z98.890 OTHER SPECIFIED POSTPROCEDURAL STATES: Chronic | ICD-10-CM

## 2018-12-06 PROBLEM — R21 RASH: Status: ACTIVE | Noted: 2018-11-30

## 2018-12-06 PROCEDURE — 74177 CT ABD & PELVIS W/CONTRAST: CPT | Mod: 26

## 2018-12-06 PROCEDURE — 71260 CT THORAX DX C+: CPT

## 2018-12-06 PROCEDURE — 71260 CT THORAX DX C+: CPT | Mod: 26

## 2018-12-06 PROCEDURE — 74177 CT ABD & PELVIS W/CONTRAST: CPT

## 2018-12-11 ENCOUNTER — EMERGENCY (EMERGENCY)
Facility: HOSPITAL | Age: 48
LOS: 1 days | Discharge: HOME | End: 2018-12-11
Attending: EMERGENCY MEDICINE | Admitting: EMERGENCY MEDICINE
Payer: MEDICAID

## 2018-12-11 ENCOUNTER — OTHER (OUTPATIENT)
Age: 48
End: 2018-12-11

## 2018-12-11 VITALS
RESPIRATION RATE: 16 BRPM | SYSTOLIC BLOOD PRESSURE: 110 MMHG | TEMPERATURE: 98 F | HEART RATE: 87 BPM | OXYGEN SATURATION: 100 % | DIASTOLIC BLOOD PRESSURE: 54 MMHG

## 2018-12-11 VITALS
SYSTOLIC BLOOD PRESSURE: 113 MMHG | TEMPERATURE: 98 F | HEART RATE: 78 BPM | DIASTOLIC BLOOD PRESSURE: 63 MMHG | RESPIRATION RATE: 16 BRPM | OXYGEN SATURATION: 100 %

## 2018-12-11 DIAGNOSIS — Z95.828 PRESENCE OF OTHER VASCULAR IMPLANTS AND GRAFTS: Chronic | ICD-10-CM

## 2018-12-11 DIAGNOSIS — Z98.890 OTHER SPECIFIED POSTPROCEDURAL STATES: Chronic | ICD-10-CM

## 2018-12-11 DIAGNOSIS — Z90.721 ACQUIRED ABSENCE OF OVARIES, UNILATERAL: Chronic | ICD-10-CM

## 2018-12-11 LAB
ALBUMIN SERPL ELPH-MCNC: 3.7 G/DL — SIGNIFICANT CHANGE UP (ref 3.3–5)
ALP SERPL-CCNC: 133 U/L — HIGH (ref 40–120)
ALT FLD-CCNC: 28 U/L — SIGNIFICANT CHANGE UP (ref 4–33)
APPEARANCE UR: CLEAR — SIGNIFICANT CHANGE UP
APTT BLD: 35.1 SEC — SIGNIFICANT CHANGE UP (ref 27.5–36.3)
AST SERPL-CCNC: 30 U/L — SIGNIFICANT CHANGE UP (ref 4–32)
BASOPHILS # BLD AUTO: 0.02 K/UL — SIGNIFICANT CHANGE UP (ref 0–0.2)
BASOPHILS NFR BLD AUTO: 0.5 % — SIGNIFICANT CHANGE UP (ref 0–2)
BILIRUB SERPL-MCNC: 0.2 MG/DL — SIGNIFICANT CHANGE UP (ref 0.2–1.2)
BILIRUB UR-MCNC: NEGATIVE — SIGNIFICANT CHANGE UP
BLD GP AB SCN SERPL QL: NEGATIVE — SIGNIFICANT CHANGE UP
BLOOD UR QL VISUAL: NEGATIVE — SIGNIFICANT CHANGE UP
BUN SERPL-MCNC: 12 MG/DL — SIGNIFICANT CHANGE UP (ref 7–23)
CALCIUM SERPL-MCNC: 9.2 MG/DL — SIGNIFICANT CHANGE UP (ref 8.4–10.5)
CHLORIDE SERPL-SCNC: 101 MMOL/L — SIGNIFICANT CHANGE UP (ref 98–107)
CO2 SERPL-SCNC: 23 MMOL/L — SIGNIFICANT CHANGE UP (ref 22–31)
COLOR SPEC: YELLOW — SIGNIFICANT CHANGE UP
CREAT SERPL-MCNC: 0.83 MG/DL — SIGNIFICANT CHANGE UP (ref 0.5–1.3)
EOSINOPHIL # BLD AUTO: 0.18 K/UL — SIGNIFICANT CHANGE UP (ref 0–0.5)
EOSINOPHIL NFR BLD AUTO: 4.1 % — SIGNIFICANT CHANGE UP (ref 0–6)
GLUCOSE SERPL-MCNC: 106 MG/DL — HIGH (ref 70–99)
GLUCOSE UR-MCNC: NEGATIVE — SIGNIFICANT CHANGE UP
HCT VFR BLD CALC: 38.8 % — SIGNIFICANT CHANGE UP (ref 34.5–45)
HGB BLD-MCNC: 12.1 G/DL — SIGNIFICANT CHANGE UP (ref 11.5–15.5)
IMM GRANULOCYTES # BLD AUTO: 0.04 # — SIGNIFICANT CHANGE UP
IMM GRANULOCYTES NFR BLD AUTO: 0.9 % — SIGNIFICANT CHANGE UP (ref 0–1.5)
INR BLD: 1.03 — SIGNIFICANT CHANGE UP (ref 0.88–1.17)
KETONES UR-MCNC: SIGNIFICANT CHANGE UP
LEUKOCYTE ESTERASE UR-ACNC: NEGATIVE — SIGNIFICANT CHANGE UP
LYMPHOCYTES # BLD AUTO: 0.97 K/UL — LOW (ref 1–3.3)
LYMPHOCYTES # BLD AUTO: 22.4 % — SIGNIFICANT CHANGE UP (ref 13–44)
MCHC RBC-ENTMCNC: 28.7 PG — SIGNIFICANT CHANGE UP (ref 27–34)
MCHC RBC-ENTMCNC: 31.2 % — LOW (ref 32–36)
MCV RBC AUTO: 92.2 FL — SIGNIFICANT CHANGE UP (ref 80–100)
MONOCYTES # BLD AUTO: 0.65 K/UL — SIGNIFICANT CHANGE UP (ref 0–0.9)
MONOCYTES NFR BLD AUTO: 15 % — HIGH (ref 2–14)
NEUTROPHILS # BLD AUTO: 2.48 K/UL — SIGNIFICANT CHANGE UP (ref 1.8–7.4)
NEUTROPHILS NFR BLD AUTO: 57.1 % — SIGNIFICANT CHANGE UP (ref 43–77)
NITRITE UR-MCNC: NEGATIVE — SIGNIFICANT CHANGE UP
NRBC # FLD: 0 — SIGNIFICANT CHANGE UP
PH UR: 6 — SIGNIFICANT CHANGE UP (ref 5–8)
PLATELET # BLD AUTO: 128 K/UL — LOW (ref 150–400)
PMV BLD: 12.5 FL — SIGNIFICANT CHANGE UP (ref 7–13)
POTASSIUM SERPL-MCNC: 4.8 MMOL/L — SIGNIFICANT CHANGE UP (ref 3.5–5.3)
POTASSIUM SERPL-SCNC: 4.8 MMOL/L — SIGNIFICANT CHANGE UP (ref 3.5–5.3)
PROT SERPL-MCNC: 7.2 G/DL — SIGNIFICANT CHANGE UP (ref 6–8.3)
PROT UR-MCNC: 20 — SIGNIFICANT CHANGE UP
PROTHROM AB SERPL-ACNC: 11.7 SEC — SIGNIFICANT CHANGE UP (ref 9.8–13.1)
RBC # BLD: 4.21 M/UL — SIGNIFICANT CHANGE UP (ref 3.8–5.2)
RBC # FLD: 14.8 % — HIGH (ref 10.3–14.5)
RBC CASTS # UR COMP ASSIST: SIGNIFICANT CHANGE UP (ref 0–?)
RH IG SCN BLD-IMP: POSITIVE — SIGNIFICANT CHANGE UP
SODIUM SERPL-SCNC: 138 MMOL/L — SIGNIFICANT CHANGE UP (ref 135–145)
SP GR SPEC: 1.03 — SIGNIFICANT CHANGE UP (ref 1–1.04)
UROBILINOGEN FLD QL: NORMAL — SIGNIFICANT CHANGE UP
WBC # BLD: 4.34 K/UL — SIGNIFICANT CHANGE UP (ref 3.8–10.5)
WBC # FLD AUTO: 4.34 K/UL — SIGNIFICANT CHANGE UP (ref 3.8–10.5)
WBC UR QL: SIGNIFICANT CHANGE UP (ref 0–?)

## 2018-12-11 PROCEDURE — 99285 EMERGENCY DEPT VISIT HI MDM: CPT

## 2018-12-11 PROCEDURE — 74177 CT ABD & PELVIS W/CONTRAST: CPT | Mod: 26

## 2018-12-11 RX ORDER — MORPHINE SULFATE 50 MG/1
6 CAPSULE, EXTENDED RELEASE ORAL ONCE
Qty: 0 | Refills: 0 | Status: DISCONTINUED | OUTPATIENT
Start: 2018-12-11 | End: 2018-12-11

## 2018-12-11 RX ORDER — HYDROMORPHONE HYDROCHLORIDE 2 MG/ML
1 INJECTION INTRAMUSCULAR; INTRAVENOUS; SUBCUTANEOUS
Qty: 4 | Refills: 0 | OUTPATIENT
Start: 2018-12-11 | End: 2018-12-11

## 2018-12-11 RX ORDER — MORPHINE SULFATE 50 MG/1
4 CAPSULE, EXTENDED RELEASE ORAL ONCE
Qty: 0 | Refills: 0 | Status: DISCONTINUED | OUTPATIENT
Start: 2018-12-11 | End: 2018-12-11

## 2018-12-11 RX ADMIN — MORPHINE SULFATE 4 MILLIGRAM(S): 50 CAPSULE, EXTENDED RELEASE ORAL at 16:21

## 2018-12-11 RX ADMIN — MORPHINE SULFATE 6 MILLIGRAM(S): 50 CAPSULE, EXTENDED RELEASE ORAL at 16:16

## 2018-12-11 RX ADMIN — MORPHINE SULFATE 6 MILLIGRAM(S): 50 CAPSULE, EXTENDED RELEASE ORAL at 13:57

## 2018-12-11 NOTE — ED ADULT NURSE NOTE - OBJECTIVE STATEMENT
pt states that she has ovarian CA last chemo two weeks ago. pt states her lower abdominal pain is unbearable, denies N/V.   20 g placed to right AC, labs drawn and sent.

## 2018-12-11 NOTE — ED PROVIDER NOTE - CARE PROVIDER_API CALL
Jacey Arreola), Hematology; Internal Medicine; Medical Oncology  [address] [place] [address]  Phone: [phone]  Fax: [phone]

## 2018-12-11 NOTE — ED PROVIDER NOTE - NSFOLLOWUPINSTRUCTIONS_ED_ALL_ED_FT
Please take Dilaudid, 2mg, every 6 hours, as needed for severe pain. Please follow up with your hematologist/oncologist for further prescription. Please also follow up with a primary care provider as soon as possible. If you do not have a primary care doctor, you can make an appointment with one at the following location.   [facility] at [place]  Phone: [phone]  Fax: [phone]  Address: [address] [place] [address], [facility] [address], [place] [address]    Additionally, you may use the following web address to find a [facility] physician close to you: [email or web address]     Please return to the emergency department if you experience worsening symptoms, or if you develop headache, neck stiffness, fever/chills, changes in vision, chest pain, shortness of breath, difficulty breathing, palpitations, lightheadedness, weakness, dizziness, numbness, tingling, abdominal pain, nausea, vomiting, diarrhea, changes in your urine, blood in the urine, painful urination, syncope (passing out), or for any other concerns.

## 2018-12-11 NOTE — ED PROVIDER NOTE - MEDICAL DECISION MAKING DETAILS
49 yo F w/ PMH of ovarian cancer s/p total hysterectomy, presenting w/ severe lower abdominal pain x 2 days, no changes to urine or bms. Eating and drinking normally, no n/v. Severely diffusely tender on exam. Will obtain cbc, cmp, vbg, coags, type and screen, ua and culture, CT ab/pelv. Treat pain, reassess.

## 2018-12-11 NOTE — ED PROVIDER NOTE - OBJECTIVE STATEMENT
47 yo F, accompanied by wife, w/ PMH of ovarian cancer s/p total hysterectomy, presenting w/ chief complaint of severe, tearing lower abdominal pain which began yesterday after lifting stuff. Denies changes to bowel movements or urine. Denies nausea, vomiting. Eating and drinking normally, just having severe lower ab pain. Had CT scan 5 days ago at outpatient heme/onc appointment showing no new changes. Denies headache, neck stiffness, fever/chills, vision change, chest pain, shortness of breath, difficulty breathing, palpitations, lightheadedness, weakness, dizziness, numbness, tingling, vomiting, diarrhea, dysuria, hematuria, syncope.     Per prior note, patient has malignant neoplasm of left ovary s/p resection of abdominal wall on 3/9/18. Pt with Hx fo recurrent cancer 6 months after completion of chemotherapy, with 2 abdominal wall metastasis on CT scan, s/p biopsy. Pt with Hx of ovarian cancer s/p left salpingo oophorectomy in 2017, and total laparoscopic hysterectomy right salpingo oophorectomy in 2/28/17.     PMD/Heme/Onc: Dr. Jacey Arreola and Dr. Osiris Castro

## 2018-12-11 NOTE — ED PROVIDER NOTE - CONSTITUTIONAL, MLM
normal... Well appearing, well nourished, awake, alert, oriented to person, place, time/situation and appears uncomfortable, especially with movement.

## 2018-12-11 NOTE — ED CLERICAL - NS ED CLERK NOTE PRE-ARRIVAL INFORMATION; ADDITIONAL PRE-ARRIVAL INFORMATION
sent from [facility] pt c/o sever abd pain pt had chemo had ct scan a few days ago pt has ovarian cancer

## 2018-12-11 NOTE — ED PROVIDER NOTE - ATTENDING CONTRIBUTION TO CARE
AJM: Patient seen with resident and agree with above note. 47 yo F, accompanied by wife, w/ PMH of ovarian cancer s/p total hysterectomy, presenting w/ chief complaint of severe, tearing lower abdominal pain which began yesterday after lifting stuff. Denies changes to bowel movements or urine. Denies nausea, vomiting. Eating and drinking normally, just having severe lower ab pain. She notes pain was first noticed last night as mild lower abd pain which has progressively become more severe. Worse with movement. No trauma. + dysuria. Had CT scan 5 days ago at outpatient heme/onc appointment showing no new changes. Denies headache, neck stiffness, fever/chills, vision change, chest pain, shortness of breath, difficulty breathing, palpitations, lightheadedness, weakness, dizziness, numbness, tingling, vomiting, diarrhea, hematuria, syncope. no vag bleeding or back pain. On exam pt very uncomfortable appearing. significant lower abd ttp. will obtain labs, ua, pain meds, ct a/p.

## 2018-12-11 NOTE — ED PROCEDURE NOTE - PROCEDURE ADDITIONAL DETAILS
Emergency Department Focused Ultrasound performed at patient's bedside for educational purposes. The study will have a follow up study performed or was performed in the direct supervision of an ultrasound trained attending.   UNOFFICIAL  No emergent findings.

## 2018-12-11 NOTE — ED PROVIDER NOTE - PROGRESS NOTE DETAILS
Patient reports improvement in pain. Feeling much improved and even walks in hallway without use of cane, which she normally uses to ambulate. Will discharge home with heme/onc followup.

## 2018-12-11 NOTE — ED ADULT TRIAGE NOTE - CHIEF COMPLAINT QUOTE
pt comes to ED for abdominal pain and burning on urination. pt has hx of ovarian CA last chemo 2 weeks ago. pt has chest wall port. pt VSS NAD

## 2018-12-13 ENCOUNTER — OUTPATIENT (OUTPATIENT)
Dept: OUTPATIENT SERVICES | Facility: HOSPITAL | Age: 48
LOS: 1 days | Discharge: HOME | End: 2018-12-13

## 2018-12-13 DIAGNOSIS — Z98.890 OTHER SPECIFIED POSTPROCEDURAL STATES: Chronic | ICD-10-CM

## 2018-12-13 DIAGNOSIS — Z71.89 OTHER SPECIFIED COUNSELING: ICD-10-CM

## 2018-12-13 DIAGNOSIS — Z90.721 ACQUIRED ABSENCE OF OVARIES, UNILATERAL: Chronic | ICD-10-CM

## 2018-12-13 DIAGNOSIS — C56.2 MALIGNANT NEOPLASM OF LEFT OVARY: ICD-10-CM

## 2018-12-13 DIAGNOSIS — Z95.828 PRESENCE OF OTHER VASCULAR IMPLANTS AND GRAFTS: Chronic | ICD-10-CM

## 2018-12-16 ENCOUNTER — FORM ENCOUNTER (OUTPATIENT)
Age: 48
End: 2018-12-16

## 2018-12-16 DIAGNOSIS — M17.10 UNILATERAL PRIMARY OSTEOARTHRITIS, UNSPECIFIED KNEE: ICD-10-CM

## 2018-12-17 ENCOUNTER — OUTPATIENT (OUTPATIENT)
Dept: OUTPATIENT SERVICES | Facility: HOSPITAL | Age: 48
LOS: 1 days | End: 2018-12-17
Payer: MEDICAID

## 2018-12-17 ENCOUNTER — APPOINTMENT (OUTPATIENT)
Dept: GERIATRICS | Facility: CLINIC | Age: 48
End: 2018-12-17
Payer: MEDICAID

## 2018-12-17 ENCOUNTER — APPOINTMENT (OUTPATIENT)
Dept: RADIOLOGY | Facility: IMAGING CENTER | Age: 48
End: 2018-12-17
Payer: MEDICAID

## 2018-12-17 VITALS
BODY MASS INDEX: 30.23 KG/M2 | DIASTOLIC BLOOD PRESSURE: 68 MMHG | TEMPERATURE: 98.9 F | WEIGHT: 149.69 LBS | RESPIRATION RATE: 17 BRPM | OXYGEN SATURATION: 99 % | HEART RATE: 66 BPM | SYSTOLIC BLOOD PRESSURE: 103 MMHG

## 2018-12-17 DIAGNOSIS — Z98.890 OTHER SPECIFIED POSTPROCEDURAL STATES: Chronic | ICD-10-CM

## 2018-12-17 DIAGNOSIS — Z95.828 PRESENCE OF OTHER VASCULAR IMPLANTS AND GRAFTS: Chronic | ICD-10-CM

## 2018-12-17 DIAGNOSIS — Z90.721 ACQUIRED ABSENCE OF OVARIES, UNILATERAL: Chronic | ICD-10-CM

## 2018-12-17 DIAGNOSIS — M25.561 PAIN IN RIGHT KNEE: ICD-10-CM

## 2018-12-17 PROCEDURE — 99215 OFFICE O/P EST HI 40 MIN: CPT

## 2018-12-17 PROCEDURE — 73562 X-RAY EXAM OF KNEE 3: CPT | Mod: 26,RT

## 2018-12-17 PROCEDURE — 73562 X-RAY EXAM OF KNEE 3: CPT

## 2018-12-18 ENCOUNTER — RESULT REVIEW (OUTPATIENT)
Age: 48
End: 2018-12-18

## 2018-12-18 ENCOUNTER — LABORATORY RESULT (OUTPATIENT)
Age: 48
End: 2018-12-18

## 2018-12-18 ENCOUNTER — APPOINTMENT (OUTPATIENT)
Dept: GYNECOLOGIC ONCOLOGY | Facility: CLINIC | Age: 48
End: 2018-12-18
Payer: MEDICAID

## 2018-12-18 ENCOUNTER — APPOINTMENT (OUTPATIENT)
Dept: HEMATOLOGY ONCOLOGY | Facility: CLINIC | Age: 48
End: 2018-12-18
Payer: MEDICAID

## 2018-12-18 ENCOUNTER — APPOINTMENT (OUTPATIENT)
Dept: INFUSION THERAPY | Facility: HOSPITAL | Age: 48
End: 2018-12-18

## 2018-12-18 VITALS
BODY MASS INDEX: 30.72 KG/M2 | DIASTOLIC BLOOD PRESSURE: 70 MMHG | SYSTOLIC BLOOD PRESSURE: 103 MMHG | HEART RATE: 77 BPM | OXYGEN SATURATION: 98 % | RESPIRATION RATE: 16 BRPM | TEMPERATURE: 98.1 F | WEIGHT: 152.12 LBS

## 2018-12-18 VITALS
SYSTOLIC BLOOD PRESSURE: 125 MMHG | WEIGHT: 149 LBS | BODY MASS INDEX: 30.04 KG/M2 | DIASTOLIC BLOOD PRESSURE: 80 MMHG | HEIGHT: 59 IN

## 2018-12-18 DIAGNOSIS — Z76.89 PERSONS ENCOUNTERING HEALTH SERVICES IN OTHER SPECIFIED CIRCUMSTANCES: ICD-10-CM

## 2018-12-18 PROBLEM — M17.10 ARTHROSIS OF KNEE: Status: ACTIVE | Noted: 2018-12-18

## 2018-12-18 LAB
BASOPHILS # BLD AUTO: 0 K/UL — SIGNIFICANT CHANGE UP (ref 0–0.2)
BASOPHILS NFR BLD AUTO: 1.1 % — SIGNIFICANT CHANGE UP (ref 0–2)
EOSINOPHIL # BLD AUTO: 0.2 K/UL — SIGNIFICANT CHANGE UP (ref 0–0.5)
EOSINOPHIL NFR BLD AUTO: 4.8 % — SIGNIFICANT CHANGE UP (ref 0–6)
HCT VFR BLD CALC: 36.3 % — SIGNIFICANT CHANGE UP (ref 34.5–45)
HGB BLD-MCNC: 12 G/DL — SIGNIFICANT CHANGE UP (ref 11.5–15.5)
LYMPHOCYTES # BLD AUTO: 0.8 K/UL — LOW (ref 1–3.3)
LYMPHOCYTES # BLD AUTO: 19.8 % — SIGNIFICANT CHANGE UP (ref 13–44)
MCHC RBC-ENTMCNC: 28.2 PG — SIGNIFICANT CHANGE UP (ref 27–34)
MCHC RBC-ENTMCNC: 33.1 G/DL — SIGNIFICANT CHANGE UP (ref 32–36)
MCV RBC AUTO: 85.1 FL — SIGNIFICANT CHANGE UP (ref 80–100)
MONOCYTES # BLD AUTO: 0.5 K/UL — SIGNIFICANT CHANGE UP (ref 0–0.9)
MONOCYTES NFR BLD AUTO: 12.8 % — SIGNIFICANT CHANGE UP (ref 2–14)
NEUTROPHILS # BLD AUTO: 2.5 K/UL — SIGNIFICANT CHANGE UP (ref 1.8–7.4)
NEUTROPHILS NFR BLD AUTO: 61.5 % — SIGNIFICANT CHANGE UP (ref 43–77)
PLATELET # BLD AUTO: 201 K/UL — SIGNIFICANT CHANGE UP (ref 150–400)
RBC # BLD: 4.27 M/UL — SIGNIFICANT CHANGE UP (ref 3.8–5.2)
RBC # FLD: 13.7 % — SIGNIFICANT CHANGE UP (ref 10.3–14.5)
WBC # BLD: 4.1 K/UL — SIGNIFICANT CHANGE UP (ref 3.8–10.5)
WBC # FLD AUTO: 4.1 K/UL — SIGNIFICANT CHANGE UP (ref 3.8–10.5)

## 2018-12-18 PROCEDURE — 99215 OFFICE O/P EST HI 40 MIN: CPT

## 2018-12-18 PROCEDURE — 99213 OFFICE O/P EST LOW 20 MIN: CPT

## 2018-12-20 ENCOUNTER — APPOINTMENT (OUTPATIENT)
Dept: INFUSION THERAPY | Facility: HOSPITAL | Age: 48
End: 2018-12-20

## 2018-12-21 DIAGNOSIS — R11.2 NAUSEA WITH VOMITING, UNSPECIFIED: ICD-10-CM

## 2018-12-21 DIAGNOSIS — Z51.11 ENCOUNTER FOR ANTINEOPLASTIC CHEMOTHERAPY: ICD-10-CM

## 2019-01-01 ENCOUNTER — OTHER (OUTPATIENT)
Age: 49
End: 2019-01-01

## 2019-01-01 ENCOUNTER — RESULT REVIEW (OUTPATIENT)
Age: 49
End: 2019-01-01

## 2019-01-01 ENCOUNTER — RX RENEWAL (OUTPATIENT)
Age: 49
End: 2019-01-01

## 2019-01-01 ENCOUNTER — APPOINTMENT (OUTPATIENT)
Dept: HEMATOLOGY ONCOLOGY | Facility: CLINIC | Age: 49
End: 2019-01-01
Payer: MEDICAID

## 2019-01-01 ENCOUNTER — EMERGENCY (EMERGENCY)
Facility: HOSPITAL | Age: 49
LOS: 1 days | Discharge: HOME | End: 2019-01-01
Attending: EMERGENCY MEDICINE | Admitting: EMERGENCY MEDICINE
Payer: COMMERCIAL

## 2019-01-01 ENCOUNTER — APPOINTMENT (OUTPATIENT)
Dept: INFUSION THERAPY | Facility: HOSPITAL | Age: 49
End: 2019-01-01

## 2019-01-01 ENCOUNTER — EMERGENCY (EMERGENCY)
Facility: HOSPITAL | Age: 49
LOS: 1 days | Discharge: HOME | End: 2019-01-01
Attending: EMERGENCY MEDICINE | Admitting: EMERGENCY MEDICINE
Payer: MEDICAID

## 2019-01-01 ENCOUNTER — OUTPATIENT (OUTPATIENT)
Dept: OUTPATIENT SERVICES | Facility: HOSPITAL | Age: 49
LOS: 1 days | Discharge: HOME | End: 2019-01-01

## 2019-01-01 ENCOUNTER — APPOINTMENT (OUTPATIENT)
Dept: HEMATOLOGY ONCOLOGY | Facility: CLINIC | Age: 49
End: 2019-01-01

## 2019-01-01 ENCOUNTER — APPOINTMENT (OUTPATIENT)
Dept: HEMATOLOGY ONCOLOGY | Facility: CLINIC | Age: 49
End: 2019-01-01
Payer: COMMERCIAL

## 2019-01-01 ENCOUNTER — LABORATORY RESULT (OUTPATIENT)
Age: 49
End: 2019-01-01

## 2019-01-01 ENCOUNTER — APPOINTMENT (OUTPATIENT)
Age: 49
End: 2019-01-01

## 2019-01-01 ENCOUNTER — MESSAGE (OUTPATIENT)
Age: 49
End: 2019-01-01

## 2019-01-01 ENCOUNTER — INPATIENT (INPATIENT)
Facility: HOSPITAL | Age: 49
LOS: 5 days | Discharge: HOME | End: 2019-05-25
Attending: HOSPITALIST | Admitting: HOSPITALIST
Payer: MEDICAID

## 2019-01-01 ENCOUNTER — TRANSCRIPTION ENCOUNTER (OUTPATIENT)
Age: 49
End: 2019-01-01

## 2019-01-01 ENCOUNTER — OUTPATIENT (OUTPATIENT)
Dept: OUTPATIENT SERVICES | Facility: HOSPITAL | Age: 49
LOS: 1 days | End: 2019-01-01
Payer: COMMERCIAL

## 2019-01-01 ENCOUNTER — APPOINTMENT (OUTPATIENT)
Dept: RADIATION ONCOLOGY | Facility: CLINIC | Age: 49
End: 2019-01-01
Payer: COMMERCIAL

## 2019-01-01 ENCOUNTER — APPOINTMENT (OUTPATIENT)
Dept: MRI IMAGING | Facility: IMAGING CENTER | Age: 49
End: 2019-01-01

## 2019-01-01 ENCOUNTER — OUTPATIENT (OUTPATIENT)
Dept: OUTPATIENT SERVICES | Facility: HOSPITAL | Age: 49
LOS: 1 days | Discharge: HOME | End: 2019-01-01
Payer: COMMERCIAL

## 2019-01-01 ENCOUNTER — APPOINTMENT (OUTPATIENT)
Dept: GYNECOLOGIC ONCOLOGY | Facility: CLINIC | Age: 49
End: 2019-01-01
Payer: COMMERCIAL

## 2019-01-01 ENCOUNTER — INBOUND DOCUMENT (OUTPATIENT)
Age: 49
End: 2019-01-01

## 2019-01-01 ENCOUNTER — INPATIENT (INPATIENT)
Facility: HOSPITAL | Age: 49
LOS: 2 days | Discharge: HOME | End: 2019-04-25
Attending: STUDENT IN AN ORGANIZED HEALTH CARE EDUCATION/TRAINING PROGRAM | Admitting: STUDENT IN AN ORGANIZED HEALTH CARE EDUCATION/TRAINING PROGRAM
Payer: MEDICAID

## 2019-01-01 ENCOUNTER — APPOINTMENT (OUTPATIENT)
Dept: FAMILY MEDICINE | Facility: CLINIC | Age: 49
End: 2019-01-01

## 2019-01-01 ENCOUNTER — INPATIENT (INPATIENT)
Facility: HOSPITAL | Age: 49
LOS: 5 days | Discharge: HOME | End: 2019-09-21
Attending: HOSPITALIST | Admitting: HOSPITALIST
Payer: COMMERCIAL

## 2019-01-01 ENCOUNTER — APPOINTMENT (OUTPATIENT)
Dept: GYNECOLOGIC ONCOLOGY | Facility: CLINIC | Age: 49
End: 2019-01-01
Payer: MEDICAID

## 2019-01-01 ENCOUNTER — FORM ENCOUNTER (OUTPATIENT)
Age: 49
End: 2019-01-01

## 2019-01-01 ENCOUNTER — RX CHANGE (OUTPATIENT)
Age: 49
End: 2019-01-01

## 2019-01-01 ENCOUNTER — OUTPATIENT (OUTPATIENT)
Dept: OUTPATIENT SERVICES | Facility: HOSPITAL | Age: 49
LOS: 1 days | Discharge: HOME | End: 2019-01-01
Payer: MEDICAID

## 2019-01-01 ENCOUNTER — APPOINTMENT (OUTPATIENT)
Dept: ULTRASOUND IMAGING | Facility: IMAGING CENTER | Age: 49
End: 2019-01-01
Payer: COMMERCIAL

## 2019-01-01 ENCOUNTER — APPOINTMENT (OUTPATIENT)
Dept: CT IMAGING | Facility: IMAGING CENTER | Age: 49
End: 2019-01-01
Payer: COMMERCIAL

## 2019-01-01 ENCOUNTER — APPOINTMENT (OUTPATIENT)
Dept: HEMATOLOGY ONCOLOGY | Facility: CLINIC | Age: 49
End: 2019-01-01
Payer: SELF-PAY

## 2019-01-01 VITALS
TEMPERATURE: 98.8 F | SYSTOLIC BLOOD PRESSURE: 109 MMHG | DIASTOLIC BLOOD PRESSURE: 70 MMHG | OXYGEN SATURATION: 98 % | HEART RATE: 78 BPM | BODY MASS INDEX: 31.17 KG/M2 | WEIGHT: 154.32 LBS

## 2019-01-01 VITALS
TEMPERATURE: 97.9 F | DIASTOLIC BLOOD PRESSURE: 67 MMHG | RESPIRATION RATE: 16 BRPM | OXYGEN SATURATION: 97 % | WEIGHT: 160.16 LBS | HEIGHT: 59 IN | HEART RATE: 117 BPM | BODY MASS INDEX: 32.29 KG/M2 | SYSTOLIC BLOOD PRESSURE: 96 MMHG

## 2019-01-01 VITALS
TEMPERATURE: 99 F | DIASTOLIC BLOOD PRESSURE: 70 MMHG | HEART RATE: 852 BPM | SYSTOLIC BLOOD PRESSURE: 104 MMHG | RESPIRATION RATE: 18 BRPM | OXYGEN SATURATION: 95 %

## 2019-01-01 VITALS
WEIGHT: 163.8 LBS | HEART RATE: 70 BPM | BODY MASS INDEX: 33.08 KG/M2 | RESPIRATION RATE: 16 BRPM | OXYGEN SATURATION: 99 % | DIASTOLIC BLOOD PRESSURE: 75 MMHG | TEMPERATURE: 99.1 F | SYSTOLIC BLOOD PRESSURE: 114 MMHG

## 2019-01-01 VITALS
RESPIRATION RATE: 17 BRPM | WEIGHT: 154.32 LBS | DIASTOLIC BLOOD PRESSURE: 80 MMHG | OXYGEN SATURATION: 98 % | SYSTOLIC BLOOD PRESSURE: 124 MMHG | BODY MASS INDEX: 31.17 KG/M2 | TEMPERATURE: 98.8 F | HEART RATE: 65 BPM

## 2019-01-01 VITALS
SYSTOLIC BLOOD PRESSURE: 102 MMHG | BODY MASS INDEX: 30.21 KG/M2 | OXYGEN SATURATION: 96 % | HEART RATE: 76 BPM | DIASTOLIC BLOOD PRESSURE: 69 MMHG | WEIGHT: 149.58 LBS | RESPIRATION RATE: 16 BRPM

## 2019-01-01 VITALS
WEIGHT: 150.58 LBS | BODY MASS INDEX: 30.41 KG/M2 | TEMPERATURE: 97.7 F | HEART RATE: 96 BPM | DIASTOLIC BLOOD PRESSURE: 76 MMHG | RESPIRATION RATE: 16 BRPM | OXYGEN SATURATION: 98 % | SYSTOLIC BLOOD PRESSURE: 113 MMHG

## 2019-01-01 VITALS
DIASTOLIC BLOOD PRESSURE: 54 MMHG | HEART RATE: 68 BPM | RESPIRATION RATE: 19 BRPM | SYSTOLIC BLOOD PRESSURE: 102 MMHG | OXYGEN SATURATION: 100 %

## 2019-01-01 VITALS
OXYGEN SATURATION: 98 % | RESPIRATION RATE: 16 BRPM | BODY MASS INDEX: 31.51 KG/M2 | HEART RATE: 87 BPM | TEMPERATURE: 98.9 F | DIASTOLIC BLOOD PRESSURE: 80 MMHG | SYSTOLIC BLOOD PRESSURE: 113 MMHG | WEIGHT: 156 LBS

## 2019-01-01 VITALS
SYSTOLIC BLOOD PRESSURE: 111 MMHG | DIASTOLIC BLOOD PRESSURE: 75 MMHG | RESPIRATION RATE: 16 BRPM | BODY MASS INDEX: 31.62 KG/M2 | OXYGEN SATURATION: 95 % | HEART RATE: 107 BPM | WEIGHT: 156.53 LBS | TEMPERATURE: 98.8 F

## 2019-01-01 VITALS
HEIGHT: 59 IN | HEART RATE: 78 BPM | RESPIRATION RATE: 16 BRPM | BODY MASS INDEX: 30.64 KG/M2 | TEMPERATURE: 98.06 F | WEIGHT: 152.01 LBS | SYSTOLIC BLOOD PRESSURE: 111 MMHG | OXYGEN SATURATION: 100 % | DIASTOLIC BLOOD PRESSURE: 75 MMHG

## 2019-01-01 VITALS
OXYGEN SATURATION: 99 % | WEIGHT: 154.74 LBS | TEMPERATURE: 98.3 F | DIASTOLIC BLOOD PRESSURE: 84 MMHG | RESPIRATION RATE: 16 BRPM | BODY MASS INDEX: 31.26 KG/M2 | SYSTOLIC BLOOD PRESSURE: 126 MMHG | HEART RATE: 83 BPM

## 2019-01-01 VITALS
SYSTOLIC BLOOD PRESSURE: 112 MMHG | DIASTOLIC BLOOD PRESSURE: 75 MMHG | HEIGHT: 59 IN | WEIGHT: 156 LBS | BODY MASS INDEX: 31.45 KG/M2

## 2019-01-01 VITALS
DIASTOLIC BLOOD PRESSURE: 67 MMHG | OXYGEN SATURATION: 98 % | SYSTOLIC BLOOD PRESSURE: 123 MMHG | RESPIRATION RATE: 18 BRPM | HEART RATE: 83 BPM

## 2019-01-01 VITALS
WEIGHT: 153 LBS | SYSTOLIC BLOOD PRESSURE: 121 MMHG | BODY MASS INDEX: 30.84 KG/M2 | HEIGHT: 59 IN | DIASTOLIC BLOOD PRESSURE: 77 MMHG

## 2019-01-01 VITALS
SYSTOLIC BLOOD PRESSURE: 99 MMHG | TEMPERATURE: 99.3 F | DIASTOLIC BLOOD PRESSURE: 68 MMHG | HEART RATE: 97 BPM | OXYGEN SATURATION: 97 % | RESPIRATION RATE: 16 BRPM

## 2019-01-01 VITALS
OXYGEN SATURATION: 96 % | WEIGHT: 139.11 LBS | DIASTOLIC BLOOD PRESSURE: 70 MMHG | BODY MASS INDEX: 28.1 KG/M2 | HEART RATE: 89 BPM | RESPIRATION RATE: 16 BRPM | TEMPERATURE: 98 F | SYSTOLIC BLOOD PRESSURE: 104 MMHG

## 2019-01-01 VITALS
TEMPERATURE: 100 F | DIASTOLIC BLOOD PRESSURE: 54 MMHG | OXYGEN SATURATION: 100 % | SYSTOLIC BLOOD PRESSURE: 126 MMHG | HEART RATE: 111 BPM | RESPIRATION RATE: 20 BRPM

## 2019-01-01 VITALS
SYSTOLIC BLOOD PRESSURE: 107 MMHG | BODY MASS INDEX: 31.21 KG/M2 | RESPIRATION RATE: 16 BRPM | DIASTOLIC BLOOD PRESSURE: 70 MMHG | HEART RATE: 96 BPM | WEIGHT: 154.54 LBS

## 2019-01-01 VITALS
WEIGHT: 145.48 LBS | HEART RATE: 83 BPM | BODY MASS INDEX: 29.39 KG/M2 | SYSTOLIC BLOOD PRESSURE: 115 MMHG | OXYGEN SATURATION: 98 % | DIASTOLIC BLOOD PRESSURE: 79 MMHG | RESPIRATION RATE: 17 BRPM | TEMPERATURE: 98.5 F

## 2019-01-01 VITALS
OXYGEN SATURATION: 92 % | WEIGHT: 164.02 LBS | TEMPERATURE: 98.1 F | SYSTOLIC BLOOD PRESSURE: 102 MMHG | HEART RATE: 86 BPM | DIASTOLIC BLOOD PRESSURE: 69 MMHG | RESPIRATION RATE: 16 BRPM | BODY MASS INDEX: 33.13 KG/M2

## 2019-01-01 VITALS
OXYGEN SATURATION: 99 % | RESPIRATION RATE: 16 BRPM | TEMPERATURE: 98 F | SYSTOLIC BLOOD PRESSURE: 122 MMHG | DIASTOLIC BLOOD PRESSURE: 61 MMHG | HEART RATE: 96 BPM

## 2019-01-01 VITALS
TEMPERATURE: 98.6 F | OXYGEN SATURATION: 95 % | HEART RATE: 89 BPM | WEIGHT: 152.12 LBS | DIASTOLIC BLOOD PRESSURE: 64 MMHG | BODY MASS INDEX: 30.72 KG/M2 | RESPIRATION RATE: 15 BRPM | SYSTOLIC BLOOD PRESSURE: 98 MMHG

## 2019-01-01 VITALS
TEMPERATURE: 97.8 F | HEART RATE: 103 BPM | SYSTOLIC BLOOD PRESSURE: 109 MMHG | DIASTOLIC BLOOD PRESSURE: 76 MMHG | WEIGHT: 156.31 LBS | OXYGEN SATURATION: 97 % | RESPIRATION RATE: 17 BRPM | BODY MASS INDEX: 31.57 KG/M2

## 2019-01-01 VITALS
DIASTOLIC BLOOD PRESSURE: 77 MMHG | BODY MASS INDEX: 31.17 KG/M2 | TEMPERATURE: 98.9 F | HEART RATE: 90 BPM | WEIGHT: 154.32 LBS | SYSTOLIC BLOOD PRESSURE: 118 MMHG | RESPIRATION RATE: 16 BRPM | OXYGEN SATURATION: 96 %

## 2019-01-01 VITALS
DIASTOLIC BLOOD PRESSURE: 64 MMHG | OXYGEN SATURATION: 97 % | SYSTOLIC BLOOD PRESSURE: 128 MMHG | HEART RATE: 50 BPM | TEMPERATURE: 98 F | RESPIRATION RATE: 18 BRPM

## 2019-01-01 VITALS
TEMPERATURE: 98 F | RESPIRATION RATE: 18 BRPM | HEART RATE: 107 BPM | DIASTOLIC BLOOD PRESSURE: 79 MMHG | BODY MASS INDEX: 27.07 KG/M2 | OXYGEN SATURATION: 99 % | WEIGHT: 134.04 LBS | SYSTOLIC BLOOD PRESSURE: 119 MMHG

## 2019-01-01 VITALS
SYSTOLIC BLOOD PRESSURE: 116 MMHG | TEMPERATURE: 98 F | RESPIRATION RATE: 18 BRPM | HEART RATE: 101 BPM | OXYGEN SATURATION: 100 % | DIASTOLIC BLOOD PRESSURE: 70 MMHG

## 2019-01-01 VITALS
HEART RATE: 90 BPM | RESPIRATION RATE: 16 BRPM | SYSTOLIC BLOOD PRESSURE: 110 MMHG | OXYGEN SATURATION: 98 % | TEMPERATURE: 98.7 F | DIASTOLIC BLOOD PRESSURE: 74 MMHG | WEIGHT: 146.17 LBS | BODY MASS INDEX: 29.52 KG/M2

## 2019-01-01 VITALS
OXYGEN SATURATION: 99 % | SYSTOLIC BLOOD PRESSURE: 104 MMHG | BODY MASS INDEX: 31.17 KG/M2 | HEART RATE: 85 BPM | TEMPERATURE: 98.3 F | WEIGHT: 154.32 LBS | DIASTOLIC BLOOD PRESSURE: 70 MMHG | RESPIRATION RATE: 16 BRPM

## 2019-01-01 VITALS
OXYGEN SATURATION: 98 % | HEART RATE: 104 BPM | BODY MASS INDEX: 26.94 KG/M2 | WEIGHT: 133.38 LBS | SYSTOLIC BLOOD PRESSURE: 113 MMHG | DIASTOLIC BLOOD PRESSURE: 79 MMHG | RESPIRATION RATE: 17 BRPM

## 2019-01-01 VITALS
DIASTOLIC BLOOD PRESSURE: 71 MMHG | HEART RATE: 110 BPM | OXYGEN SATURATION: 98 % | TEMPERATURE: 101 F | SYSTOLIC BLOOD PRESSURE: 114 MMHG | RESPIRATION RATE: 18 BRPM

## 2019-01-01 VITALS
WEIGHT: 147.05 LBS | RESPIRATION RATE: 16 BRPM | HEART RATE: 80 BPM | SYSTOLIC BLOOD PRESSURE: 97 MMHG | DIASTOLIC BLOOD PRESSURE: 59 MMHG | BODY MASS INDEX: 29.7 KG/M2 | OXYGEN SATURATION: 100 %

## 2019-01-01 VITALS
HEART RATE: 96 BPM | OXYGEN SATURATION: 96 % | BODY MASS INDEX: 28.14 KG/M2 | DIASTOLIC BLOOD PRESSURE: 77 MMHG | RESPIRATION RATE: 17 BRPM | TEMPERATURE: 98.9 F | WEIGHT: 139.33 LBS | SYSTOLIC BLOOD PRESSURE: 111 MMHG

## 2019-01-01 VITALS
DIASTOLIC BLOOD PRESSURE: 70 MMHG | OXYGEN SATURATION: 99 % | SYSTOLIC BLOOD PRESSURE: 115 MMHG | HEART RATE: 70 BPM | RESPIRATION RATE: 18 BRPM | TEMPERATURE: 99 F

## 2019-01-01 VITALS
RESPIRATION RATE: 17 BRPM | SYSTOLIC BLOOD PRESSURE: 133 MMHG | OXYGEN SATURATION: 99 % | DIASTOLIC BLOOD PRESSURE: 69 MMHG | HEART RATE: 83 BPM

## 2019-01-01 VITALS
SYSTOLIC BLOOD PRESSURE: 106 MMHG | WEIGHT: 139 LBS | BODY MASS INDEX: 28.02 KG/M2 | DIASTOLIC BLOOD PRESSURE: 71 MMHG | HEIGHT: 59 IN

## 2019-01-01 DIAGNOSIS — Z90.721 ACQUIRED ABSENCE OF OVARIES, UNILATERAL: Chronic | ICD-10-CM

## 2019-01-01 DIAGNOSIS — Z98.890 OTHER SPECIFIED POSTPROCEDURAL STATES: Chronic | ICD-10-CM

## 2019-01-01 DIAGNOSIS — N39.0 URINARY TRACT INFECTION, SITE NOT SPECIFIED: ICD-10-CM

## 2019-01-01 DIAGNOSIS — Z95.828 PRESENCE OF OTHER VASCULAR IMPLANTS AND GRAFTS: Chronic | ICD-10-CM

## 2019-01-01 DIAGNOSIS — G89.3 NEOPLASM RELATED PAIN (ACUTE) (CHRONIC): ICD-10-CM

## 2019-01-01 DIAGNOSIS — R65.10 SYSTEMIC INFLAMMATORY RESPONSE SYNDROME (SIRS) OF NON-INFECTIOUS ORIGIN WITHOUT ACUTE ORGAN DYSFUNCTION: ICD-10-CM

## 2019-01-01 DIAGNOSIS — C56.2 MALIGNANT NEOPLASM OF LEFT OVARY: ICD-10-CM

## 2019-01-01 DIAGNOSIS — R43.2 PARAGEUSIA: ICD-10-CM

## 2019-01-01 DIAGNOSIS — R11.2 NAUSEA WITH VOMITING, UNSPECIFIED: ICD-10-CM

## 2019-01-01 DIAGNOSIS — R00.1 BRADYCARDIA, UNSPECIFIED: ICD-10-CM

## 2019-01-01 DIAGNOSIS — C56.9 MALIGNANT NEOPLASM OF UNSPECIFIED OVARY: ICD-10-CM

## 2019-01-01 DIAGNOSIS — R19.00 INTRA-ABDOMINAL AND PELVIC SWELLING, MASS AND LUMP, UNSPECIFIED SITE: ICD-10-CM

## 2019-01-01 DIAGNOSIS — R60.9 EDEMA, UNSPECIFIED: ICD-10-CM

## 2019-01-01 DIAGNOSIS — K59.00 CONSTIPATION, UNSPECIFIED: ICD-10-CM

## 2019-01-01 DIAGNOSIS — R06.2 WHEEZING: ICD-10-CM

## 2019-01-01 DIAGNOSIS — R51 HEADACHE: ICD-10-CM

## 2019-01-01 DIAGNOSIS — Z51.11 ENCOUNTER FOR ANTINEOPLASTIC CHEMOTHERAPY: ICD-10-CM

## 2019-01-01 DIAGNOSIS — I89.0 LYMPHEDEMA, NOT ELSEWHERE CLASSIFIED: ICD-10-CM

## 2019-01-01 DIAGNOSIS — Z71.89 OTHER SPECIFIED COUNSELING: ICD-10-CM

## 2019-01-01 DIAGNOSIS — R10.9 UNSPECIFIED ABDOMINAL PAIN: ICD-10-CM

## 2019-01-01 DIAGNOSIS — R53.81 OTHER MALAISE: ICD-10-CM

## 2019-01-01 DIAGNOSIS — F41.9 ANXIETY DISORDER, UNSPECIFIED: ICD-10-CM

## 2019-01-01 DIAGNOSIS — C79.51 SECONDARY MALIGNANT NEOPLASM OF BONE: ICD-10-CM

## 2019-01-01 DIAGNOSIS — K12.30 ORAL MUCOSITIS (ULCERATIVE), UNSPECIFIED: ICD-10-CM

## 2019-01-01 DIAGNOSIS — A41.9 SEPSIS, UNSPECIFIED ORGANISM: ICD-10-CM

## 2019-01-01 DIAGNOSIS — D64.9 ANEMIA, UNSPECIFIED: ICD-10-CM

## 2019-01-01 DIAGNOSIS — M25.561 PAIN IN RIGHT KNEE: ICD-10-CM

## 2019-01-01 DIAGNOSIS — R50.9 FEVER, UNSPECIFIED: ICD-10-CM

## 2019-01-01 DIAGNOSIS — Z87.898 PERSONAL HISTORY OF OTHER SPECIFIED CONDITIONS: ICD-10-CM

## 2019-01-01 DIAGNOSIS — Z51.5 ENCOUNTER FOR PALLIATIVE CARE: ICD-10-CM

## 2019-01-01 DIAGNOSIS — Z29.9 ENCOUNTER FOR PROPHYLACTIC MEASURES, UNSPECIFIED: ICD-10-CM

## 2019-01-01 DIAGNOSIS — K12.31 ORAL MUCOSITIS (ULCERATIVE) DUE TO ANTINEOPLASTIC THERAPY: ICD-10-CM

## 2019-01-01 DIAGNOSIS — R32 UNSPECIFIED URINARY INCONTINENCE: ICD-10-CM

## 2019-01-01 DIAGNOSIS — K59.03 DRUG INDUCED CONSTIPATION: ICD-10-CM

## 2019-01-01 DIAGNOSIS — R52 PAIN, UNSPECIFIED: ICD-10-CM

## 2019-01-01 DIAGNOSIS — T14.8XXA OTHER INJURY OF UNSPECIFIED BODY REGION, INITIAL ENCOUNTER: ICD-10-CM

## 2019-01-01 DIAGNOSIS — G47.9 SLEEP DISORDER, UNSPECIFIED: ICD-10-CM

## 2019-01-01 DIAGNOSIS — R29.898 OTHER SYMPTOMS AND SIGNS INVOLVING THE MUSCULOSKELETAL SYSTEM: ICD-10-CM

## 2019-01-01 DIAGNOSIS — D63.0 ANEMIA IN NEOPLASTIC DISEASE: ICD-10-CM

## 2019-01-01 DIAGNOSIS — E86.0 DEHYDRATION: ICD-10-CM

## 2019-01-01 DIAGNOSIS — K13.79 OTHER LESIONS OF ORAL MUCOSA: ICD-10-CM

## 2019-01-01 DIAGNOSIS — R60.0 LOCALIZED EDEMA: ICD-10-CM

## 2019-01-01 DIAGNOSIS — R10.32 LEFT LOWER QUADRANT PAIN: ICD-10-CM

## 2019-01-01 DIAGNOSIS — Z51.89 ENCOUNTER FOR OTHER SPECIFIED AFTERCARE: ICD-10-CM

## 2019-01-01 DIAGNOSIS — I95.9 HYPOTENSION, UNSPECIFIED: ICD-10-CM

## 2019-01-01 DIAGNOSIS — G44.209 TENSION-TYPE HEADACHE, UNSPECIFIED, NOT INTRACTABLE: ICD-10-CM

## 2019-01-01 DIAGNOSIS — C56.1 MALIGNANT NEOPLASM OF RIGHT OVARY: ICD-10-CM

## 2019-01-01 DIAGNOSIS — F43.23 ADJUSTMENT DISORDER WITH MIXED ANXIETY AND DEPRESSED MOOD: ICD-10-CM

## 2019-01-01 DIAGNOSIS — I95.2 HYPOTENSION DUE TO DRUGS: ICD-10-CM

## 2019-01-01 DIAGNOSIS — R10.31 RIGHT LOWER QUADRANT PAIN: ICD-10-CM

## 2019-01-01 DIAGNOSIS — I82.409 ACUTE EMBOLISM AND THROMBOSIS OF UNSPECIFIED DEEP VEINS OF UNSPECIFIED LOWER EXTREMITY: ICD-10-CM

## 2019-01-01 DIAGNOSIS — Z86.19 PERSONAL HISTORY OF OTHER INFECTIOUS AND PARASITIC DISEASES: ICD-10-CM

## 2019-01-01 DIAGNOSIS — F43.21 ADJUSTMENT DISORDER WITH DEPRESSED MOOD: ICD-10-CM

## 2019-01-01 LAB
ALBUMIN SERPL ELPH-MCNC: 2.9 G/DL — LOW (ref 3.3–5)
ALBUMIN SERPL ELPH-MCNC: 3 G/DL — LOW (ref 3.3–5)
ALBUMIN SERPL ELPH-MCNC: 3.1 G/DL
ALBUMIN SERPL ELPH-MCNC: 3.1 G/DL
ALBUMIN SERPL ELPH-MCNC: 3.1 G/DL — LOW (ref 3.3–5)
ALBUMIN SERPL ELPH-MCNC: 3.2 G/DL — LOW (ref 3.3–5)
ALBUMIN SERPL ELPH-MCNC: 3.2 G/DL — LOW (ref 3.3–5)
ALBUMIN SERPL ELPH-MCNC: 3.3 G/DL — SIGNIFICANT CHANGE UP (ref 3.3–5)
ALBUMIN SERPL ELPH-MCNC: 3.4 G/DL
ALBUMIN SERPL ELPH-MCNC: 3.4 G/DL — SIGNIFICANT CHANGE UP (ref 3.3–5)
ALBUMIN SERPL ELPH-MCNC: 3.6 G/DL
ALBUMIN SERPL ELPH-MCNC: 3.7 G/DL — SIGNIFICANT CHANGE UP (ref 3.3–5)
ALBUMIN SERPL ELPH-MCNC: 3.9 G/DL — SIGNIFICANT CHANGE UP (ref 3.3–5)
ALBUMIN SERPL ELPH-MCNC: 3.9 G/DL — SIGNIFICANT CHANGE UP (ref 3.3–5)
ALBUMIN SERPL ELPH-MCNC: 4 G/DL
ALBUMIN SERPL ELPH-MCNC: 4 G/DL — SIGNIFICANT CHANGE UP (ref 3.3–5)
ALP BLD-CCNC: 230 U/L
ALP BLD-CCNC: 288 U/L
ALP BLD-CCNC: 301 U/L
ALP BLD-CCNC: 328 U/L
ALP BLD-CCNC: 361 U/L
ALP SERPL-CCNC: 127 U/L — HIGH (ref 40–120)
ALP SERPL-CCNC: 173 U/L — HIGH (ref 40–120)
ALP SERPL-CCNC: 180 U/L — HIGH (ref 40–120)
ALP SERPL-CCNC: 183 U/L — HIGH (ref 40–120)
ALP SERPL-CCNC: 193 U/L — HIGH (ref 40–120)
ALP SERPL-CCNC: 199 U/L — HIGH (ref 40–120)
ALP SERPL-CCNC: 205 U/L — HIGH (ref 40–120)
ALP SERPL-CCNC: 208 U/L — HIGH (ref 40–120)
ALP SERPL-CCNC: 217 U/L — HIGH (ref 40–120)
ALP SERPL-CCNC: 224 U/L — HIGH (ref 40–120)
ALP SERPL-CCNC: 228 U/L — HIGH (ref 40–120)
ALP SERPL-CCNC: 235 U/L — HIGH (ref 40–120)
ALP SERPL-CCNC: 257 U/L — HIGH (ref 40–120)
ALP SERPL-CCNC: 261 U/L — HIGH (ref 40–120)
ALP SERPL-CCNC: 291 U/L — HIGH (ref 40–120)
ALT FLD-CCNC: 27 U/L — SIGNIFICANT CHANGE UP (ref 4–33)
ALT FLD-CCNC: 28 U/L — SIGNIFICANT CHANGE UP (ref 4–33)
ALT FLD-CCNC: 29 U/L — SIGNIFICANT CHANGE UP (ref 4–33)
ALT FLD-CCNC: 30 U/L — SIGNIFICANT CHANGE UP (ref 4–33)
ALT FLD-CCNC: 31 U/L — SIGNIFICANT CHANGE UP (ref 4–33)
ALT FLD-CCNC: 33 U/L — SIGNIFICANT CHANGE UP (ref 4–33)
ALT FLD-CCNC: 36 U/L — HIGH (ref 4–33)
ALT FLD-CCNC: 40 U/L — HIGH (ref 4–33)
ALT FLD-CCNC: 50 U/L — HIGH (ref 4–33)
ALT FLD-CCNC: 50 U/L — HIGH (ref 4–33)
ALT FLD-CCNC: 61 U/L — HIGH (ref 4–33)
ALT FLD-CCNC: 65 U/L — HIGH (ref 4–33)
ALT FLD-CCNC: 71 U/L — HIGH (ref 4–33)
ALT FLD-CCNC: 88 U/L — HIGH (ref 4–33)
ALT FLD-CCNC: 95 U/L — HIGH (ref 4–33)
ALT SERPL-CCNC: 18 U/L
ALT SERPL-CCNC: 22 U/L
ALT SERPL-CCNC: 30 U/L
ALT SERPL-CCNC: 40 U/L
ALT SERPL-CCNC: 53 U/L
ANION GAP SERPL CALC-SCNC: 11 MMO/L — SIGNIFICANT CHANGE UP (ref 7–14)
ANION GAP SERPL CALC-SCNC: 12 MMO/L — SIGNIFICANT CHANGE UP (ref 7–14)
ANION GAP SERPL CALC-SCNC: 12 MMOL/L
ANION GAP SERPL CALC-SCNC: 13 MMO/L — SIGNIFICANT CHANGE UP (ref 7–14)
ANION GAP SERPL CALC-SCNC: 14 MMO/L — SIGNIFICANT CHANGE UP (ref 7–14)
ANION GAP SERPL CALC-SCNC: 14 MMOL/L
ANION GAP SERPL CALC-SCNC: 15 MMO/L — HIGH (ref 7–14)
ANION GAP SERPL CALC-SCNC: 15 MMOL/L
ANION GAP SERPL CALC-SCNC: 15 MMOL/L
ANION GAP SERPL CALC-SCNC: 17 MMOL/L
ANION GAP SERPL CALC-SCNC: 19 MMOL/L
ANION GAP SERPL CALC-SCNC: 6 MMO/L — LOW (ref 7–14)
ANISOCYTOSIS BLD QL: SIGNIFICANT CHANGE UP
ANISOCYTOSIS BLD QL: SLIGHT — SIGNIFICANT CHANGE UP
APPEARANCE UR: CLEAR — SIGNIFICANT CHANGE UP
APPEARANCE: CLEAR
APTT BLD: 26.1 SEC — LOW (ref 27.5–36.3)
APTT BLD: 30.5 SEC — SIGNIFICANT CHANGE UP (ref 27.5–36.3)
APTT BLD: 31.1 SEC — SIGNIFICANT CHANGE UP (ref 27.5–36.3)
APTT BLD: 31.4 SEC — SIGNIFICANT CHANGE UP (ref 27.5–36.3)
APTT BLD: 31.6 SEC — SIGNIFICANT CHANGE UP (ref 27.5–36.3)
APTT BLD: 31.7 SEC — SIGNIFICANT CHANGE UP (ref 27.5–36.3)
APTT BLD: 32.1 SEC — SIGNIFICANT CHANGE UP (ref 27.5–36.3)
APTT BLD: 33.2 SEC — SIGNIFICANT CHANGE UP (ref 27.5–36.3)
AST SERPL-CCNC: 17 U/L
AST SERPL-CCNC: 17 U/L — SIGNIFICANT CHANGE UP (ref 4–32)
AST SERPL-CCNC: 18 U/L
AST SERPL-CCNC: 20 U/L — SIGNIFICANT CHANGE UP (ref 4–32)
AST SERPL-CCNC: 21 U/L — SIGNIFICANT CHANGE UP (ref 4–32)
AST SERPL-CCNC: 21 U/L — SIGNIFICANT CHANGE UP (ref 4–32)
AST SERPL-CCNC: 22 U/L
AST SERPL-CCNC: 29 U/L — SIGNIFICANT CHANGE UP (ref 4–32)
AST SERPL-CCNC: 30 U/L — SIGNIFICANT CHANGE UP (ref 4–32)
AST SERPL-CCNC: 33 U/L — HIGH (ref 4–32)
AST SERPL-CCNC: 35 U/L
AST SERPL-CCNC: 36 U/L — HIGH (ref 4–32)
AST SERPL-CCNC: 37 U/L — HIGH (ref 4–32)
AST SERPL-CCNC: 39 U/L
AST SERPL-CCNC: 39 U/L — HIGH (ref 4–32)
AST SERPL-CCNC: 44 U/L — HIGH (ref 4–32)
AST SERPL-CCNC: 44 U/L — HIGH (ref 4–32)
AST SERPL-CCNC: 47 U/L — HIGH (ref 4–32)
AST SERPL-CCNC: 49 U/L — HIGH (ref 4–32)
AST SERPL-CCNC: 72 U/L — HIGH (ref 4–32)
B PERT DNA SPEC QL NAA+PROBE: NOT DETECTED — SIGNIFICANT CHANGE UP
BACTERIA # UR AUTO: NEGATIVE — SIGNIFICANT CHANGE UP
BACTERIA BLD CULT: SIGNIFICANT CHANGE UP
BACTERIA UR CULT: SIGNIFICANT CHANGE UP
BACTERIA: NEGATIVE
BASE EXCESS BLDV CALC-SCNC: 2.8 MMOL/L — SIGNIFICANT CHANGE UP
BASE EXCESS BLDV CALC-SCNC: 3.2 MMOL/L — SIGNIFICANT CHANGE UP
BASE EXCESS BLDV CALC-SCNC: 4.1 MMOL/L — SIGNIFICANT CHANGE UP
BASE EXCESS BLDV CALC-SCNC: 5.9 MMOL/L — SIGNIFICANT CHANGE UP
BASOPHILS # BLD AUTO: 0 K/UL — SIGNIFICANT CHANGE UP (ref 0–0.2)
BASOPHILS # BLD AUTO: 0.01 K/UL — SIGNIFICANT CHANGE UP (ref 0–0.2)
BASOPHILS # BLD AUTO: 0.01 K/UL — SIGNIFICANT CHANGE UP (ref 0–0.2)
BASOPHILS # BLD AUTO: 0.02 K/UL — SIGNIFICANT CHANGE UP (ref 0–0.2)
BASOPHILS # BLD AUTO: 0.03 K/UL — SIGNIFICANT CHANGE UP (ref 0–0.2)
BASOPHILS # BLD AUTO: 0.04 K/UL — SIGNIFICANT CHANGE UP (ref 0–0.2)
BASOPHILS # BLD AUTO: 0.05 K/UL — SIGNIFICANT CHANGE UP (ref 0–0.2)
BASOPHILS NFR BLD AUTO: 0 % — SIGNIFICANT CHANGE UP (ref 0–2)
BASOPHILS NFR BLD AUTO: 0.1 % — SIGNIFICANT CHANGE UP (ref 0–2)
BASOPHILS NFR BLD AUTO: 0.2 % — SIGNIFICANT CHANGE UP (ref 0–2)
BASOPHILS NFR BLD AUTO: 0.3 % — SIGNIFICANT CHANGE UP (ref 0–2)
BASOPHILS NFR BLD AUTO: 0.4 % — SIGNIFICANT CHANGE UP (ref 0–2)
BASOPHILS NFR BLD AUTO: 0.5 % — SIGNIFICANT CHANGE UP (ref 0–2)
BASOPHILS NFR BLD AUTO: 0.6 % — SIGNIFICANT CHANGE UP (ref 0–2)
BASOPHILS NFR BLD AUTO: 0.7 % — SIGNIFICANT CHANGE UP (ref 0–2)
BASOPHILS NFR BLD AUTO: 0.8 % — SIGNIFICANT CHANGE UP (ref 0–2)
BASOPHILS NFR BLD AUTO: 0.9 % — SIGNIFICANT CHANGE UP (ref 0–2)
BASOPHILS NFR BLD AUTO: 1 % — SIGNIFICANT CHANGE UP (ref 0–2)
BASOPHILS NFR BLD AUTO: 1.1 % — SIGNIFICANT CHANGE UP (ref 0–2)
BASOPHILS NFR BLD AUTO: 1.2 % — SIGNIFICANT CHANGE UP (ref 0–2)
BASOPHILS NFR SPEC: 0.8 % — SIGNIFICANT CHANGE UP (ref 0–2)
BASOPHILS NFR SPEC: 0.9 % — SIGNIFICANT CHANGE UP (ref 0–2)
BASOPHILS NFR SPEC: 0.9 % — SIGNIFICANT CHANGE UP (ref 0–2)
BILIRUB SERPL-MCNC: 0.2 MG/DL
BILIRUB SERPL-MCNC: 0.2 MG/DL — SIGNIFICANT CHANGE UP (ref 0.2–1.2)
BILIRUB SERPL-MCNC: 0.2 MG/DL — SIGNIFICANT CHANGE UP (ref 0.2–1.2)
BILIRUB SERPL-MCNC: 0.3 MG/DL
BILIRUB SERPL-MCNC: 0.3 MG/DL
BILIRUB SERPL-MCNC: 0.3 MG/DL — SIGNIFICANT CHANGE UP (ref 0.2–1.2)
BILIRUB SERPL-MCNC: 0.3 MG/DL — SIGNIFICANT CHANGE UP (ref 0.2–1.2)
BILIRUB SERPL-MCNC: 0.4 MG/DL
BILIRUB SERPL-MCNC: 0.4 MG/DL
BILIRUB SERPL-MCNC: 0.4 MG/DL — SIGNIFICANT CHANGE UP (ref 0.2–1.2)
BILIRUB SERPL-MCNC: 0.5 MG/DL — SIGNIFICANT CHANGE UP (ref 0.2–1.2)
BILIRUB SERPL-MCNC: 0.6 MG/DL — SIGNIFICANT CHANGE UP (ref 0.2–1.2)
BILIRUB SERPL-MCNC: 0.6 MG/DL — SIGNIFICANT CHANGE UP (ref 0.2–1.2)
BILIRUB SERPL-MCNC: 0.7 MG/DL — SIGNIFICANT CHANGE UP (ref 0.2–1.2)
BILIRUB SERPL-MCNC: 1.1 MG/DL — SIGNIFICANT CHANGE UP (ref 0.2–1.2)
BILIRUB SERPL-MCNC: < 0.2 MG/DL — LOW (ref 0.2–1.2)
BILIRUB UR-MCNC: NEGATIVE — SIGNIFICANT CHANGE UP
BILIRUB UR-MCNC: SIGNIFICANT CHANGE UP
BILIRUBIN URINE: NEGATIVE
BLASTS # FLD: 0 % — SIGNIFICANT CHANGE UP (ref 0–0)
BLASTS # FLD: 1 % — HIGH (ref 0–0)
BLD GP AB SCN SERPL QL: NEGATIVE — SIGNIFICANT CHANGE UP
BLOOD GAS VENOUS - CREATININE: 0.54 MG/DL — SIGNIFICANT CHANGE UP (ref 0.5–1.3)
BLOOD GAS VENOUS - CREATININE: 0.66 MG/DL — SIGNIFICANT CHANGE UP (ref 0.5–1.3)
BLOOD GAS VENOUS - CREATININE: 0.78 MG/DL — SIGNIFICANT CHANGE UP (ref 0.5–1.3)
BLOOD GAS VENOUS - CREATININE: 0.81 MG/DL — SIGNIFICANT CHANGE UP (ref 0.5–1.3)
BLOOD GAS VENOUS - FIO2: 21 — SIGNIFICANT CHANGE UP
BLOOD UR QL VISUAL: NEGATIVE — SIGNIFICANT CHANGE UP
BLOOD URINE: NEGATIVE
BUN SERPL-MCNC: 10 MG/DL — SIGNIFICANT CHANGE UP (ref 7–23)
BUN SERPL-MCNC: 11 MG/DL
BUN SERPL-MCNC: 12 MG/DL
BUN SERPL-MCNC: 13 MG/DL — SIGNIFICANT CHANGE UP (ref 7–23)
BUN SERPL-MCNC: 13 MG/DL — SIGNIFICANT CHANGE UP (ref 7–23)
BUN SERPL-MCNC: 23 MG/DL — SIGNIFICANT CHANGE UP (ref 7–23)
BUN SERPL-MCNC: 4 MG/DL — LOW (ref 7–23)
BUN SERPL-MCNC: 5 MG/DL — LOW (ref 7–23)
BUN SERPL-MCNC: 6 MG/DL
BUN SERPL-MCNC: 6 MG/DL — LOW (ref 7–23)
BUN SERPL-MCNC: 7 MG/DL
BUN SERPL-MCNC: 7 MG/DL
BUN SERPL-MCNC: 7 MG/DL — SIGNIFICANT CHANGE UP (ref 7–23)
BUN SERPL-MCNC: 8 MG/DL
BUN SERPL-MCNC: 8 MG/DL — SIGNIFICANT CHANGE UP (ref 7–23)
BUN SERPL-MCNC: 9 MG/DL — SIGNIFICANT CHANGE UP (ref 7–23)
C PNEUM DNA SPEC QL NAA+PROBE: NOT DETECTED — SIGNIFICANT CHANGE UP
CALCIUM SERPL-MCNC: 10 MG/DL — SIGNIFICANT CHANGE UP (ref 8.4–10.5)
CALCIUM SERPL-MCNC: 10.2 MG/DL — SIGNIFICANT CHANGE UP (ref 8.4–10.5)
CALCIUM SERPL-MCNC: 10.3 MG/DL — SIGNIFICANT CHANGE UP (ref 8.4–10.5)
CALCIUM SERPL-MCNC: 12.6 MG/DL
CALCIUM SERPL-MCNC: 12.6 MG/DL
CALCIUM SERPL-MCNC: 12.8 MG/DL
CALCIUM SERPL-MCNC: 13 MG/DL
CALCIUM SERPL-MCNC: 7.8 MG/DL
CALCIUM SERPL-MCNC: 8.7 MG/DL — SIGNIFICANT CHANGE UP (ref 8.4–10.5)
CALCIUM SERPL-MCNC: 8.7 MG/DL — SIGNIFICANT CHANGE UP (ref 8.4–10.5)
CALCIUM SERPL-MCNC: 8.8 MG/DL — SIGNIFICANT CHANGE UP (ref 8.4–10.5)
CALCIUM SERPL-MCNC: 8.8 MG/DL — SIGNIFICANT CHANGE UP (ref 8.4–10.5)
CALCIUM SERPL-MCNC: 9 MG/DL — SIGNIFICANT CHANGE UP (ref 8.4–10.5)
CALCIUM SERPL-MCNC: 9.1 MG/DL — SIGNIFICANT CHANGE UP (ref 8.4–10.5)
CALCIUM SERPL-MCNC: 9.3 MG/DL — SIGNIFICANT CHANGE UP (ref 8.4–10.5)
CALCIUM SERPL-MCNC: 9.3 MG/DL — SIGNIFICANT CHANGE UP (ref 8.4–10.5)
CALCIUM SERPL-MCNC: 9.5 MG/DL — SIGNIFICANT CHANGE UP (ref 8.4–10.5)
CALCIUM SERPL-MCNC: 9.5 MG/DL — SIGNIFICANT CHANGE UP (ref 8.4–10.5)
CALCIUM SERPL-MCNC: 9.7 MG/DL
CALCIUM SERPL-MCNC: 9.7 MG/DL — SIGNIFICANT CHANGE UP (ref 8.4–10.5)
CALCIUM SERPL-MCNC: 9.7 MG/DL — SIGNIFICANT CHANGE UP (ref 8.4–10.5)
CALCIUM SERPL-MCNC: 9.8 MG/DL — SIGNIFICANT CHANGE UP (ref 8.4–10.5)
CALCIUM SERPL-MCNC: 9.9 MG/DL
CALCIUM SERPL-MCNC: 9.9 MG/DL — SIGNIFICANT CHANGE UP (ref 8.4–10.5)
CANCER AG125 SERPL-ACNC: 120 U/ML
CANCER AG125 SERPL-ACNC: 164 U/ML
CANCER AG125 SERPL-ACNC: 262 U/ML
CANCER AG125 SERPL-ACNC: 75 U/ML
CHLORIDE BLDV-SCNC: 100 MMOL/L — SIGNIFICANT CHANGE UP (ref 96–108)
CHLORIDE BLDV-SCNC: 101 MMOL/L — SIGNIFICANT CHANGE UP (ref 96–108)
CHLORIDE BLDV-SCNC: 97 MMOL/L — SIGNIFICANT CHANGE UP (ref 96–108)
CHLORIDE BLDV-SCNC: 99 MMOL/L — SIGNIFICANT CHANGE UP (ref 96–108)
CHLORIDE SERPL-SCNC: 100 MMOL/L
CHLORIDE SERPL-SCNC: 100 MMOL/L — SIGNIFICANT CHANGE UP (ref 98–107)
CHLORIDE SERPL-SCNC: 101 MMOL/L — SIGNIFICANT CHANGE UP (ref 98–107)
CHLORIDE SERPL-SCNC: 101 MMOL/L — SIGNIFICANT CHANGE UP (ref 98–107)
CHLORIDE SERPL-SCNC: 102 MMOL/L
CHLORIDE SERPL-SCNC: 102 MMOL/L — SIGNIFICANT CHANGE UP (ref 98–107)
CHLORIDE SERPL-SCNC: 102 MMOL/L — SIGNIFICANT CHANGE UP (ref 98–107)
CHLORIDE SERPL-SCNC: 103 MMOL/L — SIGNIFICANT CHANGE UP (ref 98–107)
CHLORIDE SERPL-SCNC: 104 MMOL/L — SIGNIFICANT CHANGE UP (ref 98–107)
CHLORIDE SERPL-SCNC: 105 MMOL/L — SIGNIFICANT CHANGE UP (ref 98–107)
CHLORIDE SERPL-SCNC: 106 MMOL/L — SIGNIFICANT CHANGE UP (ref 98–107)
CHLORIDE SERPL-SCNC: 109 MMOL/L — HIGH (ref 98–107)
CHLORIDE SERPL-SCNC: 95 MMOL/L — LOW (ref 98–107)
CHLORIDE SERPL-SCNC: 95 MMOL/L — LOW (ref 98–107)
CHLORIDE SERPL-SCNC: 98 MMOL/L — SIGNIFICANT CHANGE UP (ref 98–107)
CHLORIDE SERPL-SCNC: 99 MMOL/L
CHLORIDE SERPL-SCNC: 99 MMOL/L
CHLORIDE SERPL-SCNC: 99 MMOL/L — SIGNIFICANT CHANGE UP (ref 98–107)
CO2 SERPL-SCNC: 22 MMOL/L
CO2 SERPL-SCNC: 22 MMOL/L
CO2 SERPL-SCNC: 24 MMOL/L — SIGNIFICANT CHANGE UP (ref 22–31)
CO2 SERPL-SCNC: 24 MMOL/L — SIGNIFICANT CHANGE UP (ref 22–31)
CO2 SERPL-SCNC: 25 MMOL/L
CO2 SERPL-SCNC: 25 MMOL/L
CO2 SERPL-SCNC: 25 MMOL/L — SIGNIFICANT CHANGE UP (ref 22–31)
CO2 SERPL-SCNC: 26 MMOL/L
CO2 SERPL-SCNC: 26 MMOL/L — SIGNIFICANT CHANGE UP (ref 22–31)
CO2 SERPL-SCNC: 27 MMOL/L
CO2 SERPL-SCNC: 27 MMOL/L — SIGNIFICANT CHANGE UP (ref 22–31)
CO2 SERPL-SCNC: 28 MMOL/L — SIGNIFICANT CHANGE UP (ref 22–31)
CO2 SERPL-SCNC: 28 MMOL/L — SIGNIFICANT CHANGE UP (ref 22–31)
CO2 SERPL-SCNC: 29 MMOL/L — SIGNIFICANT CHANGE UP (ref 22–31)
COLOR SPEC: SIGNIFICANT CHANGE UP
COLOR SPEC: SIGNIFICANT CHANGE UP
COLOR SPEC: YELLOW — SIGNIFICANT CHANGE UP
COLOR SPEC: YELLOW — SIGNIFICANT CHANGE UP
COLOR: NORMAL
COLOR: NORMAL
COLOR: YELLOW
COLOR: YELLOW
CORTIS SERPL-MCNC: 7.6 UG/DL — SIGNIFICANT CHANGE UP (ref 2.7–18.4)
CREAT SERPL-MCNC: 0.6 MG/DL — SIGNIFICANT CHANGE UP (ref 0.5–1.3)
CREAT SERPL-MCNC: 0.64 MG/DL
CREAT SERPL-MCNC: 0.66 MG/DL — SIGNIFICANT CHANGE UP (ref 0.5–1.3)
CREAT SERPL-MCNC: 0.66 MG/DL — SIGNIFICANT CHANGE UP (ref 0.5–1.3)
CREAT SERPL-MCNC: 0.67 MG/DL — SIGNIFICANT CHANGE UP (ref 0.5–1.3)
CREAT SERPL-MCNC: 0.68 MG/DL — SIGNIFICANT CHANGE UP (ref 0.5–1.3)
CREAT SERPL-MCNC: 0.69 MG/DL — SIGNIFICANT CHANGE UP (ref 0.5–1.3)
CREAT SERPL-MCNC: 0.69 MG/DL — SIGNIFICANT CHANGE UP (ref 0.5–1.3)
CREAT SERPL-MCNC: 0.7 MG/DL
CREAT SERPL-MCNC: 0.7 MG/DL — SIGNIFICANT CHANGE UP (ref 0.5–1.3)
CREAT SERPL-MCNC: 0.73 MG/DL — SIGNIFICANT CHANGE UP (ref 0.5–1.3)
CREAT SERPL-MCNC: 0.75 MG/DL
CREAT SERPL-MCNC: 0.75 MG/DL — SIGNIFICANT CHANGE UP (ref 0.5–1.3)
CREAT SERPL-MCNC: 0.75 MG/DL — SIGNIFICANT CHANGE UP (ref 0.5–1.3)
CREAT SERPL-MCNC: 0.77 MG/DL — SIGNIFICANT CHANGE UP (ref 0.5–1.3)
CREAT SERPL-MCNC: 0.78 MG/DL — SIGNIFICANT CHANGE UP (ref 0.5–1.3)
CREAT SERPL-MCNC: 0.84 MG/DL
CREAT SERPL-MCNC: 0.85 MG/DL — SIGNIFICANT CHANGE UP (ref 0.5–1.3)
CREAT SERPL-MCNC: 0.85 MG/DL — SIGNIFICANT CHANGE UP (ref 0.5–1.3)
CREAT SERPL-MCNC: 0.9 MG/DL — SIGNIFICANT CHANGE UP (ref 0.5–1.3)
CREAT SERPL-MCNC: 1.03 MG/DL
CREAT SERPL-MCNC: 1.14 MG/DL
CRP SERPL-MCNC: 168.4 MG/L — HIGH
DACRYOCYTES BLD QL SMEAR: SIGNIFICANT CHANGE UP
DACRYOCYTES BLD QL SMEAR: SIGNIFICANT CHANGE UP
DACRYOCYTES BLD QL SMEAR: SLIGHT — SIGNIFICANT CHANGE UP
DACRYOCYTES BLD QL SMEAR: SLIGHT — SIGNIFICANT CHANGE UP
ELLIPTOCYTES BLD QL SMEAR: SIGNIFICANT CHANGE UP
ELLIPTOCYTES BLD QL SMEAR: SLIGHT — SIGNIFICANT CHANGE UP
ELLIPTOCYTES BLD QL SMEAR: SLIGHT — SIGNIFICANT CHANGE UP
EOSINOPHIL # BLD AUTO: 0 K/UL — SIGNIFICANT CHANGE UP (ref 0–0.5)
EOSINOPHIL # BLD AUTO: 0 K/UL — SIGNIFICANT CHANGE UP (ref 0–0.5)
EOSINOPHIL # BLD AUTO: 0.02 K/UL — SIGNIFICANT CHANGE UP (ref 0–0.5)
EOSINOPHIL # BLD AUTO: 0.03 K/UL — SIGNIFICANT CHANGE UP (ref 0–0.5)
EOSINOPHIL # BLD AUTO: 0.03 K/UL — SIGNIFICANT CHANGE UP (ref 0–0.5)
EOSINOPHIL # BLD AUTO: 0.04 K/UL — SIGNIFICANT CHANGE UP (ref 0–0.5)
EOSINOPHIL # BLD AUTO: 0.06 K/UL — SIGNIFICANT CHANGE UP (ref 0–0.5)
EOSINOPHIL # BLD AUTO: 0.07 K/UL — SIGNIFICANT CHANGE UP (ref 0–0.5)
EOSINOPHIL # BLD AUTO: 0.07 K/UL — SIGNIFICANT CHANGE UP (ref 0–0.5)
EOSINOPHIL # BLD AUTO: 0.1 K/UL — SIGNIFICANT CHANGE UP (ref 0–0.5)
EOSINOPHIL # BLD AUTO: 0.11 K/UL — SIGNIFICANT CHANGE UP (ref 0–0.5)
EOSINOPHIL # BLD AUTO: 0.12 K/UL — SIGNIFICANT CHANGE UP (ref 0–0.5)
EOSINOPHIL # BLD AUTO: 0.15 K/UL — SIGNIFICANT CHANGE UP (ref 0–0.5)
EOSINOPHIL # BLD AUTO: 0.16 K/UL — SIGNIFICANT CHANGE UP (ref 0–0.5)
EOSINOPHIL # BLD AUTO: 0.19 K/UL — SIGNIFICANT CHANGE UP (ref 0–0.5)
EOSINOPHIL # BLD AUTO: 0.2 K/UL — SIGNIFICANT CHANGE UP (ref 0–0.5)
EOSINOPHIL # BLD AUTO: 0.23 K/UL — SIGNIFICANT CHANGE UP (ref 0–0.5)
EOSINOPHIL # BLD AUTO: 0.24 K/UL — SIGNIFICANT CHANGE UP (ref 0–0.5)
EOSINOPHIL # BLD AUTO: 0.27 K/UL — SIGNIFICANT CHANGE UP (ref 0–0.5)
EOSINOPHIL # BLD AUTO: 0.3 K/UL — SIGNIFICANT CHANGE UP (ref 0–0.5)
EOSINOPHIL # BLD AUTO: 0.4 K/UL — SIGNIFICANT CHANGE UP (ref 0–0.5)
EOSINOPHIL # BLD AUTO: 0.7 K/UL — HIGH (ref 0–0.5)
EOSINOPHIL NFR BLD AUTO: 0 % — SIGNIFICANT CHANGE UP (ref 0–6)
EOSINOPHIL NFR BLD AUTO: 0 % — SIGNIFICANT CHANGE UP (ref 0–6)
EOSINOPHIL NFR BLD AUTO: 0.3 % — SIGNIFICANT CHANGE UP (ref 0–6)
EOSINOPHIL NFR BLD AUTO: 0.4 % — SIGNIFICANT CHANGE UP (ref 0–6)
EOSINOPHIL NFR BLD AUTO: 0.5 % — SIGNIFICANT CHANGE UP (ref 0–6)
EOSINOPHIL NFR BLD AUTO: 0.7 % — SIGNIFICANT CHANGE UP (ref 0–6)
EOSINOPHIL NFR BLD AUTO: 0.7 % — SIGNIFICANT CHANGE UP (ref 0–6)
EOSINOPHIL NFR BLD AUTO: 1 % — SIGNIFICANT CHANGE UP (ref 0–6)
EOSINOPHIL NFR BLD AUTO: 1.4 % — SIGNIFICANT CHANGE UP (ref 0–6)
EOSINOPHIL NFR BLD AUTO: 1.5 % — SIGNIFICANT CHANGE UP (ref 0–6)
EOSINOPHIL NFR BLD AUTO: 1.5 % — SIGNIFICANT CHANGE UP (ref 0–6)
EOSINOPHIL NFR BLD AUTO: 1.9 % — SIGNIFICANT CHANGE UP (ref 0–6)
EOSINOPHIL NFR BLD AUTO: 10.1 % — HIGH (ref 0–6)
EOSINOPHIL NFR BLD AUTO: 2 % — SIGNIFICANT CHANGE UP (ref 0–6)
EOSINOPHIL NFR BLD AUTO: 2.6 % — SIGNIFICANT CHANGE UP (ref 0–6)
EOSINOPHIL NFR BLD AUTO: 2.7 % — SIGNIFICANT CHANGE UP (ref 0–6)
EOSINOPHIL NFR BLD AUTO: 3 % — SIGNIFICANT CHANGE UP (ref 0–6)
EOSINOPHIL NFR BLD AUTO: 3.2 % — SIGNIFICANT CHANGE UP (ref 0–6)
EOSINOPHIL NFR BLD AUTO: 3.3 % — SIGNIFICANT CHANGE UP (ref 0–6)
EOSINOPHIL NFR BLD AUTO: 3.5 % — SIGNIFICANT CHANGE UP (ref 0–6)
EOSINOPHIL NFR BLD AUTO: 3.6 % — SIGNIFICANT CHANGE UP (ref 0–6)
EOSINOPHIL NFR BLD AUTO: 4 % — SIGNIFICANT CHANGE UP (ref 0–6)
EOSINOPHIL NFR BLD AUTO: 4.3 % — SIGNIFICANT CHANGE UP (ref 0–6)
EOSINOPHIL NFR BLD AUTO: 4.4 % — SIGNIFICANT CHANGE UP (ref 0–6)
EOSINOPHIL NFR BLD AUTO: 4.6 % — SIGNIFICANT CHANGE UP (ref 0–6)
EOSINOPHIL NFR BLD AUTO: 4.7 % — SIGNIFICANT CHANGE UP (ref 0–6)
EOSINOPHIL NFR BLD AUTO: 5 % — SIGNIFICANT CHANGE UP (ref 0–6)
EOSINOPHIL NFR BLD AUTO: 5.5 % — SIGNIFICANT CHANGE UP (ref 0–6)
EOSINOPHIL NFR BLD AUTO: 6.1 % — HIGH (ref 0–6)
EOSINOPHIL NFR BLD AUTO: 6.3 % — HIGH (ref 0–6)
EOSINOPHIL NFR BLD AUTO: 7.3 % — HIGH (ref 0–6)
EOSINOPHIL NFR BLD AUTO: 8.8 % — HIGH (ref 0–6)
EOSINOPHIL NFR FLD: 0 % — SIGNIFICANT CHANGE UP (ref 0–6)
EOSINOPHIL NFR FLD: 0 % — SIGNIFICANT CHANGE UP (ref 0–6)
EOSINOPHIL NFR FLD: 2.6 % — SIGNIFICANT CHANGE UP (ref 0–6)
FLUAV H1 2009 PAND RNA SPEC QL NAA+PROBE: NOT DETECTED — SIGNIFICANT CHANGE UP
FLUAV H1 RNA SPEC QL NAA+PROBE: NOT DETECTED — SIGNIFICANT CHANGE UP
FLUAV H3 RNA SPEC QL NAA+PROBE: NOT DETECTED — SIGNIFICANT CHANGE UP
FLUAV SUBTYP SPEC NAA+PROBE: NOT DETECTED — SIGNIFICANT CHANGE UP
FLUBV RNA SPEC QL NAA+PROBE: NOT DETECTED — SIGNIFICANT CHANGE UP
GAS PNL BLDV: 131 MMOL/L — LOW (ref 136–146)
GAS PNL BLDV: 133 MMOL/L — LOW (ref 136–146)
GAS PNL BLDV: 136 MMOL/L — SIGNIFICANT CHANGE UP (ref 136–146)
GAS PNL BLDV: 138 MMOL/L — SIGNIFICANT CHANGE UP (ref 136–146)
GIANT PLATELETS BLD QL SMEAR: PRESENT — SIGNIFICANT CHANGE UP
GIANT PLATELETS BLD QL SMEAR: PRESENT — SIGNIFICANT CHANGE UP
GLUCOSE BLDV-MCNC: 115 — HIGH (ref 70–99)
GLUCOSE BLDV-MCNC: 123 — HIGH (ref 70–99)
GLUCOSE BLDV-MCNC: 126 MG/DL — HIGH (ref 70–99)
GLUCOSE BLDV-MCNC: 94 MG/DL — SIGNIFICANT CHANGE UP (ref 70–99)
GLUCOSE QUALITATIVE U: NEGATIVE
GLUCOSE SERPL-MCNC: 100 MG/DL — HIGH (ref 70–99)
GLUCOSE SERPL-MCNC: 107 MG/DL
GLUCOSE SERPL-MCNC: 108 MG/DL
GLUCOSE SERPL-MCNC: 114 MG/DL
GLUCOSE SERPL-MCNC: 118 MG/DL
GLUCOSE SERPL-MCNC: 119 MG/DL — HIGH (ref 70–99)
GLUCOSE SERPL-MCNC: 120 MG/DL
GLUCOSE SERPL-MCNC: 123 MG/DL — HIGH (ref 70–99)
GLUCOSE SERPL-MCNC: 127 MG/DL
GLUCOSE SERPL-MCNC: 132 MG/DL — HIGH (ref 70–99)
GLUCOSE SERPL-MCNC: 145 MG/DL — HIGH (ref 70–99)
GLUCOSE SERPL-MCNC: 153 MG/DL — HIGH (ref 70–99)
GLUCOSE SERPL-MCNC: 162 MG/DL — HIGH (ref 70–99)
GLUCOSE SERPL-MCNC: 85 MG/DL — SIGNIFICANT CHANGE UP (ref 70–99)
GLUCOSE SERPL-MCNC: 88 MG/DL — SIGNIFICANT CHANGE UP (ref 70–99)
GLUCOSE SERPL-MCNC: 92 MG/DL — SIGNIFICANT CHANGE UP (ref 70–99)
GLUCOSE SERPL-MCNC: 92 MG/DL — SIGNIFICANT CHANGE UP (ref 70–99)
GLUCOSE SERPL-MCNC: 93 MG/DL — SIGNIFICANT CHANGE UP (ref 70–99)
GLUCOSE SERPL-MCNC: 93 MG/DL — SIGNIFICANT CHANGE UP (ref 70–99)
GLUCOSE SERPL-MCNC: 94 MG/DL — SIGNIFICANT CHANGE UP (ref 70–99)
GLUCOSE SERPL-MCNC: 94 MG/DL — SIGNIFICANT CHANGE UP (ref 70–99)
GLUCOSE SERPL-MCNC: 96 MG/DL — SIGNIFICANT CHANGE UP (ref 70–99)
GLUCOSE SERPL-MCNC: 96 MG/DL — SIGNIFICANT CHANGE UP (ref 70–99)
GLUCOSE SERPL-MCNC: 97 MG/DL — SIGNIFICANT CHANGE UP (ref 70–99)
GLUCOSE SERPL-MCNC: 97 MG/DL — SIGNIFICANT CHANGE UP (ref 70–99)
GLUCOSE SERPL-MCNC: 98 MG/DL — SIGNIFICANT CHANGE UP (ref 70–99)
GLUCOSE SERPL-MCNC: 99 MG/DL — SIGNIFICANT CHANGE UP (ref 70–99)
GLUCOSE UR-MCNC: NEGATIVE — SIGNIFICANT CHANGE UP
HADV DNA SPEC QL NAA+PROBE: NOT DETECTED — SIGNIFICANT CHANGE UP
HCO3 BLDV-SCNC: 26 MMOL/L — SIGNIFICANT CHANGE UP (ref 20–27)
HCO3 BLDV-SCNC: 26 MMOL/L — SIGNIFICANT CHANGE UP (ref 20–27)
HCO3 BLDV-SCNC: 27 MMOL/L — SIGNIFICANT CHANGE UP (ref 20–27)
HCO3 BLDV-SCNC: 28 MMOL/L — HIGH (ref 20–27)
HCOV PNL SPEC NAA+PROBE: SIGNIFICANT CHANGE UP
HCT VFR BLD CALC: 23.9 % — LOW (ref 34.5–45)
HCT VFR BLD CALC: 23.9 % — LOW (ref 34.5–45)
HCT VFR BLD CALC: 24.6 % — LOW (ref 34.5–45)
HCT VFR BLD CALC: 25 % — LOW (ref 34.5–45)
HCT VFR BLD CALC: 25.1 % — LOW (ref 34.5–45)
HCT VFR BLD CALC: 25.3 % — LOW (ref 34.5–45)
HCT VFR BLD CALC: 25.5 % — LOW (ref 34.5–45)
HCT VFR BLD CALC: 25.7 % — LOW (ref 34.5–45)
HCT VFR BLD CALC: 25.7 % — LOW (ref 34.5–45)
HCT VFR BLD CALC: 25.8 % — LOW (ref 34.5–45)
HCT VFR BLD CALC: 25.9 % — LOW (ref 34.5–45)
HCT VFR BLD CALC: 26 % — LOW (ref 34.5–45)
HCT VFR BLD CALC: 26.3 % — LOW (ref 34.5–45)
HCT VFR BLD CALC: 26.5 % — LOW (ref 34.5–45)
HCT VFR BLD CALC: 26.5 % — LOW (ref 34.5–45)
HCT VFR BLD CALC: 26.6 % — LOW (ref 34.5–45)
HCT VFR BLD CALC: 27 % — LOW (ref 34.5–45)
HCT VFR BLD CALC: 27.2 % — LOW (ref 34.5–45)
HCT VFR BLD CALC: 27.2 % — LOW (ref 34.5–45)
HCT VFR BLD CALC: 27.3 % — LOW (ref 34.5–45)
HCT VFR BLD CALC: 27.3 % — LOW (ref 34.5–45)
HCT VFR BLD CALC: 27.6 % — LOW (ref 34.5–45)
HCT VFR BLD CALC: 27.7 % — LOW (ref 34.5–45)
HCT VFR BLD CALC: 27.8 % — LOW (ref 34.5–45)
HCT VFR BLD CALC: 27.9 % — LOW (ref 34.5–45)
HCT VFR BLD CALC: 28.2 % — LOW (ref 34.5–45)
HCT VFR BLD CALC: 28.3 % — LOW (ref 34.5–45)
HCT VFR BLD CALC: 28.5 % — LOW (ref 34.5–45)
HCT VFR BLD CALC: 28.6 % — LOW (ref 34.5–45)
HCT VFR BLD CALC: 28.8 % — LOW (ref 34.5–45)
HCT VFR BLD CALC: 28.9 % — LOW (ref 34.5–45)
HCT VFR BLD CALC: 29.2 % — LOW (ref 34.5–45)
HCT VFR BLD CALC: 29.3 % — LOW (ref 34.5–45)
HCT VFR BLD CALC: 29.9 % — LOW (ref 34.5–45)
HCT VFR BLD CALC: 30.3 % — LOW (ref 34.5–45)
HCT VFR BLD CALC: 30.4 % — LOW (ref 34.5–45)
HCT VFR BLD CALC: 30.7 % — LOW (ref 34.5–45)
HCT VFR BLD CALC: 31.1 % — LOW (ref 34.5–45)
HCT VFR BLD CALC: 31.2 % — LOW (ref 34.5–45)
HCT VFR BLD CALC: 31.5 % — LOW (ref 34.5–45)
HCT VFR BLD CALC: 31.5 % — LOW (ref 34.5–45)
HCT VFR BLD CALC: 31.6 % — LOW (ref 34.5–45)
HCT VFR BLD CALC: 31.7 % — LOW (ref 34.5–45)
HCT VFR BLD CALC: 31.7 % — LOW (ref 34.5–45)
HCT VFR BLD CALC: 32.1 % — LOW (ref 34.5–45)
HCT VFR BLD CALC: 32.6 % — LOW (ref 34.5–45)
HCT VFR BLD CALC: 33.8 % — LOW (ref 34.5–45)
HCT VFR BLDV CALC: 24.8 % — LOW (ref 34.5–45)
HCT VFR BLDV CALC: 28.9 % — LOW (ref 34.5–45)
HCT VFR BLDV CALC: 30.6 % — LOW (ref 34.5–45)
HCT VFR BLDV CALC: 31.3 % — LOW (ref 34.5–45)
HGB BLD-MCNC: 10 G/DL — LOW (ref 11.5–15.5)
HGB BLD-MCNC: 10.1 G/DL — LOW (ref 11.5–15.5)
HGB BLD-MCNC: 10.7 G/DL — LOW (ref 11.5–15.5)
HGB BLD-MCNC: 11 G/DL — LOW (ref 11.5–15.5)
HGB BLD-MCNC: 11 G/DL — LOW (ref 11.5–15.5)
HGB BLD-MCNC: 11.2 G/DL — LOW (ref 11.5–15.5)
HGB BLD-MCNC: 6.8 G/DL — CRITICAL LOW (ref 11.5–15.5)
HGB BLD-MCNC: 7.2 G/DL — LOW (ref 11.5–15.5)
HGB BLD-MCNC: 7.3 G/DL — LOW (ref 11.5–15.5)
HGB BLD-MCNC: 7.4 G/DL — LOW (ref 11.5–15.5)
HGB BLD-MCNC: 7.7 G/DL — LOW (ref 11.5–15.5)
HGB BLD-MCNC: 7.8 G/DL — LOW (ref 11.5–15.5)
HGB BLD-MCNC: 7.9 G/DL — LOW (ref 11.5–15.5)
HGB BLD-MCNC: 7.9 G/DL — LOW (ref 11.5–15.5)
HGB BLD-MCNC: 8 G/DL — LOW (ref 11.5–15.5)
HGB BLD-MCNC: 8 G/DL — LOW (ref 11.5–15.5)
HGB BLD-MCNC: 8.1 G/DL — LOW (ref 11.5–15.5)
HGB BLD-MCNC: 8.1 G/DL — LOW (ref 11.5–15.5)
HGB BLD-MCNC: 8.2 G/DL — LOW (ref 11.5–15.5)
HGB BLD-MCNC: 8.2 G/DL — LOW (ref 11.5–15.5)
HGB BLD-MCNC: 8.3 G/DL — LOW (ref 11.5–15.5)
HGB BLD-MCNC: 8.4 G/DL — LOW (ref 11.5–15.5)
HGB BLD-MCNC: 8.5 G/DL — LOW (ref 11.5–15.5)
HGB BLD-MCNC: 8.5 G/DL — LOW (ref 11.5–15.5)
HGB BLD-MCNC: 8.6 G/DL — LOW (ref 11.5–15.5)
HGB BLD-MCNC: 8.6 G/DL — LOW (ref 11.5–15.5)
HGB BLD-MCNC: 8.7 G/DL — LOW (ref 11.5–15.5)
HGB BLD-MCNC: 8.7 G/DL — LOW (ref 11.5–15.5)
HGB BLD-MCNC: 8.8 G/DL — LOW (ref 11.5–15.5)
HGB BLD-MCNC: 8.9 G/DL — LOW (ref 11.5–15.5)
HGB BLD-MCNC: 8.9 G/DL — LOW (ref 11.5–15.5)
HGB BLD-MCNC: 9 G/DL — LOW (ref 11.5–15.5)
HGB BLD-MCNC: 9.3 G/DL — LOW (ref 11.5–15.5)
HGB BLD-MCNC: 9.4 G/DL — LOW (ref 11.5–15.5)
HGB BLD-MCNC: 9.4 G/DL — LOW (ref 11.5–15.5)
HGB BLD-MCNC: 9.5 G/DL — LOW (ref 11.5–15.5)
HGB BLD-MCNC: 9.7 G/DL — LOW (ref 11.5–15.5)
HGB BLD-MCNC: 9.7 G/DL — LOW (ref 11.5–15.5)
HGB BLD-MCNC: 9.8 G/DL — LOW (ref 11.5–15.5)
HGB BLDV-MCNC: 10.1 G/DL — LOW (ref 11.5–15.5)
HGB BLDV-MCNC: 8 G/DL — LOW (ref 11.5–15.5)
HGB BLDV-MCNC: 9.3 G/DL — LOW (ref 11.5–15.5)
HGB BLDV-MCNC: 9.9 G/DL — LOW (ref 11.5–15.5)
HMPV RNA SPEC QL NAA+PROBE: NOT DETECTED — SIGNIFICANT CHANGE UP
HPIV1 RNA SPEC QL NAA+PROBE: NOT DETECTED — SIGNIFICANT CHANGE UP
HPIV2 RNA SPEC QL NAA+PROBE: NOT DETECTED — SIGNIFICANT CHANGE UP
HPIV3 RNA SPEC QL NAA+PROBE: NOT DETECTED — SIGNIFICANT CHANGE UP
HPIV4 RNA SPEC QL NAA+PROBE: NOT DETECTED — SIGNIFICANT CHANGE UP
HYALINE CASTS # UR AUTO: NEGATIVE — SIGNIFICANT CHANGE UP
HYALINE CASTS: 0 /LPF
HYPOCHROMIA BLD QL: SLIGHT — SIGNIFICANT CHANGE UP
HYPOCHROMIA BLD QL: SLIGHT — SIGNIFICANT CHANGE UP
IMM GRANULOCYTES NFR BLD AUTO: 0.4 % — SIGNIFICANT CHANGE UP (ref 0–1.5)
IMM GRANULOCYTES NFR BLD AUTO: 0.5 % — SIGNIFICANT CHANGE UP (ref 0–1.5)
IMM GRANULOCYTES NFR BLD AUTO: 0.7 % — SIGNIFICANT CHANGE UP (ref 0–1.5)
IMM GRANULOCYTES NFR BLD AUTO: 0.8 % — SIGNIFICANT CHANGE UP (ref 0–1.5)
IMM GRANULOCYTES NFR BLD AUTO: 0.8 % — SIGNIFICANT CHANGE UP (ref 0–1.5)
IMM GRANULOCYTES NFR BLD AUTO: 1 % — SIGNIFICANT CHANGE UP (ref 0–1.5)
IMM GRANULOCYTES NFR BLD AUTO: 1.1 % — SIGNIFICANT CHANGE UP (ref 0–1.5)
IMM GRANULOCYTES NFR BLD AUTO: 1.3 % — SIGNIFICANT CHANGE UP (ref 0–1.5)
IMM GRANULOCYTES NFR BLD AUTO: 1.4 % — SIGNIFICANT CHANGE UP (ref 0–1.5)
IMM GRANULOCYTES NFR BLD AUTO: 1.5 % — SIGNIFICANT CHANGE UP (ref 0–1.5)
IMM GRANULOCYTES NFR BLD AUTO: 1.6 % — HIGH (ref 0–1.5)
IMM GRANULOCYTES NFR BLD AUTO: 2.4 % — HIGH (ref 0–1.5)
IMM GRANULOCYTES NFR BLD AUTO: 3.4 % — HIGH (ref 0–1.5)
IMM GRANULOCYTES NFR BLD AUTO: 3.6 % — HIGH (ref 0–1.5)
INR BLD: 0.94 — SIGNIFICANT CHANGE UP (ref 0.88–1.17)
INR BLD: 1.15 — SIGNIFICANT CHANGE UP (ref 0.88–1.17)
INR BLD: 1.19 — HIGH (ref 0.88–1.17)
INR BLD: 1.26 — HIGH (ref 0.88–1.17)
INR BLD: 1.35 — HIGH (ref 0.88–1.17)
INR BLD: 1.42 — HIGH (ref 0.88–1.17)
INR BLD: 1.42 — HIGH (ref 0.88–1.17)
INR BLD: 1.56 — HIGH (ref 0.88–1.17)
KETONES UR-MCNC: NEGATIVE — SIGNIFICANT CHANGE UP
KETONES URINE: NEGATIVE
LACTATE BLDV-MCNC: 1.1 MMOL/L — SIGNIFICANT CHANGE UP (ref 0.5–2)
LACTATE BLDV-MCNC: 1.3 MMOL/L — SIGNIFICANT CHANGE UP (ref 0.5–2)
LACTATE BLDV-MCNC: 1.9 MMOL/L — SIGNIFICANT CHANGE UP (ref 0.5–2)
LACTATE BLDV-MCNC: 3.1 MMOL/L — HIGH (ref 0.5–2)
LEUKOCYTE ESTERASE UR-ACNC: NEGATIVE — SIGNIFICANT CHANGE UP
LEUKOCYTE ESTERASE URINE: NEGATIVE
LIDOCAIN IGE QN: 12.2 U/L — SIGNIFICANT CHANGE UP (ref 7–60)
LYMPHOCYTES # BLD AUTO: 0.17 K/UL — LOW (ref 1–3.3)
LYMPHOCYTES # BLD AUTO: 0.35 K/UL — LOW (ref 1–3.3)
LYMPHOCYTES # BLD AUTO: 0.36 K/UL — LOW (ref 1–3.3)
LYMPHOCYTES # BLD AUTO: 0.37 K/UL — LOW (ref 1–3.3)
LYMPHOCYTES # BLD AUTO: 0.4 K/UL — LOW (ref 1–3.3)
LYMPHOCYTES # BLD AUTO: 0.43 K/UL — LOW (ref 1–3.3)
LYMPHOCYTES # BLD AUTO: 0.45 K/UL — LOW (ref 1–3.3)
LYMPHOCYTES # BLD AUTO: 0.46 K/UL — LOW (ref 1–3.3)
LYMPHOCYTES # BLD AUTO: 0.5 K/UL — LOW (ref 1–3.3)
LYMPHOCYTES # BLD AUTO: 0.52 K/UL — LOW (ref 1–3.3)
LYMPHOCYTES # BLD AUTO: 0.57 K/UL — LOW (ref 1–3.3)
LYMPHOCYTES # BLD AUTO: 0.59 K/UL — LOW (ref 1–3.3)
LYMPHOCYTES # BLD AUTO: 0.6 K/UL — LOW (ref 1–3.3)
LYMPHOCYTES # BLD AUTO: 0.63 K/UL — LOW (ref 1–3.3)
LYMPHOCYTES # BLD AUTO: 0.66 K/UL — LOW (ref 1–3.3)
LYMPHOCYTES # BLD AUTO: 0.7 K/UL — LOW (ref 1–3.3)
LYMPHOCYTES # BLD AUTO: 0.73 K/UL — LOW (ref 1–3.3)
LYMPHOCYTES # BLD AUTO: 0.8 K/UL — LOW (ref 1–3.3)
LYMPHOCYTES # BLD AUTO: 0.82 K/UL — LOW (ref 1–3.3)
LYMPHOCYTES # BLD AUTO: 0.93 K/UL — LOW (ref 1–3.3)
LYMPHOCYTES # BLD AUTO: 1 K/UL — SIGNIFICANT CHANGE UP (ref 1–3.3)
LYMPHOCYTES # BLD AUTO: 1.2 K/UL — SIGNIFICANT CHANGE UP (ref 1–3.3)
LYMPHOCYTES # BLD AUTO: 1.2 K/UL — SIGNIFICANT CHANGE UP (ref 1–3.3)
LYMPHOCYTES # BLD AUTO: 10.5 % — LOW (ref 13–44)
LYMPHOCYTES # BLD AUTO: 10.7 % — LOW (ref 13–44)
LYMPHOCYTES # BLD AUTO: 10.7 % — LOW (ref 13–44)
LYMPHOCYTES # BLD AUTO: 11.5 % — LOW (ref 13–44)
LYMPHOCYTES # BLD AUTO: 12 % — LOW (ref 13–44)
LYMPHOCYTES # BLD AUTO: 12.5 % — LOW (ref 13–44)
LYMPHOCYTES # BLD AUTO: 12.9 % — LOW (ref 13–44)
LYMPHOCYTES # BLD AUTO: 13.2 % — SIGNIFICANT CHANGE UP (ref 13–44)
LYMPHOCYTES # BLD AUTO: 14.1 % — SIGNIFICANT CHANGE UP (ref 13–44)
LYMPHOCYTES # BLD AUTO: 14.8 % — SIGNIFICANT CHANGE UP (ref 13–44)
LYMPHOCYTES # BLD AUTO: 15 % — SIGNIFICANT CHANGE UP (ref 13–44)
LYMPHOCYTES # BLD AUTO: 15.3 % — SIGNIFICANT CHANGE UP (ref 13–44)
LYMPHOCYTES # BLD AUTO: 16 % — SIGNIFICANT CHANGE UP (ref 13–44)
LYMPHOCYTES # BLD AUTO: 17.3 % — SIGNIFICANT CHANGE UP (ref 13–44)
LYMPHOCYTES # BLD AUTO: 17.9 % — SIGNIFICANT CHANGE UP (ref 13–44)
LYMPHOCYTES # BLD AUTO: 18 % — SIGNIFICANT CHANGE UP (ref 13–44)
LYMPHOCYTES # BLD AUTO: 19.6 % — SIGNIFICANT CHANGE UP (ref 13–44)
LYMPHOCYTES # BLD AUTO: 2.2 % — LOW (ref 13–44)
LYMPHOCYTES # BLD AUTO: 21.1 % — SIGNIFICANT CHANGE UP (ref 13–44)
LYMPHOCYTES # BLD AUTO: 4.5 % — LOW (ref 13–44)
LYMPHOCYTES # BLD AUTO: 6.8 % — LOW (ref 13–44)
LYMPHOCYTES # BLD AUTO: 7.1 % — LOW (ref 13–44)
LYMPHOCYTES # BLD AUTO: 7.2 % — LOW (ref 13–44)
LYMPHOCYTES # BLD AUTO: 7.2 % — LOW (ref 13–44)
LYMPHOCYTES # BLD AUTO: 7.3 % — LOW (ref 13–44)
LYMPHOCYTES # BLD AUTO: 7.5 % — LOW (ref 13–44)
LYMPHOCYTES # BLD AUTO: 7.9 % — LOW (ref 13–44)
LYMPHOCYTES # BLD AUTO: 8 % — LOW (ref 13–44)
LYMPHOCYTES # BLD AUTO: 8.2 % — LOW (ref 13–44)
LYMPHOCYTES # BLD AUTO: 8.3 % — LOW (ref 13–44)
LYMPHOCYTES # BLD AUTO: 8.4 % — LOW (ref 13–44)
LYMPHOCYTES # BLD AUTO: 9.2 % — LOW (ref 13–44)
LYMPHOCYTES # BLD AUTO: 9.3 % — LOW (ref 13–44)
LYMPHOCYTES # BLD AUTO: 9.7 % — LOW (ref 13–44)
LYMPHOCYTES NFR SPEC AUTO: 0.9 % — LOW (ref 13–44)
LYMPHOCYTES NFR SPEC AUTO: 7.8 % — LOW (ref 13–44)
LYMPHOCYTES NFR SPEC AUTO: 8.7 % — LOW (ref 13–44)
MACROCYTES BLD QL: SLIGHT — SIGNIFICANT CHANGE UP
MACROCYTES BLD QL: SLIGHT — SIGNIFICANT CHANGE UP
MAGNESIUM SERPL-MCNC: 1.9 MG/DL — SIGNIFICANT CHANGE UP (ref 1.6–2.6)
MAGNESIUM SERPL-MCNC: 1.9 MG/DL — SIGNIFICANT CHANGE UP (ref 1.6–2.6)
MAGNESIUM SERPL-MCNC: 2 MG/DL — SIGNIFICANT CHANGE UP (ref 1.6–2.6)
MAGNESIUM SERPL-MCNC: 2 MG/DL — SIGNIFICANT CHANGE UP (ref 1.6–2.6)
MAGNESIUM SERPL-MCNC: 2.1 MG/DL — SIGNIFICANT CHANGE UP (ref 1.6–2.6)
MAGNESIUM SERPL-MCNC: 2.2 MG/DL — SIGNIFICANT CHANGE UP (ref 1.6–2.6)
MAGNESIUM SERPL-MCNC: 2.2 MG/DL — SIGNIFICANT CHANGE UP (ref 1.6–2.6)
MANUAL DIF COMMENT BLD-IMP: SIGNIFICANT CHANGE UP
MANUAL SMEAR VERIFICATION: SIGNIFICANT CHANGE UP
MCHC RBC-ENTMCNC: 25.3 PG — LOW (ref 27–34)
MCHC RBC-ENTMCNC: 25.4 PG — LOW (ref 27–34)
MCHC RBC-ENTMCNC: 25.4 PG — LOW (ref 27–34)
MCHC RBC-ENTMCNC: 25.5 PG — LOW (ref 27–34)
MCHC RBC-ENTMCNC: 25.6 PG — LOW (ref 27–34)
MCHC RBC-ENTMCNC: 25.7 PG — LOW (ref 27–34)
MCHC RBC-ENTMCNC: 25.8 PG — LOW (ref 27–34)
MCHC RBC-ENTMCNC: 25.8 PG — LOW (ref 27–34)
MCHC RBC-ENTMCNC: 26 PG — LOW (ref 27–34)
MCHC RBC-ENTMCNC: 26 PG — LOW (ref 27–34)
MCHC RBC-ENTMCNC: 26.1 PG — LOW (ref 27–34)
MCHC RBC-ENTMCNC: 26.3 PG — LOW (ref 27–34)
MCHC RBC-ENTMCNC: 26.4 PG — LOW (ref 27–34)
MCHC RBC-ENTMCNC: 26.5 PG — LOW (ref 27–34)
MCHC RBC-ENTMCNC: 26.5 PG — LOW (ref 27–34)
MCHC RBC-ENTMCNC: 26.6 PG — LOW (ref 27–34)
MCHC RBC-ENTMCNC: 26.6 PG — LOW (ref 27–34)
MCHC RBC-ENTMCNC: 26.7 PG — LOW (ref 27–34)
MCHC RBC-ENTMCNC: 26.9 PG — LOW (ref 27–34)
MCHC RBC-ENTMCNC: 27 PG — SIGNIFICANT CHANGE UP (ref 27–34)
MCHC RBC-ENTMCNC: 27 PG — SIGNIFICANT CHANGE UP (ref 27–34)
MCHC RBC-ENTMCNC: 27.1 PG — SIGNIFICANT CHANGE UP (ref 27–34)
MCHC RBC-ENTMCNC: 27.2 PG — SIGNIFICANT CHANGE UP (ref 27–34)
MCHC RBC-ENTMCNC: 27.3 PG — SIGNIFICANT CHANGE UP (ref 27–34)
MCHC RBC-ENTMCNC: 27.3 PG — SIGNIFICANT CHANGE UP (ref 27–34)
MCHC RBC-ENTMCNC: 27.4 PG — SIGNIFICANT CHANGE UP (ref 27–34)
MCHC RBC-ENTMCNC: 27.5 PG — SIGNIFICANT CHANGE UP (ref 27–34)
MCHC RBC-ENTMCNC: 27.6 PG — SIGNIFICANT CHANGE UP (ref 27–34)
MCHC RBC-ENTMCNC: 27.7 PG — SIGNIFICANT CHANGE UP (ref 27–34)
MCHC RBC-ENTMCNC: 27.8 % — LOW (ref 32–36)
MCHC RBC-ENTMCNC: 27.9 PG — SIGNIFICANT CHANGE UP (ref 27–34)
MCHC RBC-ENTMCNC: 28 % — LOW (ref 32–36)
MCHC RBC-ENTMCNC: 28 PG — SIGNIFICANT CHANGE UP (ref 27–34)
MCHC RBC-ENTMCNC: 28.1 PG — SIGNIFICANT CHANGE UP (ref 27–34)
MCHC RBC-ENTMCNC: 28.2 PG — SIGNIFICANT CHANGE UP (ref 27–34)
MCHC RBC-ENTMCNC: 28.3 PG — SIGNIFICANT CHANGE UP (ref 27–34)
MCHC RBC-ENTMCNC: 28.4 % — LOW (ref 32–36)
MCHC RBC-ENTMCNC: 28.4 PG — SIGNIFICANT CHANGE UP (ref 27–34)
MCHC RBC-ENTMCNC: 28.5 % — LOW (ref 32–36)
MCHC RBC-ENTMCNC: 28.5 PG — SIGNIFICANT CHANGE UP (ref 27–34)
MCHC RBC-ENTMCNC: 28.6 % — LOW (ref 32–36)
MCHC RBC-ENTMCNC: 28.7 % — LOW (ref 32–36)
MCHC RBC-ENTMCNC: 28.8 % — LOW (ref 32–36)
MCHC RBC-ENTMCNC: 28.8 PG — SIGNIFICANT CHANGE UP (ref 27–34)
MCHC RBC-ENTMCNC: 29.2 PG — SIGNIFICANT CHANGE UP (ref 27–34)
MCHC RBC-ENTMCNC: 29.3 % — LOW (ref 32–36)
MCHC RBC-ENTMCNC: 29.5 % — LOW (ref 32–36)
MCHC RBC-ENTMCNC: 29.6 % — LOW (ref 32–36)
MCHC RBC-ENTMCNC: 29.8 % — LOW (ref 32–36)
MCHC RBC-ENTMCNC: 29.8 % — LOW (ref 32–36)
MCHC RBC-ENTMCNC: 30.1 PG — SIGNIFICANT CHANGE UP (ref 27–34)
MCHC RBC-ENTMCNC: 30.2 G/DL — LOW (ref 32–36)
MCHC RBC-ENTMCNC: 30.4 G/DL — LOW (ref 32–36)
MCHC RBC-ENTMCNC: 30.5 % — LOW (ref 32–36)
MCHC RBC-ENTMCNC: 30.7 % — LOW (ref 32–36)
MCHC RBC-ENTMCNC: 30.7 % — LOW (ref 32–36)
MCHC RBC-ENTMCNC: 30.7 PG — SIGNIFICANT CHANGE UP (ref 27–34)
MCHC RBC-ENTMCNC: 30.7 PG — SIGNIFICANT CHANGE UP (ref 27–34)
MCHC RBC-ENTMCNC: 30.8 % — LOW (ref 32–36)
MCHC RBC-ENTMCNC: 30.8 % — LOW (ref 32–36)
MCHC RBC-ENTMCNC: 30.8 G/DL — LOW (ref 32–36)
MCHC RBC-ENTMCNC: 30.8 PG — SIGNIFICANT CHANGE UP (ref 27–34)
MCHC RBC-ENTMCNC: 30.9 G/DL — LOW (ref 32–36)
MCHC RBC-ENTMCNC: 31 G/DL — LOW (ref 32–36)
MCHC RBC-ENTMCNC: 31 PG — SIGNIFICANT CHANGE UP (ref 27–34)
MCHC RBC-ENTMCNC: 31.2 % — LOW (ref 32–36)
MCHC RBC-ENTMCNC: 31.3 G/DL — LOW (ref 32–36)
MCHC RBC-ENTMCNC: 31.4 G/DL — LOW (ref 32–36)
MCHC RBC-ENTMCNC: 31.4 G/DL — LOW (ref 32–36)
MCHC RBC-ENTMCNC: 31.5 G/DL — LOW (ref 32–36)
MCHC RBC-ENTMCNC: 31.5 G/DL — LOW (ref 32–36)
MCHC RBC-ENTMCNC: 31.6 G/DL — LOW (ref 32–36)
MCHC RBC-ENTMCNC: 31.8 G/DL — LOW (ref 32–36)
MCHC RBC-ENTMCNC: 31.8 G/DL — LOW (ref 32–36)
MCHC RBC-ENTMCNC: 31.9 % — LOW (ref 32–36)
MCHC RBC-ENTMCNC: 31.9 G/DL — LOW (ref 32–36)
MCHC RBC-ENTMCNC: 31.9 G/DL — LOW (ref 32–36)
MCHC RBC-ENTMCNC: 32 G/DL — SIGNIFICANT CHANGE UP (ref 32–36)
MCHC RBC-ENTMCNC: 32 G/DL — SIGNIFICANT CHANGE UP (ref 32–36)
MCHC RBC-ENTMCNC: 33.1 G/DL — SIGNIFICANT CHANGE UP (ref 32–36)
MCHC RBC-ENTMCNC: 33.2 G/DL — SIGNIFICANT CHANGE UP (ref 32–36)
MCHC RBC-ENTMCNC: 33.3 G/DL — SIGNIFICANT CHANGE UP (ref 32–36)
MCHC RBC-ENTMCNC: 33.3 G/DL — SIGNIFICANT CHANGE UP (ref 32–36)
MCHC RBC-ENTMCNC: 33.6 G/DL — SIGNIFICANT CHANGE UP (ref 32–36)
MCHC RBC-ENTMCNC: 34.6 G/DL — SIGNIFICANT CHANGE UP (ref 32–36)
MCHC RBC-ENTMCNC: 34.6 G/DL — SIGNIFICANT CHANGE UP (ref 32–36)
MCHC RBC-ENTMCNC: 35.2 G/DL — SIGNIFICANT CHANGE UP (ref 32–36)
MCHC RBC-ENTMCNC: 35.2 G/DL — SIGNIFICANT CHANGE UP (ref 32–36)
MCV RBC AUTO: 83.2 FL — SIGNIFICANT CHANGE UP (ref 80–100)
MCV RBC AUTO: 83.6 FL — SIGNIFICANT CHANGE UP (ref 80–100)
MCV RBC AUTO: 83.6 FL — SIGNIFICANT CHANGE UP (ref 80–100)
MCV RBC AUTO: 83.8 FL — SIGNIFICANT CHANGE UP (ref 80–100)
MCV RBC AUTO: 84.2 FL — SIGNIFICANT CHANGE UP (ref 80–100)
MCV RBC AUTO: 84.2 FL — SIGNIFICANT CHANGE UP (ref 80–100)
MCV RBC AUTO: 84.5 FL — SIGNIFICANT CHANGE UP (ref 80–100)
MCV RBC AUTO: 84.6 FL — SIGNIFICANT CHANGE UP (ref 80–100)
MCV RBC AUTO: 84.7 FL — SIGNIFICANT CHANGE UP (ref 80–100)
MCV RBC AUTO: 84.9 FL — SIGNIFICANT CHANGE UP (ref 80–100)
MCV RBC AUTO: 85.1 FL — SIGNIFICANT CHANGE UP (ref 80–100)
MCV RBC AUTO: 85.8 FL — SIGNIFICANT CHANGE UP (ref 80–100)
MCV RBC AUTO: 85.9 FL — SIGNIFICANT CHANGE UP (ref 80–100)
MCV RBC AUTO: 85.9 FL — SIGNIFICANT CHANGE UP (ref 80–100)
MCV RBC AUTO: 86 FL — SIGNIFICANT CHANGE UP (ref 80–100)
MCV RBC AUTO: 86.7 FL — SIGNIFICANT CHANGE UP (ref 80–100)
MCV RBC AUTO: 86.9 FL — SIGNIFICANT CHANGE UP (ref 80–100)
MCV RBC AUTO: 87.5 FL — SIGNIFICANT CHANGE UP (ref 80–100)
MCV RBC AUTO: 87.6 FL — SIGNIFICANT CHANGE UP (ref 80–100)
MCV RBC AUTO: 87.9 FL — SIGNIFICANT CHANGE UP (ref 80–100)
MCV RBC AUTO: 88.2 FL — SIGNIFICANT CHANGE UP (ref 80–100)
MCV RBC AUTO: 88.4 FL — SIGNIFICANT CHANGE UP (ref 80–100)
MCV RBC AUTO: 88.5 FL — SIGNIFICANT CHANGE UP (ref 80–100)
MCV RBC AUTO: 88.8 FL — SIGNIFICANT CHANGE UP (ref 80–100)
MCV RBC AUTO: 89.2 FL — SIGNIFICANT CHANGE UP (ref 80–100)
MCV RBC AUTO: 89.5 FL — SIGNIFICANT CHANGE UP (ref 80–100)
MCV RBC AUTO: 90.1 FL — SIGNIFICANT CHANGE UP (ref 80–100)
MCV RBC AUTO: 90.2 FL — SIGNIFICANT CHANGE UP (ref 80–100)
MCV RBC AUTO: 90.4 FL — SIGNIFICANT CHANGE UP (ref 80–100)
MCV RBC AUTO: 90.7 FL — SIGNIFICANT CHANGE UP (ref 80–100)
MCV RBC AUTO: 90.7 FL — SIGNIFICANT CHANGE UP (ref 80–100)
MCV RBC AUTO: 90.8 FL — SIGNIFICANT CHANGE UP (ref 80–100)
MCV RBC AUTO: 91.2 FL — SIGNIFICANT CHANGE UP (ref 80–100)
MCV RBC AUTO: 91.3 FL — SIGNIFICANT CHANGE UP (ref 80–100)
MCV RBC AUTO: 91.4 FL — SIGNIFICANT CHANGE UP (ref 80–100)
MCV RBC AUTO: 91.5 FL — SIGNIFICANT CHANGE UP (ref 80–100)
MCV RBC AUTO: 92.5 FL — SIGNIFICANT CHANGE UP (ref 80–100)
MCV RBC AUTO: 92.9 FL — SIGNIFICANT CHANGE UP (ref 80–100)
MCV RBC AUTO: 93.2 FL — SIGNIFICANT CHANGE UP (ref 80–100)
MCV RBC AUTO: 93.3 FL — SIGNIFICANT CHANGE UP (ref 80–100)
METAMYELOCYTES # FLD: 0 % — SIGNIFICANT CHANGE UP (ref 0–1)
METAMYELOCYTES # FLD: 0 % — SIGNIFICANT CHANGE UP (ref 0–1)
METAMYELOCYTES # FLD: 0.9 % — SIGNIFICANT CHANGE UP (ref 0–1)
METAMYELOCYTES # FLD: 6 % — HIGH (ref 0–0)
MICROCYTES BLD QL: SIGNIFICANT CHANGE UP
MICROCYTES BLD QL: SLIGHT — SIGNIFICANT CHANGE UP
MICROCYTES BLD QL: SLIGHT — SIGNIFICANT CHANGE UP
MICROSCOPIC-UA: NORMAL
MONOCYTES # BLD AUTO: 0.06 K/UL — SIGNIFICANT CHANGE UP (ref 0–0.9)
MONOCYTES # BLD AUTO: 0.17 K/UL — SIGNIFICANT CHANGE UP (ref 0–0.9)
MONOCYTES # BLD AUTO: 0.18 K/UL — SIGNIFICANT CHANGE UP (ref 0–0.9)
MONOCYTES # BLD AUTO: 0.24 K/UL — SIGNIFICANT CHANGE UP (ref 0–0.9)
MONOCYTES # BLD AUTO: 0.25 K/UL — SIGNIFICANT CHANGE UP (ref 0–0.9)
MONOCYTES # BLD AUTO: 0.3 K/UL — SIGNIFICANT CHANGE UP (ref 0–0.9)
MONOCYTES # BLD AUTO: 0.4 K/UL — SIGNIFICANT CHANGE UP (ref 0–0.9)
MONOCYTES # BLD AUTO: 0.44 K/UL — SIGNIFICANT CHANGE UP (ref 0–0.9)
MONOCYTES # BLD AUTO: 0.47 K/UL — SIGNIFICANT CHANGE UP (ref 0–0.9)
MONOCYTES # BLD AUTO: 0.5 K/UL — SIGNIFICANT CHANGE UP (ref 0–0.9)
MONOCYTES # BLD AUTO: 0.52 K/UL — SIGNIFICANT CHANGE UP (ref 0–0.9)
MONOCYTES # BLD AUTO: 0.6 K/UL — SIGNIFICANT CHANGE UP (ref 0–0.9)
MONOCYTES # BLD AUTO: 0.6 K/UL — SIGNIFICANT CHANGE UP (ref 0–0.9)
MONOCYTES # BLD AUTO: 0.63 K/UL — SIGNIFICANT CHANGE UP (ref 0–0.9)
MONOCYTES # BLD AUTO: 0.69 K/UL — SIGNIFICANT CHANGE UP (ref 0–0.9)
MONOCYTES # BLD AUTO: 0.7 K/UL — SIGNIFICANT CHANGE UP (ref 0–0.9)
MONOCYTES # BLD AUTO: 0.8 K/UL — SIGNIFICANT CHANGE UP (ref 0–0.9)
MONOCYTES # BLD AUTO: 0.82 K/UL — SIGNIFICANT CHANGE UP (ref 0–0.9)
MONOCYTES # BLD AUTO: 0.9 K/UL — SIGNIFICANT CHANGE UP (ref 0–0.9)
MONOCYTES # BLD AUTO: 0.91 K/UL — HIGH (ref 0–0.9)
MONOCYTES # BLD AUTO: 1 K/UL — HIGH (ref 0–0.9)
MONOCYTES # BLD AUTO: 1.01 K/UL — HIGH (ref 0–0.9)
MONOCYTES # BLD AUTO: 1.1 K/UL — HIGH (ref 0–0.9)
MONOCYTES # BLD AUTO: 1.15 K/UL — HIGH (ref 0–0.9)
MONOCYTES # BLD AUTO: 1.17 K/UL — HIGH (ref 0–0.9)
MONOCYTES # BLD AUTO: 1.2 K/UL — HIGH (ref 0–0.9)
MONOCYTES NFR BLD AUTO: 1.1 % — LOW (ref 2–14)
MONOCYTES NFR BLD AUTO: 10.5 % — SIGNIFICANT CHANGE UP (ref 2–14)
MONOCYTES NFR BLD AUTO: 11.1 % — SIGNIFICANT CHANGE UP (ref 2–14)
MONOCYTES NFR BLD AUTO: 11.8 % — SIGNIFICANT CHANGE UP (ref 2–14)
MONOCYTES NFR BLD AUTO: 11.9 % — SIGNIFICANT CHANGE UP (ref 2–14)
MONOCYTES NFR BLD AUTO: 12 % — SIGNIFICANT CHANGE UP (ref 2–14)
MONOCYTES NFR BLD AUTO: 12.2 % — SIGNIFICANT CHANGE UP (ref 2–14)
MONOCYTES NFR BLD AUTO: 12.8 % — SIGNIFICANT CHANGE UP (ref 2–14)
MONOCYTES NFR BLD AUTO: 13.2 % — SIGNIFICANT CHANGE UP (ref 2–14)
MONOCYTES NFR BLD AUTO: 13.2 % — SIGNIFICANT CHANGE UP (ref 2–14)
MONOCYTES NFR BLD AUTO: 13.3 % — SIGNIFICANT CHANGE UP (ref 2–14)
MONOCYTES NFR BLD AUTO: 13.7 % — SIGNIFICANT CHANGE UP (ref 2–14)
MONOCYTES NFR BLD AUTO: 14.1 % — HIGH (ref 2–14)
MONOCYTES NFR BLD AUTO: 14.5 % — HIGH (ref 2–14)
MONOCYTES NFR BLD AUTO: 15.4 % — HIGH (ref 2–14)
MONOCYTES NFR BLD AUTO: 15.7 % — HIGH (ref 2–14)
MONOCYTES NFR BLD AUTO: 16 % — HIGH (ref 2–14)
MONOCYTES NFR BLD AUTO: 16.1 % — HIGH (ref 2–14)
MONOCYTES NFR BLD AUTO: 16.8 % — HIGH (ref 2–14)
MONOCYTES NFR BLD AUTO: 2.4 % — SIGNIFICANT CHANGE UP (ref 2–14)
MONOCYTES NFR BLD AUTO: 2.4 % — SIGNIFICANT CHANGE UP (ref 2–14)
MONOCYTES NFR BLD AUTO: 5 % — SIGNIFICANT CHANGE UP (ref 2–14)
MONOCYTES NFR BLD AUTO: 5.9 % — SIGNIFICANT CHANGE UP (ref 2–14)
MONOCYTES NFR BLD AUTO: 6.3 % — SIGNIFICANT CHANGE UP (ref 2–14)
MONOCYTES NFR BLD AUTO: 6.4 % — SIGNIFICANT CHANGE UP (ref 2–14)
MONOCYTES NFR BLD AUTO: 7.7 % — SIGNIFICANT CHANGE UP (ref 2–14)
MONOCYTES NFR BLD AUTO: 8 % — SIGNIFICANT CHANGE UP (ref 2–14)
MONOCYTES NFR BLD AUTO: 8.2 % — SIGNIFICANT CHANGE UP (ref 2–14)
MONOCYTES NFR BLD AUTO: 8.4 % — SIGNIFICANT CHANGE UP (ref 2–14)
MONOCYTES NFR BLD AUTO: 9 % — SIGNIFICANT CHANGE UP (ref 2–14)
MONOCYTES NFR BLD AUTO: 9 % — SIGNIFICANT CHANGE UP (ref 2–14)
MONOCYTES NFR BLD AUTO: 9.2 % — SIGNIFICANT CHANGE UP (ref 2–14)
MONOCYTES NFR BLD: 17.4 % — HIGH (ref 2–9)
MONOCYTES NFR BLD: 2.6 % — SIGNIFICANT CHANGE UP (ref 2–9)
MONOCYTES NFR BLD: 6.1 % — SIGNIFICANT CHANGE UP (ref 2–9)
MYELOCYTES NFR BLD: 0 % — SIGNIFICANT CHANGE UP (ref 0–0)
MYELOCYTES NFR BLD: 0 % — SIGNIFICANT CHANGE UP (ref 0–0)
MYELOCYTES NFR BLD: 0.9 % — HIGH (ref 0–0)
MYELOCYTES NFR BLD: 7 % — HIGH (ref 0–0)
NEUTROPHIL AB SER-ACNC: 69.5 % — SIGNIFICANT CHANGE UP (ref 43–77)
NEUTROPHIL AB SER-ACNC: 83.5 % — HIGH (ref 43–77)
NEUTROPHIL AB SER-ACNC: 95.6 % — HIGH (ref 43–77)
NEUTROPHILS # BLD AUTO: 1.5 K/UL — LOW (ref 1.8–7.4)
NEUTROPHILS # BLD AUTO: 1.9 K/UL — SIGNIFICANT CHANGE UP (ref 1.8–7.4)
NEUTROPHILS # BLD AUTO: 10.8 K/UL — HIGH (ref 1.8–7.4)
NEUTROPHILS # BLD AUTO: 12.1 K/UL — HIGH (ref 1.8–7.4)
NEUTROPHILS # BLD AUTO: 2.1 K/UL — SIGNIFICANT CHANGE UP (ref 1.8–7.4)
NEUTROPHILS # BLD AUTO: 2.18 K/UL — SIGNIFICANT CHANGE UP (ref 1.8–7.4)
NEUTROPHILS # BLD AUTO: 2.4 K/UL — SIGNIFICANT CHANGE UP (ref 1.8–7.4)
NEUTROPHILS # BLD AUTO: 2.5 K/UL — SIGNIFICANT CHANGE UP (ref 1.8–7.4)
NEUTROPHILS # BLD AUTO: 2.52 K/UL — SIGNIFICANT CHANGE UP (ref 1.8–7.4)
NEUTROPHILS # BLD AUTO: 2.6 K/UL — SIGNIFICANT CHANGE UP (ref 1.8–7.4)
NEUTROPHILS # BLD AUTO: 3.18 K/UL — SIGNIFICANT CHANGE UP (ref 1.8–7.4)
NEUTROPHILS # BLD AUTO: 3.3 K/UL — SIGNIFICANT CHANGE UP (ref 1.8–7.4)
NEUTROPHILS # BLD AUTO: 3.41 K/UL — SIGNIFICANT CHANGE UP (ref 1.8–7.4)
NEUTROPHILS # BLD AUTO: 3.53 K/UL — SIGNIFICANT CHANGE UP (ref 1.8–7.4)
NEUTROPHILS # BLD AUTO: 3.6 K/UL — SIGNIFICANT CHANGE UP (ref 1.8–7.4)
NEUTROPHILS # BLD AUTO: 4 K/UL — SIGNIFICANT CHANGE UP (ref 1.8–7.4)
NEUTROPHILS # BLD AUTO: 4.2 K/UL — SIGNIFICANT CHANGE UP (ref 1.8–7.4)
NEUTROPHILS # BLD AUTO: 4.24 K/UL — SIGNIFICANT CHANGE UP (ref 1.8–7.4)
NEUTROPHILS # BLD AUTO: 4.5 K/UL — SIGNIFICANT CHANGE UP (ref 1.8–7.4)
NEUTROPHILS # BLD AUTO: 4.6 K/UL — SIGNIFICANT CHANGE UP (ref 1.8–7.4)
NEUTROPHILS # BLD AUTO: 4.78 K/UL — SIGNIFICANT CHANGE UP (ref 1.8–7.4)
NEUTROPHILS # BLD AUTO: 4.88 K/UL — SIGNIFICANT CHANGE UP (ref 1.8–7.4)
NEUTROPHILS # BLD AUTO: 4.9 K/UL — SIGNIFICANT CHANGE UP (ref 1.8–7.4)
NEUTROPHILS # BLD AUTO: 5.3 K/UL — SIGNIFICANT CHANGE UP (ref 1.8–7.4)
NEUTROPHILS # BLD AUTO: 5.33 K/UL — SIGNIFICANT CHANGE UP (ref 1.8–7.4)
NEUTROPHILS # BLD AUTO: 5.38 K/UL — SIGNIFICANT CHANGE UP (ref 1.8–7.4)
NEUTROPHILS # BLD AUTO: 5.5 K/UL — SIGNIFICANT CHANGE UP (ref 1.8–7.4)
NEUTROPHILS # BLD AUTO: 5.7 K/UL — SIGNIFICANT CHANGE UP (ref 1.8–7.4)
NEUTROPHILS # BLD AUTO: 6.27 K/UL — SIGNIFICANT CHANGE UP (ref 1.8–7.4)
NEUTROPHILS # BLD AUTO: 6.34 K/UL — SIGNIFICANT CHANGE UP (ref 1.8–7.4)
NEUTROPHILS # BLD AUTO: 6.37 K/UL — SIGNIFICANT CHANGE UP (ref 1.8–7.4)
NEUTROPHILS # BLD AUTO: 7.13 K/UL — SIGNIFICANT CHANGE UP (ref 1.8–7.4)
NEUTROPHILS # BLD AUTO: 8.3 K/UL — HIGH (ref 1.8–7.4)
NEUTROPHILS # BLD AUTO: SIGNIFICANT CHANGE UP (ref 1.8–7.4)
NEUTROPHILS NFR BLD AUTO: 53 % — SIGNIFICANT CHANGE UP (ref 43–77)
NEUTROPHILS NFR BLD AUTO: 56.7 % — SIGNIFICANT CHANGE UP (ref 43–77)
NEUTROPHILS NFR BLD AUTO: 60.7 % — SIGNIFICANT CHANGE UP (ref 43–77)
NEUTROPHILS NFR BLD AUTO: 60.9 % — SIGNIFICANT CHANGE UP (ref 43–77)
NEUTROPHILS NFR BLD AUTO: 62.7 % — SIGNIFICANT CHANGE UP (ref 43–77)
NEUTROPHILS NFR BLD AUTO: 65.5 % — SIGNIFICANT CHANGE UP (ref 43–77)
NEUTROPHILS NFR BLD AUTO: 65.9 % — SIGNIFICANT CHANGE UP (ref 43–77)
NEUTROPHILS NFR BLD AUTO: 65.9 % — SIGNIFICANT CHANGE UP (ref 43–77)
NEUTROPHILS NFR BLD AUTO: 67 % — SIGNIFICANT CHANGE UP (ref 43–77)
NEUTROPHILS NFR BLD AUTO: 67.7 % — SIGNIFICANT CHANGE UP (ref 43–77)
NEUTROPHILS NFR BLD AUTO: 67.7 % — SIGNIFICANT CHANGE UP (ref 43–77)
NEUTROPHILS NFR BLD AUTO: 70 % — SIGNIFICANT CHANGE UP (ref 43–77)
NEUTROPHILS NFR BLD AUTO: 71 % — SIGNIFICANT CHANGE UP (ref 43–77)
NEUTROPHILS NFR BLD AUTO: 71.3 % — SIGNIFICANT CHANGE UP (ref 43–77)
NEUTROPHILS NFR BLD AUTO: 71.4 % — SIGNIFICANT CHANGE UP (ref 43–77)
NEUTROPHILS NFR BLD AUTO: 72 % — SIGNIFICANT CHANGE UP (ref 43–77)
NEUTROPHILS NFR BLD AUTO: 72.8 % — SIGNIFICANT CHANGE UP (ref 43–77)
NEUTROPHILS NFR BLD AUTO: 73.1 % — SIGNIFICANT CHANGE UP (ref 43–77)
NEUTROPHILS NFR BLD AUTO: 74.9 % — SIGNIFICANT CHANGE UP (ref 43–77)
NEUTROPHILS NFR BLD AUTO: 75.3 % — SIGNIFICANT CHANGE UP (ref 43–77)
NEUTROPHILS NFR BLD AUTO: 75.4 % — SIGNIFICANT CHANGE UP (ref 43–77)
NEUTROPHILS NFR BLD AUTO: 75.6 % — SIGNIFICANT CHANGE UP (ref 43–77)
NEUTROPHILS NFR BLD AUTO: 77.1 % — HIGH (ref 43–77)
NEUTROPHILS NFR BLD AUTO: 77.7 % — HIGH (ref 43–77)
NEUTROPHILS NFR BLD AUTO: 79.5 % — HIGH (ref 43–77)
NEUTROPHILS NFR BLD AUTO: 82.5 % — HIGH (ref 43–77)
NEUTROPHILS NFR BLD AUTO: 83.2 % — HIGH (ref 43–77)
NEUTROPHILS NFR BLD AUTO: 83.5 % — HIGH (ref 43–77)
NEUTROPHILS NFR BLD AUTO: 85 % — HIGH (ref 43–77)
NEUTROPHILS NFR BLD AUTO: 85.4 % — HIGH (ref 43–77)
NEUTROPHILS NFR BLD AUTO: 85.9 % — HIGH (ref 43–77)
NEUTROPHILS NFR BLD AUTO: 88.4 % — HIGH (ref 43–77)
NEUTROPHILS NFR BLD AUTO: 88.5 % — HIGH (ref 43–77)
NEUTROPHILS NFR BLD AUTO: 94.3 % — HIGH (ref 43–77)
NEUTS BAND # BLD: 0 % — SIGNIFICANT CHANGE UP (ref 0–6)
NEUTS BAND # BLD: 3 % — SIGNIFICANT CHANGE UP (ref 0–8)
NITRITE UR-MCNC: NEGATIVE — SIGNIFICANT CHANGE UP
NITRITE URINE: NEGATIVE
NRBC # BLD: 4 /100 — HIGH (ref 0–0)
NRBC # FLD: 0 K/UL — SIGNIFICANT CHANGE UP (ref 0–0)
NRBC # FLD: 0.02 K/UL — SIGNIFICANT CHANGE UP (ref 0–0)
NRBC # FLD: 0.03 K/UL — SIGNIFICANT CHANGE UP (ref 0–0)
NRBC # FLD: 0.06 K/UL — SIGNIFICANT CHANGE UP (ref 0–0)
NRBC # FLD: 0.1 K/UL — SIGNIFICANT CHANGE UP (ref 0–0)
NRBC FLD-RTO: 2 — SIGNIFICANT CHANGE UP
NRBC FLD-RTO: 3 — SIGNIFICANT CHANGE UP
NT-PROBNP SERPL-SCNC: 61.3 PG/ML — SIGNIFICANT CHANGE UP
OTHER - HEMATOLOGY %: 0 — SIGNIFICANT CHANGE UP
OVALOCYTES BLD QL SMEAR: SLIGHT — SIGNIFICANT CHANGE UP
PARATHYROID HORMONE INTACT: 4 PG/ML
PCO2 BLDV: 45 MMHG — SIGNIFICANT CHANGE UP (ref 41–51)
PCO2 BLDV: 47 MMHG — SIGNIFICANT CHANGE UP (ref 41–51)
PCO2 BLDV: 54 MMHG — HIGH (ref 41–51)
PCO2 BLDV: 61 MMHG — HIGH (ref 41–51)
PH BLDV: 7.33 PH — SIGNIFICANT CHANGE UP (ref 7.32–7.43)
PH BLDV: 7.36 PH — SIGNIFICANT CHANGE UP (ref 7.32–7.43)
PH BLDV: 7.39 PH — SIGNIFICANT CHANGE UP (ref 7.32–7.43)
PH BLDV: 7.4 PH — SIGNIFICANT CHANGE UP (ref 7.32–7.43)
PH UR: 6 — SIGNIFICANT CHANGE UP (ref 5–8)
PH UR: 6.5 — SIGNIFICANT CHANGE UP (ref 5–8)
PH UR: 6.5 — SIGNIFICANT CHANGE UP (ref 5–8)
PH UR: 7 — SIGNIFICANT CHANGE UP (ref 5–8)
PH URINE: 6
PH URINE: 6
PH URINE: 6.5
PH URINE: 6.5
PHOSPHATE SERPL-MCNC: 2.7 MG/DL — SIGNIFICANT CHANGE UP (ref 2.5–4.5)
PHOSPHATE SERPL-MCNC: 2.9 MG/DL — SIGNIFICANT CHANGE UP (ref 2.5–4.5)
PHOSPHATE SERPL-MCNC: 3.3 MG/DL — SIGNIFICANT CHANGE UP (ref 2.5–4.5)
PHOSPHATE SERPL-MCNC: 3.3 MG/DL — SIGNIFICANT CHANGE UP (ref 2.5–4.5)
PHOSPHATE SERPL-MCNC: 3.4 MG/DL — SIGNIFICANT CHANGE UP (ref 2.5–4.5)
PHOSPHATE SERPL-MCNC: 3.4 MG/DL — SIGNIFICANT CHANGE UP (ref 2.5–4.5)
PHOSPHATE SERPL-MCNC: 3.7 MG/DL — SIGNIFICANT CHANGE UP (ref 2.5–4.5)
PHOSPHATE SERPL-MCNC: 3.8 MG/DL — SIGNIFICANT CHANGE UP (ref 2.5–4.5)
PHOSPHATE SERPL-MCNC: 3.9 MG/DL — SIGNIFICANT CHANGE UP (ref 2.5–4.5)
PHOSPHATE SERPL-MCNC: 4.2 MG/DL — SIGNIFICANT CHANGE UP (ref 2.5–4.5)
PHOSPHATE SERPL-MCNC: 4.3 MG/DL — SIGNIFICANT CHANGE UP (ref 2.5–4.5)
PLAT MORPH BLD: NORMAL — SIGNIFICANT CHANGE UP
PLATELET # BLD AUTO: 115 K/UL — LOW (ref 150–400)
PLATELET # BLD AUTO: 127 K/UL — LOW (ref 150–400)
PLATELET # BLD AUTO: 129 K/UL — LOW (ref 150–400)
PLATELET # BLD AUTO: 131 K/UL — SIGNIFICANT CHANGE UP (ref 150–400)
PLATELET # BLD AUTO: 133 K/UL — LOW (ref 150–400)
PLATELET # BLD AUTO: 153 K/UL — SIGNIFICANT CHANGE UP (ref 150–400)
PLATELET # BLD AUTO: 153 K/UL — SIGNIFICANT CHANGE UP (ref 150–400)
PLATELET # BLD AUTO: 161 K/UL — SIGNIFICANT CHANGE UP (ref 150–400)
PLATELET # BLD AUTO: 163 K/UL — SIGNIFICANT CHANGE UP (ref 150–400)
PLATELET # BLD AUTO: 171 K/UL — SIGNIFICANT CHANGE UP (ref 150–400)
PLATELET # BLD AUTO: 171 K/UL — SIGNIFICANT CHANGE UP (ref 150–400)
PLATELET # BLD AUTO: 178 K/UL — SIGNIFICANT CHANGE UP (ref 150–400)
PLATELET # BLD AUTO: 191 K/UL — SIGNIFICANT CHANGE UP (ref 150–400)
PLATELET # BLD AUTO: 202 K/UL — SIGNIFICANT CHANGE UP (ref 150–400)
PLATELET # BLD AUTO: 211 K/UL — SIGNIFICANT CHANGE UP (ref 150–400)
PLATELET # BLD AUTO: 214 K/UL — SIGNIFICANT CHANGE UP (ref 150–400)
PLATELET # BLD AUTO: 221 K/UL — SIGNIFICANT CHANGE UP (ref 150–400)
PLATELET # BLD AUTO: 231 K/UL — SIGNIFICANT CHANGE UP (ref 150–400)
PLATELET # BLD AUTO: 236 K/UL — SIGNIFICANT CHANGE UP (ref 150–400)
PLATELET # BLD AUTO: 237 K/UL — SIGNIFICANT CHANGE UP (ref 150–400)
PLATELET # BLD AUTO: 238 K/UL — SIGNIFICANT CHANGE UP (ref 150–400)
PLATELET # BLD AUTO: 242 K/UL — SIGNIFICANT CHANGE UP (ref 150–400)
PLATELET # BLD AUTO: 245 K/UL — SIGNIFICANT CHANGE UP (ref 150–400)
PLATELET # BLD AUTO: 248 K/UL — SIGNIFICANT CHANGE UP (ref 150–400)
PLATELET # BLD AUTO: 253 K/UL — SIGNIFICANT CHANGE UP (ref 150–400)
PLATELET # BLD AUTO: 261 K/UL — SIGNIFICANT CHANGE UP (ref 150–400)
PLATELET # BLD AUTO: 262 K/UL — SIGNIFICANT CHANGE UP (ref 150–400)
PLATELET # BLD AUTO: 263 K/UL — SIGNIFICANT CHANGE UP (ref 150–400)
PLATELET # BLD AUTO: 269 K/UL — SIGNIFICANT CHANGE UP (ref 150–400)
PLATELET # BLD AUTO: 278 K/UL — SIGNIFICANT CHANGE UP (ref 150–400)
PLATELET # BLD AUTO: 286 K/UL — SIGNIFICANT CHANGE UP (ref 150–400)
PLATELET # BLD AUTO: 292 K/UL — SIGNIFICANT CHANGE UP (ref 150–400)
PLATELET # BLD AUTO: 295 K/UL — SIGNIFICANT CHANGE UP (ref 150–400)
PLATELET # BLD AUTO: 307 K/UL — SIGNIFICANT CHANGE UP (ref 150–400)
PLATELET # BLD AUTO: 312 K/UL — SIGNIFICANT CHANGE UP (ref 150–400)
PLATELET # BLD AUTO: 319 K/UL — SIGNIFICANT CHANGE UP (ref 150–400)
PLATELET # BLD AUTO: 324 K/UL — SIGNIFICANT CHANGE UP (ref 150–400)
PLATELET # BLD AUTO: 325 K/UL — SIGNIFICANT CHANGE UP (ref 150–400)
PLATELET # BLD AUTO: 325 K/UL — SIGNIFICANT CHANGE UP (ref 150–400)
PLATELET # BLD AUTO: 333 K/UL — SIGNIFICANT CHANGE UP (ref 150–400)
PLATELET # BLD AUTO: 340 K/UL — SIGNIFICANT CHANGE UP (ref 150–400)
PLATELET # BLD AUTO: 342 K/UL — SIGNIFICANT CHANGE UP (ref 150–400)
PLATELET # BLD AUTO: 366 K/UL — SIGNIFICANT CHANGE UP (ref 150–400)
PLATELET # BLD AUTO: 403 K/UL — HIGH (ref 150–400)
PLATELET # BLD AUTO: 73 K/UL — LOW (ref 150–400)
PLATELET # BLD AUTO: 80 K/UL — LOW (ref 150–400)
PLATELET # BLD AUTO: 85 K/UL — LOW (ref 150–400)
PLATELET # BLD AUTO: 92 K/UL — LOW (ref 150–400)
PLATELET # BLD AUTO: SIGNIFICANT CHANGE UP K/UL (ref 150–400)
PLATELET COUNT - ESTIMATE: NORMAL — SIGNIFICANT CHANGE UP
PMV BLD: 11.1 FL — SIGNIFICANT CHANGE UP (ref 7–13)
PMV BLD: 11.5 FL — SIGNIFICANT CHANGE UP (ref 7–13)
PMV BLD: 11.6 FL — SIGNIFICANT CHANGE UP (ref 7–13)
PMV BLD: 11.9 FL — SIGNIFICANT CHANGE UP (ref 7–13)
PMV BLD: 12.2 FL — SIGNIFICANT CHANGE UP (ref 7–13)
PMV BLD: 12.3 FL — SIGNIFICANT CHANGE UP (ref 7–13)
PMV BLD: 12.3 FL — SIGNIFICANT CHANGE UP (ref 7–13)
PMV BLD: 12.4 FL — SIGNIFICANT CHANGE UP (ref 7–13)
PMV BLD: 12.5 FL — SIGNIFICANT CHANGE UP (ref 7–13)
PMV BLD: 12.6 FL — SIGNIFICANT CHANGE UP (ref 7–13)
PMV BLD: 12.7 FL — SIGNIFICANT CHANGE UP (ref 7–13)
PMV BLD: 12.9 FL — SIGNIFICANT CHANGE UP (ref 7–13)
PMV BLD: 13.1 FL — HIGH (ref 7–13)
PMV BLD: 13.1 FL — HIGH (ref 7–13)
PMV BLD: 13.3 FL — HIGH (ref 7–13)
PMV BLD: SIGNIFICANT CHANGE UP FL (ref 7–13)
PMV BLD: SIGNIFICANT CHANGE UP FL (ref 7–13)
PO2 BLDV: 26 MMHG — LOW (ref 35–40)
PO2 BLDV: 27 MMHG — LOW (ref 35–40)
PO2 BLDV: 34 MMHG — LOW (ref 35–40)
PO2 BLDV: 42 MMHG — HIGH (ref 35–40)
POIKILOCYTOSIS BLD QL AUTO: SIGNIFICANT CHANGE UP
POIKILOCYTOSIS BLD QL AUTO: SIGNIFICANT CHANGE UP
POIKILOCYTOSIS BLD QL AUTO: SLIGHT — SIGNIFICANT CHANGE UP
POIKILOCYTOSIS BLD QL AUTO: SLIGHT — SIGNIFICANT CHANGE UP
POLYCHROMASIA BLD QL SMEAR: SIGNIFICANT CHANGE UP
POLYCHROMASIA BLD QL SMEAR: SIGNIFICANT CHANGE UP
POLYCHROMASIA BLD QL SMEAR: SLIGHT — SIGNIFICANT CHANGE UP
POTASSIUM BLDV-SCNC: 3.4 MMOL/L — SIGNIFICANT CHANGE UP (ref 3.4–4.5)
POTASSIUM BLDV-SCNC: 3.5 MMOL/L — SIGNIFICANT CHANGE UP (ref 3.4–4.5)
POTASSIUM BLDV-SCNC: 3.7 MMOL/L — SIGNIFICANT CHANGE UP (ref 3.4–4.5)
POTASSIUM BLDV-SCNC: 3.9 MMOL/L — SIGNIFICANT CHANGE UP (ref 3.4–4.5)
POTASSIUM SERPL-MCNC: 3.4 MMOL/L — LOW (ref 3.5–5.3)
POTASSIUM SERPL-MCNC: 3.5 MMOL/L — SIGNIFICANT CHANGE UP (ref 3.5–5.3)
POTASSIUM SERPL-MCNC: 3.7 MMOL/L — SIGNIFICANT CHANGE UP (ref 3.5–5.3)
POTASSIUM SERPL-MCNC: 3.8 MMOL/L — SIGNIFICANT CHANGE UP (ref 3.5–5.3)
POTASSIUM SERPL-MCNC: 3.8 MMOL/L — SIGNIFICANT CHANGE UP (ref 3.5–5.3)
POTASSIUM SERPL-MCNC: 4 MMOL/L — SIGNIFICANT CHANGE UP (ref 3.5–5.3)
POTASSIUM SERPL-MCNC: 4.1 MMOL/L — SIGNIFICANT CHANGE UP (ref 3.5–5.3)
POTASSIUM SERPL-MCNC: 4.4 MMOL/L — SIGNIFICANT CHANGE UP (ref 3.5–5.3)
POTASSIUM SERPL-MCNC: 4.7 MMOL/L — SIGNIFICANT CHANGE UP (ref 3.5–5.3)
POTASSIUM SERPL-MCNC: 4.7 MMOL/L — SIGNIFICANT CHANGE UP (ref 3.5–5.3)
POTASSIUM SERPL-MCNC: 4.9 MMOL/L — SIGNIFICANT CHANGE UP (ref 3.5–5.3)
POTASSIUM SERPL-MCNC: 5 MMOL/L — SIGNIFICANT CHANGE UP (ref 3.5–5.3)
POTASSIUM SERPL-SCNC: 3.4 MMOL/L — LOW (ref 3.5–5.3)
POTASSIUM SERPL-SCNC: 3.5 MMOL/L — SIGNIFICANT CHANGE UP (ref 3.5–5.3)
POTASSIUM SERPL-SCNC: 3.6 MMOL/L
POTASSIUM SERPL-SCNC: 3.7 MMOL/L — SIGNIFICANT CHANGE UP (ref 3.5–5.3)
POTASSIUM SERPL-SCNC: 3.8 MMOL/L
POTASSIUM SERPL-SCNC: 3.8 MMOL/L — SIGNIFICANT CHANGE UP (ref 3.5–5.3)
POTASSIUM SERPL-SCNC: 3.8 MMOL/L — SIGNIFICANT CHANGE UP (ref 3.5–5.3)
POTASSIUM SERPL-SCNC: 3.9 MMOL/L
POTASSIUM SERPL-SCNC: 4 MMOL/L
POTASSIUM SERPL-SCNC: 4 MMOL/L
POTASSIUM SERPL-SCNC: 4 MMOL/L — SIGNIFICANT CHANGE UP (ref 3.5–5.3)
POTASSIUM SERPL-SCNC: 4.1 MMOL/L — SIGNIFICANT CHANGE UP (ref 3.5–5.3)
POTASSIUM SERPL-SCNC: 4.4 MMOL/L — SIGNIFICANT CHANGE UP (ref 3.5–5.3)
POTASSIUM SERPL-SCNC: 4.6 MMOL/L
POTASSIUM SERPL-SCNC: 4.7 MMOL/L — SIGNIFICANT CHANGE UP (ref 3.5–5.3)
POTASSIUM SERPL-SCNC: 4.7 MMOL/L — SIGNIFICANT CHANGE UP (ref 3.5–5.3)
POTASSIUM SERPL-SCNC: 4.9 MMOL/L — SIGNIFICANT CHANGE UP (ref 3.5–5.3)
POTASSIUM SERPL-SCNC: 5 MMOL/L — SIGNIFICANT CHANGE UP (ref 3.5–5.3)
PROCALCITONIN SERPL-MCNC: 0.21 NG/ML — HIGH (ref 0.02–0.1)
PROCALCITONIN SERPL-MCNC: 0.53 NG/ML — HIGH (ref 0.02–0.1)
PROMYELOCYTES # FLD: 0 % — SIGNIFICANT CHANGE UP (ref 0–0)
PROT SERPL-MCNC: 5.8 G/DL — LOW (ref 6–8.3)
PROT SERPL-MCNC: 5.9 G/DL — LOW (ref 6–8.3)
PROT SERPL-MCNC: 6.1 G/DL
PROT SERPL-MCNC: 6.1 G/DL — SIGNIFICANT CHANGE UP (ref 6–8.3)
PROT SERPL-MCNC: 6.2 G/DL
PROT SERPL-MCNC: 6.2 G/DL — SIGNIFICANT CHANGE UP (ref 6–8.3)
PROT SERPL-MCNC: 6.3 G/DL
PROT SERPL-MCNC: 6.3 G/DL — SIGNIFICANT CHANGE UP (ref 6–8.3)
PROT SERPL-MCNC: 6.4 G/DL — SIGNIFICANT CHANGE UP (ref 6–8.3)
PROT SERPL-MCNC: 6.5 G/DL
PROT SERPL-MCNC: 6.5 G/DL — SIGNIFICANT CHANGE UP (ref 6–8.3)
PROT SERPL-MCNC: 6.7 G/DL — SIGNIFICANT CHANGE UP (ref 6–8.3)
PROT SERPL-MCNC: 6.8 G/DL
PROT SERPL-MCNC: 6.8 G/DL — SIGNIFICANT CHANGE UP (ref 6–8.3)
PROT SERPL-MCNC: 6.9 G/DL — SIGNIFICANT CHANGE UP (ref 6–8.3)
PROT SERPL-MCNC: 7.1 G/DL — SIGNIFICANT CHANGE UP (ref 6–8.3)
PROT SERPL-MCNC: 7.1 G/DL — SIGNIFICANT CHANGE UP (ref 6–8.3)
PROT SERPL-MCNC: 7.5 G/DL — SIGNIFICANT CHANGE UP (ref 6–8.3)
PROT UR-MCNC: 20 — SIGNIFICANT CHANGE UP
PROT UR-MCNC: NEGATIVE — SIGNIFICANT CHANGE UP
PROTEIN URINE: NEGATIVE
PROTEIN URINE: NORMAL
PROTHROM AB SERPL-ACNC: 10.7 SEC — SIGNIFICANT CHANGE UP (ref 9.8–13.1)
PROTHROM AB SERPL-ACNC: 12.8 SEC — SIGNIFICANT CHANGE UP (ref 9.8–13.1)
PROTHROM AB SERPL-ACNC: 13.6 SEC — HIGH (ref 9.8–13.1)
PROTHROM AB SERPL-ACNC: 14.1 SEC — HIGH (ref 9.8–13.1)
PROTHROM AB SERPL-ACNC: 15.5 SEC — HIGH (ref 9.8–13.1)
PROTHROM AB SERPL-ACNC: 15.9 SEC — HIGH (ref 9.8–13.1)
PROTHROM AB SERPL-ACNC: 16.4 SEC — HIGH (ref 9.8–13.1)
PROTHROM AB SERPL-ACNC: 17.5 SEC — HIGH (ref 9.8–13.1)
PTH RELATED PROT SERPL-MCNC: 2.8 PMOL/L
PTH RELATED PROT SERPL-MCNC: <2 PMOL/L
RBC # BLD: 2.62 M/UL — LOW (ref 3.8–5.2)
RBC # BLD: 2.8 M/UL — LOW (ref 3.8–5.2)
RBC # BLD: 2.84 M/UL — LOW (ref 3.8–5.2)
RBC # BLD: 2.85 M/UL — LOW (ref 3.8–5.2)
RBC # BLD: 2.85 M/UL — LOW (ref 3.8–5.2)
RBC # BLD: 2.86 M/UL — LOW (ref 3.8–5.2)
RBC # BLD: 2.91 M/UL — LOW (ref 3.8–5.2)
RBC # BLD: 2.92 M/UL — LOW (ref 3.8–5.2)
RBC # BLD: 2.92 M/UL — LOW (ref 3.8–5.2)
RBC # BLD: 2.96 M/UL — LOW (ref 3.8–5.2)
RBC # BLD: 3 M/UL — LOW (ref 3.8–5.2)
RBC # BLD: 3 M/UL — LOW (ref 3.8–5.2)
RBC # BLD: 3.02 M/UL — LOW (ref 3.8–5.2)
RBC # BLD: 3.07 M/UL — LOW (ref 3.8–5.2)
RBC # BLD: 3.09 M/UL — LOW (ref 3.8–5.2)
RBC # BLD: 3.09 M/UL — LOW (ref 3.8–5.2)
RBC # BLD: 3.12 M/UL — LOW (ref 3.8–5.2)
RBC # BLD: 3.12 M/UL — LOW (ref 3.8–5.2)
RBC # BLD: 3.13 M/UL — LOW (ref 3.8–5.2)
RBC # BLD: 3.15 M/UL — LOW (ref 3.8–5.2)
RBC # BLD: 3.16 M/UL — LOW (ref 3.8–5.2)
RBC # BLD: 3.19 M/UL — LOW (ref 3.8–5.2)
RBC # BLD: 3.19 M/UL — LOW (ref 3.8–5.2)
RBC # BLD: 3.2 M/UL — LOW (ref 3.8–5.2)
RBC # BLD: 3.22 M/UL — LOW (ref 3.8–5.2)
RBC # BLD: 3.23 M/UL — LOW (ref 3.8–5.2)
RBC # BLD: 3.26 M/UL — LOW (ref 3.8–5.2)
RBC # BLD: 3.27 M/UL — LOW (ref 3.8–5.2)
RBC # BLD: 3.31 M/UL — LOW (ref 3.8–5.2)
RBC # BLD: 3.32 M/UL — LOW (ref 3.8–5.2)
RBC # BLD: 3.32 M/UL — LOW (ref 3.8–5.2)
RBC # BLD: 3.33 M/UL — LOW (ref 3.8–5.2)
RBC # BLD: 3.34 M/UL — LOW (ref 3.8–5.2)
RBC # BLD: 3.38 M/UL — LOW (ref 3.8–5.2)
RBC # BLD: 3.4 M/UL — LOW (ref 3.8–5.2)
RBC # BLD: 3.45 M/UL — LOW (ref 3.8–5.2)
RBC # BLD: 3.45 M/UL — LOW (ref 3.8–5.2)
RBC # BLD: 3.46 M/UL — LOW (ref 3.8–5.2)
RBC # BLD: 3.47 M/UL — LOW (ref 3.8–5.2)
RBC # BLD: 3.47 M/UL — LOW (ref 3.8–5.2)
RBC # BLD: 3.49 M/UL — LOW (ref 3.8–5.2)
RBC # BLD: 3.51 M/UL — LOW (ref 3.8–5.2)
RBC # BLD: 3.57 M/UL — LOW (ref 3.8–5.2)
RBC # BLD: 3.58 M/UL — LOW (ref 3.8–5.2)
RBC # BLD: 3.64 M/UL — LOW (ref 3.8–5.2)
RBC # BLD: 3.68 M/UL — LOW (ref 3.8–5.2)
RBC # BLD: 3.9 M/UL — SIGNIFICANT CHANGE UP (ref 3.8–5.2)
RBC # FLD: 17.3 % — HIGH (ref 10.3–14.5)
RBC # FLD: 17.5 % — HIGH (ref 10.3–14.5)
RBC # FLD: 17.6 % — HIGH (ref 10.3–14.5)
RBC # FLD: 17.8 % — HIGH (ref 10.3–14.5)
RBC # FLD: 17.9 % — HIGH (ref 10.3–14.5)
RBC # FLD: 17.9 % — HIGH (ref 10.3–14.5)
RBC # FLD: 18 % — HIGH (ref 10.3–14.5)
RBC # FLD: 18.1 % — HIGH (ref 10.3–14.5)
RBC # FLD: 18.1 % — HIGH (ref 10.3–14.5)
RBC # FLD: 18.2 % — HIGH (ref 10.3–14.5)
RBC # FLD: 18.3 % — HIGH (ref 10.3–14.5)
RBC # FLD: 18.4 % — HIGH (ref 10.3–14.5)
RBC # FLD: 18.6 % — HIGH (ref 10.3–14.5)
RBC # FLD: 18.6 % — HIGH (ref 10.3–14.5)
RBC # FLD: 18.7 % — HIGH (ref 10.3–14.5)
RBC # FLD: 18.8 % — HIGH (ref 10.3–14.5)
RBC # FLD: 18.9 % — HIGH (ref 10.3–14.5)
RBC # FLD: 18.9 % — HIGH (ref 10.3–14.5)
RBC # FLD: 19 % — HIGH (ref 10.3–14.5)
RBC # FLD: 19.1 % — HIGH (ref 10.3–14.5)
RBC # FLD: 19.2 % — HIGH (ref 10.3–14.5)
RBC # FLD: 19.3 % — HIGH (ref 10.3–14.5)
RBC # FLD: 19.3 % — HIGH (ref 10.3–14.5)
RBC # FLD: 19.4 % — HIGH (ref 10.3–14.5)
RBC # FLD: 19.6 % — HIGH (ref 10.3–14.5)
RBC # FLD: 19.7 % — HIGH (ref 10.3–14.5)
RBC # FLD: 19.8 % — HIGH (ref 10.3–14.5)
RBC # FLD: 19.9 % — HIGH (ref 10.3–14.5)
RBC # FLD: 20.2 % — HIGH (ref 10.3–14.5)
RBC # FLD: 20.3 % — HIGH (ref 10.3–14.5)
RBC # FLD: 20.3 % — HIGH (ref 10.3–14.5)
RBC # FLD: 20.5 % — HIGH (ref 10.3–14.5)
RBC # FLD: 20.7 % — HIGH (ref 10.3–14.5)
RBC # FLD: 20.8 % — HIGH (ref 10.3–14.5)
RBC # FLD: 21.3 % — HIGH (ref 10.3–14.5)
RBC # FLD: 21.3 % — HIGH (ref 10.3–14.5)
RBC # FLD: 23.2 % — HIGH (ref 10.3–14.5)
RBC # FLD: 23.9 % — HIGH (ref 10.3–14.5)
RBC # FLD: 24.4 % — HIGH (ref 10.3–14.5)
RBC BLD AUTO: ABNORMAL
RBC BLD AUTO: ABNORMAL
RBC BLD AUTO: SIGNIFICANT CHANGE UP
RBC CASTS # UR COMP ASSIST: SIGNIFICANT CHANGE UP (ref 0–?)
RED BLOOD CELLS URINE: 0 /HPF
REVIEW TO FOLLOW: YES — SIGNIFICANT CHANGE UP
RH IG SCN BLD-IMP: POSITIVE — SIGNIFICANT CHANGE UP
RSV RNA SPEC QL NAA+PROBE: NOT DETECTED — SIGNIFICANT CHANGE UP
RV+EV RNA SPEC QL NAA+PROBE: NOT DETECTED — SIGNIFICANT CHANGE UP
SAO2 % BLDV: 38 % — LOW (ref 60–85)
SAO2 % BLDV: 38.1 % — LOW (ref 60–85)
SAO2 % BLDV: 55.1 % — LOW (ref 60–85)
SAO2 % BLDV: 73.4 % — SIGNIFICANT CHANGE UP (ref 60–85)
SODIUM SERPL-SCNC: 134 MMOL/L — LOW (ref 135–145)
SODIUM SERPL-SCNC: 134 MMOL/L — LOW (ref 135–145)
SODIUM SERPL-SCNC: 136 MMOL/L — SIGNIFICANT CHANGE UP (ref 135–145)
SODIUM SERPL-SCNC: 136 MMOL/L — SIGNIFICANT CHANGE UP (ref 135–145)
SODIUM SERPL-SCNC: 137 MMOL/L — SIGNIFICANT CHANGE UP (ref 135–145)
SODIUM SERPL-SCNC: 138 MMOL/L
SODIUM SERPL-SCNC: 138 MMOL/L
SODIUM SERPL-SCNC: 138 MMOL/L — SIGNIFICANT CHANGE UP (ref 135–145)
SODIUM SERPL-SCNC: 139 MMOL/L — SIGNIFICANT CHANGE UP (ref 135–145)
SODIUM SERPL-SCNC: 140 MMOL/L
SODIUM SERPL-SCNC: 140 MMOL/L
SODIUM SERPL-SCNC: 140 MMOL/L — SIGNIFICANT CHANGE UP (ref 135–145)
SODIUM SERPL-SCNC: 141 MMOL/L
SODIUM SERPL-SCNC: 141 MMOL/L — SIGNIFICANT CHANGE UP (ref 135–145)
SODIUM SERPL-SCNC: 141 MMOL/L — SIGNIFICANT CHANGE UP (ref 135–145)
SODIUM SERPL-SCNC: 142 MMOL/L
SODIUM SERPL-SCNC: 142 MMOL/L — SIGNIFICANT CHANGE UP (ref 135–145)
SODIUM SERPL-SCNC: 143 MMOL/L — SIGNIFICANT CHANGE UP (ref 135–145)
SODIUM SERPL-SCNC: 143 MMOL/L — SIGNIFICANT CHANGE UP (ref 135–145)
SODIUM SERPL-SCNC: 144 MMOL/L — SIGNIFICANT CHANGE UP (ref 135–145)
SODIUM SERPL-SCNC: 144 MMOL/L — SIGNIFICANT CHANGE UP (ref 135–145)
SP GR SPEC: 1.01 — SIGNIFICANT CHANGE UP (ref 1–1.04)
SP GR SPEC: 1.01 — SIGNIFICANT CHANGE UP (ref 1–1.04)
SP GR SPEC: 1.02 — SIGNIFICANT CHANGE UP (ref 1–1.04)
SP GR SPEC: 1.02 — SIGNIFICANT CHANGE UP (ref 1–1.04)
SPECIFIC GRAVITY URINE: 1.01
SPECIFIC GRAVITY URINE: 1.01
SPECIFIC GRAVITY URINE: 1.02
SPECIFIC GRAVITY URINE: 1.02
SPECIMEN SOURCE: SIGNIFICANT CHANGE UP
SQUAMOUS # UR AUTO: SIGNIFICANT CHANGE UP
SQUAMOUS EPITHELIAL CELLS: 2 /HPF
STOMATOCYTES BLD QL SMEAR: SLIGHT — SIGNIFICANT CHANGE UP
UROBILINOGEN FLD QL: HIGH
UROBILINOGEN FLD QL: NORMAL — SIGNIFICANT CHANGE UP
UROBILINOGEN URINE: NORMAL
VANCOMYCIN TROUGH SERPL-MCNC: 9 UG/ML — LOW (ref 10–20)
VANCOMYCIN TROUGH SERPL-MCNC: 9.3 UG/ML — LOW (ref 10–20)
VARIANT LYMPHS # BLD: 0 % — SIGNIFICANT CHANGE UP
VARIANT LYMPHS # BLD: 0 % — SIGNIFICANT CHANGE UP
VARIANT LYMPHS # BLD: 0.9 % — SIGNIFICANT CHANGE UP
WBC # BLD: 10.1 K/UL — SIGNIFICANT CHANGE UP (ref 3.8–10.5)
WBC # BLD: 10.3 K/UL — SIGNIFICANT CHANGE UP (ref 3.8–10.5)
WBC # BLD: 11.7 K/UL — HIGH (ref 3.8–10.5)
WBC # BLD: 12.6 K/UL — HIGH (ref 3.8–10.5)
WBC # BLD: 14.3 K/UL — HIGH (ref 3.8–10.5)
WBC # BLD: 16.5 K/UL — HIGH (ref 3.8–10.5)
WBC # BLD: 2.4 K/UL — LOW (ref 3.8–10.5)
WBC # BLD: 3.02 K/UL — LOW (ref 3.8–10.5)
WBC # BLD: 3.3 K/UL — LOW (ref 3.8–10.5)
WBC # BLD: 3.33 K/UL — LOW (ref 3.8–10.5)
WBC # BLD: 3.4 K/UL — LOW (ref 3.8–10.5)
WBC # BLD: 3.47 K/UL — LOW (ref 3.8–10.5)
WBC # BLD: 3.69 K/UL — LOW (ref 3.8–10.5)
WBC # BLD: 3.7 K/UL — LOW (ref 3.8–10.5)
WBC # BLD: 3.76 K/UL — LOW (ref 3.8–10.5)
WBC # BLD: 3.82 K/UL — SIGNIFICANT CHANGE UP (ref 3.8–10.5)
WBC # BLD: 3.9 K/UL — SIGNIFICANT CHANGE UP (ref 3.8–10.5)
WBC # BLD: 3.98 K/UL — SIGNIFICANT CHANGE UP (ref 3.8–10.5)
WBC # BLD: 4.34 K/UL — SIGNIFICANT CHANGE UP (ref 3.8–10.5)
WBC # BLD: 4.6 K/UL — SIGNIFICANT CHANGE UP (ref 3.8–10.5)
WBC # BLD: 4.9 K/UL — SIGNIFICANT CHANGE UP (ref 3.8–10.5)
WBC # BLD: 4.97 K/UL — SIGNIFICANT CHANGE UP (ref 3.8–10.5)
WBC # BLD: 5.18 K/UL — SIGNIFICANT CHANGE UP (ref 3.8–10.5)
WBC # BLD: 5.41 K/UL — SIGNIFICANT CHANGE UP (ref 3.8–10.5)
WBC # BLD: 5.5 K/UL — SIGNIFICANT CHANGE UP (ref 3.8–10.5)
WBC # BLD: 5.56 K/UL — SIGNIFICANT CHANGE UP (ref 3.8–10.5)
WBC # BLD: 5.6 K/UL — SIGNIFICANT CHANGE UP (ref 3.8–10.5)
WBC # BLD: 5.61 K/UL — SIGNIFICANT CHANGE UP (ref 3.8–10.5)
WBC # BLD: 5.81 K/UL — SIGNIFICANT CHANGE UP (ref 3.8–10.5)
WBC # BLD: 5.81 K/UL — SIGNIFICANT CHANGE UP (ref 3.8–10.5)
WBC # BLD: 5.85 K/UL — SIGNIFICANT CHANGE UP (ref 3.8–10.5)
WBC # BLD: 6.1 K/UL — SIGNIFICANT CHANGE UP (ref 3.8–10.5)
WBC # BLD: 6.21 K/UL — SIGNIFICANT CHANGE UP (ref 3.8–10.5)
WBC # BLD: 6.6 K/UL — SIGNIFICANT CHANGE UP (ref 3.8–10.5)
WBC # BLD: 6.6 K/UL — SIGNIFICANT CHANGE UP (ref 3.8–10.5)
WBC # BLD: 6.9 K/UL — SIGNIFICANT CHANGE UP (ref 3.8–10.5)
WBC # BLD: 7.09 K/UL — SIGNIFICANT CHANGE UP (ref 3.8–10.5)
WBC # BLD: 7.1 K/UL — SIGNIFICANT CHANGE UP (ref 3.8–10.5)
WBC # BLD: 7.14 K/UL — SIGNIFICANT CHANGE UP (ref 3.8–10.5)
WBC # BLD: 7.3 K/UL — SIGNIFICANT CHANGE UP (ref 3.8–10.5)
WBC # BLD: 7.4 K/UL — SIGNIFICANT CHANGE UP (ref 3.8–10.5)
WBC # BLD: 7.4 K/UL — SIGNIFICANT CHANGE UP (ref 3.8–10.5)
WBC # BLD: 7.47 K/UL — SIGNIFICANT CHANGE UP (ref 3.8–10.5)
WBC # BLD: 7.5 K/UL — SIGNIFICANT CHANGE UP (ref 3.8–10.5)
WBC # BLD: 7.56 K/UL — SIGNIFICANT CHANGE UP (ref 3.8–10.5)
WBC # BLD: 8.28 K/UL — SIGNIFICANT CHANGE UP (ref 3.8–10.5)
WBC # BLD: 8.39 K/UL — SIGNIFICANT CHANGE UP (ref 3.8–10.5)
WBC # BLD: 9.5 K/UL — SIGNIFICANT CHANGE UP (ref 3.8–10.5)
WBC # BLD: 9.8 K/UL — SIGNIFICANT CHANGE UP (ref 3.8–10.5)
WBC # FLD AUTO: 10.1 K/UL — SIGNIFICANT CHANGE UP (ref 3.8–10.5)
WBC # FLD AUTO: 10.3 K/UL — SIGNIFICANT CHANGE UP (ref 3.8–10.5)
WBC # FLD AUTO: 11.7 K/UL — HIGH (ref 3.8–10.5)
WBC # FLD AUTO: 12.6 K/UL — HIGH (ref 3.8–10.5)
WBC # FLD AUTO: 14.3 K/UL — HIGH (ref 3.8–10.5)
WBC # FLD AUTO: 16.5 K/UL — HIGH (ref 3.8–10.5)
WBC # FLD AUTO: 2.4 K/UL — LOW (ref 3.8–10.5)
WBC # FLD AUTO: 3.02 K/UL — LOW (ref 3.8–10.5)
WBC # FLD AUTO: 3.3 K/UL — LOW (ref 3.8–10.5)
WBC # FLD AUTO: 3.33 K/UL — LOW (ref 3.8–10.5)
WBC # FLD AUTO: 3.4 K/UL — LOW (ref 3.8–10.5)
WBC # FLD AUTO: 3.47 K/UL — LOW (ref 3.8–10.5)
WBC # FLD AUTO: 3.69 K/UL — LOW (ref 3.8–10.5)
WBC # FLD AUTO: 3.7 K/UL — LOW (ref 3.8–10.5)
WBC # FLD AUTO: 3.76 K/UL — LOW (ref 3.8–10.5)
WBC # FLD AUTO: 3.82 K/UL — SIGNIFICANT CHANGE UP (ref 3.8–10.5)
WBC # FLD AUTO: 3.9 K/UL — SIGNIFICANT CHANGE UP (ref 3.8–10.5)
WBC # FLD AUTO: 3.98 K/UL — SIGNIFICANT CHANGE UP (ref 3.8–10.5)
WBC # FLD AUTO: 4.34 K/UL — SIGNIFICANT CHANGE UP (ref 3.8–10.5)
WBC # FLD AUTO: 4.6 K/UL — SIGNIFICANT CHANGE UP (ref 3.8–10.5)
WBC # FLD AUTO: 4.9 K/UL — SIGNIFICANT CHANGE UP (ref 3.8–10.5)
WBC # FLD AUTO: 4.97 K/UL — SIGNIFICANT CHANGE UP (ref 3.8–10.5)
WBC # FLD AUTO: 5.18 K/UL — SIGNIFICANT CHANGE UP (ref 3.8–10.5)
WBC # FLD AUTO: 5.41 K/UL — SIGNIFICANT CHANGE UP (ref 3.8–10.5)
WBC # FLD AUTO: 5.5 K/UL — SIGNIFICANT CHANGE UP (ref 3.8–10.5)
WBC # FLD AUTO: 5.56 K/UL — SIGNIFICANT CHANGE UP (ref 3.8–10.5)
WBC # FLD AUTO: 5.6 K/UL — SIGNIFICANT CHANGE UP (ref 3.8–10.5)
WBC # FLD AUTO: 5.61 K/UL — SIGNIFICANT CHANGE UP (ref 3.8–10.5)
WBC # FLD AUTO: 5.81 K/UL — SIGNIFICANT CHANGE UP (ref 3.8–10.5)
WBC # FLD AUTO: 5.81 K/UL — SIGNIFICANT CHANGE UP (ref 3.8–10.5)
WBC # FLD AUTO: 5.85 K/UL — SIGNIFICANT CHANGE UP (ref 3.8–10.5)
WBC # FLD AUTO: 6.1 K/UL — SIGNIFICANT CHANGE UP (ref 3.8–10.5)
WBC # FLD AUTO: 6.21 K/UL — SIGNIFICANT CHANGE UP (ref 3.8–10.5)
WBC # FLD AUTO: 6.6 K/UL — SIGNIFICANT CHANGE UP (ref 3.8–10.5)
WBC # FLD AUTO: 6.6 K/UL — SIGNIFICANT CHANGE UP (ref 3.8–10.5)
WBC # FLD AUTO: 6.9 K/UL — SIGNIFICANT CHANGE UP (ref 3.8–10.5)
WBC # FLD AUTO: 7.09 K/UL — SIGNIFICANT CHANGE UP (ref 3.8–10.5)
WBC # FLD AUTO: 7.1 K/UL — SIGNIFICANT CHANGE UP (ref 3.8–10.5)
WBC # FLD AUTO: 7.14 K/UL — SIGNIFICANT CHANGE UP (ref 3.8–10.5)
WBC # FLD AUTO: 7.3 K/UL — SIGNIFICANT CHANGE UP (ref 3.8–10.5)
WBC # FLD AUTO: 7.4 K/UL — SIGNIFICANT CHANGE UP (ref 3.8–10.5)
WBC # FLD AUTO: 7.4 K/UL — SIGNIFICANT CHANGE UP (ref 3.8–10.5)
WBC # FLD AUTO: 7.47 K/UL — SIGNIFICANT CHANGE UP (ref 3.8–10.5)
WBC # FLD AUTO: 7.5 K/UL — SIGNIFICANT CHANGE UP (ref 3.8–10.5)
WBC # FLD AUTO: 7.56 K/UL — SIGNIFICANT CHANGE UP (ref 3.8–10.5)
WBC # FLD AUTO: 8.28 K/UL — SIGNIFICANT CHANGE UP (ref 3.8–10.5)
WBC # FLD AUTO: 8.39 K/UL — SIGNIFICANT CHANGE UP (ref 3.8–10.5)
WBC # FLD AUTO: 9.5 K/UL — SIGNIFICANT CHANGE UP (ref 3.8–10.5)
WBC # FLD AUTO: 9.8 K/UL — SIGNIFICANT CHANGE UP (ref 3.8–10.5)
WBC UR QL: SIGNIFICANT CHANGE UP (ref 0–?)
WHITE BLOOD CELLS URINE: 1 /HPF

## 2019-01-01 PROCEDURE — 93010 ELECTROCARDIOGRAM REPORT: CPT

## 2019-01-01 PROCEDURE — 99239 HOSP IP/OBS DSCHRG MGMT >30: CPT

## 2019-01-01 PROCEDURE — 99214 OFFICE O/P EST MOD 30 MIN: CPT

## 2019-01-01 PROCEDURE — 99215 OFFICE O/P EST HI 40 MIN: CPT

## 2019-01-01 PROCEDURE — 99233 SBSQ HOSP IP/OBS HIGH 50: CPT

## 2019-01-01 PROCEDURE — 99222 1ST HOSP IP/OBS MODERATE 55: CPT

## 2019-01-01 PROCEDURE — 99223 1ST HOSP IP/OBS HIGH 75: CPT | Mod: GC

## 2019-01-01 PROCEDURE — 74177 CT ABD & PELVIS W/CONTRAST: CPT | Mod: 26

## 2019-01-01 PROCEDURE — 71275 CT ANGIOGRAPHY CHEST: CPT | Mod: 26

## 2019-01-01 PROCEDURE — 99204 OFFICE O/P NEW MOD 45 MIN: CPT | Mod: 25

## 2019-01-01 PROCEDURE — 99233 SBSQ HOSP IP/OBS HIGH 50: CPT | Mod: GC

## 2019-01-01 PROCEDURE — 99223 1ST HOSP IP/OBS HIGH 75: CPT

## 2019-01-01 PROCEDURE — 93970 EXTREMITY STUDY: CPT | Mod: 26

## 2019-01-01 PROCEDURE — 77334 RADIATION TREATMENT AID(S): CPT | Mod: 26

## 2019-01-01 PROCEDURE — 86900 BLOOD TYPING SEROLOGIC ABO: CPT

## 2019-01-01 PROCEDURE — 74177 CT ABD & PELVIS W/CONTRAST: CPT

## 2019-01-01 PROCEDURE — 99223 1ST HOSP IP/OBS HIGH 75: CPT | Mod: AI,GC

## 2019-01-01 PROCEDURE — 86850 RBC ANTIBODY SCREEN: CPT

## 2019-01-01 PROCEDURE — 71260 CT THORAX DX C+: CPT | Mod: 26

## 2019-01-01 PROCEDURE — 12345: CPT | Mod: NC

## 2019-01-01 PROCEDURE — 93970 EXTREMITY STUDY: CPT

## 2019-01-01 PROCEDURE — 99024 POSTOP FOLLOW-UP VISIT: CPT

## 2019-01-01 PROCEDURE — 77290 THER RAD SIMULAJ FIELD CPLX: CPT | Mod: 26

## 2019-01-01 PROCEDURE — 71045 X-RAY EXAM CHEST 1 VIEW: CPT | Mod: 26

## 2019-01-01 PROCEDURE — 77427 RADIATION TX MANAGEMENT X5: CPT

## 2019-01-01 PROCEDURE — 99285 EMERGENCY DEPT VISIT HI MDM: CPT

## 2019-01-01 PROCEDURE — 99213 OFFICE O/P EST LOW 20 MIN: CPT

## 2019-01-01 PROCEDURE — 99231 SBSQ HOSP IP/OBS SF/LOW 25: CPT

## 2019-01-01 PROCEDURE — 85060 BLOOD SMEAR INTERPRETATION: CPT

## 2019-01-01 PROCEDURE — 71046 X-RAY EXAM CHEST 2 VIEWS: CPT | Mod: 26

## 2019-01-01 PROCEDURE — 73610 X-RAY EXAM OF ANKLE: CPT | Mod: 26,RT

## 2019-01-01 PROCEDURE — 86901 BLOOD TYPING SEROLOGIC RH(D): CPT

## 2019-01-01 PROCEDURE — 90791 PSYCH DIAGNOSTIC EVALUATION: CPT

## 2019-01-01 PROCEDURE — 77262 THER RADIOLOGY TX PLNG INTRM: CPT

## 2019-01-01 PROCEDURE — 93971 EXTREMITY STUDY: CPT | Mod: 26,LT

## 2019-01-01 PROCEDURE — 77307 TELETHX ISODOSE PLAN CPLX: CPT | Mod: 26

## 2019-01-01 PROCEDURE — 99232 SBSQ HOSP IP/OBS MODERATE 35: CPT

## 2019-01-01 PROCEDURE — 70552 MRI BRAIN STEM W/DYE: CPT | Mod: 26

## 2019-01-01 PROCEDURE — 77280 THER RAD SIMULAJ FIELD SMPL: CPT | Mod: 26

## 2019-01-01 PROCEDURE — G9005: CPT

## 2019-01-01 RX ORDER — POTASSIUM CHLORIDE 20 MEQ
20 PACKET (EA) ORAL ONCE
Refills: 0 | Status: COMPLETED | OUTPATIENT
Start: 2019-01-01 | End: 2019-01-01

## 2019-01-01 RX ORDER — METOCLOPRAMIDE 5 MG/1
5 TABLET ORAL TWICE DAILY
Qty: 60 | Refills: 0 | Status: DISCONTINUED | COMMUNITY
Start: 2019-01-01 | End: 2019-01-01

## 2019-01-01 RX ORDER — HYDROMORPHONE HYDROCHLORIDE 2 MG/ML
1 INJECTION INTRAMUSCULAR; INTRAVENOUS; SUBCUTANEOUS
Qty: 0 | Refills: 0 | COMMUNITY

## 2019-01-01 RX ORDER — VANCOMYCIN HCL 1 G
1000 VIAL (EA) INTRAVENOUS EVERY 12 HOURS
Refills: 0 | Status: DISCONTINUED | OUTPATIENT
Start: 2019-01-01 | End: 2019-01-01

## 2019-01-01 RX ORDER — NALOXONE HYDROCHLORIDE NASAL 4 MG/.1ML
4 SPRAY NASAL
Qty: 1 | Refills: 0 | Status: ACTIVE | COMMUNITY
Start: 2019-01-01 | End: 1900-01-01

## 2019-01-01 RX ORDER — POTASSIUM CHLORIDE 20 MEQ
40 PACKET (EA) ORAL ONCE
Refills: 0 | Status: COMPLETED | OUTPATIENT
Start: 2019-01-01 | End: 2019-01-01

## 2019-01-01 RX ORDER — ONDANSETRON 4 MG/1
4 TABLET, ORALLY DISINTEGRATING ORAL
Qty: 90 | Refills: 0 | Status: ACTIVE | COMMUNITY
Start: 2019-01-01 | End: 1900-01-01

## 2019-01-01 RX ORDER — SODIUM CHLORIDE 9 MG/ML
1000 INJECTION INTRAMUSCULAR; INTRAVENOUS; SUBCUTANEOUS ONCE
Qty: 0 | Refills: 0 | Status: COMPLETED | OUTPATIENT
Start: 2019-01-01 | End: 2019-01-01

## 2019-01-01 RX ORDER — CEFEPIME 1 G/1
2000 INJECTION, POWDER, FOR SOLUTION INTRAMUSCULAR; INTRAVENOUS EVERY 8 HOURS
Refills: 0 | Status: DISCONTINUED | OUTPATIENT
Start: 2019-01-01 | End: 2019-01-01

## 2019-01-01 RX ORDER — KETOROLAC TROMETHAMINE 30 MG/ML
15 SYRINGE (ML) INJECTION EVERY 8 HOURS
Qty: 0 | Refills: 0 | Status: DISCONTINUED | OUTPATIENT
Start: 2019-01-01 | End: 2019-01-01

## 2019-01-01 RX ORDER — METHADONE HYDROCHLORIDE 40 MG/1
15 TABLET ORAL EVERY 8 HOURS
Refills: 0 | Status: DISCONTINUED | OUTPATIENT
Start: 2019-01-01 | End: 2019-01-01

## 2019-01-01 RX ORDER — ONDANSETRON 8 MG/1
4 TABLET, FILM COATED ORAL EVERY 6 HOURS
Qty: 0 | Refills: 0 | Status: DISCONTINUED | OUTPATIENT
Start: 2019-01-01 | End: 2019-01-01

## 2019-01-01 RX ORDER — NYSTATIN 100000 [USP'U]/ML
100000 SUSPENSION ORAL 4 TIMES DAILY
Qty: 200 | Refills: 1 | Status: COMPLETED | COMMUNITY
Start: 2019-01-01 | End: 2019-01-01

## 2019-01-01 RX ORDER — MIDODRINE HYDROCHLORIDE 5 MG/1
5 TABLET ORAL 3 TIMES DAILY
Qty: 90 | Refills: 1 | Status: ACTIVE | COMMUNITY
Start: 2019-01-01 | End: 1900-01-01

## 2019-01-01 RX ORDER — LIDOCAINE 4 G/100G
1 CREAM TOPICAL DAILY
Refills: 0 | Status: DISCONTINUED | OUTPATIENT
Start: 2019-01-01 | End: 2019-01-01

## 2019-01-01 RX ORDER — PIPERACILLIN AND TAZOBACTAM 4; .5 G/20ML; G/20ML
3.38 INJECTION, POWDER, LYOPHILIZED, FOR SOLUTION INTRAVENOUS EVERY 8 HOURS
Refills: 0 | Status: DISCONTINUED | OUTPATIENT
Start: 2019-01-01 | End: 2019-01-01

## 2019-01-01 RX ORDER — ASCORBIC ACID 60 MG
500 TABLET,CHEWABLE ORAL DAILY
Refills: 0 | Status: DISCONTINUED | OUTPATIENT
Start: 2019-01-01 | End: 2019-01-01

## 2019-01-01 RX ORDER — PANTOPRAZOLE SODIUM 20 MG/1
1 TABLET, DELAYED RELEASE ORAL
Qty: 30 | Refills: 0 | OUTPATIENT
Start: 2019-01-01 | End: 2019-01-01

## 2019-01-01 RX ORDER — NYSTATIN CREAM 100000 [USP'U]/G
1 CREAM TOPICAL
Qty: 0 | Refills: 0 | Status: DISCONTINUED | OUTPATIENT
Start: 2019-01-01 | End: 2019-01-01

## 2019-01-01 RX ORDER — FENTANYL CITRATE 50 UG/ML
25 INJECTION INTRAVENOUS ONCE
Refills: 0 | Status: DISCONTINUED | OUTPATIENT
Start: 2019-01-01 | End: 2019-01-01

## 2019-01-01 RX ORDER — HYDROMORPHONE HYDROCHLORIDE 2 MG/ML
12 INJECTION INTRAMUSCULAR; INTRAVENOUS; SUBCUTANEOUS
Refills: 0 | Status: DISCONTINUED | OUTPATIENT
Start: 2019-01-01 | End: 2019-01-01

## 2019-01-01 RX ORDER — DOXYLAMINE SUCCINATE 25 MG
50 TABLET ORAL
Qty: 30 | Refills: 2 | Status: ACTIVE | COMMUNITY
Start: 2019-01-01 | End: 1900-01-01

## 2019-01-01 RX ORDER — CEFEPIME 1 G/1
2000 INJECTION, POWDER, FOR SOLUTION INTRAMUSCULAR; INTRAVENOUS ONCE
Refills: 0 | Status: COMPLETED | OUTPATIENT
Start: 2019-01-01 | End: 2019-01-01

## 2019-01-01 RX ORDER — VENLAFAXINE HCL 75 MG
50 CAPSULE, EXT RELEASE 24 HR ORAL DAILY
Refills: 0 | Status: DISCONTINUED | OUTPATIENT
Start: 2019-01-01 | End: 2019-01-01

## 2019-01-01 RX ORDER — LIDOCAINE 5 G/100G
5 OINTMENT TOPICAL
Qty: 1 | Refills: 2 | Status: ACTIVE | COMMUNITY
Start: 2019-02-14 | End: 1900-01-01

## 2019-01-01 RX ORDER — LORATADINE 5 MG
TABLET,CHEWABLE ORAL
Refills: 0 | Status: DISCONTINUED | COMMUNITY
End: 2019-01-01

## 2019-01-01 RX ORDER — DEXAMETHASONE 0.5 MG/5ML
2 ELIXIR ORAL
Qty: 0 | Refills: 0 | Status: DISCONTINUED | OUTPATIENT
Start: 2019-01-01 | End: 2019-01-01

## 2019-01-01 RX ORDER — ALTEPLASE 2.2 MG/2ML
2 INJECTION, POWDER, LYOPHILIZED, FOR SOLUTION INTRAVENOUS
Qty: 2 | Refills: 0 | Status: ACTIVE | COMMUNITY
Start: 2019-01-01 | End: 1900-01-01

## 2019-01-01 RX ORDER — ACETAMINOPHEN 500 MG
1000 TABLET ORAL ONCE
Refills: 0 | Status: COMPLETED | OUTPATIENT
Start: 2019-01-01 | End: 2019-01-01

## 2019-01-01 RX ORDER — HYDROMORPHONE HYDROCHLORIDE 2 MG/ML
8 INJECTION INTRAMUSCULAR; INTRAVENOUS; SUBCUTANEOUS EVERY 4 HOURS
Refills: 0 | Status: DISCONTINUED | OUTPATIENT
Start: 2019-01-01 | End: 2019-01-01

## 2019-01-01 RX ORDER — HYDROMORPHONE HYDROCHLORIDE 2 MG/ML
12 INJECTION INTRAMUSCULAR; INTRAVENOUS; SUBCUTANEOUS ONCE
Refills: 0 | Status: DISCONTINUED | OUTPATIENT
Start: 2019-01-01 | End: 2019-01-01

## 2019-01-01 RX ORDER — HYDROMORPHONE HYDROCHLORIDE 2 MG/ML
3 INJECTION INTRAMUSCULAR; INTRAVENOUS; SUBCUTANEOUS
Refills: 0 | Status: DISCONTINUED | OUTPATIENT
Start: 2019-01-01 | End: 2019-01-01

## 2019-01-01 RX ORDER — NYSTATIN CREAM 100000 [USP'U]/G
1 CREAM TOPICAL
Qty: 15 | Refills: 0 | OUTPATIENT
Start: 2019-01-01 | End: 2019-01-01

## 2019-01-01 RX ORDER — METHADONE HYDROCHLORIDE 40 MG/1
15 TABLET ORAL EVERY 8 HOURS
Qty: 0 | Refills: 0 | Status: DISCONTINUED | OUTPATIENT
Start: 2019-01-01 | End: 2019-01-01

## 2019-01-01 RX ORDER — ACETAMINOPHEN 500 MG
650 TABLET ORAL EVERY 6 HOURS
Refills: 0 | Status: DISCONTINUED | OUTPATIENT
Start: 2019-01-01 | End: 2019-01-01

## 2019-01-01 RX ORDER — VENLAFAXINE HCL 75 MG
50 CAPSULE, EXT RELEASE 24 HR ORAL DAILY
Qty: 0 | Refills: 0 | Status: DISCONTINUED | OUTPATIENT
Start: 2019-01-01 | End: 2019-01-01

## 2019-01-01 RX ORDER — HYDROMORPHONE HYDROCHLORIDE 2 MG/ML
30 INJECTION INTRAMUSCULAR; INTRAVENOUS; SUBCUTANEOUS
Refills: 0 | Status: DISCONTINUED | OUTPATIENT
Start: 2019-01-01 | End: 2019-01-01

## 2019-01-01 RX ORDER — HYDROMORPHONE HYDROCHLORIDE 2 MG/ML
1 INJECTION INTRAMUSCULAR; INTRAVENOUS; SUBCUTANEOUS ONCE
Refills: 0 | Status: DISCONTINUED | OUTPATIENT
Start: 2019-01-01 | End: 2019-01-01

## 2019-01-01 RX ORDER — HYDROMORPHONE HYDROCHLORIDE 2 MG/ML
8 INJECTION INTRAMUSCULAR; INTRAVENOUS; SUBCUTANEOUS
Refills: 0 | Status: DISCONTINUED | OUTPATIENT
Start: 2019-01-01 | End: 2019-01-01

## 2019-01-01 RX ORDER — METHADONE HYDROCHLORIDE 40 MG/1
1 TABLET ORAL
Qty: 0 | Refills: 0 | COMMUNITY

## 2019-01-01 RX ORDER — LACTULOSE 10 G/15ML
10 SOLUTION ORAL EVERY 6 HOURS
Refills: 0 | Status: DISCONTINUED | OUTPATIENT
Start: 2019-01-01 | End: 2019-01-01

## 2019-01-01 RX ORDER — LACTULOSE 10 G/15ML
10 SOLUTION ORAL EVERY 4 HOURS
Refills: 0 | Status: DISCONTINUED | OUTPATIENT
Start: 2019-01-01 | End: 2019-01-01

## 2019-01-01 RX ORDER — METHADONE HYDROCHLORIDE 40 MG/1
2 TABLET ORAL
Qty: 42 | Refills: 0
Start: 2019-01-01 | End: 2019-01-01

## 2019-01-01 RX ORDER — DEXAMETHASONE 0.5 MG/5ML
1 ELIXIR ORAL
Qty: 24 | Refills: 0 | OUTPATIENT
Start: 2019-01-01 | End: 2019-01-01

## 2019-01-01 RX ORDER — HYDROMORPHONE HYDROCHLORIDE 2 MG/ML
2 INJECTION INTRAMUSCULAR; INTRAVENOUS; SUBCUTANEOUS ONCE
Refills: 0 | Status: DISCONTINUED | OUTPATIENT
Start: 2019-01-01 | End: 2019-01-01

## 2019-01-01 RX ORDER — ONDANSETRON 8 MG/1
4 TABLET, FILM COATED ORAL ONCE
Refills: 0 | Status: COMPLETED | OUTPATIENT
Start: 2019-01-01 | End: 2019-01-01

## 2019-01-01 RX ORDER — NYSTATIN 100000 1/G
100000 POWDER TOPICAL 3 TIMES DAILY
Qty: 1 | Refills: 2 | Status: DISCONTINUED | COMMUNITY
Start: 2019-02-14 | End: 2019-01-01

## 2019-01-01 RX ORDER — SODIUM CHLORIDE 9 MG/ML
1000 INJECTION INTRAMUSCULAR; INTRAVENOUS; SUBCUTANEOUS ONCE
Refills: 0 | Status: COMPLETED | OUTPATIENT
Start: 2019-01-01 | End: 2019-01-01

## 2019-01-01 RX ORDER — LIDOCAINE 4 G/100G
1 CREAM TOPICAL ONCE
Refills: 0 | Status: COMPLETED | OUTPATIENT
Start: 2019-01-01 | End: 2019-01-01

## 2019-01-01 RX ORDER — MORPHINE SULFATE 50 MG/1
6 CAPSULE, EXTENDED RELEASE ORAL ONCE
Qty: 0 | Refills: 0 | Status: DISCONTINUED | OUTPATIENT
Start: 2019-01-01 | End: 2019-01-01

## 2019-01-01 RX ORDER — MIDODRINE HYDROCHLORIDE 2.5 MG/1
1 TABLET ORAL
Qty: 90 | Refills: 0
Start: 2019-01-01 | End: 2019-01-01

## 2019-01-01 RX ORDER — SODIUM CHLORIDE 9 MG/ML
500 INJECTION INTRAMUSCULAR; INTRAVENOUS; SUBCUTANEOUS ONCE
Qty: 0 | Refills: 0 | Status: COMPLETED | OUTPATIENT
Start: 2019-01-01 | End: 2019-01-01

## 2019-01-01 RX ORDER — CHLORHEXIDINE GLUCONATE 4 %
1000 LIQUID (ML) TOPICAL
Refills: 0 | Status: ACTIVE | COMMUNITY

## 2019-01-01 RX ORDER — METHADONE HYDROCHLORIDE 40 MG/1
3 TABLET ORAL
Qty: 27 | Refills: 0 | OUTPATIENT
Start: 2019-01-01 | End: 2019-01-01

## 2019-01-01 RX ORDER — PROMETHAZINE HYDROCHLORIDE AND DEXTROMETHORPHAN HYDROBROMIDE ORAL SOLUTION 15; 6.25 MG/5ML; MG/5ML
6.25-15 SOLUTION ORAL
Qty: 240 | Refills: 0 | Status: DISCONTINUED | COMMUNITY
Start: 2017-08-29 | End: 2019-01-01

## 2019-01-01 RX ORDER — HYDROMORPHONE HYDROCHLORIDE 2 MG/ML
2 INJECTION INTRAMUSCULAR; INTRAVENOUS; SUBCUTANEOUS EVERY 4 HOURS
Qty: 0 | Refills: 0 | Status: DISCONTINUED | OUTPATIENT
Start: 2019-01-01 | End: 2019-01-01

## 2019-01-01 RX ORDER — MIRTAZAPINE 45 MG/1
1 TABLET, ORALLY DISINTEGRATING ORAL
Qty: 0 | Refills: 0 | DISCHARGE

## 2019-01-01 RX ORDER — DIPHENHYDRAMINE HCL 50 MG
25 CAPSULE ORAL ONCE
Refills: 0 | Status: DISCONTINUED | OUTPATIENT
Start: 2019-01-01 | End: 2019-01-01

## 2019-01-01 RX ORDER — PANTOPRAZOLE 40 MG/1
40 TABLET, DELAYED RELEASE ORAL DAILY
Qty: 30 | Refills: 2 | Status: ACTIVE | COMMUNITY
Start: 2019-01-01 | End: 1900-01-01

## 2019-01-01 RX ORDER — SODIUM CHLORIDE 9 MG/ML
1400 INJECTION, SOLUTION INTRAVENOUS ONCE
Refills: 0 | Status: DISCONTINUED | OUTPATIENT
Start: 2019-01-01 | End: 2019-01-01

## 2019-01-01 RX ORDER — NYSTATIN 500MM UNIT
500000 POWDER (EA) MISCELLANEOUS
Refills: 0 | Status: DISCONTINUED | OUTPATIENT
Start: 2019-01-01 | End: 2019-01-01

## 2019-01-01 RX ORDER — FUROSEMIDE 20 MG/1
20 TABLET ORAL
Qty: 90 | Refills: 0 | Status: ACTIVE | COMMUNITY
Start: 2019-01-01 | End: 1900-01-01

## 2019-01-01 RX ORDER — ACETAMINOPHEN 500 MG
650 TABLET ORAL EVERY 6 HOURS
Qty: 0 | Refills: 0 | Status: DISCONTINUED | OUTPATIENT
Start: 2019-01-01 | End: 2019-01-01

## 2019-01-01 RX ORDER — ENOXAPARIN SODIUM 100 MG/ML
60 INJECTION SUBCUTANEOUS TWICE DAILY
Qty: 6 | Refills: 0 | Status: ACTIVE | COMMUNITY
Start: 2019-01-01 | End: 1900-01-01

## 2019-01-01 RX ORDER — RANITIDINE 150 MG/1
150 TABLET ORAL
Qty: 30 | Refills: 1 | Status: DISCONTINUED | COMMUNITY
Start: 2018-11-08 | End: 2019-01-01

## 2019-01-01 RX ORDER — SODIUM CHLORIDE 9 MG/ML
1400 INJECTION INTRAMUSCULAR; INTRAVENOUS; SUBCUTANEOUS ONCE
Refills: 0 | Status: COMPLETED | OUTPATIENT
Start: 2019-01-01 | End: 2019-01-01

## 2019-01-01 RX ORDER — METOCLOPRAMIDE HCL 10 MG
10 TABLET ORAL THREE TIMES A DAY
Refills: 0 | Status: DISCONTINUED | OUTPATIENT
Start: 2019-01-01 | End: 2019-01-01

## 2019-01-01 RX ORDER — FOLIC ACID 1 MG/1
1 TABLET ORAL
Qty: 90 | Refills: 0 | Status: ACTIVE | COMMUNITY
Start: 2019-01-01 | End: 1900-01-01

## 2019-01-01 RX ORDER — DEXAMETHASONE 2 MG/1
2 TABLET ORAL
Qty: 60 | Refills: 0 | Status: DISCONTINUED | COMMUNITY
Start: 2018-12-18 | End: 2019-01-01

## 2019-01-01 RX ORDER — PANTOPRAZOLE SODIUM 20 MG/1
40 TABLET, DELAYED RELEASE ORAL
Qty: 0 | Refills: 0 | Status: DISCONTINUED | OUTPATIENT
Start: 2019-01-01 | End: 2019-01-01

## 2019-01-01 RX ORDER — HYDROMORPHONE HYDROCHLORIDE 2 MG/ML
1 INJECTION INTRAMUSCULAR; INTRAVENOUS; SUBCUTANEOUS EVERY 4 HOURS
Qty: 0 | Refills: 0 | Status: DISCONTINUED | OUTPATIENT
Start: 2019-01-01 | End: 2019-01-01

## 2019-01-01 RX ORDER — CHOLECALCIFEROL (VITAMIN D3) 125 MCG
400 CAPSULE ORAL DAILY
Refills: 0 | Status: DISCONTINUED | OUTPATIENT
Start: 2019-01-01 | End: 2019-01-01

## 2019-01-01 RX ORDER — TOPIRAMATE 25 MG/1
25 TABLET, COATED ORAL
Refills: 0 | Status: DISCONTINUED | COMMUNITY
End: 2019-01-01

## 2019-01-01 RX ORDER — POTASSIUM CHLORIDE 20 MEQ
20 PACKET (EA) ORAL
Refills: 0 | Status: COMPLETED | OUTPATIENT
Start: 2019-01-01 | End: 2019-01-01

## 2019-01-01 RX ORDER — MORPHINE SULFATE 50 MG/1
8 CAPSULE, EXTENDED RELEASE ORAL ONCE
Qty: 0 | Refills: 0 | Status: DISCONTINUED | OUTPATIENT
Start: 2019-01-01 | End: 2019-01-01

## 2019-01-01 RX ORDER — CHOLECALCIFEROL (VITAMIN D3) 125 MCG
400 CAPSULE ORAL DAILY
Qty: 0 | Refills: 0 | Status: DISCONTINUED | OUTPATIENT
Start: 2019-01-01 | End: 2019-01-01

## 2019-01-01 RX ORDER — LIDOCAINE 4 G/100G
1 CREAM TOPICAL ONCE
Qty: 0 | Refills: 0 | Status: COMPLETED | OUTPATIENT
Start: 2019-01-01 | End: 2019-01-01

## 2019-01-01 RX ORDER — LEVOFLOXACIN 500 MG/1
500 TABLET, FILM COATED ORAL DAILY
Qty: 5 | Refills: 0 | Status: DISCONTINUED | COMMUNITY
Start: 2019-01-01 | End: 2019-01-01

## 2019-01-01 RX ORDER — OMEPRAZOLE 40 MG/1
40 CAPSULE, DELAYED RELEASE ORAL
Qty: 90 | Refills: 0 | Status: DISCONTINUED | COMMUNITY
Start: 2017-01-06 | End: 2019-01-01

## 2019-01-01 RX ORDER — DEXAMETHASONE 0.5 MG/5ML
4 ELIXIR ORAL EVERY 6 HOURS
Qty: 0 | Refills: 0 | Status: DISCONTINUED | OUTPATIENT
Start: 2019-01-01 | End: 2019-01-01

## 2019-01-01 RX ORDER — HYDROMORPHONE HYDROCHLORIDE 2 MG/ML
2 INJECTION INTRAMUSCULAR; INTRAVENOUS; SUBCUTANEOUS
Refills: 0 | Status: DISCONTINUED | OUTPATIENT
Start: 2019-01-01 | End: 2019-01-01

## 2019-01-01 RX ORDER — ENOXAPARIN SODIUM 100 MG/ML
40 INJECTION SUBCUTANEOUS EVERY 24 HOURS
Refills: 0 | Status: DISCONTINUED | OUTPATIENT
Start: 2019-01-01 | End: 2019-01-01

## 2019-01-01 RX ORDER — METHADONE HYDROCHLORIDE 40 MG/1
2.5 TABLET ORAL
Qty: 0 | Refills: 0 | DISCHARGE
Start: 2019-01-01 | End: 2019-01-01

## 2019-01-01 RX ORDER — CEFEPIME 1 G/1
2000 INJECTION, POWDER, FOR SOLUTION INTRAMUSCULAR; INTRAVENOUS ONCE
Qty: 0 | Refills: 0 | Status: COMPLETED | OUTPATIENT
Start: 2019-01-01 | End: 2019-01-01

## 2019-01-01 RX ORDER — ACETAMINOPHEN 500 MG
1000 TABLET ORAL ONCE
Qty: 0 | Refills: 0 | Status: COMPLETED | OUTPATIENT
Start: 2019-01-01 | End: 2019-01-01

## 2019-01-01 RX ORDER — MORPHINE SULFATE 50 MG/1
1.5 CAPSULE, EXTENDED RELEASE ORAL
Qty: 0 | Refills: 0 | DISCHARGE

## 2019-01-01 RX ORDER — NYSTATIN CREAM 100000 [USP'U]/G
1 CREAM TOPICAL
Qty: 15 | Refills: 0
Start: 2019-01-01 | End: 2019-01-01

## 2019-01-01 RX ORDER — MORPHINE SULFATE 50 MG/1
1 CAPSULE, EXTENDED RELEASE ORAL
Qty: 30 | Refills: 0 | OUTPATIENT
Start: 2019-01-01 | End: 2019-01-01

## 2019-01-01 RX ORDER — HYDROMORPHONE HYDROCHLORIDE 10 MG/ML
10 INJECTION INTRAMUSCULAR; INTRAVENOUS; SUBCUTANEOUS
Refills: 0 | Status: ACTIVE | COMMUNITY

## 2019-01-01 RX ORDER — DIBUCAINE 1 %
1 OINTMENT (GRAM) RECTAL THREE TIMES A DAY
Refills: 0 | Status: DISCONTINUED | OUTPATIENT
Start: 2019-01-01 | End: 2019-01-01

## 2019-01-01 RX ORDER — PANTOPRAZOLE 40 MG/1
40 TABLET, DELAYED RELEASE ORAL
Refills: 0 | Status: DISCONTINUED | COMMUNITY
End: 2019-01-01

## 2019-01-01 RX ORDER — METHADONE HYDROCHLORIDE 40 MG/1
3 TABLET ORAL
Qty: 45 | Refills: 0 | OUTPATIENT
Start: 2019-01-01 | End: 2019-01-01

## 2019-01-01 RX ORDER — LACTULOSE 10 G/15ML
15 SOLUTION ORAL
Qty: 0 | Refills: 0 | DISCHARGE
Start: 2019-01-01

## 2019-01-01 RX ORDER — SODIUM CHLORIDE 9 MG/ML
2000 INJECTION INTRAMUSCULAR; INTRAVENOUS; SUBCUTANEOUS ONCE
Qty: 0 | Refills: 0 | Status: COMPLETED | OUTPATIENT
Start: 2019-01-01 | End: 2019-01-01

## 2019-01-01 RX ORDER — MIRTAZAPINE 45 MG/1
15 TABLET, ORALLY DISINTEGRATING ORAL AT BEDTIME
Refills: 0 | Status: DISCONTINUED | OUTPATIENT
Start: 2019-01-01 | End: 2019-01-01

## 2019-01-01 RX ORDER — MORPHINE SULFATE 50 MG/1
30 CAPSULE, EXTENDED RELEASE ORAL EVERY 4 HOURS
Qty: 0 | Refills: 0 | Status: DISCONTINUED | OUTPATIENT
Start: 2019-01-01 | End: 2019-01-01

## 2019-01-01 RX ORDER — PHENYLEPHRINE-SHARK LIVER OIL-MINERAL OIL-PETROLATUM RECTAL OINTMENT
1 OINTMENT (GRAM) RECTAL THREE TIMES A DAY
Refills: 0 | Status: DISCONTINUED | OUTPATIENT
Start: 2019-01-01 | End: 2019-01-01

## 2019-01-01 RX ORDER — METHADONE HYDROCHLORIDE 40 MG/1
20 TABLET ORAL EVERY 8 HOURS
Refills: 0 | Status: DISCONTINUED | OUTPATIENT
Start: 2019-01-01 | End: 2019-01-01

## 2019-01-01 RX ORDER — VANCOMYCIN HCL 1 G
1000 VIAL (EA) INTRAVENOUS ONCE
Qty: 0 | Refills: 0 | Status: COMPLETED | OUTPATIENT
Start: 2019-01-01 | End: 2019-01-01

## 2019-01-01 RX ORDER — LORAZEPAM 0.5 MG/1
0.5 TABLET ORAL 3 TIMES DAILY
Qty: 90 | Refills: 0 | Status: ACTIVE | COMMUNITY
Start: 2019-01-24 | End: 1900-01-01

## 2019-01-01 RX ORDER — HYDROMORPHONE HYDROCHLORIDE 2 MG/ML
2 INJECTION INTRAMUSCULAR; INTRAVENOUS; SUBCUTANEOUS
Qty: 84 | Refills: 0
Start: 2019-01-01 | End: 2019-01-01

## 2019-01-01 RX ORDER — LIDOCAINE 4 G/100G
1 CREAM TOPICAL
Qty: 0 | Refills: 0 | DISCHARGE

## 2019-01-01 RX ORDER — MORPHINE SULFATE 50 MG/1
8 CAPSULE, EXTENDED RELEASE ORAL
Qty: 0 | Refills: 0 | Status: DISCONTINUED | OUTPATIENT
Start: 2019-01-01 | End: 2019-01-01

## 2019-01-01 RX ORDER — HYDROMORPHONE HYDROCHLORIDE 2 MG/ML
1 INJECTION INTRAMUSCULAR; INTRAVENOUS; SUBCUTANEOUS
Qty: 5 | Refills: 0
Start: 2019-01-01

## 2019-01-01 RX ORDER — DEXAMETHASONE 0.5 MG/5ML
1 ELIXIR ORAL
Qty: 24 | Refills: 0
Start: 2019-01-01 | End: 2019-01-01

## 2019-01-01 RX ORDER — NALOXEGOL OXALATE 25 MG/1
25 TABLET, FILM COATED ORAL DAILY
Qty: 30 | Refills: 3 | Status: ACTIVE | COMMUNITY
Start: 2019-01-01 | End: 1900-01-01

## 2019-01-01 RX ORDER — PIPERACILLIN AND TAZOBACTAM 4; .5 G/20ML; G/20ML
3.38 INJECTION, POWDER, LYOPHILIZED, FOR SOLUTION INTRAVENOUS ONCE
Refills: 0 | Status: COMPLETED | OUTPATIENT
Start: 2019-01-01 | End: 2019-01-01

## 2019-01-01 RX ORDER — DEXAMETHASONE 0.5 MG/5ML
4 ELIXIR ORAL ONCE
Qty: 0 | Refills: 0 | Status: COMPLETED | OUTPATIENT
Start: 2019-01-01 | End: 2019-01-01

## 2019-01-01 RX ORDER — ACETAMINOPHEN 500 MG
650 TABLET ORAL ONCE
Refills: 0 | Status: COMPLETED | OUTPATIENT
Start: 2019-01-01 | End: 2019-01-01

## 2019-01-01 RX ORDER — HYDROMORPHONE HYDROCHLORIDE 2 MG/ML
16 INJECTION INTRAMUSCULAR; INTRAVENOUS; SUBCUTANEOUS EVERY 4 HOURS
Refills: 0 | Status: DISCONTINUED | OUTPATIENT
Start: 2019-01-01 | End: 2019-01-01

## 2019-01-01 RX ORDER — MORPHINE SULFATE 50 MG/1
1 CAPSULE, EXTENDED RELEASE ORAL
Qty: 30 | Refills: 0
Start: 2019-01-01 | End: 2019-01-01

## 2019-01-01 RX ORDER — METHADONE HYDROCHLORIDE 40 MG/1
10 TABLET ORAL ONCE
Refills: 0 | Status: DISCONTINUED | OUTPATIENT
Start: 2019-01-01 | End: 2019-01-01

## 2019-01-01 RX ORDER — NYSTATIN 500MM UNIT
5 POWDER (EA) MISCELLANEOUS
Qty: 75 | Refills: 0
Start: 2019-01-01 | End: 2019-01-01

## 2019-01-01 RX ORDER — ACETAMINOPHEN 500 MG
2 TABLET ORAL
Qty: 0 | Refills: 0 | DISCHARGE
Start: 2019-01-01

## 2019-01-01 RX ORDER — DIBUCAINE 1 G/100G
1 OINTMENT TOPICAL
Qty: 2 | Refills: 2 | Status: ACTIVE | COMMUNITY
Start: 2019-01-01 | End: 1900-01-01

## 2019-01-01 RX ORDER — SAW/PYGEUM/BETA/HERB/D3/B6/ZN 30 MG-25MG
CAPSULE ORAL
Refills: 0 | Status: ACTIVE | COMMUNITY

## 2019-01-01 RX ORDER — ENOXAPARIN SODIUM 100 MG/ML
40 INJECTION SUBCUTANEOUS EVERY 24 HOURS
Qty: 0 | Refills: 0 | Status: DISCONTINUED | OUTPATIENT
Start: 2019-01-01 | End: 2019-01-01

## 2019-01-01 RX ORDER — ENOXAPARIN SODIUM 100 MG/ML
40 INJECTION SUBCUTANEOUS DAILY
Refills: 0 | Status: DISCONTINUED | OUTPATIENT
Start: 2019-01-01 | End: 2019-01-01

## 2019-01-01 RX ORDER — HYDROMORPHONE HYDROCHLORIDE 2 MG/ML
0.5 INJECTION INTRAMUSCULAR; INTRAVENOUS; SUBCUTANEOUS ONCE
Qty: 0 | Refills: 0 | Status: DISCONTINUED | OUTPATIENT
Start: 2019-01-01 | End: 2019-01-01

## 2019-01-01 RX ORDER — SODIUM CHLORIDE 9 MG/ML
1000 INJECTION, SOLUTION INTRAVENOUS ONCE
Refills: 0 | Status: COMPLETED | OUTPATIENT
Start: 2019-01-01 | End: 2019-01-01

## 2019-01-01 RX ORDER — FERROUS SULFATE 325(65) MG
325 TABLET ORAL DAILY
Refills: 0 | Status: DISCONTINUED | OUTPATIENT
Start: 2019-01-01 | End: 2019-01-01

## 2019-01-01 RX ORDER — MORPHINE SULFATE 50 MG/1
45 CAPSULE, EXTENDED RELEASE ORAL EVERY 6 HOURS
Refills: 0 | Status: DISCONTINUED | OUTPATIENT
Start: 2019-01-01 | End: 2019-01-01

## 2019-01-01 RX ORDER — POLYETHYLENE GLYCOL 3350 17 G/17G
17 POWDER, FOR SOLUTION ORAL
Qty: 1 | Refills: 0
Start: 2019-01-01 | End: 2019-01-01

## 2019-01-01 RX ORDER — DIBUCAINE 1 %
1 OINTMENT (GRAM) RECTAL
Qty: 1 | Refills: 0
Start: 2019-01-01 | End: 2019-01-01

## 2019-01-01 RX ORDER — NITROFURANTOIN (MONOHYDRATE/MACROCRYSTALS) 25; 75 MG/1; MG/1
100 CAPSULE ORAL
Qty: 14 | Refills: 0 | Status: DISCONTINUED | COMMUNITY
Start: 2019-02-12 | End: 2019-01-01

## 2019-01-01 RX ORDER — POTASSIUM CHLORIDE 1500 MG/1
20 TABLET, FILM COATED, EXTENDED RELEASE ORAL DAILY
Qty: 30 | Refills: 0 | Status: ACTIVE | COMMUNITY
Start: 2019-01-01 | End: 1900-01-01

## 2019-01-01 RX ORDER — VANCOMYCIN HCL 1 G
1000 VIAL (EA) INTRAVENOUS ONCE
Refills: 0 | Status: COMPLETED | OUTPATIENT
Start: 2019-01-01 | End: 2019-01-01

## 2019-01-01 RX ORDER — CHLORHEXIDINE GLUCONATE 213 G/1000ML
1 SOLUTION TOPICAL
Qty: 0 | Refills: 0 | Status: DISCONTINUED | OUTPATIENT
Start: 2019-01-01 | End: 2019-01-01

## 2019-01-01 RX ORDER — ASCORBIC ACID 500 MG
TABLET ORAL
Refills: 0 | Status: ACTIVE | COMMUNITY

## 2019-01-01 RX ORDER — LACTULOSE 10 G/15ML
10 SOLUTION ORAL EVERY 6 HOURS
Qty: 0 | Refills: 0 | Status: DISCONTINUED | OUTPATIENT
Start: 2019-01-01 | End: 2019-01-01

## 2019-01-01 RX ORDER — LACTULOSE 10 G/15ML
15 SOLUTION ORAL
Qty: 650 | Refills: 0
Start: 2019-01-01 | End: 2019-01-01

## 2019-01-01 RX ORDER — MULTIVIT-MIN/FOLIC/VIT K/LYCOP 400-300MCG
25 MCG TABLET ORAL
Refills: 0 | Status: ACTIVE | COMMUNITY

## 2019-01-01 RX ORDER — MORPHINE SULFATE 50 MG/1
1 CAPSULE, EXTENDED RELEASE ORAL
Qty: 18 | Refills: 0 | OUTPATIENT
Start: 2019-01-01 | End: 2019-01-01

## 2019-01-01 RX ORDER — MORPHINE SULFATE 50 MG/1
45 CAPSULE, EXTENDED RELEASE ORAL EVERY 4 HOURS
Refills: 0 | Status: DISCONTINUED | OUTPATIENT
Start: 2019-01-01 | End: 2019-01-01

## 2019-01-01 RX ORDER — LIDOCAINE 4 G/100G
1 CREAM TOPICAL DAILY
Qty: 0 | Refills: 0 | Status: DISCONTINUED | OUTPATIENT
Start: 2019-01-01 | End: 2019-01-01

## 2019-01-01 RX ORDER — FAMOTIDINE 20 MG/1
20 TABLET, FILM COATED ORAL TWICE DAILY
Qty: 60 | Refills: 1 | Status: ACTIVE | COMMUNITY
Start: 2019-01-01 | End: 1900-01-01

## 2019-01-01 RX ORDER — METHADONE HYDROCHLORIDE 40 MG/1
10 TABLET ORAL THREE TIMES A DAY
Qty: 0 | Refills: 0 | Status: DISCONTINUED | OUTPATIENT
Start: 2019-01-01 | End: 2019-01-01

## 2019-01-01 RX ORDER — DRONABINOL 5 MG/1
5 CAPSULE ORAL
Qty: 60 | Refills: 0 | Status: DISCONTINUED | COMMUNITY
Start: 2019-01-01 | End: 2019-01-01

## 2019-01-01 RX ORDER — MORPHINE SULFATE 30 MG/1
TABLET ORAL
Refills: 0 | Status: ACTIVE | COMMUNITY

## 2019-01-01 RX ORDER — SODIUM CHLORIDE 9 MG/ML
1000 INJECTION, SOLUTION INTRAVENOUS
Refills: 0 | Status: DISCONTINUED | OUTPATIENT
Start: 2019-01-01 | End: 2019-01-01

## 2019-01-01 RX ORDER — METHADONE HYDROCHLORIDE 5 MG/1
5 TABLET ORAL 3 TIMES DAILY
Qty: 90 | Refills: 0 | Status: ACTIVE | COMMUNITY
Start: 2019-01-01 | End: 1900-01-01

## 2019-01-01 RX ORDER — VENLAFAXINE 50 MG/1
50 TABLET ORAL
Qty: 30 | Refills: 3 | Status: ACTIVE | COMMUNITY
Start: 2019-01-15 | End: 1900-01-01

## 2019-01-01 RX ORDER — MIRTAZAPINE 45 MG/1
15 TABLET, ORALLY DISINTEGRATING ORAL AT BEDTIME
Qty: 0 | Refills: 0 | Status: DISCONTINUED | OUTPATIENT
Start: 2019-01-01 | End: 2019-01-01

## 2019-01-01 RX ORDER — NALOXONE HYDROCHLORIDE 4 MG/.1ML
0.1 SPRAY NASAL
Refills: 0 | Status: DISCONTINUED | OUTPATIENT
Start: 2019-01-01 | End: 2019-01-01

## 2019-01-01 RX ORDER — DICLOFENAC SODIUM 10 MG/G
1 GEL TOPICAL DAILY
Qty: 1 | Refills: 2 | Status: ACTIVE | COMMUNITY
Start: 2018-12-18 | End: 1900-01-01

## 2019-01-01 RX ORDER — METHADONE HYDROCHLORIDE 40 MG/1
25 TABLET ORAL EVERY 8 HOURS
Refills: 0 | Status: DISCONTINUED | OUTPATIENT
Start: 2019-01-01 | End: 2019-01-01

## 2019-01-01 RX ORDER — ACETAMINOPHEN 500 MG
650 TABLET ORAL ONCE
Qty: 0 | Refills: 0 | Status: DISCONTINUED | OUTPATIENT
Start: 2019-01-01 | End: 2019-01-01

## 2019-01-01 RX ORDER — ACETAMINOPHEN 500 MG
650 TABLET ORAL ONCE
Qty: 0 | Refills: 0 | Status: COMPLETED | OUTPATIENT
Start: 2019-01-01 | End: 2019-01-01

## 2019-01-01 RX ORDER — METHADONE HYDROCHLORIDE 10 MG/1
10 TABLET ORAL 3 TIMES DAILY
Qty: 105 | Refills: 0 | Status: ACTIVE | COMMUNITY
Start: 2018-07-30 | End: 1900-01-01

## 2019-01-01 RX ORDER — ACETAMINOPHEN 500 MG
975 TABLET ORAL ONCE
Refills: 0 | Status: COMPLETED | OUTPATIENT
Start: 2019-01-01 | End: 2019-01-01

## 2019-01-01 RX ORDER — METOCLOPRAMIDE 5 MG/1
5 TABLET ORAL 3 TIMES DAILY
Qty: 42 | Refills: 0 | Status: DISCONTINUED | COMMUNITY
Start: 2019-01-01 | End: 2019-01-01

## 2019-01-01 RX ORDER — ACETAMINOPHEN 325 MG/1
325 TABLET, FILM COATED ORAL
Refills: 0 | Status: DISCONTINUED | COMMUNITY
End: 2019-01-01

## 2019-01-01 RX ORDER — HYDROMORPHONE HYDROCHLORIDE 8 MG/1
8 TABLET ORAL
Qty: 160 | Refills: 0 | Status: DISCONTINUED | COMMUNITY
Start: 2018-12-11 | End: 2019-01-01

## 2019-01-01 RX ORDER — DIPHENHYDRAMINE HYDROCHLORIDE AND LIDOCAINE HYDROCHLORIDE AND ALUMINUM HYDROXIDE AND MAGNESIUM HYDRO
KIT
Qty: 1 | Refills: 2 | Status: ACTIVE | COMMUNITY
Start: 2018-12-18 | End: 1900-01-01

## 2019-01-01 RX ORDER — HYDROMORPHONE HYDROCHLORIDE 2 MG/ML
0.7 INJECTION INTRAMUSCULAR; INTRAVENOUS; SUBCUTANEOUS
Qty: 1 | Refills: 0
Start: 2019-01-01 | End: 2019-01-01

## 2019-01-01 RX ORDER — METOCLOPRAMIDE HCL 10 MG
0 TABLET ORAL
Qty: 0 | Refills: 0 | DISCHARGE

## 2019-01-01 RX ORDER — MIDODRINE HYDROCHLORIDE 2.5 MG/1
5 TABLET ORAL THREE TIMES A DAY
Refills: 0 | Status: DISCONTINUED | OUTPATIENT
Start: 2019-01-01 | End: 2019-01-01

## 2019-01-01 RX ORDER — FERROUS SULFATE 325(65) MG
325 TABLET ORAL
Refills: 0 | Status: ACTIVE | COMMUNITY

## 2019-01-01 RX ORDER — LACTULOSE 10 G/15ML
10 SOLUTION ORAL EVERY 6 HOURS
Qty: 1892 | Refills: 3 | Status: ACTIVE | COMMUNITY
Start: 2018-07-30 | End: 1900-01-01

## 2019-01-01 RX ORDER — ALBUTEROL SULFATE 90 UG/1
108 (90 BASE) INHALANT RESPIRATORY (INHALATION)
Qty: 1 | Refills: 0 | Status: ACTIVE | COMMUNITY
Start: 2019-01-01 | End: 1900-01-01

## 2019-01-01 RX ORDER — LIDOCAINE AND PRILOCAINE 25; 25 MG/G; MG/G
2.5-2.5 CREAM TOPICAL
Qty: 1 | Refills: 2 | Status: ACTIVE | COMMUNITY
Start: 2017-03-29 | End: 1900-01-01

## 2019-01-01 RX ORDER — MIRTAZAPINE 15 MG/1
15 TABLET, FILM COATED ORAL
Qty: 30 | Refills: 2 | Status: ACTIVE | COMMUNITY
Start: 2018-07-30 | End: 1900-01-01

## 2019-01-01 RX ORDER — PANTOPRAZOLE SODIUM 20 MG/1
40 TABLET, DELAYED RELEASE ORAL
Refills: 0 | Status: DISCONTINUED | OUTPATIENT
Start: 2019-01-01 | End: 2019-01-01

## 2019-01-01 RX ORDER — POLYETHYLENE GLYCOL 3350 17 G/17G
17 POWDER, FOR SOLUTION ORAL DAILY
Refills: 0 | Status: DISCONTINUED | OUTPATIENT
Start: 2019-01-01 | End: 2019-01-01

## 2019-01-01 RX ORDER — PANTOPRAZOLE SODIUM 20 MG/1
1 TABLET, DELAYED RELEASE ORAL
Qty: 30 | Refills: 0
Start: 2019-01-01 | End: 2019-01-01

## 2019-01-01 RX ORDER — LIDOCAINE 4 G/100G
2 CREAM TOPICAL DAILY
Refills: 0 | Status: DISCONTINUED | OUTPATIENT
Start: 2019-01-01 | End: 2019-01-01

## 2019-01-01 RX ORDER — METHADONE HYDROCHLORIDE 40 MG/1
3 TABLET ORAL
Qty: 45 | Refills: 0
Start: 2019-01-01 | End: 2019-01-01

## 2019-01-01 RX ORDER — VENLAFAXINE HCL 75 MG
1 CAPSULE, EXT RELEASE 24 HR ORAL
Qty: 0 | Refills: 0 | DISCHARGE

## 2019-01-01 RX ORDER — DOCUSATE SODIUM 100 MG/1
CAPSULE ORAL
Refills: 0 | Status: DISCONTINUED | COMMUNITY
End: 2019-01-01

## 2019-01-01 RX ORDER — DEXAMETHASONE 0.5 MG/5ML
1 ELIXIR ORAL
Qty: 0 | Refills: 0 | COMMUNITY

## 2019-01-01 RX ORDER — ACETAMINOPHEN 500 MG
650 TABLET ORAL EVERY 4 HOURS
Refills: 0 | Status: DISCONTINUED | OUTPATIENT
Start: 2019-01-01 | End: 2019-01-01

## 2019-01-01 RX ADMIN — Medication 10 MILLIGRAM(S): at 14:01

## 2019-01-01 RX ADMIN — LACTULOSE 10 GRAM(S): 10 SOLUTION ORAL at 14:01

## 2019-01-01 RX ADMIN — HYDROMORPHONE HYDROCHLORIDE 30 MILLILITER(S): 2 INJECTION INTRAMUSCULAR; INTRAVENOUS; SUBCUTANEOUS at 08:26

## 2019-01-01 RX ADMIN — CHLORHEXIDINE GLUCONATE 1 APPLICATION(S): 213 SOLUTION TOPICAL at 09:29

## 2019-01-01 RX ADMIN — LACTULOSE 10 GRAM(S): 10 SOLUTION ORAL at 06:40

## 2019-01-01 RX ADMIN — HYDROMORPHONE HYDROCHLORIDE 16 MILLIGRAM(S): 2 INJECTION INTRAMUSCULAR; INTRAVENOUS; SUBCUTANEOUS at 12:16

## 2019-01-01 RX ADMIN — NYSTATIN CREAM 1 APPLICATION(S): 100000 CREAM TOPICAL at 17:13

## 2019-01-01 RX ADMIN — HYDROMORPHONE HYDROCHLORIDE 12 MILLIGRAM(S): 2 INJECTION INTRAMUSCULAR; INTRAVENOUS; SUBCUTANEOUS at 07:30

## 2019-01-01 RX ADMIN — Medication 50 MILLIGRAM(S): at 13:03

## 2019-01-01 RX ADMIN — METHADONE HYDROCHLORIDE 25 MILLIGRAM(S): 40 TABLET ORAL at 14:40

## 2019-01-01 RX ADMIN — METHADONE HYDROCHLORIDE 10 MILLIGRAM(S): 40 TABLET ORAL at 06:22

## 2019-01-01 RX ADMIN — Medication 4 MILLIGRAM(S): at 17:47

## 2019-01-01 RX ADMIN — Medication 250 MILLIGRAM(S): at 05:15

## 2019-01-01 RX ADMIN — LIDOCAINE 1 PATCH: 4 CREAM TOPICAL at 22:24

## 2019-01-01 RX ADMIN — METHADONE HYDROCHLORIDE 20 MILLIGRAM(S): 40 TABLET ORAL at 14:07

## 2019-01-01 RX ADMIN — LACTULOSE 10 GRAM(S): 10 SOLUTION ORAL at 12:16

## 2019-01-01 RX ADMIN — Medication 400 UNIT(S): at 12:14

## 2019-01-01 RX ADMIN — Medication 650 MILLIGRAM(S): at 07:43

## 2019-01-01 RX ADMIN — MIDODRINE HYDROCHLORIDE 5 MILLIGRAM(S): 2.5 TABLET ORAL at 12:01

## 2019-01-01 RX ADMIN — HYDROMORPHONE HYDROCHLORIDE 12 MILLIGRAM(S): 2 INJECTION INTRAMUSCULAR; INTRAVENOUS; SUBCUTANEOUS at 06:28

## 2019-01-01 RX ADMIN — Medication 500000 UNIT(S): at 05:38

## 2019-01-01 RX ADMIN — HYDROMORPHONE HYDROCHLORIDE 16 MILLIGRAM(S): 2 INJECTION INTRAMUSCULAR; INTRAVENOUS; SUBCUTANEOUS at 06:10

## 2019-01-01 RX ADMIN — PIPERACILLIN AND TAZOBACTAM 25 GRAM(S): 4; .5 INJECTION, POWDER, LYOPHILIZED, FOR SOLUTION INTRAVENOUS at 17:48

## 2019-01-01 RX ADMIN — LIDOCAINE 2 PATCH: 4 CREAM TOPICAL at 20:30

## 2019-01-01 RX ADMIN — Medication 400 MILLIGRAM(S): at 21:48

## 2019-01-01 RX ADMIN — METHADONE HYDROCHLORIDE 10 MILLIGRAM(S): 40 TABLET ORAL at 22:14

## 2019-01-01 RX ADMIN — Medication 50 MILLIGRAM(S): at 11:25

## 2019-01-01 RX ADMIN — Medication 400 UNIT(S): at 11:56

## 2019-01-01 RX ADMIN — LACTULOSE 10 GRAM(S): 10 SOLUTION ORAL at 13:21

## 2019-01-01 RX ADMIN — LIDOCAINE 1 PATCH: 4 CREAM TOPICAL at 00:30

## 2019-01-01 RX ADMIN — LIDOCAINE 2 PATCH: 4 CREAM TOPICAL at 00:00

## 2019-01-01 RX ADMIN — Medication 650 MILLIGRAM(S): at 11:39

## 2019-01-01 RX ADMIN — HYDROMORPHONE HYDROCHLORIDE 3 MILLIGRAM(S): 2 INJECTION INTRAMUSCULAR; INTRAVENOUS; SUBCUTANEOUS at 16:25

## 2019-01-01 RX ADMIN — Medication 0.5 MILLIGRAM(S): at 23:45

## 2019-01-01 RX ADMIN — Medication 500000 UNIT(S): at 06:44

## 2019-01-01 RX ADMIN — HYDROMORPHONE HYDROCHLORIDE 1 MILLIGRAM(S): 2 INJECTION INTRAMUSCULAR; INTRAVENOUS; SUBCUTANEOUS at 06:23

## 2019-01-01 RX ADMIN — Medication 0.5 MILLIGRAM(S): at 21:55

## 2019-01-01 RX ADMIN — HYDROMORPHONE HYDROCHLORIDE 12 MILLIGRAM(S): 2 INJECTION INTRAMUSCULAR; INTRAVENOUS; SUBCUTANEOUS at 09:45

## 2019-01-01 RX ADMIN — HYDROMORPHONE HYDROCHLORIDE 30 MILLILITER(S): 2 INJECTION INTRAMUSCULAR; INTRAVENOUS; SUBCUTANEOUS at 17:31

## 2019-01-01 RX ADMIN — LIDOCAINE 1 PATCH: 4 CREAM TOPICAL at 18:29

## 2019-01-01 RX ADMIN — MIRTAZAPINE 15 MILLIGRAM(S): 45 TABLET, ORALLY DISINTEGRATING ORAL at 21:01

## 2019-01-01 RX ADMIN — Medication 400 UNIT(S): at 12:13

## 2019-01-01 RX ADMIN — Medication 20 MILLIEQUIVALENT(S): at 08:38

## 2019-01-01 RX ADMIN — LACTULOSE 10 GRAM(S): 10 SOLUTION ORAL at 09:57

## 2019-01-01 RX ADMIN — METHADONE HYDROCHLORIDE 20 MILLIGRAM(S): 40 TABLET ORAL at 07:45

## 2019-01-01 RX ADMIN — HYDROMORPHONE HYDROCHLORIDE 2 MILLIGRAM(S): 2 INJECTION INTRAMUSCULAR; INTRAVENOUS; SUBCUTANEOUS at 21:23

## 2019-01-01 RX ADMIN — POLYETHYLENE GLYCOL 3350 17 GRAM(S): 17 POWDER, FOR SOLUTION ORAL at 11:25

## 2019-01-01 RX ADMIN — Medication 325 MILLIGRAM(S): at 12:38

## 2019-01-01 RX ADMIN — HYDROMORPHONE HYDROCHLORIDE 12 MILLIGRAM(S): 2 INJECTION INTRAMUSCULAR; INTRAVENOUS; SUBCUTANEOUS at 13:13

## 2019-01-01 RX ADMIN — Medication 1 ENEMA: at 21:22

## 2019-01-01 RX ADMIN — Medication 500000 UNIT(S): at 18:47

## 2019-01-01 RX ADMIN — METHADONE HYDROCHLORIDE 15 MILLIGRAM(S): 40 TABLET ORAL at 06:49

## 2019-01-01 RX ADMIN — MORPHINE SULFATE 8 MILLIGRAM(S): 50 CAPSULE, EXTENDED RELEASE ORAL at 01:46

## 2019-01-01 RX ADMIN — Medication 40 MILLIEQUIVALENT(S): at 10:53

## 2019-01-01 RX ADMIN — Medication 50 MILLIGRAM(S): at 12:16

## 2019-01-01 RX ADMIN — METHADONE HYDROCHLORIDE 15 MILLIGRAM(S): 40 TABLET ORAL at 14:02

## 2019-01-01 RX ADMIN — METHADONE HYDROCHLORIDE 25 MILLIGRAM(S): 40 TABLET ORAL at 23:30

## 2019-01-01 RX ADMIN — LACTULOSE 10 GRAM(S): 10 SOLUTION ORAL at 23:20

## 2019-01-01 RX ADMIN — Medication 10 MILLIGRAM(S): at 06:41

## 2019-01-01 RX ADMIN — LIDOCAINE 1 PATCH: 4 CREAM TOPICAL at 13:03

## 2019-01-01 RX ADMIN — Medication 500000 UNIT(S): at 18:13

## 2019-01-01 RX ADMIN — MIDODRINE HYDROCHLORIDE 5 MILLIGRAM(S): 2.5 TABLET ORAL at 06:35

## 2019-01-01 RX ADMIN — HYDROMORPHONE HYDROCHLORIDE 12 MILLIGRAM(S): 2 INJECTION INTRAMUSCULAR; INTRAVENOUS; SUBCUTANEOUS at 19:15

## 2019-01-01 RX ADMIN — MIDODRINE HYDROCHLORIDE 5 MILLIGRAM(S): 2.5 TABLET ORAL at 17:48

## 2019-01-01 RX ADMIN — HYDROMORPHONE HYDROCHLORIDE 1 MILLIGRAM(S): 2 INJECTION INTRAMUSCULAR; INTRAVENOUS; SUBCUTANEOUS at 17:53

## 2019-01-01 RX ADMIN — MIDODRINE HYDROCHLORIDE 5 MILLIGRAM(S): 2.5 TABLET ORAL at 12:13

## 2019-01-01 RX ADMIN — LACTULOSE 10 GRAM(S): 10 SOLUTION ORAL at 06:44

## 2019-01-01 RX ADMIN — PIPERACILLIN AND TAZOBACTAM 25 GRAM(S): 4; .5 INJECTION, POWDER, LYOPHILIZED, FOR SOLUTION INTRAVENOUS at 02:40

## 2019-01-01 RX ADMIN — Medication 400 UNIT(S): at 14:01

## 2019-01-01 RX ADMIN — Medication 50 MILLIGRAM(S): at 11:50

## 2019-01-01 RX ADMIN — METHADONE HYDROCHLORIDE 10 MILLIGRAM(S): 40 TABLET ORAL at 06:28

## 2019-01-01 RX ADMIN — HYDROMORPHONE HYDROCHLORIDE 2 MILLIGRAM(S): 2 INJECTION INTRAMUSCULAR; INTRAVENOUS; SUBCUTANEOUS at 22:52

## 2019-01-01 RX ADMIN — Medication 10 MILLIGRAM(S): at 14:39

## 2019-01-01 RX ADMIN — SODIUM CHLORIDE 1000 MILLILITER(S): 9 INJECTION INTRAMUSCULAR; INTRAVENOUS; SUBCUTANEOUS at 18:01

## 2019-01-01 RX ADMIN — METHADONE HYDROCHLORIDE 20 MILLIGRAM(S): 40 TABLET ORAL at 06:07

## 2019-01-01 RX ADMIN — LACTULOSE 10 GRAM(S): 10 SOLUTION ORAL at 15:15

## 2019-01-01 RX ADMIN — SODIUM CHLORIDE 1000 MILLILITER(S): 9 INJECTION INTRAMUSCULAR; INTRAVENOUS; SUBCUTANEOUS at 11:00

## 2019-01-01 RX ADMIN — Medication 0.5 MILLIGRAM(S): at 06:22

## 2019-01-01 RX ADMIN — LIDOCAINE 1 PATCH: 4 CREAM TOPICAL at 20:00

## 2019-01-01 RX ADMIN — HYDROMORPHONE HYDROCHLORIDE 16 MILLIGRAM(S): 2 INJECTION INTRAMUSCULAR; INTRAVENOUS; SUBCUTANEOUS at 21:20

## 2019-01-01 RX ADMIN — LIDOCAINE 2 PATCH: 4 CREAM TOPICAL at 14:00

## 2019-01-01 RX ADMIN — Medication 500000 UNIT(S): at 07:45

## 2019-01-01 RX ADMIN — Medication 1000 MILLIGRAM(S): at 22:03

## 2019-01-01 RX ADMIN — PIPERACILLIN AND TAZOBACTAM 25 GRAM(S): 4; .5 INJECTION, POWDER, LYOPHILIZED, FOR SOLUTION INTRAVENOUS at 08:28

## 2019-01-01 RX ADMIN — Medication 250 MILLIGRAM(S): at 07:38

## 2019-01-01 RX ADMIN — CEFEPIME 2000 MILLIGRAM(S): 1 INJECTION, POWDER, FOR SOLUTION INTRAMUSCULAR; INTRAVENOUS at 11:15

## 2019-01-01 RX ADMIN — LACTULOSE 10 GRAM(S): 10 SOLUTION ORAL at 18:13

## 2019-01-01 RX ADMIN — MIRTAZAPINE 15 MILLIGRAM(S): 45 TABLET, ORALLY DISINTEGRATING ORAL at 22:30

## 2019-01-01 RX ADMIN — HYDROMORPHONE HYDROCHLORIDE 16 MILLIGRAM(S): 2 INJECTION INTRAMUSCULAR; INTRAVENOUS; SUBCUTANEOUS at 12:28

## 2019-01-01 RX ADMIN — HYDROMORPHONE HYDROCHLORIDE 2 MILLIGRAM(S): 2 INJECTION INTRAMUSCULAR; INTRAVENOUS; SUBCUTANEOUS at 14:18

## 2019-01-01 RX ADMIN — HYDROMORPHONE HYDROCHLORIDE 12 MILLIGRAM(S): 2 INJECTION INTRAMUSCULAR; INTRAVENOUS; SUBCUTANEOUS at 02:29

## 2019-01-01 RX ADMIN — HYDROMORPHONE HYDROCHLORIDE 16 MILLIGRAM(S): 2 INJECTION INTRAMUSCULAR; INTRAVENOUS; SUBCUTANEOUS at 17:17

## 2019-01-01 RX ADMIN — MIDODRINE HYDROCHLORIDE 5 MILLIGRAM(S): 2.5 TABLET ORAL at 06:41

## 2019-01-01 RX ADMIN — Medication 4 MILLIGRAM(S): at 23:27

## 2019-01-01 RX ADMIN — MIRTAZAPINE 15 MILLIGRAM(S): 45 TABLET, ORALLY DISINTEGRATING ORAL at 22:25

## 2019-01-01 RX ADMIN — MIRTAZAPINE 15 MILLIGRAM(S): 45 TABLET, ORALLY DISINTEGRATING ORAL at 21:28

## 2019-01-01 RX ADMIN — Medication 250 MILLIGRAM(S): at 06:24

## 2019-01-01 RX ADMIN — CEFEPIME 100 MILLIGRAM(S): 1 INJECTION, POWDER, FOR SOLUTION INTRAMUSCULAR; INTRAVENOUS at 00:07

## 2019-01-01 RX ADMIN — Medication 250 MILLIGRAM(S): at 17:33

## 2019-01-01 RX ADMIN — Medication 4 MILLIGRAM(S): at 13:03

## 2019-01-01 RX ADMIN — Medication 250 MILLIGRAM(S): at 17:08

## 2019-01-01 RX ADMIN — Medication 400 UNIT(S): at 12:16

## 2019-01-01 RX ADMIN — METHADONE HYDROCHLORIDE 25 MILLIGRAM(S): 40 TABLET ORAL at 06:25

## 2019-01-01 RX ADMIN — LIDOCAINE 2 PATCH: 4 CREAM TOPICAL at 12:13

## 2019-01-01 RX ADMIN — HYDROMORPHONE HYDROCHLORIDE 2 MILLIGRAM(S): 2 INJECTION INTRAMUSCULAR; INTRAVENOUS; SUBCUTANEOUS at 23:42

## 2019-01-01 RX ADMIN — LIDOCAINE 1 PATCH: 4 CREAM TOPICAL at 23:13

## 2019-01-01 RX ADMIN — NYSTATIN CREAM 1 APPLICATION(S): 100000 CREAM TOPICAL at 18:55

## 2019-01-01 RX ADMIN — MIRTAZAPINE 15 MILLIGRAM(S): 45 TABLET, ORALLY DISINTEGRATING ORAL at 23:20

## 2019-01-01 RX ADMIN — LACTULOSE 10 GRAM(S): 10 SOLUTION ORAL at 07:44

## 2019-01-01 RX ADMIN — MIRTAZAPINE 15 MILLIGRAM(S): 45 TABLET, ORALLY DISINTEGRATING ORAL at 22:49

## 2019-01-01 RX ADMIN — HYDROMORPHONE HYDROCHLORIDE 30 MILLILITER(S): 2 INJECTION INTRAMUSCULAR; INTRAVENOUS; SUBCUTANEOUS at 07:06

## 2019-01-01 RX ADMIN — HYDROMORPHONE HYDROCHLORIDE 2 MILLIGRAM(S): 2 INJECTION INTRAMUSCULAR; INTRAVENOUS; SUBCUTANEOUS at 10:26

## 2019-01-01 RX ADMIN — Medication 500000 UNIT(S): at 17:17

## 2019-01-01 RX ADMIN — METHADONE HYDROCHLORIDE 20 MILLIGRAM(S): 40 TABLET ORAL at 23:20

## 2019-01-01 RX ADMIN — LIDOCAINE 2 PATCH: 4 CREAM TOPICAL at 18:02

## 2019-01-01 RX ADMIN — LACTULOSE 10 GRAM(S): 10 SOLUTION ORAL at 23:55

## 2019-01-01 RX ADMIN — HYDROMORPHONE HYDROCHLORIDE 30 MILLILITER(S): 2 INJECTION INTRAMUSCULAR; INTRAVENOUS; SUBCUTANEOUS at 07:35

## 2019-01-01 RX ADMIN — ENOXAPARIN SODIUM 40 MILLIGRAM(S): 100 INJECTION SUBCUTANEOUS at 12:01

## 2019-01-01 RX ADMIN — HYDROMORPHONE HYDROCHLORIDE 2 MILLIGRAM(S): 2 INJECTION INTRAMUSCULAR; INTRAVENOUS; SUBCUTANEOUS at 23:22

## 2019-01-01 RX ADMIN — METHADONE HYDROCHLORIDE 25 MILLIGRAM(S): 40 TABLET ORAL at 05:02

## 2019-01-01 RX ADMIN — Medication 650 MILLIGRAM(S): at 23:05

## 2019-01-01 RX ADMIN — CHLORHEXIDINE GLUCONATE 1 APPLICATION(S): 213 SOLUTION TOPICAL at 09:57

## 2019-01-01 RX ADMIN — MIDODRINE HYDROCHLORIDE 5 MILLIGRAM(S): 2.5 TABLET ORAL at 18:55

## 2019-01-01 RX ADMIN — METHADONE HYDROCHLORIDE 25 MILLIGRAM(S): 40 TABLET ORAL at 23:56

## 2019-01-01 RX ADMIN — MORPHINE SULFATE 45 MILLIGRAM(S): 50 CAPSULE, EXTENDED RELEASE ORAL at 07:00

## 2019-01-01 RX ADMIN — Medication 500 MILLIGRAM(S): at 14:01

## 2019-01-01 RX ADMIN — LACTULOSE 10 GRAM(S): 10 SOLUTION ORAL at 14:48

## 2019-01-01 RX ADMIN — LACTULOSE 10 GRAM(S): 10 SOLUTION ORAL at 11:25

## 2019-01-01 RX ADMIN — HYDROMORPHONE HYDROCHLORIDE 1 MILLIGRAM(S): 2 INJECTION INTRAMUSCULAR; INTRAVENOUS; SUBCUTANEOUS at 06:57

## 2019-01-01 RX ADMIN — Medication 250 MILLIGRAM(S): at 17:48

## 2019-01-01 RX ADMIN — PANTOPRAZOLE SODIUM 40 MILLIGRAM(S): 20 TABLET, DELAYED RELEASE ORAL at 05:38

## 2019-01-01 RX ADMIN — Medication 4 MILLIGRAM(S): at 23:39

## 2019-01-01 RX ADMIN — Medication 500 MILLIGRAM(S): at 12:16

## 2019-01-01 RX ADMIN — HYDROMORPHONE HYDROCHLORIDE 3 MILLIGRAM(S): 2 INJECTION INTRAMUSCULAR; INTRAVENOUS; SUBCUTANEOUS at 09:05

## 2019-01-01 RX ADMIN — Medication 10 MILLIGRAM(S): at 23:56

## 2019-01-01 RX ADMIN — FENTANYL CITRATE 25 MICROGRAM(S): 50 INJECTION INTRAVENOUS at 17:37

## 2019-01-01 RX ADMIN — PANTOPRAZOLE SODIUM 40 MILLIGRAM(S): 20 TABLET, DELAYED RELEASE ORAL at 07:01

## 2019-01-01 RX ADMIN — Medication 4 MILLIGRAM(S): at 06:49

## 2019-01-01 RX ADMIN — PANTOPRAZOLE SODIUM 40 MILLIGRAM(S): 20 TABLET, DELAYED RELEASE ORAL at 07:13

## 2019-01-01 RX ADMIN — Medication 500000 UNIT(S): at 18:18

## 2019-01-01 RX ADMIN — METHADONE HYDROCHLORIDE 25 MILLIGRAM(S): 40 TABLET ORAL at 15:02

## 2019-01-01 RX ADMIN — MIDODRINE HYDROCHLORIDE 5 MILLIGRAM(S): 2.5 TABLET ORAL at 12:12

## 2019-01-01 RX ADMIN — Medication 250 MILLIGRAM(S): at 06:22

## 2019-01-01 RX ADMIN — Medication 400 UNIT(S): at 11:31

## 2019-01-01 RX ADMIN — SODIUM CHLORIDE 1000 MILLILITER(S): 9 INJECTION INTRAMUSCULAR; INTRAVENOUS; SUBCUTANEOUS at 15:19

## 2019-01-01 RX ADMIN — METHADONE HYDROCHLORIDE 20 MILLIGRAM(S): 40 TABLET ORAL at 23:31

## 2019-01-01 RX ADMIN — LIDOCAINE 1 PATCH: 4 CREAM TOPICAL at 19:28

## 2019-01-01 RX ADMIN — METHADONE HYDROCHLORIDE 15 MILLIGRAM(S): 40 TABLET ORAL at 17:41

## 2019-01-01 RX ADMIN — Medication 500000 UNIT(S): at 05:54

## 2019-01-01 RX ADMIN — METHADONE HYDROCHLORIDE 15 MILLIGRAM(S): 40 TABLET ORAL at 00:06

## 2019-01-01 RX ADMIN — METHADONE HYDROCHLORIDE 20 MILLIGRAM(S): 40 TABLET ORAL at 05:38

## 2019-01-01 RX ADMIN — Medication 50 MILLIGRAM(S): at 11:28

## 2019-01-01 RX ADMIN — Medication 400 UNIT(S): at 11:25

## 2019-01-01 RX ADMIN — Medication 650 MILLIGRAM(S): at 15:53

## 2019-01-01 RX ADMIN — SODIUM CHLORIDE 1000 MILLILITER(S): 9 INJECTION INTRAMUSCULAR; INTRAVENOUS; SUBCUTANEOUS at 06:02

## 2019-01-01 RX ADMIN — HYDROMORPHONE HYDROCHLORIDE 3 MILLIGRAM(S): 2 INJECTION INTRAMUSCULAR; INTRAVENOUS; SUBCUTANEOUS at 11:25

## 2019-01-01 RX ADMIN — MIDODRINE HYDROCHLORIDE 5 MILLIGRAM(S): 2.5 TABLET ORAL at 18:05

## 2019-01-01 RX ADMIN — Medication 500000 UNIT(S): at 06:07

## 2019-01-01 RX ADMIN — HYDROMORPHONE HYDROCHLORIDE 2 MILLIGRAM(S): 2 INJECTION INTRAMUSCULAR; INTRAVENOUS; SUBCUTANEOUS at 09:26

## 2019-01-01 RX ADMIN — Medication 400 UNIT(S): at 12:37

## 2019-01-01 RX ADMIN — MIDODRINE HYDROCHLORIDE 5 MILLIGRAM(S): 2.5 TABLET ORAL at 18:32

## 2019-01-01 RX ADMIN — HYDROMORPHONE HYDROCHLORIDE 16 MILLIGRAM(S): 2 INJECTION INTRAMUSCULAR; INTRAVENOUS; SUBCUTANEOUS at 05:24

## 2019-01-01 RX ADMIN — METHADONE HYDROCHLORIDE 25 MILLIGRAM(S): 40 TABLET ORAL at 15:51

## 2019-01-01 RX ADMIN — MORPHINE SULFATE 30 MILLIGRAM(S): 50 CAPSULE, EXTENDED RELEASE ORAL at 19:35

## 2019-01-01 RX ADMIN — FENTANYL CITRATE 25 MICROGRAM(S): 50 INJECTION INTRAVENOUS at 20:43

## 2019-01-01 RX ADMIN — METHADONE HYDROCHLORIDE 15 MILLIGRAM(S): 40 TABLET ORAL at 08:38

## 2019-01-01 RX ADMIN — Medication 0.5 MILLIGRAM(S): at 08:59

## 2019-01-01 RX ADMIN — MORPHINE SULFATE 30 MILLIGRAM(S): 50 CAPSULE, EXTENDED RELEASE ORAL at 18:54

## 2019-01-01 RX ADMIN — Medication 50 MILLIGRAM(S): at 11:55

## 2019-01-01 RX ADMIN — SODIUM CHLORIDE 1400 MILLILITER(S): 9 INJECTION INTRAMUSCULAR; INTRAVENOUS; SUBCUTANEOUS at 13:10

## 2019-01-01 RX ADMIN — Medication 0.5 MILLIGRAM(S): at 00:22

## 2019-01-01 RX ADMIN — HYDROMORPHONE HYDROCHLORIDE 2 MILLIGRAM(S): 2 INJECTION INTRAMUSCULAR; INTRAVENOUS; SUBCUTANEOUS at 19:20

## 2019-01-01 RX ADMIN — METHADONE HYDROCHLORIDE 25 MILLIGRAM(S): 40 TABLET ORAL at 05:15

## 2019-01-01 RX ADMIN — MIDODRINE HYDROCHLORIDE 5 MILLIGRAM(S): 2.5 TABLET ORAL at 06:25

## 2019-01-01 RX ADMIN — LIDOCAINE 1 PATCH: 4 CREAM TOPICAL at 22:22

## 2019-01-01 RX ADMIN — Medication 50 MILLIGRAM(S): at 12:02

## 2019-01-01 RX ADMIN — HYDROMORPHONE HYDROCHLORIDE 12 MILLIGRAM(S): 2 INJECTION INTRAMUSCULAR; INTRAVENOUS; SUBCUTANEOUS at 07:28

## 2019-01-01 RX ADMIN — CEFEPIME 100 MILLIGRAM(S): 1 INJECTION, POWDER, FOR SOLUTION INTRAMUSCULAR; INTRAVENOUS at 10:36

## 2019-01-01 RX ADMIN — HYDROMORPHONE HYDROCHLORIDE 1 MILLIGRAM(S): 2 INJECTION INTRAMUSCULAR; INTRAVENOUS; SUBCUTANEOUS at 15:19

## 2019-01-01 RX ADMIN — MIDODRINE HYDROCHLORIDE 5 MILLIGRAM(S): 2.5 TABLET ORAL at 21:49

## 2019-01-01 RX ADMIN — LACTULOSE 10 GRAM(S): 10 SOLUTION ORAL at 05:53

## 2019-01-01 RX ADMIN — LIDOCAINE 2 PATCH: 4 CREAM TOPICAL at 12:37

## 2019-01-01 RX ADMIN — LIDOCAINE 2 PATCH: 4 CREAM TOPICAL at 00:30

## 2019-01-01 RX ADMIN — MORPHINE SULFATE 45 MILLIGRAM(S): 50 CAPSULE, EXTENDED RELEASE ORAL at 06:19

## 2019-01-01 RX ADMIN — HYDROMORPHONE HYDROCHLORIDE 12 MILLIGRAM(S): 2 INJECTION INTRAMUSCULAR; INTRAVENOUS; SUBCUTANEOUS at 03:30

## 2019-01-01 RX ADMIN — Medication 650 MILLIGRAM(S): at 00:00

## 2019-01-01 RX ADMIN — HYDROMORPHONE HYDROCHLORIDE 12 MILLIGRAM(S): 2 INJECTION INTRAMUSCULAR; INTRAVENOUS; SUBCUTANEOUS at 21:17

## 2019-01-01 RX ADMIN — Medication 500 MILLIGRAM(S): at 12:37

## 2019-01-01 RX ADMIN — HYDROMORPHONE HYDROCHLORIDE 30 MILLILITER(S): 2 INJECTION INTRAMUSCULAR; INTRAVENOUS; SUBCUTANEOUS at 07:19

## 2019-01-01 RX ADMIN — LIDOCAINE 2 PATCH: 4 CREAM TOPICAL at 18:14

## 2019-01-01 RX ADMIN — Medication 325 MILLIGRAM(S): at 11:55

## 2019-01-01 RX ADMIN — Medication 250 MILLIGRAM(S): at 05:54

## 2019-01-01 RX ADMIN — LACTULOSE 10 GRAM(S): 10 SOLUTION ORAL at 06:41

## 2019-01-01 RX ADMIN — POLYETHYLENE GLYCOL 3350 17 GRAM(S): 17 POWDER, FOR SOLUTION ORAL at 13:54

## 2019-01-01 RX ADMIN — LIDOCAINE 1 PATCH: 4 CREAM TOPICAL at 19:52

## 2019-01-01 RX ADMIN — LIDOCAINE 1 PATCH: 4 CREAM TOPICAL at 11:25

## 2019-01-01 RX ADMIN — HYDROMORPHONE HYDROCHLORIDE 2 MILLIGRAM(S): 2 INJECTION INTRAMUSCULAR; INTRAVENOUS; SUBCUTANEOUS at 01:32

## 2019-01-01 RX ADMIN — PIPERACILLIN AND TAZOBACTAM 25 GRAM(S): 4; .5 INJECTION, POWDER, LYOPHILIZED, FOR SOLUTION INTRAVENOUS at 00:52

## 2019-01-01 RX ADMIN — CEFEPIME 100 MILLIGRAM(S): 1 INJECTION, POWDER, FOR SOLUTION INTRAMUSCULAR; INTRAVENOUS at 17:42

## 2019-01-01 RX ADMIN — Medication 0.5 MILLIGRAM(S): at 23:17

## 2019-01-01 RX ADMIN — HYDROMORPHONE HYDROCHLORIDE 30 MILLILITER(S): 2 INJECTION INTRAMUSCULAR; INTRAVENOUS; SUBCUTANEOUS at 20:12

## 2019-01-01 RX ADMIN — Medication 50 MILLIGRAM(S): at 14:01

## 2019-01-01 RX ADMIN — Medication 975 MILLIGRAM(S): at 13:08

## 2019-01-01 RX ADMIN — LIDOCAINE 1 PATCH: 4 CREAM TOPICAL at 07:46

## 2019-01-01 RX ADMIN — MORPHINE SULFATE 8 MILLIGRAM(S): 50 CAPSULE, EXTENDED RELEASE ORAL at 01:31

## 2019-01-01 RX ADMIN — Medication 50 MILLIGRAM(S): at 11:26

## 2019-01-01 RX ADMIN — HYDROMORPHONE HYDROCHLORIDE 30 MILLILITER(S): 2 INJECTION INTRAMUSCULAR; INTRAVENOUS; SUBCUTANEOUS at 19:10

## 2019-01-01 RX ADMIN — HYDROMORPHONE HYDROCHLORIDE 3 MILLIGRAM(S): 2 INJECTION INTRAMUSCULAR; INTRAVENOUS; SUBCUTANEOUS at 11:46

## 2019-01-01 RX ADMIN — LIDOCAINE 1 PATCH: 4 CREAM TOPICAL at 01:32

## 2019-01-01 RX ADMIN — Medication 975 MILLIGRAM(S): at 18:17

## 2019-01-01 RX ADMIN — Medication 500 MILLIGRAM(S): at 11:55

## 2019-01-01 RX ADMIN — METHADONE HYDROCHLORIDE 25 MILLIGRAM(S): 40 TABLET ORAL at 06:41

## 2019-01-01 RX ADMIN — CEFEPIME 100 MILLIGRAM(S): 1 INJECTION, POWDER, FOR SOLUTION INTRAMUSCULAR; INTRAVENOUS at 07:02

## 2019-01-01 RX ADMIN — SODIUM CHLORIDE 500 MILLILITER(S): 9 INJECTION INTRAMUSCULAR; INTRAVENOUS; SUBCUTANEOUS at 12:14

## 2019-01-01 RX ADMIN — PANTOPRAZOLE SODIUM 40 MILLIGRAM(S): 20 TABLET, DELAYED RELEASE ORAL at 06:19

## 2019-01-01 RX ADMIN — LACTULOSE 10 GRAM(S): 10 SOLUTION ORAL at 13:54

## 2019-01-01 RX ADMIN — Medication 400 UNIT(S): at 13:03

## 2019-01-01 RX ADMIN — MIDODRINE HYDROCHLORIDE 5 MILLIGRAM(S): 2.5 TABLET ORAL at 11:55

## 2019-01-01 RX ADMIN — POLYETHYLENE GLYCOL 3350 17 GRAM(S): 17 POWDER, FOR SOLUTION ORAL at 12:17

## 2019-01-01 RX ADMIN — NYSTATIN CREAM 1 APPLICATION(S): 100000 CREAM TOPICAL at 17:47

## 2019-01-01 RX ADMIN — LIDOCAINE 1 PATCH: 4 CREAM TOPICAL at 03:00

## 2019-01-01 RX ADMIN — Medication 1000 MILLIGRAM(S): at 22:26

## 2019-01-01 RX ADMIN — SODIUM CHLORIDE 75 MILLILITER(S): 9 INJECTION, SOLUTION INTRAVENOUS at 22:03

## 2019-01-01 RX ADMIN — Medication 50 MILLIGRAM(S): at 13:54

## 2019-01-01 RX ADMIN — HYDROMORPHONE HYDROCHLORIDE 30 MILLILITER(S): 2 INJECTION INTRAMUSCULAR; INTRAVENOUS; SUBCUTANEOUS at 18:47

## 2019-01-01 RX ADMIN — LACTULOSE 10 GRAM(S): 10 SOLUTION ORAL at 18:18

## 2019-01-01 RX ADMIN — LIDOCAINE 2 PATCH: 4 CREAM TOPICAL at 12:00

## 2019-01-01 RX ADMIN — MORPHINE SULFATE 45 MILLIGRAM(S): 50 CAPSULE, EXTENDED RELEASE ORAL at 10:00

## 2019-01-01 RX ADMIN — MIDODRINE HYDROCHLORIDE 5 MILLIGRAM(S): 2.5 TABLET ORAL at 07:59

## 2019-01-01 RX ADMIN — Medication 400 UNIT(S): at 12:12

## 2019-01-01 RX ADMIN — HYDROMORPHONE HYDROCHLORIDE 30 MILLILITER(S): 2 INJECTION INTRAMUSCULAR; INTRAVENOUS; SUBCUTANEOUS at 15:36

## 2019-01-01 RX ADMIN — PIPERACILLIN AND TAZOBACTAM 200 GRAM(S): 4; .5 INJECTION, POWDER, LYOPHILIZED, FOR SOLUTION INTRAVENOUS at 15:53

## 2019-01-01 RX ADMIN — SODIUM CHLORIDE 75 MILLILITER(S): 9 INJECTION, SOLUTION INTRAVENOUS at 06:49

## 2019-01-01 RX ADMIN — PANTOPRAZOLE SODIUM 40 MILLIGRAM(S): 20 TABLET, DELAYED RELEASE ORAL at 07:45

## 2019-01-01 RX ADMIN — MORPHINE SULFATE 8 MILLIGRAM(S): 50 CAPSULE, EXTENDED RELEASE ORAL at 17:41

## 2019-01-01 RX ADMIN — Medication 500000 UNIT(S): at 17:43

## 2019-01-01 RX ADMIN — METHADONE HYDROCHLORIDE 20 MILLIGRAM(S): 40 TABLET ORAL at 14:48

## 2019-01-01 RX ADMIN — MORPHINE SULFATE 8 MILLIGRAM(S): 50 CAPSULE, EXTENDED RELEASE ORAL at 18:00

## 2019-01-01 RX ADMIN — Medication 4 MILLIGRAM(S): at 12:14

## 2019-01-01 RX ADMIN — ONDANSETRON 4 MILLIGRAM(S): 8 TABLET, FILM COATED ORAL at 07:24

## 2019-01-01 RX ADMIN — FENTANYL CITRATE 25 MICROGRAM(S): 50 INJECTION INTRAVENOUS at 21:10

## 2019-01-01 RX ADMIN — Medication 250 MILLIGRAM(S): at 19:01

## 2019-01-01 RX ADMIN — MIRTAZAPINE 15 MILLIGRAM(S): 45 TABLET, ORALLY DISINTEGRATING ORAL at 23:56

## 2019-01-01 RX ADMIN — SODIUM CHLORIDE 1000 MILLILITER(S): 9 INJECTION INTRAMUSCULAR; INTRAVENOUS; SUBCUTANEOUS at 13:49

## 2019-01-01 RX ADMIN — LACTULOSE 10 GRAM(S): 10 SOLUTION ORAL at 17:27

## 2019-01-01 RX ADMIN — Medication 50 MILLIGRAM(S): at 23:13

## 2019-01-01 RX ADMIN — LIDOCAINE 1 PATCH: 4 CREAM TOPICAL at 19:34

## 2019-01-01 RX ADMIN — HYDROMORPHONE HYDROCHLORIDE 30 MILLILITER(S): 2 INJECTION INTRAMUSCULAR; INTRAVENOUS; SUBCUTANEOUS at 15:04

## 2019-01-01 RX ADMIN — Medication 500 MILLIGRAM(S): at 12:12

## 2019-01-01 RX ADMIN — LIDOCAINE 1 PATCH: 4 CREAM TOPICAL at 23:11

## 2019-01-01 RX ADMIN — HYDROMORPHONE HYDROCHLORIDE 16 MILLIGRAM(S): 2 INJECTION INTRAMUSCULAR; INTRAVENOUS; SUBCUTANEOUS at 18:00

## 2019-01-01 RX ADMIN — Medication 650 MILLIGRAM(S): at 10:36

## 2019-01-01 RX ADMIN — HYDROMORPHONE HYDROCHLORIDE 0.5 MILLIGRAM(S): 2 INJECTION INTRAMUSCULAR; INTRAVENOUS; SUBCUTANEOUS at 22:14

## 2019-01-01 RX ADMIN — CEFEPIME 100 MILLIGRAM(S): 1 INJECTION, POWDER, FOR SOLUTION INTRAMUSCULAR; INTRAVENOUS at 08:03

## 2019-01-01 RX ADMIN — POLYETHYLENE GLYCOL 3350 17 GRAM(S): 17 POWDER, FOR SOLUTION ORAL at 11:50

## 2019-01-01 RX ADMIN — METHADONE HYDROCHLORIDE 20 MILLIGRAM(S): 40 TABLET ORAL at 22:30

## 2019-01-01 RX ADMIN — NYSTATIN CREAM 1 APPLICATION(S): 100000 CREAM TOPICAL at 06:50

## 2019-01-01 RX ADMIN — Medication 500 MILLIGRAM(S): at 11:25

## 2019-01-01 RX ADMIN — HYDROMORPHONE HYDROCHLORIDE 16 MILLIGRAM(S): 2 INJECTION INTRAMUSCULAR; INTRAVENOUS; SUBCUTANEOUS at 13:30

## 2019-01-01 RX ADMIN — HYDROMORPHONE HYDROCHLORIDE 12 MILLIGRAM(S): 2 INJECTION INTRAMUSCULAR; INTRAVENOUS; SUBCUTANEOUS at 22:00

## 2019-01-01 RX ADMIN — PANTOPRAZOLE SODIUM 40 MILLIGRAM(S): 20 TABLET, DELAYED RELEASE ORAL at 06:49

## 2019-01-01 RX ADMIN — LIDOCAINE 2 PATCH: 4 CREAM TOPICAL at 17:49

## 2019-01-01 RX ADMIN — MIDODRINE HYDROCHLORIDE 5 MILLIGRAM(S): 2.5 TABLET ORAL at 12:37

## 2019-01-01 RX ADMIN — PANTOPRAZOLE SODIUM 40 MILLIGRAM(S): 20 TABLET, DELAYED RELEASE ORAL at 06:23

## 2019-01-01 RX ADMIN — LIDOCAINE 1 PATCH: 4 CREAM TOPICAL at 11:28

## 2019-01-01 RX ADMIN — Medication 500 MILLIGRAM(S): at 12:01

## 2019-01-01 RX ADMIN — METHADONE HYDROCHLORIDE 25 MILLIGRAM(S): 40 TABLET ORAL at 22:49

## 2019-01-01 RX ADMIN — MIDODRINE HYDROCHLORIDE 5 MILLIGRAM(S): 2.5 TABLET ORAL at 14:01

## 2019-01-01 RX ADMIN — Medication 50 MILLIGRAM(S): at 13:07

## 2019-01-01 RX ADMIN — LIDOCAINE 1 PATCH: 4 CREAM TOPICAL at 11:51

## 2019-01-01 RX ADMIN — METHADONE HYDROCHLORIDE 15 MILLIGRAM(S): 40 TABLET ORAL at 08:43

## 2019-01-01 RX ADMIN — Medication 250 MILLIGRAM(S): at 18:47

## 2019-01-01 RX ADMIN — HYDROMORPHONE HYDROCHLORIDE 12 MILLIGRAM(S): 2 INJECTION INTRAMUSCULAR; INTRAVENOUS; SUBCUTANEOUS at 14:03

## 2019-01-01 RX ADMIN — LIDOCAINE 2 PATCH: 4 CREAM TOPICAL at 12:14

## 2019-01-01 RX ADMIN — SODIUM CHLORIDE 1000 MILLILITER(S): 9 INJECTION INTRAMUSCULAR; INTRAVENOUS; SUBCUTANEOUS at 00:53

## 2019-01-01 RX ADMIN — MIRTAZAPINE 15 MILLIGRAM(S): 45 TABLET, ORALLY DISINTEGRATING ORAL at 23:13

## 2019-01-01 RX ADMIN — Medication 500 MILLIGRAM(S): at 13:54

## 2019-01-01 RX ADMIN — SODIUM CHLORIDE 1000 MILLILITER(S): 9 INJECTION INTRAMUSCULAR; INTRAVENOUS; SUBCUTANEOUS at 16:25

## 2019-01-01 RX ADMIN — METHADONE HYDROCHLORIDE 15 MILLIGRAM(S): 40 TABLET ORAL at 06:46

## 2019-01-01 RX ADMIN — SODIUM CHLORIDE 2000 MILLILITER(S): 9 INJECTION INTRAMUSCULAR; INTRAVENOUS; SUBCUTANEOUS at 16:14

## 2019-01-01 RX ADMIN — Medication 325 MILLIGRAM(S): at 14:01

## 2019-01-01 RX ADMIN — HYDROMORPHONE HYDROCHLORIDE 8 MILLIGRAM(S): 2 INJECTION INTRAMUSCULAR; INTRAVENOUS; SUBCUTANEOUS at 14:50

## 2019-01-01 RX ADMIN — METHADONE HYDROCHLORIDE 15 MILLIGRAM(S): 40 TABLET ORAL at 22:25

## 2019-01-01 RX ADMIN — Medication 0.5 MILLIGRAM(S): at 23:56

## 2019-01-01 RX ADMIN — LIDOCAINE 1 PATCH: 4 CREAM TOPICAL at 12:13

## 2019-01-01 RX ADMIN — Medication 650 MILLIGRAM(S): at 21:50

## 2019-01-01 RX ADMIN — Medication 500 MILLIGRAM(S): at 11:28

## 2019-01-01 RX ADMIN — Medication 325 MILLIGRAM(S): at 12:02

## 2019-01-01 RX ADMIN — Medication 4 MILLIGRAM(S): at 11:28

## 2019-01-01 RX ADMIN — HYDROMORPHONE HYDROCHLORIDE 1 MILLIGRAM(S): 2 INJECTION INTRAMUSCULAR; INTRAVENOUS; SUBCUTANEOUS at 16:28

## 2019-01-01 RX ADMIN — HYDROMORPHONE HYDROCHLORIDE 0.5 MILLIGRAM(S): 2 INJECTION INTRAMUSCULAR; INTRAVENOUS; SUBCUTANEOUS at 22:39

## 2019-01-01 RX ADMIN — Medication 500 MILLIGRAM(S): at 12:13

## 2019-01-01 RX ADMIN — METHADONE HYDROCHLORIDE 15 MILLIGRAM(S): 40 TABLET ORAL at 21:01

## 2019-01-01 RX ADMIN — HYDROMORPHONE HYDROCHLORIDE 12 MILLIGRAM(S): 2 INJECTION INTRAMUSCULAR; INTRAVENOUS; SUBCUTANEOUS at 10:45

## 2019-01-01 RX ADMIN — HYDROMORPHONE HYDROCHLORIDE 30 MILLILITER(S): 2 INJECTION INTRAMUSCULAR; INTRAVENOUS; SUBCUTANEOUS at 19:20

## 2019-01-01 RX ADMIN — HYDROMORPHONE HYDROCHLORIDE 3 MILLIGRAM(S): 2 INJECTION INTRAMUSCULAR; INTRAVENOUS; SUBCUTANEOUS at 11:40

## 2019-01-01 RX ADMIN — CHLORHEXIDINE GLUCONATE 1 APPLICATION(S): 213 SOLUTION TOPICAL at 09:50

## 2019-01-01 RX ADMIN — Medication 500 MILLIGRAM(S): at 11:50

## 2019-01-01 RX ADMIN — HYDROMORPHONE HYDROCHLORIDE 12 MILLIGRAM(S): 2 INJECTION INTRAMUSCULAR; INTRAVENOUS; SUBCUTANEOUS at 02:35

## 2019-01-01 RX ADMIN — SODIUM CHLORIDE 75 MILLILITER(S): 9 INJECTION, SOLUTION INTRAVENOUS at 19:04

## 2019-01-01 RX ADMIN — Medication 50 MILLIGRAM(S): at 12:38

## 2019-01-01 RX ADMIN — HYDROMORPHONE HYDROCHLORIDE 12 MILLIGRAM(S): 2 INJECTION INTRAMUSCULAR; INTRAVENOUS; SUBCUTANEOUS at 20:38

## 2019-01-01 RX ADMIN — METHADONE HYDROCHLORIDE 15 MILLIGRAM(S): 40 TABLET ORAL at 16:54

## 2019-01-01 RX ADMIN — LIDOCAINE 1 PATCH: 4 CREAM TOPICAL at 12:17

## 2019-01-01 RX ADMIN — HYDROMORPHONE HYDROCHLORIDE 30 MILLILITER(S): 2 INJECTION INTRAMUSCULAR; INTRAVENOUS; SUBCUTANEOUS at 19:23

## 2019-01-01 RX ADMIN — Medication 20 MILLIEQUIVALENT(S): at 08:42

## 2019-01-01 RX ADMIN — CEFEPIME 100 MILLIGRAM(S): 1 INJECTION, POWDER, FOR SOLUTION INTRAMUSCULAR; INTRAVENOUS at 16:55

## 2019-01-01 RX ADMIN — MORPHINE SULFATE 30 MILLIGRAM(S): 50 CAPSULE, EXTENDED RELEASE ORAL at 17:48

## 2019-01-01 RX ADMIN — Medication 250 MILLIGRAM(S): at 12:35

## 2019-01-01 RX ADMIN — LACTULOSE 10 GRAM(S): 10 SOLUTION ORAL at 17:31

## 2019-01-01 RX ADMIN — MORPHINE SULFATE 45 MILLIGRAM(S): 50 CAPSULE, EXTENDED RELEASE ORAL at 02:08

## 2019-01-01 RX ADMIN — Medication 325 MILLIGRAM(S): at 12:12

## 2019-01-01 RX ADMIN — HYDROMORPHONE HYDROCHLORIDE 16 MILLIGRAM(S): 2 INJECTION INTRAMUSCULAR; INTRAVENOUS; SUBCUTANEOUS at 22:00

## 2019-01-01 RX ADMIN — HYDROMORPHONE HYDROCHLORIDE 3 MILLIGRAM(S): 2 INJECTION INTRAMUSCULAR; INTRAVENOUS; SUBCUTANEOUS at 16:09

## 2019-01-01 RX ADMIN — HYDROMORPHONE HYDROCHLORIDE 3 MILLIGRAM(S): 2 INJECTION INTRAMUSCULAR; INTRAVENOUS; SUBCUTANEOUS at 12:40

## 2019-01-01 RX ADMIN — LACTULOSE 10 GRAM(S): 10 SOLUTION ORAL at 12:37

## 2019-01-01 RX ADMIN — HYDROMORPHONE HYDROCHLORIDE 8 MILLIGRAM(S): 2 INJECTION INTRAMUSCULAR; INTRAVENOUS; SUBCUTANEOUS at 13:50

## 2019-01-01 RX ADMIN — LACTULOSE 10 GRAM(S): 10 SOLUTION ORAL at 18:05

## 2019-01-01 RX ADMIN — HYDROMORPHONE HYDROCHLORIDE 30 MILLILITER(S): 2 INJECTION INTRAMUSCULAR; INTRAVENOUS; SUBCUTANEOUS at 19:32

## 2019-01-01 RX ADMIN — Medication 4 MILLIGRAM(S): at 06:02

## 2019-01-01 RX ADMIN — MORPHINE SULFATE 8 MILLIGRAM(S): 50 CAPSULE, EXTENDED RELEASE ORAL at 11:10

## 2019-01-01 RX ADMIN — LIDOCAINE 2 PATCH: 4 CREAM TOPICAL at 18:00

## 2019-01-01 RX ADMIN — HYDROMORPHONE HYDROCHLORIDE 3 MILLIGRAM(S): 2 INJECTION INTRAMUSCULAR; INTRAVENOUS; SUBCUTANEOUS at 22:25

## 2019-01-01 RX ADMIN — Medication 500 MILLIGRAM(S): at 11:31

## 2019-01-01 RX ADMIN — LIDOCAINE 1 PATCH: 4 CREAM TOPICAL at 12:05

## 2019-01-01 RX ADMIN — Medication 400 MILLIGRAM(S): at 17:18

## 2019-01-01 RX ADMIN — MIRTAZAPINE 15 MILLIGRAM(S): 45 TABLET, ORALLY DISINTEGRATING ORAL at 23:28

## 2019-01-01 RX ADMIN — Medication 400 UNIT(S): at 11:28

## 2019-01-01 RX ADMIN — METHADONE HYDROCHLORIDE 25 MILLIGRAM(S): 40 TABLET ORAL at 13:46

## 2019-01-01 RX ADMIN — LIDOCAINE 2 PATCH: 4 CREAM TOPICAL at 23:37

## 2019-01-01 RX ADMIN — LIDOCAINE 1 PATCH: 4 CREAM TOPICAL at 23:53

## 2019-01-01 RX ADMIN — Medication 400 UNIT(S): at 13:54

## 2019-01-01 RX ADMIN — METHADONE HYDROCHLORIDE 15 MILLIGRAM(S): 40 TABLET ORAL at 07:13

## 2019-01-01 RX ADMIN — MORPHINE SULFATE 45 MILLIGRAM(S): 50 CAPSULE, EXTENDED RELEASE ORAL at 12:06

## 2019-01-01 RX ADMIN — HYDROMORPHONE HYDROCHLORIDE 2 MILLIGRAM(S): 2 INJECTION INTRAMUSCULAR; INTRAVENOUS; SUBCUTANEOUS at 19:30

## 2019-01-01 RX ADMIN — METHADONE HYDROCHLORIDE 15 MILLIGRAM(S): 40 TABLET ORAL at 14:04

## 2019-01-01 RX ADMIN — LACTULOSE 10 GRAM(S): 10 SOLUTION ORAL at 02:03

## 2019-01-01 RX ADMIN — MIRTAZAPINE 15 MILLIGRAM(S): 45 TABLET, ORALLY DISINTEGRATING ORAL at 21:55

## 2019-01-01 RX ADMIN — METHADONE HYDROCHLORIDE 20 MILLIGRAM(S): 40 TABLET ORAL at 14:01

## 2019-01-01 RX ADMIN — Medication 400 UNIT(S): at 12:01

## 2019-01-01 RX ADMIN — LIDOCAINE 1 PATCH: 4 CREAM TOPICAL at 17:27

## 2019-01-01 RX ADMIN — PIPERACILLIN AND TAZOBACTAM 25 GRAM(S): 4; .5 INJECTION, POWDER, LYOPHILIZED, FOR SOLUTION INTRAVENOUS at 18:47

## 2019-01-01 RX ADMIN — Medication 50 MILLIGRAM(S): at 12:12

## 2019-01-01 RX ADMIN — MORPHINE SULFATE 45 MILLIGRAM(S): 50 CAPSULE, EXTENDED RELEASE ORAL at 12:36

## 2019-01-01 RX ADMIN — HYDROMORPHONE HYDROCHLORIDE 12 MILLIGRAM(S): 2 INJECTION INTRAMUSCULAR; INTRAVENOUS; SUBCUTANEOUS at 18:17

## 2019-01-01 RX ADMIN — MIDODRINE HYDROCHLORIDE 5 MILLIGRAM(S): 2.5 TABLET ORAL at 05:15

## 2019-01-01 RX ADMIN — Medication 50 MILLIGRAM(S): at 12:13

## 2019-01-01 RX ADMIN — LIDOCAINE 2 PATCH: 4 CREAM TOPICAL at 11:52

## 2019-01-01 RX ADMIN — Medication 650 MILLIGRAM(S): at 17:07

## 2019-01-01 RX ADMIN — LIDOCAINE 1 PATCH: 4 CREAM TOPICAL at 17:44

## 2019-01-01 RX ADMIN — LIDOCAINE 1 APPLICATION(S): 4 CREAM TOPICAL at 22:20

## 2019-01-01 RX ADMIN — LACTULOSE 10 GRAM(S): 10 SOLUTION ORAL at 17:17

## 2019-01-01 RX ADMIN — PANTOPRAZOLE SODIUM 40 MILLIGRAM(S): 20 TABLET, DELAYED RELEASE ORAL at 06:07

## 2019-01-01 RX ADMIN — HYDROMORPHONE HYDROCHLORIDE 12 MILLIGRAM(S): 2 INJECTION INTRAMUSCULAR; INTRAVENOUS; SUBCUTANEOUS at 08:30

## 2019-01-01 RX ADMIN — METHADONE HYDROCHLORIDE 15 MILLIGRAM(S): 40 TABLET ORAL at 21:55

## 2019-01-01 RX ADMIN — METHADONE HYDROCHLORIDE 25 MILLIGRAM(S): 40 TABLET ORAL at 22:30

## 2019-01-01 RX ADMIN — HYDROMORPHONE HYDROCHLORIDE 30 MILLILITER(S): 2 INJECTION INTRAMUSCULAR; INTRAVENOUS; SUBCUTANEOUS at 07:47

## 2019-01-01 RX ADMIN — HYDROMORPHONE HYDROCHLORIDE 2 MILLIGRAM(S): 2 INJECTION INTRAMUSCULAR; INTRAVENOUS; SUBCUTANEOUS at 18:17

## 2019-01-01 RX ADMIN — METHADONE HYDROCHLORIDE 15 MILLIGRAM(S): 40 TABLET ORAL at 00:18

## 2019-01-01 RX ADMIN — METHADONE HYDROCHLORIDE 10 MILLIGRAM(S): 40 TABLET ORAL at 13:07

## 2019-01-01 RX ADMIN — Medication 1 APPLICATION(S): at 09:21

## 2019-01-01 RX ADMIN — METHADONE HYDROCHLORIDE 25 MILLIGRAM(S): 40 TABLET ORAL at 23:46

## 2019-01-01 RX ADMIN — SODIUM CHLORIDE 75 MILLILITER(S): 9 INJECTION, SOLUTION INTRAVENOUS at 21:28

## 2019-01-01 RX ADMIN — MORPHINE SULFATE 45 MILLIGRAM(S): 50 CAPSULE, EXTENDED RELEASE ORAL at 20:20

## 2019-01-01 RX ADMIN — HYDROMORPHONE HYDROCHLORIDE 3 MILLIGRAM(S): 2 INJECTION INTRAMUSCULAR; INTRAVENOUS; SUBCUTANEOUS at 22:40

## 2019-01-01 RX ADMIN — LIDOCAINE 1 PATCH: 4 CREAM TOPICAL at 18:15

## 2019-01-01 RX ADMIN — LACTULOSE 10 GRAM(S): 10 SOLUTION ORAL at 22:25

## 2019-01-01 RX ADMIN — MORPHINE SULFATE 45 MILLIGRAM(S): 50 CAPSULE, EXTENDED RELEASE ORAL at 02:50

## 2019-01-01 RX ADMIN — Medication 50 MILLIGRAM(S): at 11:31

## 2019-01-01 RX ADMIN — Medication 650 MILLIGRAM(S): at 06:45

## 2019-01-01 RX ADMIN — MORPHINE SULFATE 8 MILLIGRAM(S): 50 CAPSULE, EXTENDED RELEASE ORAL at 11:23

## 2019-01-01 RX ADMIN — LACTULOSE 10 GRAM(S): 10 SOLUTION ORAL at 00:22

## 2019-01-01 RX ADMIN — Medication 4 MILLIGRAM(S): at 17:13

## 2019-01-01 RX ADMIN — CEFEPIME 100 MILLIGRAM(S): 1 INJECTION, POWDER, FOR SOLUTION INTRAMUSCULAR; INTRAVENOUS at 22:03

## 2019-01-01 RX ADMIN — Medication 250 MILLIGRAM(S): at 05:02

## 2019-01-01 RX ADMIN — Medication 500000 UNIT(S): at 06:23

## 2019-01-01 RX ADMIN — LACTULOSE 10 GRAM(S): 10 SOLUTION ORAL at 06:07

## 2019-01-01 RX ADMIN — SODIUM CHLORIDE 75 MILLILITER(S): 9 INJECTION, SOLUTION INTRAVENOUS at 18:21

## 2019-01-01 RX ADMIN — LACTULOSE 10 GRAM(S): 10 SOLUTION ORAL at 10:01

## 2019-01-01 RX ADMIN — METHADONE HYDROCHLORIDE 25 MILLIGRAM(S): 40 TABLET ORAL at 07:59

## 2019-01-01 RX ADMIN — METHADONE HYDROCHLORIDE 20 MILLIGRAM(S): 40 TABLET ORAL at 14:21

## 2019-01-01 RX ADMIN — NYSTATIN CREAM 1 APPLICATION(S): 100000 CREAM TOPICAL at 06:22

## 2019-01-01 RX ADMIN — HYDROMORPHONE HYDROCHLORIDE 16 MILLIGRAM(S): 2 INJECTION INTRAMUSCULAR; INTRAVENOUS; SUBCUTANEOUS at 12:46

## 2019-01-01 RX ADMIN — MORPHINE SULFATE 45 MILLIGRAM(S): 50 CAPSULE, EXTENDED RELEASE ORAL at 19:21

## 2019-01-01 RX ADMIN — MORPHINE SULFATE 8 MILLIGRAM(S): 50 CAPSULE, EXTENDED RELEASE ORAL at 11:38

## 2019-01-01 RX ADMIN — MIRTAZAPINE 15 MILLIGRAM(S): 45 TABLET, ORALLY DISINTEGRATING ORAL at 00:06

## 2019-01-01 RX ADMIN — Medication 400 MILLIGRAM(S): at 21:49

## 2019-01-01 RX ADMIN — FENTANYL CITRATE 25 MICROGRAM(S): 50 INJECTION INTRAVENOUS at 20:13

## 2019-01-01 RX ADMIN — LIDOCAINE 1 PATCH: 4 CREAM TOPICAL at 11:30

## 2019-01-01 RX ADMIN — SODIUM CHLORIDE 2000 MILLILITER(S): 9 INJECTION INTRAMUSCULAR; INTRAVENOUS; SUBCUTANEOUS at 10:35

## 2019-01-01 RX ADMIN — Medication 400 UNIT(S): at 11:50

## 2019-01-01 RX ADMIN — Medication 0.5 MILLIGRAM(S): at 14:39

## 2019-01-01 RX ADMIN — MORPHINE SULFATE 8 MILLIGRAM(S): 50 CAPSULE, EXTENDED RELEASE ORAL at 11:39

## 2019-01-01 RX ADMIN — LIDOCAINE 1 PATCH: 4 CREAM TOPICAL at 06:24

## 2019-01-01 RX ADMIN — HYDROMORPHONE HYDROCHLORIDE 30 MILLILITER(S): 2 INJECTION INTRAMUSCULAR; INTRAVENOUS; SUBCUTANEOUS at 21:05

## 2019-01-01 RX ADMIN — HYDROMORPHONE HYDROCHLORIDE 1 MILLIGRAM(S): 2 INJECTION INTRAMUSCULAR; INTRAVENOUS; SUBCUTANEOUS at 15:25

## 2019-01-01 RX ADMIN — CEFEPIME 100 MILLIGRAM(S): 1 INJECTION, POWDER, FOR SOLUTION INTRAMUSCULAR; INTRAVENOUS at 00:28

## 2019-01-01 RX ADMIN — CEFEPIME 100 MILLIGRAM(S): 1 INJECTION, POWDER, FOR SOLUTION INTRAMUSCULAR; INTRAVENOUS at 13:55

## 2019-01-01 RX ADMIN — LIDOCAINE 1 PATCH: 4 CREAM TOPICAL at 15:03

## 2019-01-01 RX ADMIN — SODIUM CHLORIDE 75 MILLILITER(S): 9 INJECTION, SOLUTION INTRAVENOUS at 05:53

## 2019-01-01 RX ADMIN — NYSTATIN CREAM 1 APPLICATION(S): 100000 CREAM TOPICAL at 07:13

## 2019-01-01 RX ADMIN — PANTOPRAZOLE SODIUM 40 MILLIGRAM(S): 20 TABLET, DELAYED RELEASE ORAL at 06:46

## 2019-01-01 RX ADMIN — METHADONE HYDROCHLORIDE 25 MILLIGRAM(S): 40 TABLET ORAL at 13:30

## 2019-01-01 RX ADMIN — Medication 4 MILLIGRAM(S): at 18:54

## 2019-01-01 RX ADMIN — Medication 650 MILLIGRAM(S): at 20:50

## 2019-01-01 RX ADMIN — MIRTAZAPINE 15 MILLIGRAM(S): 45 TABLET, ORALLY DISINTEGRATING ORAL at 23:45

## 2019-01-01 RX ADMIN — Medication 0.5 MILLIGRAM(S): at 22:14

## 2019-01-01 RX ADMIN — Medication 325 MILLIGRAM(S): at 12:13

## 2019-01-01 RX ADMIN — HYDROMORPHONE HYDROCHLORIDE 12 MILLIGRAM(S): 2 INJECTION INTRAMUSCULAR; INTRAVENOUS; SUBCUTANEOUS at 21:30

## 2019-01-01 RX ADMIN — LACTULOSE 10 GRAM(S): 10 SOLUTION ORAL at 10:25

## 2019-01-01 RX ADMIN — LACTULOSE 10 GRAM(S): 10 SOLUTION ORAL at 21:55

## 2019-01-01 RX ADMIN — HYDROMORPHONE HYDROCHLORIDE 3 MILLIGRAM(S): 2 INJECTION INTRAMUSCULAR; INTRAVENOUS; SUBCUTANEOUS at 08:47

## 2019-01-01 RX ADMIN — MIDODRINE HYDROCHLORIDE 5 MILLIGRAM(S): 2.5 TABLET ORAL at 17:31

## 2019-01-01 RX ADMIN — MORPHINE SULFATE 45 MILLIGRAM(S): 50 CAPSULE, EXTENDED RELEASE ORAL at 11:00

## 2019-01-01 RX ADMIN — PIPERACILLIN AND TAZOBACTAM 25 GRAM(S): 4; .5 INJECTION, POWDER, LYOPHILIZED, FOR SOLUTION INTRAVENOUS at 07:38

## 2019-01-07 NOTE — HISTORY OF PRESENT ILLNESS
[Disease: _____________________] : Disease: [unfilled] [AJCC Stage: ____] : AJCC Stage: [unfilled] [Treatment Protocol] : Treatment Protocol [de-identified] : \par 47 y/o G0 presents for evaluation of newly diagnosed ovarian cancer. \par \victoriano Presented to the ED at [facility] on 1/25/17 with a complaints of abdominal pain. Had imaging that showed a suspicious pelvic mass. Taken emergently to the OR by general gyn out of concern for acute abdominal pain. Had a laparoscopy with conversion to ELAP via PFAN for left salpingo-oophorectomy. 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 \par \par 1/25/2017- CA-125 = 186, CA 19-9 < 1\par 1/31/2017- CT Abd/pelvis- no bowel obstruction (sent to the ED for evaluation of abdominal pain). \par \par Had been on Depo- provera for approximately 4 years, reportedly for management of endometriosis. \par Reports a family history of ovarian cancer. \par Patient subsequently underwent completion surgery on 2/28/17. She is s/p RTLH, RSO, resection left adnexal remnant, omentectomy, P&PA LND, resection of pelvic nodules, staging . \par Final Pathology: Clear cell ovarian cancer, stage II B. [de-identified] : DOS: 2/28/17\par Carboplatin and weekly Taxol 4/6/17- 6/1/17(three cycles)\par Carboplatin and Taxol q3w 6/29/17- 8/10/17( three cycles)\par

## 2019-01-09 ENCOUNTER — OUTPATIENT (OUTPATIENT)
Dept: OUTPATIENT SERVICES | Facility: HOSPITAL | Age: 49
LOS: 1 days | Discharge: HOME | End: 2019-01-09

## 2019-01-09 ENCOUNTER — INBOUND DOCUMENT (OUTPATIENT)
Age: 49
End: 2019-01-09

## 2019-01-09 DIAGNOSIS — C56.2 MALIGNANT NEOPLASM OF LEFT OVARY: ICD-10-CM

## 2019-01-09 DIAGNOSIS — Z90.721 ACQUIRED ABSENCE OF OVARIES, UNILATERAL: Chronic | ICD-10-CM

## 2019-01-09 DIAGNOSIS — Z95.828 PRESENCE OF OTHER VASCULAR IMPLANTS AND GRAFTS: Chronic | ICD-10-CM

## 2019-01-09 DIAGNOSIS — Z98.890 OTHER SPECIFIED POSTPROCEDURAL STATES: Chronic | ICD-10-CM

## 2019-01-15 ENCOUNTER — APPOINTMENT (OUTPATIENT)
Dept: INFUSION THERAPY | Facility: HOSPITAL | Age: 49
End: 2019-01-15

## 2019-01-15 ENCOUNTER — APPOINTMENT (OUTPATIENT)
Dept: HEMATOLOGY ONCOLOGY | Facility: CLINIC | Age: 49
End: 2019-01-15
Payer: MEDICAID

## 2019-01-15 ENCOUNTER — RESULT REVIEW (OUTPATIENT)
Age: 49
End: 2019-01-15

## 2019-01-15 ENCOUNTER — LABORATORY RESULT (OUTPATIENT)
Age: 49
End: 2019-01-15

## 2019-01-15 VITALS
HEART RATE: 106 BPM | SYSTOLIC BLOOD PRESSURE: 130 MMHG | BODY MASS INDEX: 31.61 KG/M2 | OXYGEN SATURATION: 96 % | TEMPERATURE: 98 F | WEIGHT: 156.53 LBS | RESPIRATION RATE: 16 BRPM | DIASTOLIC BLOOD PRESSURE: 76 MMHG

## 2019-01-15 LAB
BASOPHILS # BLD AUTO: 0 K/UL — SIGNIFICANT CHANGE UP (ref 0–0.2)
EOSINOPHIL # BLD AUTO: 0 K/UL — SIGNIFICANT CHANGE UP (ref 0–0.5)
EOSINOPHIL NFR BLD AUTO: 1 % — SIGNIFICANT CHANGE UP (ref 0–6)
HCT VFR BLD CALC: 34.5 % — SIGNIFICANT CHANGE UP (ref 34.5–45)
HGB BLD-MCNC: 11.8 G/DL — SIGNIFICANT CHANGE UP (ref 11.5–15.5)
LYMPHOCYTES # BLD AUTO: 0.3 K/UL — LOW (ref 1–3.3)
LYMPHOCYTES # BLD AUTO: 12 % — LOW (ref 13–44)
MCHC RBC-ENTMCNC: 30.1 PG — SIGNIFICANT CHANGE UP (ref 27–34)
MCHC RBC-ENTMCNC: 34.2 G/DL — SIGNIFICANT CHANGE UP (ref 32–36)
MCV RBC AUTO: 87.9 FL — SIGNIFICANT CHANGE UP (ref 80–100)
MONOCYTES # BLD AUTO: 0.1 K/UL — SIGNIFICANT CHANGE UP (ref 0–0.9)
MONOCYTES NFR BLD AUTO: 2 % — SIGNIFICANT CHANGE UP (ref 2–14)
NEUTROPHILS # BLD AUTO: 2.4 K/UL — SIGNIFICANT CHANGE UP (ref 1.8–7.4)
NEUTROPHILS NFR BLD AUTO: 85 % — HIGH (ref 43–77)
PLAT MORPH BLD: NORMAL — SIGNIFICANT CHANGE UP
PLATELET # BLD AUTO: 124 K/UL — LOW (ref 150–400)
RBC # BLD: 3.92 M/UL — SIGNIFICANT CHANGE UP (ref 3.8–5.2)
RBC # FLD: 14.8 % — HIGH (ref 10.3–14.5)
RBC BLD AUTO: NORMAL — SIGNIFICANT CHANGE UP
WBC # BLD: 2.7 K/UL — LOW (ref 3.8–10.5)
WBC # FLD AUTO: 2.7 K/UL — LOW (ref 3.8–10.5)

## 2019-01-15 PROCEDURE — 99214 OFFICE O/P EST MOD 30 MIN: CPT

## 2019-01-16 NOTE — PHYSICAL EXAM
[Restricted in physically strenuous activity but ambulatory and able to carry out work of a light or sedentary nature] : Status 1- Restricted in physically strenuous activity but ambulatory and able to carry out work of a light or sedentary nature, e.g., light house work, office work [Ulcers] : ulcers [Mucositis] : mucositis [Normal] : affect appropriate

## 2019-01-17 ENCOUNTER — APPOINTMENT (OUTPATIENT)
Dept: INFUSION THERAPY | Facility: HOSPITAL | Age: 49
End: 2019-01-17

## 2019-01-18 DIAGNOSIS — R11.2 NAUSEA WITH VOMITING, UNSPECIFIED: ICD-10-CM

## 2019-01-18 DIAGNOSIS — Z51.11 ENCOUNTER FOR ANTINEOPLASTIC CHEMOTHERAPY: ICD-10-CM

## 2019-01-24 ENCOUNTER — APPOINTMENT (OUTPATIENT)
Dept: GERIATRICS | Facility: CLINIC | Age: 49
End: 2019-01-24
Payer: MEDICAID

## 2019-01-24 VITALS
SYSTOLIC BLOOD PRESSURE: 106 MMHG | HEART RATE: 100 BPM | RESPIRATION RATE: 17 BRPM | BODY MASS INDEX: 31.17 KG/M2 | TEMPERATURE: 98.5 F | DIASTOLIC BLOOD PRESSURE: 74 MMHG | OXYGEN SATURATION: 96 % | WEIGHT: 154.32 LBS

## 2019-01-24 PROCEDURE — 99215 OFFICE O/P EST HI 40 MIN: CPT

## 2019-01-25 NOTE — HISTORY OF PRESENT ILLNESS
[Disease: _____________________] : Disease: [unfilled] [AJCC Stage: ____] : AJCC Stage: [unfilled] [Cardiovascular] : Cardiovascular [Constitutional] : Constitutional [ENT] : ENT [Dermatologic] : Dermatologic [Endocrine] : Endocrine [Gastrointestinal] : Gastrointestinal [Genitourinary] : Genitourinary [Gynecologic] : Gynecologic [Infectious] : Infectious [Musculoskeletal] : Musculoskeletal [Neurologic] : Neurologic [Pain] : Pain [Pulmonary] : Pulmonary [Hematologic] : Hematologic [IIB] : IIB [de-identified] : \par 45 y/o G0 presents for evaluation of newly diagnosed ovarian cancer. \par \victoriano Presented to the ED at [facility] on 1/25/17 with a complaints of abdominal pain. Had imaging that showed a suspicious pelvic mass. Taken emergently to the OR by general gyn out of concern for acute abdominal pain. Had a laparoscopy with conversion to ELAP via PFAN for left salpingo-oophorectomy. 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 \par \par 1/25/2017- CA-125 = 186, CA 19-9 < 1\par 1/31/2017- CT Abd/pelvis- no bowel obstruction (sent to the ED for evaluation of abdominal pain). \par \par Had been on Depo- provera for approximately 4 years, reportedly for management of endometriosis. \par Reports a family history of ovarian cancer. \par Patient subsequently underwent completion surgery on 2/28/17. She is s/p RTLH, RSO, resection left adnexal remnant, omentectomy, P&PA LND, resection of pelvic nodules, staging . \par Final Pathology: Clear cell ovarian cancer, stage II B. [de-identified] : DOS: 2/28/17\par Carboplatin and weekly Taxol 4/6/17- 6/1/17(three cycles)\par Carboplatin and Taxol q3w 6/29/17- 8/10/17( three cycles)\par Keytruda 7/9/18 -8/23/18 (3 cycles) [FreeTextEntry1] : Doxil\par C1 - 9/27/18\par C2 - 10/25/18\par C3 - 11/23/18\par C4 - 12/20/18 [de-identified] : Patient ehre for follow up with multiple complaints.  She has worsening fatigue, intermittent right sided abdominal pain, intermittent knee pain, all of which she has had with priro cycles.  She also has a mild skin rash to left chest, less than with last cycle but has been on steroids (finished a few days ago).  She reports a severe headache last week which was new but resolved about 2 days ago.  She has worsening hot flashes, night sweats and depression.  She does not get out of the house much because she does not feel well enough to.  She denies fever, chest pain, SOB, nausea, vomiting, diarrhea, constipation, bleeding.  Patient and and her spouse express concern that the chemo dose needs to be reduced from toxicity.

## 2019-01-25 NOTE — ASSESSMENT
[Palliative] : Goals of care discussed with patient: Palliative [Palliative Care Plan] : not applicable at this time [FreeTextEntry1] : 48 year old woman with relapsed ovarian cancer on Doxil.\par Multiple complaints which are chronic and overall stable.\par Fatigue is worsening and headache was new.  Patient to call if headache returns.\par Long discussion with patient and her spouse that some of these symptoms are from the treatment and some from the cancer itself. Will lower dose with next cycle to see if better tolerated.\par Plan is to continue repeat CT scans after 6th cycle or sooner if clinically indicated.\par Continue follow up with Dr. Arellano for pain/palliative.

## 2019-01-27 NOTE — REVIEW OF SYSTEMS
[Feeling Poorly] : feeling poorly [Negative] : Gastrointestinal [Fever] : no fever [Chills] : no chills

## 2019-01-27 NOTE — DATA REVIEWED
[FreeTextEntry1] : CT A/P (8/28/18) - CHEST: \par LUNGS AND LARGE AIRWAYS: Patent central airways. No pulmonary nodules. \par PLEURA: No pleural effusion. \par VESSELS: Within normal limits. \par HEART: Heart size is normal. No pericardial effusion. \par MEDIASTINUM AND JOSE: No lymphadenopathy. \par CHEST WALL AND LOWER NECK: Right chest wall chemotherapy port. \par ABDOMEN AND PELVIS: \par LIVER: Within normal limits. \par BILE DUCTS: Normal caliber. \par GALLBLADDER: Within normal limits. \par SPLEEN: Within normal limits. \par PANCREAS: Within normal limits. \par ADRENALS: Within normal limits. \par KIDNEYS/URETERS: Within normal limits. \par \par BLADDER: Within normal limits. \par REPRODUCTIVE ORGANS: Hysterectomy. \par \par BOWEL: No bowel obstruction. The appendix is normal. \par PERITONEUM: Mild ascites. Enlarging 1.8 x 2.2 cm right lower quadrant peritoneal implant previously 1.1 x 1.3 cm. \par VESSELS: Within normal limits. \par RETROPERITONEUM: A 1.6 x 1.1 cm right external iliac node on image 136 previously less than 5 mm short axis. A 6 mm lower pelvic implant versus postoperative change on image 131. \par ABDOMINAL WALL: Postoperative changes of the lower abdominal wall. \par BONES: Within normal limits. \par \par IMPRESSION: \par Enlarging peritoneal implant in the right lower quadrant. Mild ascites. Right external iliac node increase in size.

## 2019-01-27 NOTE — ASSESSMENT
[______] : HCP: [unfilled] [FreeTextEntry1] : 48yoF with:\par \par 1. Ovarian cancer - s/p sx and adjuvant chemo, POD on Pembro and now on monthly Doxil. \par Prognosis is poor given refractory disease; patient and wife are aware of the terminal nature of her diagnosis but hopeful that treatment will extend life as long as possible.\par \par 2. Neoplasm-related pain - C/w Methadone 10/5/5, PRN Hydromorphone to 4mg. \par \par 3. Anxiety/depression - C/w Mirtazapine 15mg QHS.  Pt would benefit from improvement in anti-depressant regimen however more acutely she is experiencing significant marital tension and reactive anger. She expresses that she needs to find a way to calm herself down in the moments when she is worked up. Worked on brainstorming ways for Hailey and Parvin to come together in a more peaceful way; Parvin needs to find a way to vent her emotions to a third party as Hailey is not in a mental state to handle Parvin's emotions and anxiety.  It is extremely important that Parvin find a way to care for herself as she is experiencing significant caregiver burnout. \par Course of Lorazepam 0.5mg prescribed to be used during moments of high anxiety. \par Will re-visit the topic of adding a second anti-depressant vs. switching off Mirtazapine altogether to another anti-depressant as presently they have hesitations on starting the Venlafaxine that was recently prescribed. \par \par 4. Opioid-related constipation - daily lactulose helping\par \par 5. Encounter for Palliative Care - Emotional support provided to patient. HCP has already been done, is wife, Parvin Shetty ([phone]). In regard to advance directives, patient states she would not want to have heroic/aggressive interventions done on her if she has "no hope" of recovery.  She has previously communicated this to her wife and other family. \par \par RTO 1 month, sooner if needed

## 2019-01-27 NOTE — PHYSICAL EXAM
[General Appearance - Alert] : alert [General Appearance - In No Acute Distress] : in no acute distress [Sclera] : the sclera and conjunctiva were normal [Normal Oral Mucosa] : normal oral mucosa [Neck Appearance] : the appearance of the neck was normal [Auscultation Breath Sounds / Voice Sounds] : lungs were clear to auscultation bilaterally [Heart Rate And Rhythm] : heart rate was normal and rhythm regular [Heart Sounds] : normal S1 and S2 [Edema] : there was no peripheral edema [Bowel Sounds] : normal bowel sounds [Abdomen Soft] : soft [Abdomen Tenderness] : non-tender [Oriented To Time, Place, And Person] : oriented to person, place, and time [Affect] : the affect was normal [FreeTextEntry1] : +maculopapular rash on trunk, arms

## 2019-02-01 ENCOUNTER — OUTPATIENT (OUTPATIENT)
Dept: OUTPATIENT SERVICES | Facility: HOSPITAL | Age: 49
LOS: 1 days | Discharge: HOME | End: 2019-02-01

## 2019-02-01 DIAGNOSIS — Z98.890 OTHER SPECIFIED POSTPROCEDURAL STATES: Chronic | ICD-10-CM

## 2019-02-01 DIAGNOSIS — C56.2 MALIGNANT NEOPLASM OF LEFT OVARY: ICD-10-CM

## 2019-02-01 DIAGNOSIS — Z95.828 PRESENCE OF OTHER VASCULAR IMPLANTS AND GRAFTS: Chronic | ICD-10-CM

## 2019-02-01 DIAGNOSIS — Z90.721 ACQUIRED ABSENCE OF OVARIES, UNILATERAL: Chronic | ICD-10-CM

## 2019-02-04 ENCOUNTER — EMERGENCY (EMERGENCY)
Facility: HOSPITAL | Age: 49
LOS: 1 days | Discharge: HOME | End: 2019-02-04
Attending: EMERGENCY MEDICINE | Admitting: EMERGENCY MEDICINE
Payer: MEDICAID

## 2019-02-04 VITALS
HEART RATE: 87 BPM | OXYGEN SATURATION: 99 % | RESPIRATION RATE: 16 BRPM | DIASTOLIC BLOOD PRESSURE: 65 MMHG | TEMPERATURE: 98 F | SYSTOLIC BLOOD PRESSURE: 93 MMHG

## 2019-02-04 VITALS
TEMPERATURE: 99 F | OXYGEN SATURATION: 95 % | HEART RATE: 59 BPM | RESPIRATION RATE: 18 BRPM | DIASTOLIC BLOOD PRESSURE: 54 MMHG | SYSTOLIC BLOOD PRESSURE: 92 MMHG

## 2019-02-04 DIAGNOSIS — Z95.828 PRESENCE OF OTHER VASCULAR IMPLANTS AND GRAFTS: Chronic | ICD-10-CM

## 2019-02-04 DIAGNOSIS — Z98.890 OTHER SPECIFIED POSTPROCEDURAL STATES: Chronic | ICD-10-CM

## 2019-02-04 DIAGNOSIS — Z90.721 ACQUIRED ABSENCE OF OVARIES, UNILATERAL: Chronic | ICD-10-CM

## 2019-02-04 LAB
ALBUMIN SERPL ELPH-MCNC: 3.6 G/DL — SIGNIFICANT CHANGE UP (ref 3.3–5)
ALP SERPL-CCNC: 195 U/L — HIGH (ref 40–120)
ALT FLD-CCNC: 24 U/L — SIGNIFICANT CHANGE UP (ref 4–33)
ANION GAP SERPL CALC-SCNC: 14 MMO/L — SIGNIFICANT CHANGE UP (ref 7–14)
APPEARANCE UR: SIGNIFICANT CHANGE UP
AST SERPL-CCNC: 39 U/L — HIGH (ref 4–32)
BACTERIA # UR AUTO: NEGATIVE — SIGNIFICANT CHANGE UP
BASE EXCESS BLDV CALC-SCNC: 4.1 MMOL/L — SIGNIFICANT CHANGE UP
BASOPHILS # BLD AUTO: 0.03 K/UL — SIGNIFICANT CHANGE UP (ref 0–0.2)
BASOPHILS NFR BLD AUTO: 0.7 % — SIGNIFICANT CHANGE UP (ref 0–2)
BILIRUB SERPL-MCNC: 0.2 MG/DL — SIGNIFICANT CHANGE UP (ref 0.2–1.2)
BILIRUB UR-MCNC: NEGATIVE — SIGNIFICANT CHANGE UP
BLOOD GAS VENOUS - CREATININE: 0.78 MG/DL — SIGNIFICANT CHANGE UP (ref 0.5–1.3)
BLOOD UR QL VISUAL: NEGATIVE — SIGNIFICANT CHANGE UP
BUN SERPL-MCNC: 10 MG/DL — SIGNIFICANT CHANGE UP (ref 7–23)
CALCIUM SERPL-MCNC: 9.7 MG/DL — SIGNIFICANT CHANGE UP (ref 8.4–10.5)
CHLORIDE BLDV-SCNC: 106 MMOL/L — SIGNIFICANT CHANGE UP (ref 96–108)
CHLORIDE SERPL-SCNC: 101 MMOL/L — SIGNIFICANT CHANGE UP (ref 98–107)
CO2 SERPL-SCNC: 25 MMOL/L — SIGNIFICANT CHANGE UP (ref 22–31)
COLOR SPEC: SIGNIFICANT CHANGE UP
CREAT SERPL-MCNC: 0.86 MG/DL — SIGNIFICANT CHANGE UP (ref 0.5–1.3)
EOSINOPHIL # BLD AUTO: 0.16 K/UL — SIGNIFICANT CHANGE UP (ref 0–0.5)
EOSINOPHIL NFR BLD AUTO: 3.8 % — SIGNIFICANT CHANGE UP (ref 0–6)
GAS PNL BLDV: 135 MMOL/L — LOW (ref 136–146)
GLUCOSE BLDV-MCNC: 82 — SIGNIFICANT CHANGE UP (ref 70–99)
GLUCOSE SERPL-MCNC: 85 MG/DL — SIGNIFICANT CHANGE UP (ref 70–99)
GLUCOSE UR-MCNC: NEGATIVE — SIGNIFICANT CHANGE UP
HCO3 BLDV-SCNC: 26 MMOL/L — SIGNIFICANT CHANGE UP (ref 20–27)
HCT VFR BLD CALC: 34.9 % — SIGNIFICANT CHANGE UP (ref 34.5–45)
HCT VFR BLDV CALC: 33.7 % — LOW (ref 34.5–45)
HGB BLD-MCNC: 10.7 G/DL — LOW (ref 11.5–15.5)
HGB BLDV-MCNC: 10.9 G/DL — LOW (ref 11.5–15.5)
HYALINE CASTS # UR AUTO: NEGATIVE — SIGNIFICANT CHANGE UP
IMM GRANULOCYTES NFR BLD AUTO: 0.5 % — SIGNIFICANT CHANGE UP (ref 0–1.5)
KETONES UR-MCNC: NEGATIVE — SIGNIFICANT CHANGE UP
LACTATE BLDV-MCNC: 1.4 MMOL/L — SIGNIFICANT CHANGE UP (ref 0.5–2)
LEUKOCYTE ESTERASE UR-ACNC: NEGATIVE — SIGNIFICANT CHANGE UP
LIDOCAIN IGE QN: 14.7 U/L — SIGNIFICANT CHANGE UP (ref 7–60)
LYMPHOCYTES # BLD AUTO: 0.71 K/UL — LOW (ref 1–3.3)
LYMPHOCYTES # BLD AUTO: 17.1 % — SIGNIFICANT CHANGE UP (ref 13–44)
MCHC RBC-ENTMCNC: 28.1 PG — SIGNIFICANT CHANGE UP (ref 27–34)
MCHC RBC-ENTMCNC: 30.7 % — LOW (ref 32–36)
MCV RBC AUTO: 91.6 FL — SIGNIFICANT CHANGE UP (ref 80–100)
MONOCYTES # BLD AUTO: 0.58 K/UL — SIGNIFICANT CHANGE UP (ref 0–0.9)
MONOCYTES NFR BLD AUTO: 13.9 % — SIGNIFICANT CHANGE UP (ref 2–14)
NEUTROPHILS # BLD AUTO: 2.66 K/UL — SIGNIFICANT CHANGE UP (ref 1.8–7.4)
NEUTROPHILS NFR BLD AUTO: 64 % — SIGNIFICANT CHANGE UP (ref 43–77)
NITRITE UR-MCNC: NEGATIVE — SIGNIFICANT CHANGE UP
NRBC # FLD: 0 K/UL — LOW (ref 25–125)
PCO2 BLDV: 56 MMHG — HIGH (ref 41–51)
PH BLDV: 7.34 PH — SIGNIFICANT CHANGE UP (ref 7.32–7.43)
PH UR: 6 — SIGNIFICANT CHANGE UP (ref 5–8)
PLATELET # BLD AUTO: 160 K/UL — SIGNIFICANT CHANGE UP (ref 150–400)
PMV BLD: 11.9 FL — SIGNIFICANT CHANGE UP (ref 7–13)
PO2 BLDV: 21 MMHG — LOW (ref 35–40)
POTASSIUM BLDV-SCNC: 4.7 MMOL/L — HIGH (ref 3.4–4.5)
POTASSIUM SERPL-MCNC: 5.1 MMOL/L — SIGNIFICANT CHANGE UP (ref 3.5–5.3)
POTASSIUM SERPL-SCNC: 5.1 MMOL/L — SIGNIFICANT CHANGE UP (ref 3.5–5.3)
PROT SERPL-MCNC: 7.1 G/DL — SIGNIFICANT CHANGE UP (ref 6–8.3)
PROT UR-MCNC: NEGATIVE — SIGNIFICANT CHANGE UP
RBC # BLD: 3.81 M/UL — SIGNIFICANT CHANGE UP (ref 3.8–5.2)
RBC # FLD: 14.1 % — SIGNIFICANT CHANGE UP (ref 10.3–14.5)
RBC CASTS # UR COMP ASSIST: SIGNIFICANT CHANGE UP (ref 0–?)
SAO2 % BLDV: 26 % — LOW (ref 60–85)
SODIUM SERPL-SCNC: 140 MMOL/L — SIGNIFICANT CHANGE UP (ref 135–145)
SP GR SPEC: 1.01 — SIGNIFICANT CHANGE UP (ref 1–1.04)
SQUAMOUS # UR AUTO: SIGNIFICANT CHANGE UP
UROBILINOGEN FLD QL: NORMAL — SIGNIFICANT CHANGE UP
WBC # BLD: 4.16 K/UL — SIGNIFICANT CHANGE UP (ref 3.8–10.5)
WBC # FLD AUTO: 4.16 K/UL — SIGNIFICANT CHANGE UP (ref 3.8–10.5)
WBC UR QL: SIGNIFICANT CHANGE UP (ref 0–?)

## 2019-02-04 PROCEDURE — 99285 EMERGENCY DEPT VISIT HI MDM: CPT

## 2019-02-04 PROCEDURE — 74176 CT ABD & PELVIS W/O CONTRAST: CPT | Mod: 26

## 2019-02-04 RX ORDER — HYDROMORPHONE HYDROCHLORIDE 2 MG/ML
1 INJECTION INTRAMUSCULAR; INTRAVENOUS; SUBCUTANEOUS ONCE
Qty: 0 | Refills: 0 | Status: DISCONTINUED | OUTPATIENT
Start: 2019-02-04 | End: 2019-02-04

## 2019-02-04 RX ADMIN — HYDROMORPHONE HYDROCHLORIDE 1 MILLIGRAM(S): 2 INJECTION INTRAMUSCULAR; INTRAVENOUS; SUBCUTANEOUS at 13:44

## 2019-02-04 RX ADMIN — HYDROMORPHONE HYDROCHLORIDE 1 MILLIGRAM(S): 2 INJECTION INTRAMUSCULAR; INTRAVENOUS; SUBCUTANEOUS at 17:25

## 2019-02-04 RX ADMIN — HYDROMORPHONE HYDROCHLORIDE 1 MILLIGRAM(S): 2 INJECTION INTRAMUSCULAR; INTRAVENOUS; SUBCUTANEOUS at 14:14

## 2019-02-04 NOTE — ED ADULT TRIAGE NOTE - CHIEF COMPLAINT QUOTE
Pt complaining of R side abdominal pain, pt states she had a hernia repair in 03/2018. Pt currently being treated for ovarian ca with mets, last chemo was Thursday. Pt denies chest pain, SOB, n/v/d, fever or chills.

## 2019-02-04 NOTE — ED PROVIDER NOTE - MEDICAL DECISION MAKING DETAILS
Traci: Ovarian cancer, s/p surgery, on chemo infusions, p/w R abd pain. Takes steroids and opiates. TTP R lower abd. Will give pain meds, call her Onc. regarding appropriateness of imaging.

## 2019-02-04 NOTE — ED PROVIDER NOTE - ATTENDING CONTRIBUTION TO CARE
I performed a face-to-face evaluation of the patient and performed a history and physical examination. I agree with the history and physical examination.    Traci: Ovarian cancer, s/p surgery, on chemo infusions, p/w R abd pain. Takes steroids and opiates. TTP R lower abd. Will give pain meds, call her Onc. regarding appropriateness of imaging, such as for early SBO (though is having BMs).

## 2019-02-04 NOTE — ED PROVIDER NOTE - OBJECTIVE STATEMENT
47yo F hx ovarian ca s/p hysterectomy, FLAVIA oophorectomy, surgical removal, cancer return with mets now on weekly7 chemo infusions on thursdays with worsening constant right sided abdominal pain for 4 days. Slightly better with methadone and dialudid. also on steroids daily. Last CT was dec, growing in size cancer. Denies sob, cp, dysuria. Normal BMs, passing gas.   Next appt with cancer doc on thursday. Sees Dr. Jacey Arreola. 49yo F hx ovarian ca s/p hysterectomy, FLAVIA oophorectomy, surgical removal, cancer return with mets now on weekly7 chemo infusions on thursdays with worsening constant right sided abdominal pain for 4 days. Slightly better with methadone and dialudid. also on steroids daily. Last CT was dec, growing in size cancer. Denies sob, cp, dysuria. Normal BMs, passing gas.   Next appt with cancer doc on Thursday. Sees Dr. Jacey Arreola.

## 2019-02-04 NOTE — ED ADULT NURSE NOTE - NSIMPLEMENTINTERV_GEN_ALL_ED
Implemented All Fall Risk Interventions:  [place] to call system. Call bell, personal items and telephone within reach. Instruct patient to call for assistance. Room bathroom lighting operational. Non-slip footwear when patient is off stretcher. Physically safe environment: no spills, clutter or unnecessary equipment. Stretcher in lowest position, wheels locked, appropriate side rails in place. Provide visual cue, wrist band, yellow gown, etc. Monitor gait and stability. Monitor for mental status changes and reorient to person, place, and time. Review medications for side effects contributing to fall risk. Reinforce activity limits and safety measures with patient and family.

## 2019-02-04 NOTE — ED ADULT NURSE NOTE - OBJECTIVE STATEMENT
Pt is AOX4, skin warm, dry and intact.  Presents with c/o abdominal pain below umbilicus since Thursday.  Pt endorses dysuria, concentrated urine, with burning.  IV access obtained, labs drawn and sent.  Pt give call bell, and instructed to call for assistance prior to ambulation.  Pt verbalized understanding of same.  Pt medicated for pain.  Continue to monitor.

## 2019-02-04 NOTE — ED PROVIDER NOTE - PHYSICAL EXAMINATION
Gen: No acute distress, alert, cooperative  HENT: Normocephalic, atraumatic. External ears normal, no rhinorrhea, moist mucous membranes, normal conjunctiva  Eyes: PERRLA, EOMI    Resp: CTAB, non-labored, speaking without difficulty on room air, no wheeze  CV: rrr, no murmur  Abd: soft, tender RLQ and mildly tender RUQ and suprapubic area, ND, no rebound or guarding  MSK: No CVAT bilaterally, no midline ttp  Skin: warm and dry  Neuro: AOx3, speech is fluent and appropriate, no focal deficits  Psych: Normal affect

## 2019-02-04 NOTE — ED PROVIDER NOTE - NSFOLLOWUPINSTRUCTIONS_ED_ALL_ED_FT
-Follow-up with your Primary Care Doctor in 1-2 days.  -Follow-up with oncologist within the week  -Return to the Emergency Department for any worsening or concerning symptoms such as fevers, severe pain, trouble breathing, weakness or lightheadedness.  -You can take an extra dose of Dilaudid per day.

## 2019-02-05 LAB — SPECIMEN SOURCE: SIGNIFICANT CHANGE UP

## 2019-02-07 LAB
-  AMIKACIN: SIGNIFICANT CHANGE UP
-  AMPICILLIN/SULBACTAM: SIGNIFICANT CHANGE UP
-  AMPICILLIN: SIGNIFICANT CHANGE UP
-  AZTREONAM: SIGNIFICANT CHANGE UP
-  CEFAZOLIN: SIGNIFICANT CHANGE UP
-  CEFEPIME: SIGNIFICANT CHANGE UP
-  CEFOXITIN: SIGNIFICANT CHANGE UP
-  CEFTAZIDIME: SIGNIFICANT CHANGE UP
-  CEFTRIAXONE: SIGNIFICANT CHANGE UP
-  CIPROFLOXACIN: SIGNIFICANT CHANGE UP
-  ERTAPENEM: SIGNIFICANT CHANGE UP
-  GENTAMICIN: SIGNIFICANT CHANGE UP
-  IMIPENEM: SIGNIFICANT CHANGE UP
-  LEVOFLOXACIN: SIGNIFICANT CHANGE UP
-  MEROPENEM: SIGNIFICANT CHANGE UP
-  NITROFURANTOIN: SIGNIFICANT CHANGE UP
-  PIPERACILLIN/TAZOBACTAM: SIGNIFICANT CHANGE UP
-  TIGECYCLINE: SIGNIFICANT CHANGE UP
-  TOBRAMYCIN: SIGNIFICANT CHANGE UP
-  TRIMETHOPRIM/SULFAMETHOXAZOLE: SIGNIFICANT CHANGE UP
BACTERIA UR CULT: SIGNIFICANT CHANGE UP
METHOD TYPE: SIGNIFICANT CHANGE UP
ORGANISM # SPEC MICROSCOPIC CNT: SIGNIFICANT CHANGE UP
ORGANISM # SPEC MICROSCOPIC CNT: SIGNIFICANT CHANGE UP

## 2019-02-08 RX ORDER — NITROFURANTOIN MACROCRYSTAL 50 MG
1 CAPSULE ORAL
Qty: 14 | Refills: 0 | OUTPATIENT
Start: 2019-02-08 | End: 2019-02-14

## 2019-02-12 ENCOUNTER — APPOINTMENT (OUTPATIENT)
Dept: INFUSION THERAPY | Facility: HOSPITAL | Age: 49
End: 2019-02-12

## 2019-02-12 ENCOUNTER — LABORATORY RESULT (OUTPATIENT)
Age: 49
End: 2019-02-12

## 2019-02-12 ENCOUNTER — APPOINTMENT (OUTPATIENT)
Dept: HEMATOLOGY ONCOLOGY | Facility: CLINIC | Age: 49
End: 2019-02-12
Payer: MEDICAID

## 2019-02-12 VITALS
TEMPERATURE: 98.8 F | DIASTOLIC BLOOD PRESSURE: 66 MMHG | HEART RATE: 79 BPM | WEIGHT: 149.67 LBS | RESPIRATION RATE: 16 BRPM | BODY MASS INDEX: 30.23 KG/M2 | SYSTOLIC BLOOD PRESSURE: 98 MMHG | OXYGEN SATURATION: 94 %

## 2019-02-12 DIAGNOSIS — Z87.440 PERSONAL HISTORY OF URINARY (TRACT) INFECTIONS: ICD-10-CM

## 2019-02-12 PROCEDURE — 99215 OFFICE O/P EST HI 40 MIN: CPT

## 2019-02-14 ENCOUNTER — APPOINTMENT (OUTPATIENT)
Dept: GERIATRICS | Facility: CLINIC | Age: 49
End: 2019-02-14
Payer: MEDICAID

## 2019-02-14 ENCOUNTER — APPOINTMENT (OUTPATIENT)
Dept: INFUSION THERAPY | Facility: HOSPITAL | Age: 49
End: 2019-02-14

## 2019-02-14 VITALS
TEMPERATURE: 97.9 F | RESPIRATION RATE: 16 BRPM | SYSTOLIC BLOOD PRESSURE: 112 MMHG | DIASTOLIC BLOOD PRESSURE: 81 MMHG | WEIGHT: 147.71 LBS | OXYGEN SATURATION: 97 % | HEART RATE: 85 BPM | BODY MASS INDEX: 29.83 KG/M2

## 2019-02-14 DIAGNOSIS — K64.9 UNSPECIFIED HEMORRHOIDS: ICD-10-CM

## 2019-02-14 DIAGNOSIS — B36.9 SUPERFICIAL MYCOSIS, UNSPECIFIED: ICD-10-CM

## 2019-02-14 PROCEDURE — 99215 OFFICE O/P EST HI 40 MIN: CPT

## 2019-02-21 ENCOUNTER — APPOINTMENT (OUTPATIENT)
Dept: HEMATOLOGY ONCOLOGY | Facility: CLINIC | Age: 49
End: 2019-02-21
Payer: MEDICAID

## 2019-02-21 VITALS
WEIGHT: 151.24 LBS | TEMPERATURE: 98.4 F | SYSTOLIC BLOOD PRESSURE: 112 MMHG | RESPIRATION RATE: 18 BRPM | HEIGHT: 59 IN | BODY MASS INDEX: 30.49 KG/M2 | OXYGEN SATURATION: 100 % | DIASTOLIC BLOOD PRESSURE: 74 MMHG | HEART RATE: 73 BPM

## 2019-02-21 PROCEDURE — 99215 OFFICE O/P EST HI 40 MIN: CPT

## 2019-02-22 NOTE — ASSESSMENT
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 We reviewed her overall chemotherapy course and expectations with further palliative chemotherapy. We explained that while we want her to be in remission, it is less and less likely after recurrence and multiple lines of chemotherapy. We explained expectation will be stabilization of disease and palliation of pain. We explained the clear cell histology is less chemoresponsive. We explained the different chemotherapy options she has: gemcitabine, topotecan, alimta. We also reviewed clinical trial option. We reviewed pros and cons of each option. Given the side effect profile with each agent, we agree with her oncologist Dr Arreola to try gemcitabine D 1 D8 every 3 weeks. We reviewed potential side effects including but not limited to: fatigue, fevers, low blood counts, fever, allergic reaction, muscle pain, and constipation/ diarrhea. We reviewed alternative option: supportive care and reiterated that there will come a time where the risks of chemotherapy will outweigh any benefits. Questions answered to her and her wife's satisfaction. Written information was given to her. She consented to gemcitabine. Will continue goals of care with each visit. Will ask Dr Niels Hays if will benefit from lidocaine patch.

## 2019-02-22 NOTE — PHYSICAL EXAM
[Ambulatory and capable of all self care but unable to carry out any work activities] : Status 2- Ambulatory and capable of all self care but unable to carry out any work activities. Up and about more than 50% of waking hours [Normal] : affect appropriate [de-identified] : tenderness even on percussion over the RLQ and gentle palpation [de-identified] : pain over the right thigh on palpation

## 2019-02-22 NOTE — REVIEW OF SYSTEMS
[Negative] : Allergic/Immunologic [Fever] : no fever [Chills] : no chills [Night Sweats] : no night sweats [Fatigue] : fatigue [Recent Change In Weight] : ~T no recent weight change [Abdominal Pain] : abdominal pain [Vomiting] : no vomiting [Constipation] : constipation [Diarrhea] : no diarrhea [FreeTextEntry7] : constipation better with lactulose  [de-identified] : right upper extremity weakness

## 2019-02-22 NOTE — HISTORY OF PRESENT ILLNESS
[Disease: _____________________] : Disease: [unfilled] [AJCC Stage: ____] : AJCC Stage: [unfilled] [Cardiovascular] : Cardiovascular [Constitutional] : Constitutional [ENT] : ENT [Dermatologic] : Dermatologic [Endocrine] : Endocrine [Gastrointestinal] : Gastrointestinal [Genitourinary] : Genitourinary [Gynecologic] : Gynecologic [Infectious] : Infectious [Musculoskeletal] : Musculoskeletal [Neurologic] : Neurologic [Pain] : Pain [Pulmonary] : Pulmonary [Hematologic] : Hematologic [IIB] : IIB [de-identified] : Age: 47 y/o diagnosed ovarian cancer. \victoriano Presented to the ED at [facility] on 1/25/17 with a complaints of abdominal pain. Had imaging that showed a suspicious pelvic mass. Taken emergently to the OR by general gyn out of concern for acute abdominal pain. Had a laparoscopy with conversion to ELAP via PFAN for left salpingo-oophorectomy. 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 1/31/2017- CT Abd/pelvis- no bowel obstruction (sent to the ED for evaluation of abdominal pain). Had been on Depo- provera for approximately 4 years, reportedly for management of endometriosis. Had a family history of ovarian cancer. On 2/28/17, she underwent RTLH, RSO, resection left adnexal remnant, omentectomy, P&PA LND, resection of pelvic nodules, staging . Final Pathology: Clear cell ovarian cancer, stage II B. She completed 6 cycles of carboplatin/ paclitaxel in 8/2017 and was in remission until 12/2017 which showed new foci in the rectus sheath. [facility] sent and had PDL1 1%, MSI stable, TMB low and was started on Keytruda where she felt the neuropathy became worse: pain over the fingers and hands, fatigue, not feeling well. She had progression on immunotherapy and was switched to Doxil which she tolerated better except for rashes over the body and mouth sores.  [de-identified] : 1/25/2017- CA-125 = 186, CA 19-9 < 1 [de-identified] : DOS: 2/28/17\par Carboplatin and weekly Taxol 4/6/17- 6/1/17(three cycles)\par Carboplatin and Taxol q3w 6/29/17- 8/10/17( three cycles)\par Keytruda 7/9/18 -8/23/18 (3 cycles)\par Doxil: 8/2018 to 1/2019 [FreeTextEntry1] : \par C1 - 9/27/18\par C2 - 10/25/18\par C3 - 11/23/18\par C4 - 12/20/18\par C5 - 1/17/19 [de-identified] : Presented after progression on Doxil and wanted another opinion/ transfer of care. Currently feels she is impacted by abdominal pain that limits her ability to do things: has to hold on to her abdomen: pain radiates from her abdomen to her thigh. Improved by pain medication but feels it wears off: on Methadone 10 mg AM/Afternoon 5 MG Bedtime \par She reports 5 hours of  relief with Methadone.  With Dilaudid she only gets 2.45 hours of relief and can be shaking in pain. Also uses [place] [employer] and binder to help with pain. Due to pain and fatigue, limited from doing her favorite activities: building things at home and gardening. She would like to be able to say she is not in pain and mean it. She also would like to be in remission again and has many questions: can she go back on carboplatin/ paclitaxel, can she go on PARP inhibitor; can she go on anything that will bring her into remission.

## 2019-02-25 NOTE — HISTORY OF PRESENT ILLNESS
[FreeTextEntry1] : 48yoF with ovarian cancer Stage IIb s/p p RTLH, RSO, resection left adnexal remnant, omentectomy, P&PA LND (2/28/17), s/p Carboplatin, Taxol with disease recurrence presents for follow up visit.  PMH also significant for vertigo, migraines.  Had POD on Pembrolizumab, now on monthly Doxil with recent scan showing POD.\par \par Pt was in ER last week with severe abdominal pain.  The abdominal pain is persistent, she rates it at 4/10 presently. Has been using Methadone 10/5/5 and hydromorphone 4mg Q4h PRN, which she uses every 4 hours ATC. \par \par Appetite remains low. Lately she only craves cereal. She plans to buy a nutritional supplement drink. \par \par Has been having diarrhea and difficulty with hemorrhoids. \par \par +submammary rash\par +intermittent sweats\par \par Continues to use vaporized cannabis (dose unknown) that wife purchased from [place]. Helps her to calm down as she has been getting very anxious and sad lately. Was prescribed Venlafaxine last week for her depressed mood and anxiety. She did not start using it yet as her wife was concerned about the side effects.  \par \par ROS: \par +metallic taste, started using plastic utensils when eating which has helped. \par +oral sores from the chemotherapy - uses magic mouthwash \par Denies constipation, takes lactulose daily which has been helping. \par Appetite is poor\par No n/v\par \par I-Stop Ref#: [id number]

## 2019-02-25 NOTE — ASSESSMENT
[______] : HCP: [unfilled] [FreeTextEntry1] : 48yoF with:\par \par 1. Ovarian cancer - s/p sx and adjuvant chemo, POD on Pembro and now has POD on monthly Doxil. \par Prognosis is poor given refractory disease; patient and wife are aware of the terminal nature of her diagnosis but hopeful that treatment will extend life as long as possible. Given the most recent progression of disease and hearing that there are few treatment options available at this point patient is more fearful about dying and is willing to try any treatment in order to extend her life. Will be meeting with Dr. Uriel Cole for a second oncologic opinion. \par \par 2. Neoplasm-related pain - Increase Methadone to 10/10/5, c/w PRN Hydromorphone 4mg. \par \par 3. Anxiety/depression - C/w Mirtazapine 15mg QHS. \par \par 4. Hemorrhoids Lidocaine 5% ointment \par \par 5. Fungal rash - Topical nystatin powder prescribed.\par \par 6. Loose stools - Informed patient that she should hold lactulose if she is having loose stools. \par \par 7. Encounter for Palliative Care - Emotional support provided to patient. HCP has already been done, is wife, Parvin Shetty ([phone]). In regard to advance directives, patient states she would not want to have heroic/aggressive interventions done on her if she has "no hope" of recovery.  She has previously communicated this to her wife and other family. \par \par RTO 1 month, sooner if needed

## 2019-02-26 ENCOUNTER — APPOINTMENT (OUTPATIENT)
Dept: INFUSION THERAPY | Facility: HOSPITAL | Age: 49
End: 2019-02-26

## 2019-02-26 ENCOUNTER — RX RENEWAL (OUTPATIENT)
Age: 49
End: 2019-02-26

## 2019-02-28 ENCOUNTER — LABORATORY RESULT (OUTPATIENT)
Age: 49
End: 2019-02-28

## 2019-02-28 ENCOUNTER — RESULT REVIEW (OUTPATIENT)
Age: 49
End: 2019-02-28

## 2019-02-28 ENCOUNTER — APPOINTMENT (OUTPATIENT)
Dept: INFUSION THERAPY | Facility: HOSPITAL | Age: 49
End: 2019-02-28

## 2019-02-28 ENCOUNTER — APPOINTMENT (OUTPATIENT)
Dept: HEMATOLOGY ONCOLOGY | Facility: CLINIC | Age: 49
End: 2019-02-28
Payer: MEDICAID

## 2019-02-28 VITALS
RESPIRATION RATE: 16 BRPM | TEMPERATURE: 98.2 F | DIASTOLIC BLOOD PRESSURE: 81 MMHG | SYSTOLIC BLOOD PRESSURE: 124 MMHG | WEIGHT: 145.5 LBS | HEART RATE: 89 BPM | BODY MASS INDEX: 29.39 KG/M2 | OXYGEN SATURATION: 93 %

## 2019-02-28 LAB
HCT VFR BLD CALC: 32.5 % — LOW (ref 34.5–45)
HGB BLD-MCNC: 10.8 G/DL — LOW (ref 11.5–15.5)
MCHC RBC-ENTMCNC: 28.8 PG — SIGNIFICANT CHANGE UP (ref 27–34)
MCHC RBC-ENTMCNC: 33.3 G/DL — SIGNIFICANT CHANGE UP (ref 32–36)
MCV RBC AUTO: 86.7 FL — SIGNIFICANT CHANGE UP (ref 80–100)
PLATELET # BLD AUTO: 313 K/UL — SIGNIFICANT CHANGE UP (ref 150–400)
RBC # BLD: 3.75 M/UL — LOW (ref 3.8–5.2)
RBC # FLD: 13.6 % — SIGNIFICANT CHANGE UP (ref 10.3–14.5)
WBC # BLD: 5.4 K/UL — SIGNIFICANT CHANGE UP (ref 3.8–10.5)
WBC # FLD AUTO: 5.4 K/UL — SIGNIFICANT CHANGE UP (ref 3.8–10.5)

## 2019-02-28 PROCEDURE — 99214 OFFICE O/P EST MOD 30 MIN: CPT

## 2019-03-01 ENCOUNTER — OUTPATIENT (OUTPATIENT)
Dept: OUTPATIENT SERVICES | Facility: HOSPITAL | Age: 49
LOS: 1 days | Discharge: HOME | End: 2019-03-01

## 2019-03-01 DIAGNOSIS — R11.2 NAUSEA WITH VOMITING, UNSPECIFIED: ICD-10-CM

## 2019-03-01 DIAGNOSIS — Z98.890 OTHER SPECIFIED POSTPROCEDURAL STATES: Chronic | ICD-10-CM

## 2019-03-01 DIAGNOSIS — C56.2 MALIGNANT NEOPLASM OF LEFT OVARY: ICD-10-CM

## 2019-03-01 DIAGNOSIS — Z90.721 ACQUIRED ABSENCE OF OVARIES, UNILATERAL: Chronic | ICD-10-CM

## 2019-03-01 DIAGNOSIS — Z95.828 PRESENCE OF OTHER VASCULAR IMPLANTS AND GRAFTS: Chronic | ICD-10-CM

## 2019-03-01 DIAGNOSIS — Z51.11 ENCOUNTER FOR ANTINEOPLASTIC CHEMOTHERAPY: ICD-10-CM

## 2019-03-04 NOTE — ASSESSMENT
[______] : HCP: [unfilled] [FreeTextEntry1] : 48yoF with:\par \par 1. Ovarian cancer - s/p sx and adjuvant chemo, POD on Pembro and now has POD on monthly Doxil. To initiate Gemcitabine today. \par Prognosis is poor given refractory disease; patient and wife are aware of the terminal nature of her diagnosis but hopeful that treatment will extend life as long as possible. Given the most recent progression of disease and hearing that there are few treatment options available at this point patient is more fearful about dying and is willing to try any treatment in order to extend her life.  \par \par 2. Neoplasm-related pain/CIPN - C/w Methadone 10mg TID, c/w PRN Hydromorphone 4mg-8mg. Add Lidocaine 4% patch to low abdomen, might have some utility given that patient finds topical agents soothing. \par \par 3. Anxiety/depression - C/w Mirtazapine 15mg QHS. \par \par 4. Encounter for Palliative Care - Emotional support provided to patient. HCP has already been done, is wife, Parvin Shetty ([phone]). In regard to advance directives, patient states she would not want to have heroic/aggressive interventions done on her if she has "no hope" of recovery.  She has previously communicated this to her wife and other family. \par \par RTO 1 month, sooner if needed

## 2019-03-04 NOTE — HISTORY OF PRESENT ILLNESS
[FreeTextEntry1] : 48yoF with ovarian cancer Stage IIb s/p p RTLH, RSO, resection left adnexal remnant, omentectomy, P&PA LND (2/28/17), s/p Carboplatin, Taxol with disease recurrence presents for follow up visit.  PMH also significant for vertigo, migraines.  Had POD on Pembrolizumab, now on monthly Doxil with recent scan showing POD. Patient now under the care of Dr. Cole, plan is to initiate treatment with Gemcitabine, is scheduled for first infusion today. \par \par Patient has been progressively worsening pain in the R lower quadrant, non radiating.  Methadone dose is now at 10mg TID.  She is using Hydromorphone 8mg approximately every 4 hours.\par \par Two days ago she started using Lorazepam 0.5mg at night as pain/anxiety levels were very high.  She is sleeping better since starting it, finds that pain is more tolerable when she is calmer. \par \par ROS:\par +has been biting the sides of her tongue  - likely from anxiety\par +having trouble urinating, sits on toilet for 30-60 minutes sometimes to try to get urine out. Mild dysuria. Urine is dark.\par Denies constipation, takes lactulose daily which has been helping. \par Appetite is poor\par No n/v \par \par I-Stop Ref#: [id number]

## 2019-03-07 ENCOUNTER — RESULT REVIEW (OUTPATIENT)
Age: 49
End: 2019-03-07

## 2019-03-07 ENCOUNTER — APPOINTMENT (OUTPATIENT)
Dept: INFUSION THERAPY | Facility: HOSPITAL | Age: 49
End: 2019-03-07

## 2019-03-07 ENCOUNTER — APPOINTMENT (OUTPATIENT)
Dept: HEMATOLOGY ONCOLOGY | Facility: CLINIC | Age: 49
End: 2019-03-07
Payer: MEDICAID

## 2019-03-07 VITALS
OXYGEN SATURATION: 98 % | HEART RATE: 82 BPM | WEIGHT: 147.71 LBS | RESPIRATION RATE: 18 BRPM | DIASTOLIC BLOOD PRESSURE: 70 MMHG | BODY MASS INDEX: 29.83 KG/M2 | SYSTOLIC BLOOD PRESSURE: 120 MMHG | TEMPERATURE: 98.3 F

## 2019-03-07 LAB
HCT VFR BLD CALC: 45 % — SIGNIFICANT CHANGE UP (ref 34.5–45)
HGB BLD-MCNC: 14.8 G/DL — SIGNIFICANT CHANGE UP (ref 11.5–15.5)
MCHC RBC-ENTMCNC: 28.4 PG — SIGNIFICANT CHANGE UP (ref 27–34)
MCHC RBC-ENTMCNC: 32.8 G/DL — SIGNIFICANT CHANGE UP (ref 32–36)
MCV RBC AUTO: 86.6 FL — SIGNIFICANT CHANGE UP (ref 80–100)
PLATELET # BLD AUTO: 133 K/UL — LOW (ref 150–400)
RBC # BLD: 5.19 M/UL — SIGNIFICANT CHANGE UP (ref 3.8–5.2)
RBC # FLD: 14.7 % — HIGH (ref 10.3–14.5)
WBC # BLD: 4.4 K/UL — SIGNIFICANT CHANGE UP (ref 3.8–10.5)
WBC # FLD AUTO: 4.4 K/UL — SIGNIFICANT CHANGE UP (ref 3.8–10.5)

## 2019-03-07 PROCEDURE — 99215 OFFICE O/P EST HI 40 MIN: CPT

## 2019-03-07 RX ORDER — METHYLPREDNISOLONE 4 MG/1
4 TABLET ORAL
Qty: 1 | Refills: 0 | Status: DISCONTINUED | COMMUNITY
Start: 2018-11-30 | End: 2019-03-07

## 2019-03-07 RX ORDER — CAMPHOR 0.45 %
25 GEL (GRAM) TOPICAL EVERY 6 HOURS
Qty: 80 | Refills: 0 | Status: DISCONTINUED | COMMUNITY
Start: 2018-11-08 | End: 2019-03-07

## 2019-03-08 DIAGNOSIS — R11.2 NAUSEA WITH VOMITING, UNSPECIFIED: ICD-10-CM

## 2019-03-08 DIAGNOSIS — Z51.11 ENCOUNTER FOR ANTINEOPLASTIC CHEMOTHERAPY: ICD-10-CM

## 2019-03-11 NOTE — PHYSICAL EXAM
[de-identified] : M [de-identified] : mucous membranes intact.  [de-identified] : Tattoos -   [de-identified] : ANALIA oliveros  [de-identified] : no tenderness with palpation, no guarding .  [de-identified] : no pain over the right thigh on palpation  [de-identified] : Tattoos to abdomen./trunk

## 2019-03-11 NOTE — REVIEW OF SYSTEMS
[Fever] : no fever [Chills] : no chills [Night Sweats] : no night sweats [Recent Change In Weight] : ~T no recent weight change [Vomiting] : no vomiting [Diarrhea] : no diarrhea [FreeTextEntry7] : constipation better with lactulose , abdominal pain decreaed.  [FreeTextEntry8] : Urinary frequency but hydrates well.  [de-identified] : Denies skin rash or itch  [de-identified] : No new neuropathic pain to diseal extremities.  Can feel dizzy at times with medication dosing.  [de-identified] : Night sweats at times.  Able to resume rest after changing clothes

## 2019-03-11 NOTE — ASSESSMENT
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   She is present with her wife today for Gemzar C1 Day 8.  She reported a significant improvement in generalized pain.  She has constipation that is relieved with laxative with expected arthralgias and fatigue which is currently improved.    We reviewed her overall chemotherapy course and expectations with further palliative chemotherapy.   Her wife inquired will Hailey be on Gemzar the rest of her life.  I expressed that unfortunately chemotherapy resistance does occur with chemo agents but we will continue to evaluate her response over time with diagnostic imaging, evaluation of toxicity.  laboratory monitoring and subjective reports.  I explained that with continued chemotherapy that patients will have cumulative myelosuppression and therapy may need to be held or transitioned.   We explained that while we want her to be in remission, it is less and less likely after recurrence and multiple lines of chemotherapy. We explained expectation will be stabilization of disease and palliation of pain. We explained the clear cell histology is less chemoresponsive. We explained the different chemotherapy options she has: gemcitabine, topotecan, Alimta.   We reviewed potential side effects including but not limited to: fatigue, fevers, low blood counts, fever, allergic reaction, muscle pain, and constipation/ diarrhea. We reviewed alternative option: supportive care and reiterated that there will come a time where the risks of chemotherapy will outweigh any benefits.  Questions answered to her and her wife's satisfaction.  Will continue goals of care with each visit.   Emotional support provided.  I completed letter for her job stating that she is receiving palliative therapy for advanced illness.  Left at  for patient. \par \par ** A detailed message was left on top # stating March 21 appt time with NANCI and letter at  \par

## 2019-03-11 NOTE — HISTORY OF PRESENT ILLNESS
[de-identified] : Age: 45 y/o diagnosed ovarian cancer. \victoriano Presented to the ED at [facility] on 1/25/17 with a complaints of abdominal pain. Had imaging that showed a suspicious pelvic mass. Taken emergently to the OR by general gyn out of concern for acute abdominal pain. Had a laparoscopy with conversion to ELAP via PFAN for left salpingo-oophorectomy. 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 1/31/2017- CT Abd/pelvis- no bowel obstruction (sent to the ED for evaluation of abdominal pain). Had been on Depo- provera for approximately 4 years, reportedly for management of endometriosis. Had a family history of ovarian cancer. On 2/28/17, she underwent RTLH, RSO, resection left adnexal remnant, omentectomy, P&PA LND, resection of pelvic nodules, staging . Final Pathology: Clear cell ovarian cancer, stage II B. She completed 6 cycles of carboplatin/ paclitaxel in 8/2017 and was in remission until 12/2017 which showed new foci in the rectus sheath. [facility] sent and had PDL1 1%, MSI stable, TMB low and was started on Keytruda where she felt the neuropathy became worse: pain over the fingers and hands, fatigue, not feeling well. She had progression on immunotherapy and was switched to Doxil which she tolerated better except for rashes over the body and mouth sores.  She transitioned care and started palliative Gemzar  2/28/19  [de-identified] : 1/25/2017- CA-125 = 186, CA 19-9 < 1 [de-identified] : DOS: 2/28/17\par Carboplatin and weekly Taxol 4/6/17- 6/1/17(three cycles)\par Carboplatin and Taxol q3w 6/29/17- 8/10/17( three cycles)\par Keytruda 7/9/18 -8/23/18 (3 cycles)\par Doxil: 8/2018 to 1/2019\par Gemzar 2/28/19-  [FreeTextEntry1] : GEMZAR \par C1  D 1  2/27/19 [de-identified] : Pt reports significant improvement in pain. \par She currently takes Morning and Afternoon: Methadone 10 MG, Dilaudid 8 mg  Ativan 0.5 mg  Dex 2 mg. Lidocaine path 12 hours on 12 hours off\par Bedtime pain regiment: Methadone 10 mg,  Effexor 50 mg,  Ativan 0.5 mg, Mirtazapine 15 mg. \par She has noted some breakthru anxiety when she has missed doses and is tearful at returning to the center\par She has had episodes of feeling tired but has also become more active over the past day or so.  She has more desire to complete tasks. \par She reports hydrating well but onset of GERD and taste dysgeusia and dry mouth for which Biotene is helping.  \par Appetite waxes and wanes.  \par Denies nausea or emesis.   +Relief of constipation with lactulose. \par She denies fevers but frequently has night sweats. \par Denies cough. Exertional FUENTES which resolves with rest \par Denies chest pain, palpitations, falls or trauma. She has handrails at home for ADLS.  She does note feeling some weakness from time to time. \par Denies hematuria, dysuria, vaginal or rectal bleeding. \par Intermittent arthralgias to lower extremities.

## 2019-03-21 PROBLEM — K13.79 MOUTH SORE: Status: ACTIVE | Noted: 2019-01-01

## 2019-03-21 NOTE — HISTORY OF PRESENT ILLNESS
[Disease: _____________________] : Disease: [unfilled] [AJCC Stage: ____] : AJCC Stage: [unfilled] [de-identified] : Age: 47 y/o diagnosed ovarian cancer. \victoriano Presented to the ED at [facility] on 1/25/17 with a complaints of abdominal pain. Had imaging that showed a suspicious pelvic mass. Taken emergently to the OR by general gyn out of concern for acute abdominal pain. Had a laparoscopy with conversion to ELAP via PFAN for left salpingo-oophorectomy. 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 1/31/2017- CT Abd/pelvis- no bowel obstruction (sent to the ED for evaluation of abdominal pain). Had been on Depo- provera for approximately 4 years, reportedly for management of endometriosis. Had a family history of ovarian cancer. On 2/28/17, she underwent RTLH, RSO, resection left adnexal remnant, omentectomy, P&PA LND, resection of pelvic nodules, staging . Final Pathology: Clear cell ovarian cancer, stage II B. She completed 6 cycles of carboplatin/ paclitaxel in 8/2017 and was in remission until 12/2017 which showed new foci in the rectus sheath. [facility] sent and had PDL1 1%, MSI stable, TMB low and was started on Keytruda where she felt the neuropathy became worse: pain over the fingers and hands, fatigue, not feeling well. She had progression on immunotherapy and was switched to Doxil which she tolerated better except for rashes over the body and mouth sores. After review at tumor board, consensus was trial of palliative gemcitabine D1, D8 every 3 weeks.  [de-identified] : clear cell  [de-identified] : 1/25/2017- CA-125 = 186, CA 19-9 < 1 [de-identified] : DOS: 2/28/17\par Carboplatin and weekly Taxol 4/6/17- 6/1/17(three cycles)\par Carboplatin and Taxol q3w 6/29/17- 8/10/17( three cycles)\par Keytruda 7/9/18 -8/23/18 (3 cycles)\par Doxil: 8/2018 to 1/2019\par gemcitabine 2/2019 to present  [de-identified] : She feels her energy is better: has good and bad days. Still in bed on the bad days: lasts 1 to 2 days after treatment. She has been able to go outside and garden. Feels the anterior abdominal wall pain is better. Able to do ADLs and has help of her wife. Wearing the lidocaine patch over the lower posterior back where she has strained while weeding. Rates pain better controlled on pain medications and patch. Has some constipation but BM daily. She also has occasionally drowsiness. Her wife has noticed she is talking in her sleep but the patient does not wake up and feels rested in am. She denies any other issue with pain medications or chemotherapy. Had muscle achiness with the 1st treatment but has resolved. No fevers or chills. Can't taste food well and craves frosted flakes.

## 2019-03-21 NOTE — REASON FOR VISIT
[Follow-Up Visit] : a follow-up [Spouse] : spouse [FreeTextEntry2] : follow up on Cycle 2 D1 gemcitabine today

## 2019-03-21 NOTE — PHYSICAL EXAM
[Restricted in physically strenuous activity but ambulatory and able to carry out work of a light or sedentary nature] : Status 1- Restricted in physically strenuous activity but ambulatory and able to carry out work of a light or sedentary nature, e.g., light house work, office work [Ulcers] : ulcers [Normal] : affect appropriate [de-identified] : sore over the R side of tongue 2 cm  [de-identified] : + BS, no tenderness on palpation; no BenGay or patch over abdomen  [de-identified] : patch over the posterior back

## 2019-03-21 NOTE — ASSESSMENT
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 She is currently on C2 of palliative regimen gemcitabine D1. She has stable CBC and without fevers on therapy. She has had palliation of anterior abdominal wall pain with pain medication. We explained that if pain is uncontrolled local RT to the anterior rectus sheath mass is an option. Her QOL is currently maintained with better pain control and improved fatigue. Next follow up in 3 weeks. \par \par Constipation due to pain medication: renewed Lactulose as needed. \par \par Mouth sore over tongue from prior therapy: continue with BMS mouthwash. \par \par Dysgeusia: reviewed supportive measures to help with taste change: baking soda and water mouth rinse prior to eating and lime/ lemon squeeze to meat/ fish.

## 2019-03-21 NOTE — REVIEW OF SYSTEMS
[Fever] : no fever [Chills] : no chills [Night Sweats] : no night sweats [Fatigue] : fatigue [Recent Change In Weight] : ~T no recent weight change [Abdominal Pain] : abdominal pain [Vomiting] : no vomiting [Constipation] : no constipation [Diarrhea] : no diarrhea [Negative] : Allergic/Immunologic

## 2019-03-31 NOTE — ED PROVIDER NOTE - ATTENDING CONTRIBUTION TO CARE
I, Michael Baugh MD, personally saw the patient with the resident, and completed the key components of the history and physical exam. I then discussed the management plan with the resident.    pt w/ full body swelling, possible chemo side effect, CT for eval SVC syndrome.  Is well appearing w/o signs of airway compromise or res distress.

## 2019-03-31 NOTE — ED PROVIDER NOTE - SKIN, MLM
Skin normal color for race, warm, dry and intact. No evidence of rash. Skin normal color for race, warm, dry. No evidence of rash.

## 2019-03-31 NOTE — ED ADULT TRIAGE NOTE - CHIEF COMPLAINT QUOTE
Patient c/o swelling to face and B/L LE x3days. Patient is a red card valdez on chemo for ovarian ca; last chemo was Thursday. Patient denies any sob or acute pain. Patient last took benadryl 3pm today with no relief. Hx. vertigo, chronic back pain.

## 2019-03-31 NOTE — ED PROVIDER NOTE - PROGRESS NOTE DETAILS
DD ED ATTF ov CA on gemzar, p/w anasarca x days.  plan CT CAP, rpt lactate.  sleeping currently in no distress. Nina PGY2: spoke with onc who said genzar does not cause swelling they said to do an ED workup per ED judgement and dispo per ED judgement did not offer further specific recs. labwork grossly wnl CT a/p shows increased size of RLQ mets discussed findings with pt who is in NAD states she feels well will dc with onc f/u

## 2019-03-31 NOTE — ED PROVIDER NOTE - OBJECTIVE STATEMENT
49 yo F c PMH of stage IIb ovarian ca (s/p hysterectomy and L salpingectomy and oophorectomy) dx 2017 s/p chemotx (2 weeks on/1 week off, last chemotx last Thursday, "gemzar") p/w 1 week h/o progressive swelling that started in L cheek now progressed to b/l cheeks, abdomen, and BLE. no hx of sx, however pt had adverse rxn of rash to med 1 m/a and is on last day of dexamethasone taper today. no new meds other than the steroid taper. no rash/sob/drooling/dysphonia, has white plaque on R tongue.     ROS positive: swelling, oliguria, edema  ROS negative: f/c, congestion, coryza, pharyngitis, HA, cough, hemoptysis, vision changes, dysphonia, CP, SOB, abdominal pain, n/v, hematuria, hematachezia, dental complaints, rash, tongue swelling 47 yo F c PMH of stage IIb ovarian ca (s/p hysterectomy and L salpingectomy and oophorectomy) dx 2017 s/p chemotx (2 weeks on/1 week off, last chemotx last Thursday, "gemzar") p/w 1 week h/o progressive swelling that started in L cheek now progressed to b/l cheeks, abdomen, and BLE. no hx of sx, however pt had adverse rxn of rash to med 1 m/a and is on last day of dexamethasone taper today. no new meds other than the steroid taper. no rash/sob/drooling/dysphonia.    Amauri: 49 y/o F w/ pHx as above pw full body swelling x 1 wk, progressively worsened.  Currently on prednisone taper 2/2 recent rash.  Denies CP, SOB, vomiting, fever.      ROS positive: swelling, oliguria, edema  ROS negative: f/c, congestion, coryza, pharyngitis, HA, cough, hemoptysis, vision changes, dysphonia, CP, SOB, abdominal pain, n/v, hematuria, hematachezia, dental complaints, rash, tongue swelling

## 2019-03-31 NOTE — ED PROVIDER NOTE - NSFOLLOWUPINSTRUCTIONS_ED_ALL_ED_FT
1. You were seen for swelling. A copy of your resulted labs, imaging, and findings have been provided to you.  2. Continue to take your home medications as prescribed.  3. Follow up with your primary care doctor within 48 hours. Please call [phone] to make an appointment or with any questions you may have.  4. Return immediately to the emergency department for new, persistent, or worsening symptoms or signs. Return immediately to the emergency department if you have chest pain, shortness of breath, loss of consciousness, or fever.

## 2019-03-31 NOTE — ED PROVIDER NOTE - PHYSICAL EXAMINATION
***GEN - NAD; well appearing; A+O x3   ***PULMONARY - CTA b/l, symmetric breath sounds. ***CARDIAC -s1s2, RRR, no M,G,R  ***ABDOMEN - ND, NT, soft, no guarding, no rebound, no abran's   ***SKIN - no rash or bruising   ***NEUROLOGIC - alert and oriented, follows commands, sensation nl, motor nl, ***PSYCH - insight and judgment nl, memory nl, affect nl, thought nl    noted facial swelling, pt describes swelling from baseline to bilateral U/LE and abdomen

## 2019-03-31 NOTE — ED PROVIDER NOTE - NONTENDER LOCATION
left upper quadrant/left lower quadrant/right lower quadrant/periumbilical/right upper quadrant/umbilical/suprapubic/left costovertebral angle/right costovertebral angle

## 2019-03-31 NOTE — ED ADULT NURSE NOTE - OBJECTIVE STATEMENT
pt received in room 16, A&Ox3, c/o swelling to bilateral lower extremities & stomach x1 day, swelling to face x3 days. pt hx of ovarian ca, last chemo thursday, has a R chest port. pt breathing even & unlabored, +2 pitting edema in lower extremities bilaterally, abd soft, non tender, non distended, pt denies SOB, CP, abd pain, n/v/d, fever, chills, numbness, tingling, weakness, dizziness, syncope, dysuria, no difference in eating/drinking. 20G IV placed, inR hand, labs sent, will continue to monitor. pt received in room 16, A&Ox3, c/o swelling to bilateral lower extremities & abd x1 day, swelling to cheeks x3 days. pt hx of ovarian ca, last chemo Thursday, has a R chest port. pt breathing even & unlabored, +2 pitting edema in lower extremities bilaterally, lung sounds clear bilaterally, abd soft, non tender, non distended, cheeks soft, non tender, distended, pt has no difficulty swallowing, pt denies SOB, CP, abd pain, n/v/d, fever, chills, numbness, tingling, weakness, dizziness, syncope, dysuria, no difference in eating/drinking, no difference in urination amount/frequency. 20G IV placed, inR hand, labs sent, will continue to monitor.

## 2019-03-31 NOTE — ED PROVIDER NOTE - CLINICAL SUMMARY MEDICAL DECISION MAKING FREE TEXT BOX
47 yo F c PMH of stage IIb ovarian ca (s/p hysterectomy and L salpingectomy and oophorectomy) dx 2017 s/p chemotx (2 weeks on/1 week off, last chemotx last Thursday, "gemzar") p/w 1 week h/o progressive swelling that started in L cheek now progressed to b/l cheeks, abdomen, and BLE. On exam, VS wnl (T pending), pt has b/l cheek and ble swelling, and abdominal swelling. DDx: ascites/SVC. labs and cxr pending. will reassess.

## 2019-03-31 NOTE — ED PROVIDER NOTE - THROAT FINDINGS
NO DROOLING/NO TONGUE ELEVATION/NO STRIDOR/no vesicles/NO VESICLES/ULCERS/no exudate/uvula midline/no redness/no submandibular swelling

## 2019-04-03 PROBLEM — G47.9 TROUBLE IN SLEEPING: Status: ACTIVE | Noted: 2019-01-01

## 2019-04-07 NOTE — HISTORY OF PRESENT ILLNESS
[FreeTextEntry1] : 48yoF with ovarian cancer Stage IIb s/p p RTLH, RSO, resection left adnexal remnant, omentectomy, P&PA LND (2/28/17), s/p Carboplatin, Taxol with disease recurrence presents for follow up visit.  PMH also significant for vertigo, migraines.  Had POD on Pembrolizumab, now on monthly Doxil with recent scan showing POD. Patient now under the care of Dr. Cole, on Gemcitabine. \par \par Pain has been better controlled on the regimen of: Methadone 10mg TID, Hydromorphone 8mg PRN (three times daily). \par \par Swelling of the face, legs has been an issue. Had been on Dexamethasone for past few weeks, was tapered off about 2-3 days ago. \par \par Has been using Lorazepam 0.5mg TID, which is helping control her high anxiety levels/outbursts.  \par \par ROS:\par +trouble sleeping\par +has been biting the sides of her tongue  - likely from anxiety\par Denies constipation, takes lactulose daily which is very helpful\par Appetite has been very good, eating a lot of food, mainly non-nutritious \par No n/v \par \par I-Stop Ref#: [id number]

## 2019-04-07 NOTE — ASSESSMENT
[______] : HCP: [unfilled] [FreeTextEntry1] : 48yoF with:\par \par 1. Ovarian cancer - s/p sx and adjuvant chemo, POD on Pembro and now has POD on monthly Doxil, now on Gemcitabine.\par Prognosis is poor given refractory disease; patient and wife are aware of the terminal nature of her diagnosis but hopeful that treatment will extend life as long as possible. Given the most recent progression of disease and hearing that there are few treatment options available at this point patient is more fearful about dying and is willing to try any treatment in order to extend her life.  \par \par 2. Neoplasm-related pain/CIPN - C/w Methadone 10mg TID, c/w PRN Hydromorphone 4mg-8mg. \par \par 3. Anxiety/depression - C/w Mirtazapine 15mg QHS. C/w Lorazepam 0.5mg TID PRN\par \par 4. Swelling - Likely 2/2 steroids, should improve now that Dex has been tapered off. Recommended protein intake. \par \par 5. Encounter for Palliative Care - Emotional support provided to patient. HCP has already been done, is wife, Parvin Shetty ([phone]). In regard to advance directives, patient states she would not want to have heroic/aggressive interventions done on her if she has "no hope" of recovery.  She has previously communicated this to her wife and other family. \par \par RTO 1 month, sooner if needed

## 2019-04-13 NOTE — ASSESSMENT
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 She is currently on C3 of palliative regimen gemcitabine D1. We reviewed her ED visit and her CT of the abd/ pelvis. We will proceed with Cycle 3 today but recommended evaluation with Dr Dc for RT to the rectus sheath. We reviewed repeat CT after Cycle 5. We reviewed her current symptoms of headache and encouraged supportive measures and diary of symptoms to elucidate if truly migraine. Will obtain MRI of the brain to rule out any metastatic disease. We reviewed swelling most likely due to dexamethasone and reviewed low salt diet. We reviewed leg elevation but also diary to document symptoms proceeding swelling if it occurs again. We reviewed thrush and nystatin treatment. We reviewed her current abdominal pain regimen which she will continue. We reviewed goals of care of palliative therapy and treatment and reviewed importance of continuing lidoderm patch as ordered. Questions and concerns answered to their satisfaction. Next follow up in 3 weeks.

## 2019-04-13 NOTE — REVIEW OF SYSTEMS
[Chills] : no chills [Fever] : no fever [Night Sweats] : no night sweats [Recent Change In Weight] : ~T no recent weight change [Fatigue] : fatigue [Dysphagia] : no dysphagia [Loss of Hearing] : no loss of hearing [Nosebleeds] : no nosebleeds [Odynophagia] : no odynophagia [Hoarseness] : no hoarseness [Mucosal Pain] : mucosal pain [Abdominal Pain] : abdominal pain [Constipation] : no constipation [Vomiting] : no vomiting [Diarrhea] : no diarrhea [Joint Pain] : joint pain [Muscle Pain] : no muscle pain [Joint Stiffness] : no joint stiffness [Muscle Weakness] : no muscle weakness [Negative] : Allergic/Immunologic [FreeTextEntry2] : migraine headaches worsening  [FreeTextEntry4] : white plaque over tongue

## 2019-04-13 NOTE — REASON FOR VISIT
[Spouse] : spouse [Follow-Up Visit] : a follow-up [FreeTextEntry2] : follow up for ovarian cancer s/p ED visit with CT of the abd/ pelvis

## 2019-04-13 NOTE — PHYSICAL EXAM
[Ambulatory and capable of all self care but unable to carry out any work activities] : Status 2- Ambulatory and capable of all self care but unable to carry out any work activities. Up and about more than 50% of waking hours [Ulcers] : ulcers [Normal] : grossly intact [de-identified] : + BS, no tenderness on palpation; no BenGay or patch over abdomen  [de-identified] : patch over the posterior back  [de-identified] : sore over the R side of tongue and white plaque over tongue

## 2019-04-13 NOTE — HISTORY OF PRESENT ILLNESS
[AJCC Stage: ____] : AJCC Stage: [unfilled] [Disease: _____________________] : Disease: [unfilled] [de-identified] : Age: 45 y/o diagnosed ovarian cancer. \victoriano Presented to the ED at [facility] on 1/25/17 with a complaints of abdominal pain. Had imaging that showed a suspicious pelvic mass. Taken emergently to the OR by general gyn out of concern for acute abdominal pain. Had a laparoscopy with conversion to ELAP via PFAN for left salpingo-oophorectomy. 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 1/31/2017- CT Abd/pelvis- no bowel obstruction (sent to the ED for evaluation of abdominal pain). Had been on Depo- provera for approximately 4 years, reportedly for management of endometriosis. Had a family history of ovarian cancer. On 2/28/17, she underwent RTLH, RSO, resection left adnexal remnant, omentectomy, P&PA LND, resection of pelvic nodules, staging . Final Pathology: Clear cell ovarian cancer, stage II B. She completed 6 cycles of carboplatin/ paclitaxel in 8/2017 and was in remission until 12/2017 which showed new foci in the rectus sheath. [facility] sent and had PDL1 1%, MSI stable, TMB low and was started on Keytruda where she felt the neuropathy became worse: pain over the fingers and hands, fatigue, not feeling well. She had progression on immunotherapy and was switched to Doxil which she tolerated better except for rashes over the body and mouth sores. After review at tumor board, consensus was trial of palliative gemcitabine D1, D8 every 3 weeks.  [de-identified] : 1/25/2017- CA-125 = 186, CA 19-9 < 1 [de-identified] : DOS: 2/28/17\par Carboplatin and weekly Taxol 4/6/17- 6/1/17(three cycles)\par Carboplatin and Taxol q3w 6/29/17- 8/10/17( three cycles)\par Keytruda 7/9/18 -8/23/18 (3 cycles)\par Doxil: 8/2018 to 1/2019\par gemcitabine 2/2019 to present  [de-identified] : clear cell  [de-identified] : She had went April 1st to [facility] ED with facial swelling and ankle swelling. Her spouse thought this was due to allergic reaction. Denied any rash or swelling of the tongue. She was on dexamethasone which was stopped that day. There was no additional intervention that improved the swelling. The swelling has improved over the last few days. Still some ankle swelling. No SOB or CP. She had interval CT of the abd/ pelvis in [facility] ED due to elevated lactate which showed increase in rectus sheath masses but decrease in inguinal lymph node. Spouse is worried about her talking in her sleep: admits this is improving off the steroids. She also has increasing headaches: every day and multiple times throughout the day: taking Migraine medication with relief but headache returns after few hours. No vision changes or weakness. She also has noticed white plaque over the tongue. Abdominal pain is better; today she has back pain but has not been using lidoderm patches for last 2 days. She did not put on due to back pain improved. BM with pain medications.

## 2019-04-18 PROBLEM — M25.561 RIGHT KNEE PAIN, UNSPECIFIED CHRONICITY: Status: ACTIVE | Noted: 2018-12-17

## 2019-04-18 NOTE — PHYSICAL EXAM
[Ambulatory and capable of all self care but unable to carry out any work activities] : Status 2- Ambulatory and capable of all self care but unable to carry out any work activities. Up and about more than 50% of waking hours [Ulcers] : no ulcers [Normal] : affect appropriate [de-identified] : + BS, no tenderness on palpation [de-identified] : sore over the R side of tongue with resolved white plaque

## 2019-04-18 NOTE — ASSESSMENT
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 She remains on palliative gemcitabine and tolerating therapy with expected fatigue. We reviewed her leg swelling and will further adjust therapy to avoid dexamethasone and ondansetron to decrease any water retention and leg swelling. We reviewed stopping ondansetron to decrease potential of antiemetic causing headaches. Will await MRI of the brain for headaches. We reviewed supportive measures. We reviewed potential urinary retention from pain medications. Reviewed ice chips for prevention of mouth sores. Questions answered to her and her spouse's satisfaction.

## 2019-04-18 NOTE — REASON FOR VISIT
[Follow-Up Visit] : a follow-up [Spouse] : spouse [FreeTextEntry2] : follow up for metastatic ovarian cancer

## 2019-04-18 NOTE — REVIEW OF SYSTEMS
[Dysphagia] : no dysphagia [Loss of Hearing] : no loss of hearing [Nosebleeds] : no nosebleeds [Hoarseness] : no hoarseness [Odynophagia] : no odynophagia [Mucosal Pain] : no mucosal pain [Confused] : no confusion [Dizziness] : no dizziness [Fainting] : no fainting [Difficulty Walking] : no difficulty walking [FreeTextEntry4] : sore over tongue better but   [Negative] : Allergic/Immunologic [de-identified] : headaches

## 2019-04-18 NOTE — HISTORY OF PRESENT ILLNESS
[Disease: _____________________] : Disease: [unfilled] [AJCC Stage: ____] : AJCC Stage: [unfilled] [de-identified] : Age: 47 y/o diagnosed ovarian cancer. \victoriano Presented to the ED at [facility] on 1/25/17 with a complaints of abdominal pain. Had imaging that showed a suspicious pelvic mass. Taken emergently to the OR by general gyn out of concern for acute abdominal pain. Had a laparoscopy with conversion to ELAP via PFAN for left salpingo-oophorectomy. 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 1/31/2017- CT Abd/pelvis- no bowel obstruction (sent to the ED for evaluation of abdominal pain). Had been on Depo- provera for approximately 4 years, reportedly for management of endometriosis. Had a family history of ovarian cancer. On 2/28/17, she underwent RTLH, RSO, resection left adnexal remnant, omentectomy, P&PA LND, resection of pelvic nodules, staging . Final Pathology: Clear cell ovarian cancer, stage II B. She completed 6 cycles of carboplatin/ paclitaxel in 8/2017 and was in remission until 12/2017 which showed new foci in the rectus sheath. [employer] sent and had PDL1 1%, MSI stable, TMB low and was started on Keytruda where she felt the neuropathy became worse: pain over the fingers and hands, fatigue, not feeling well. She had progression on immunotherapy and was switched to Doxil which she tolerated better except for rashes over the body and mouth sores. After review at tumor board, consensus was trial of palliative gemcitabine D1, D8 every 3 weeks.  [de-identified] : clear cell  [de-identified] : 1/25/2017- CA-125 = 186, CA 19-9 < 1 [de-identified] : DOS: 2/28/17\par Carboplatin and weekly Taxol 4/6/17- 6/1/17(three cycles)\par Carboplatin and Taxol q3w 6/29/17- 8/10/17( three cycles)\par Keytruda 7/9/18 -8/23/18 (3 cycles)\par Doxil: 8/2018 to 1/2019\par gemcitabine 2/2019 to present  [de-identified] : She denies any further facial swelling. She has lower ankle swelling about 2 days after the treatment but better with leg elevation. Spouse states that the legs become hot and red and resolves with leg elevation. She denies any abdominal pain or back pain today. She has R knee pain and applying topical analgesic over the area. She has urinary retention symptoms but able to urinate after few moments. She denies any nausea or vomiting. Completed thrush treatment.

## 2019-04-22 NOTE — H&P ADULT - NSHPPHYSICALEXAM_GEN_ALL_CORE
Vital Signs Last 24 Hrs  T(C): 36.7 (22 Apr 2019 15:23), Max: 38.6 (22 Apr 2019 11:02)  T(F): 98 (22 Apr 2019 15:23), Max: 101.5 (22 Apr 2019 11:02)  HR: 71 (22 Apr 2019 15:23) (71 - 122)  BP: 92/50 (22 Apr 2019 15:27) (87/43 - 154/76)  BP(mean): --  RR: 18 (22 Apr 2019 15:27) (18 - 20)  SpO2: 98% (22 Apr 2019 15:27) (98% - 100%)    PHYSICAL EXAM:  GENERAL: NAD, well-developed  HEAD:  Atraumatic, Normocephalic  EYES: EOMI, PERRLA, conjunctiva and sclera clear  NECK: Supple, No JVD  CHEST/LUNG: Clear to auscultation bilaterally; No wheeze  HEART: Regular rate and rhythm; No murmurs, rubs, or gallops  ABDOMEN: Soft, Nontender, Nondistended; Bowel sounds present  EXTREMITIES:  2+ Peripheral Pulses, No clubbing, cyanosis, or edema  PSYCH: AAOx3  NEUROLOGY: non-focal  SKIN: No rashes or lesions Vital Signs Last 24 Hrs  T(C): 36.7 (22 Apr 2019 15:23), Max: 38.6 (22 Apr 2019 11:02)  T(F): 98 (22 Apr 2019 15:23), Max: 101.5 (22 Apr 2019 11:02)  HR: 71 (22 Apr 2019 15:23) (71 - 122)  BP: 92/50 (22 Apr 2019 15:27) (87/43 - 154/76)  BP(mean): --  RR: 18 (22 Apr 2019 15:27) (18 - 20)  SpO2: 98% (22 Apr 2019 15:27) (98% - 100%)    PHYSICAL EXAM:  GENERAL: NAD, well-developed  HEAD:  Atraumatic, Normocephalic  EYES: EOMI, PERRLA, conjunctiva and sclera clear  NECK: Supple, No JVD  CHEST/LUNG: Clear to auscultation bilaterally; No wheeze  HEART: Regular rate and rhythm; No murmurs, rubs, or gallops  ABDOMEN: +BS refusing to let me examine RT LQ   EXTREMITIES:  2+ Peripheral Pulses, No clubbing, cyanosis, or edema  PSYCH: AAOx3  NEUROLOGY: non-focal  SKIN: +tattoo RT LQ

## 2019-04-22 NOTE — ED ADULT NURSE NOTE - OBJECTIVE STATEMENT
Pt presents to ED complaining of RLQ abdominal pain 10/10. Pt states the pain is at the site that she ahd her ovaries removed on 3/9. Pt states the pain came on gradually starting on Sunday, but the pain became a lot worse at 3Am today. Pt on chemo for ovarian cancer, last chemo was thursday. Pt AxOx3. pt tender to touch on RLQ. Pt denies chest pain, lightheadedness and dizziness. pt denies shortness of breath. Breathing even and unlabored. Pt denies dysuria and hematuria. Pt states she has had both ovaries removed. Pt Skin clean dry and intct. 20G IVL placed in the left AC. Will continue to monitor. Pt presents to ED complaining of RLQ abdominal pain 10/10. Pt states the pain is at the site that she ahd her ovaries removed on 3/9. Pt states the pain came on gradually starting on Sunday, but the pain became a lot worse at 3Am today. Pt on chemo for ovarian cancer, last chemo was thursday. Pt AxOx3. pt tender to touch on RLQ. Pt denies chest pain, lightheadedness and dizziness. pt denies shortness of breath. Breathing even and unlabored. Pt denies dysuria and hematuria. Pt states she has had both ovaries removed. Pt Skin clean dry and intct. R chest wall port noted. 20G IVL placed in the left AC. Will continue to monitor.

## 2019-04-22 NOTE — H&P ADULT - NSICDXFAMILYHX_GEN_ALL_CORE_FT
FAMILY HISTORY:  Family history of prostate cancer in father    Grandparent  Still living? No  Family history of ovarian cancer, Age at diagnosis: Age Unknown

## 2019-04-22 NOTE — ED ADULT NURSE REASSESSMENT NOTE - NS ED NURSE REASSESS COMMENT FT1
Pt at CT scan at this time. awaiting UA/UC at this time. pt states pain is much better 6/10 with no further interventions. Will continue to monitor.

## 2019-04-22 NOTE — H&P ADULT - NSHPREVIEWOFSYSTEMS_GEN_ALL_CORE
Review of Systems:     CONSTITUTIONAL: No weight loss, or fatigue +fever  EYES: No eye pain, visual disturbances, or discharge  ENMT:  No difficulty hearing, tinnitus, vertigo; No sinus or throat pain  NECK: No pain or stiffness  BREASTS: No pain, masses, or nipple discharge  RESPIRATORY: No cough, wheezing, chills or hemoptysis; No shortness of breath  CARDIOVASCULAR: No chest pain, palpitations, dizziness, or leg swelling  GASTROINTESTINAL: No epigastric pain. No nausea, vomiting, or hematemesis; No diarrhea or constipation. No melena or hematochezia.  GENITOURINARY: No dysuria, frequency, hematuria, or incontinence  NEUROLOGICAL: No headaches, memory loss, loss of strength, numbness, or tremors  SKIN: No itching, burning, rashes, or lesions   LYMPH NODES: No enlarged glands  ENDOCRINE: No heat or cold intolerance; No hair loss  MUSCULOSKELETAL: No joint pain or swelling; No muscle, back, or extremity pain  PSYCHIATRIC: No depression, anxiety, mood swings, or difficulty sleeping  HEME/LYMPH: No easy bruising, or bleeding gums  ALLERGY AND IMMUNOLOGIC: No hives or eczema

## 2019-04-22 NOTE — H&P ADULT - ASSESSMENT
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 CT shows progression of RT LQ peritoneal mets. UA neg WBC-WNL

## 2019-04-22 NOTE — H&P ADULT - NSICDXPASTMEDICALHX_GEN_ALL_CORE_FT
PAST MEDICAL HISTORY:  Anxiety     Deviated Nasal Septum     Ovarian cancer     Ovarian cyst (L) 1/2017    Vertigo

## 2019-04-22 NOTE — ED ADULT TRIAGE NOTE - CHIEF COMPLAINT QUOTE
Pt with hx ovarian cancer. Pt had surgery 3/9/19. Pt has right sided abd pain at surgical site. Pt currently on chemo.

## 2019-04-22 NOTE — ED PROVIDER NOTE - OBJECTIVE STATEMENT
47yo female PMH ovarian cancer s/p b/l salpingo-oopherectomy and hysterectomy, on chemo (last chemo 4/18) presenting with fever and abdominal pain. Patient states that pain started suddenly at 3am and radiates to R groin, sharp, constant. A/w 1 episode of NBNB vomiting. Normally takes methadone for cancer pain but has been unrelieved. +chills, states her wife noticed she felt warm at home but did not take temperature. No diarrhea, no dysuria or hematuria.

## 2019-04-22 NOTE — H&P ADULT - NSICDXPASTSURGICALHX_GEN_ALL_CORE_FT
PAST SURGICAL HISTORY:  H/O right breast biopsy 2015    H/O: hysterectomy robotic total laparoscopic hysterectomy, right salpingo oophorectomy 2/2017    History of Tonsillectomy     Port-a-cath in place Right 4/2017    S/P unilateral salpingo-oophorectomy 1/25/17 (L)

## 2019-04-22 NOTE — ED PROVIDER NOTE - PROGRESS NOTE DETAILS
Patient feeling much better, pain improved. Found to have larger metastatic lesion in RLQ, will admit to hospital for sepsis of unclear etiology and pain control. Stephanie Herbert DO

## 2019-04-22 NOTE — ED PROVIDER NOTE - ATTENDING CONTRIBUTION TO CARE
DR. MEYER, ATTENDING MD-  I performed a face to face bedside interview with patient regarding history of present illness, review of symptoms and past medical history. I completed an independent physical exam.  I have discussed patient's plan of care with the resident.   Documentation as above in the note.    47 y/o female with h/o ov ca, s/p ilan bso, here with rlq pain and fever sudden onset at 3am.  Denies ha, neck stiffness, cp, sob, cough, n/v/d, dysuria, rash.  Febrile, grimacing, dry mm, ctabil, s1s2 tachy reg rhythm no m/r/g, abd soft rlq ttp no r/g, no cva tenderness b/l, no leg swelling b/l, no rash.  Concern for appy vs colitis vs sbo vs uti, meets sirs criteria.  Obtain cbc, cmp, vbg, blood cx x2, ct a/p, cxr, ua, ucx, give ivf bolus x2, broad spec abx, acetaminophen, pain med, will need admission for further care and evaluation.

## 2019-04-22 NOTE — H&P ADULT - PROBLEM SELECTOR PLAN 2
-Likely secondary to progression of disease  -Heme/Onc c/s -Heme/Onc c/s  -states previously on decadron was taken off as per pharmacy last filled 3/12 30 days supply will hold for now if hypotensive then would restart and verify with Onc in AM if any chronic indication for steroids.

## 2019-04-22 NOTE — ED PROVIDER NOTE - CARE PLAN
Principal Discharge DX:	SIRS (systemic inflammatory response syndrome)  Secondary Diagnosis:	Intractable pain  Secondary Diagnosis:	Ovarian cancer

## 2019-04-22 NOTE — H&P ADULT - NSHPLABSRESULTS_GEN_ALL_CORE
10.1   7.09  )-----------( 129      ( 2019 10:25 )             31.7           134<L>  |  95<L>  |  13  ----------------------------<  119<H>  4.7   |  27  |  0.85    Ca    9.7      2019 10:25    TPro  7.1  /  Alb  3.7  /  TBili  0.7  /  DBili  x   /  AST  44<H>  /  ALT  65<H>  /  AlkPhos  261<H>        CAPILLARY BLOOD GLUCOSE          Urinalysis Basic - ( 2019 12:36 )    Color: YELLOW / Appearance: CLEAR / S.016 / pH: 6.5  Gluc: NEGATIVE / Ketone: NEGATIVE  / Bili: NEGATIVE / Urobili: NORMAL   Blood: NEGATIVE / Protein: NEGATIVE / Nitrite: NEGATIVE   Leuk Esterase: NEGATIVE / RBC: x / WBC x   Sq Epi: x / Non Sq Epi: x / Bacteria: x        PT/INR - ( 2019 10:43 )   PT: 15.5 SEC;   INR: 1.35          PTT - ( 2019 10:43 )  PTT:31.6 SEC    Radiology  < from: CT Abdomen and Pelvis w/ IV Cont (19 @ 12:07) >    EXAM:  CT ABDOMEN AND PELVIS IC        PROCEDURE DATE:  2019         INTERPRETATION:  CLINICAL INFORMATION: Fever, right lower quadrant   abdominal pain, history of ovarian cancer     COMPARISON: CT chest/abdomen/pelvis 2019    PROCEDURE:   CT of the Abdomen and Pelvis was performed with intravenous contrast.   Intravenous contrast: 90 ml Omnipaque 350. 10 ml discarded.  Oral contrast: None.  Sagittal and coronal reformats were performed.    FINDINGS:    LOWER CHEST: Partially imagedcentral venous catheter with tip in the   SVC. Trace bibasilar linear atelectasis.    LIVER: Tiny focal fat along the falciform ligament.  BILE DUCTS: Normal caliber.  GALLBLADDER: Within normal limits.  SPLEEN: Within normal limits.  PANCREAS: Within normal limits.  ADRENALS: Within normal limits.  KIDNEYS/URETERS: Within normal limits.    BLADDER: Within normal limits.  REPRODUCTIVE ORGANS: Hysterectomy.    BOWEL: No bowel obstruction.   PERITONEUM: Progression of multiple abdominal and pelvic metastases, with   the largest lesion in the right hemiabdomen increased in size, now   measuring 5.4 x 2.9 cm, previously measuring 4.8 x 3.5 cm (2:88).   Redemonstrated additional peritoneal implants and nodularity. Trace   pelvic ascites.  VESSELS:Within normal limits.  RETROPERITONEUM: Right external iliac lymphadenopathy, not significantly   changed.    ABDOMINAL WALL: Tiny fat-containing umbilical hernia.  BONES: Within normal limits.    IMPRESSION:  Interval progression of right lower quadrant peritoneal metastases, as   described above. Right pelvic lymphadenopathy, not significantly changed.                  RADHA DOWNING M.D., RADIOLOGY RESIDENT  This document has been electronically signed.    < end of copied text > 10.1   7.09  )-----------( 129      ( 2019 10:25 )             31.7           134<L>  |  95<L>  |  13  ----------------------------<  119<H>  4.7   |  27  |  0.85    Ca    9.7      2019 10:25    TPro  7.1  /  Alb  3.7  /  TBili  0.7  /  DBili  x   /  AST  44<H>  /  ALT  65<H>  /  AlkPhos  261<H>        CAPILLARY BLOOD GLUCOSE          Urinalysis Basic - ( 2019 12:36 )    Color: YELLOW / Appearance: CLEAR / S.016 / pH: 6.5  Gluc: NEGATIVE / Ketone: NEGATIVE  / Bili: NEGATIVE / Urobili: NORMAL   Blood: NEGATIVE / Protein: NEGATIVE / Nitrite: NEGATIVE   Leuk Esterase: NEGATIVE / RBC: x / WBC x   Sq Epi: x / Non Sq Epi: x / Bacteria: x        PT/INR - ( 2019 10:43 )   PT: 15.5 SEC;   INR: 1.35          PTT - ( 2019 10:43 )  PTT:31.6 SEC      EKG- NSR with no acute St/T wave changes    Radiology  < from: CT Abdomen and Pelvis w/ IV Cont (19 @ 12:07) >    EXAM:  CT ABDOMEN AND PELVIS IC        PROCEDURE DATE:  2019         INTERPRETATION:  CLINICAL INFORMATION: Fever, right lower quadrant   abdominal pain, history of ovarian cancer     COMPARISON: CT chest/abdomen/pelvis 2019    PROCEDURE:   CT of the Abdomen and Pelvis was performed with intravenous contrast.   Intravenous contrast: 90 ml Omnipaque 350. 10 ml discarded.  Oral contrast: None.  Sagittal and coronal reformats were performed.    FINDINGS:    LOWER CHEST: Partially imagedcentral venous catheter with tip in the   SVC. Trace bibasilar linear atelectasis.    LIVER: Tiny focal fat along the falciform ligament.  BILE DUCTS: Normal caliber.  GALLBLADDER: Within normal limits.  SPLEEN: Within normal limits.  PANCREAS: Within normal limits.  ADRENALS: Within normal limits.  KIDNEYS/URETERS: Within normal limits.    BLADDER: Within normal limits.  REPRODUCTIVE ORGANS: Hysterectomy.    BOWEL: No bowel obstruction.   PERITONEUM: Progression of multiple abdominal and pelvic metastases, with   the largest lesion in the right hemiabdomen increased in size, now   measuring 5.4 x 2.9 cm, previously measuring 4.8 x 3.5 cm (2:88).   Redemonstrated additional peritoneal implants and nodularity. Trace   pelvic ascites.  VESSELS:Within normal limits.  RETROPERITONEUM: Right external iliac lymphadenopathy, not significantly   changed.    ABDOMINAL WALL: Tiny fat-containing umbilical hernia.  BONES: Within normal limits.    IMPRESSION:  Interval progression of right lower quadrant peritoneal metastases, as   described above. Right pelvic lymphadenopathy, not significantly changed.

## 2019-04-22 NOTE — H&P ADULT - PROBLEM SELECTOR PLAN 1
-? secondary to progression of disease vs possible underlying infection   -f/u cx  -check procalcitonin/ESR  -Heme c/s -secondary to progression of disease no overt signs of infection   -f/u cx  -check procalcitonin/ESR  -if febrile or HD unstable would start empiric antibiotics   -Heme c/s   -palliative care c/s in AM for GOC due to progression of disease and pain control

## 2019-04-22 NOTE — ED PROVIDER NOTE - CLINICAL SUMMARY MEDICAL DECISION MAKING FREE TEXT BOX
47yo female with RLQ pain and fever, h/o ovarian cancer on chemo, s/p b/l oopherectomy. Ddx includes nephrolithiasis vs pyelo vs other intra-abdominal infection, will obtain labs, give antibiotics, IV fluids, tylenol, analgesia, CT a/p, reassess. Stephanie Herbert DO

## 2019-04-22 NOTE — H&P ADULT - HISTORY OF PRESENT ILLNESS
47 y/o F w/ hx clear cell ovarian ca (1/25/17 with a complaints of abdominal pain found to have a pelvic mass emergent lap s/p LT salpingo-oophrectomy path clear cell ca subsequent RT SO with resection Lt adexnal mass  and  s/p recurrent resection s/p carboplatin and pac s/p keytruda complicated by neuropathy and progression switched to doxil complicated mouth sores and rash now currently on palliative gemcitabine) p/w abdominal pain and fever. 45 y/o F w/ hx clear cell ovarian ca (1/25/17 with a complaints of abdominal pain found to have a pelvic mass emergent lap s/p LT salpingo-oophrectomy path clear cell ca subsequent RT SO with resection Lt adexnal mass  and  s/p recurrent resection s/p carboplatin and pac s/p keytruda complicated by neuropathy and progression switched to doxil complicated mouth sores and rash now currently on palliative gemcitabine) p/w abdominal pain and fever. pt states abdominal pain worsening over last few days primarily on RT LQ and RT groin without nausea or vomiting.

## 2019-04-22 NOTE — ED PROVIDER NOTE - PHYSICAL EXAMINATION
Gen: in moderate distress 2/2 pain, tearful, uncomfortable  Head: NCAT  Lung: CTAB, no respiratory distress, no wheezing, rales, rhonchi  CV: normal s1/s2, rrr, no murmurs, Normal perfusion, pulses 2+ throughout  Abd: soft, diffusely TTP throughout all 4 quadrants with greatest tenderness in RLQ, no CVA tenderness  MSK: No edema, no visible deformities, full range of motion in all 4 extremities  Neuro: No focal neurologic deficits  Skin: No rash   Psych: normal affect

## 2019-04-23 NOTE — PROGRESS NOTE ADULT - PROBLEM SELECTOR PLAN 3
-secondary to progression of disease no overt signs of infection   -f/u cx  -check procalcitonin/ESR  -if febrile or HD unstable would start empiric antibiotics   -Heme c/s   -palliative care c/s in AM for GOC due to progression of disease and pain control

## 2019-04-23 NOTE — CONSULT NOTE ADULT - ATTENDING COMMENTS
Pt seen with NP.  Agree with above plan.  Will increase methadone as above.  Please page for uncontrolled pain [phone].

## 2019-04-23 NOTE — CONSULT NOTE ADULT - SUBJECTIVE AND OBJECTIVE BOX
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 pt states abdominal pain worsening over last few days primarily on RT LQ and RT groin without nausea or vomiting. (2019 15:30)    Patient currently laying in bed in no acute distress     PERTINENT PM/SXH:   Anxiety  Vertigo  Ovarian cyst  Ovarian cancer  Deviated Nasal Septum    Port-a-cath in place  H/O right breast biopsy  H/O: hysterectomy  S/P unilateral salpingo-oophorectomy  History of Tonsillectomy    FAMILY HISTORY:  Family history of ovarian cancer (Grandparent)  Family history of prostate cancer in father      SOCIAL HISTORY:   Significant other/partner: Yes [x ]  No [ ] Children:  Yes [ ]  No [x ] Quaker/Spirituality: Advent   Substance hx: +medical marijuana Tobacco hx:  Yes [ ] No [x ]   Alcohol hx: Yes [ ] No [ x]   Home Opioid hx:  Yes [x ]   Living Situation: [x ]Home  [ ]Long term care  [ ]Rehab [ ]Other    ADVANCE DIRECTIVES:  Full Code   DNR  MOLST  Yes [ ] No [ ]  Living Will  Yes [ ]  No [ ]     [x ] Health Care Proxy(s)  [ ] Surrogate(s)  [ ] Guardian           Name(s): Phone Number(s):  curtis Shetty [phone]    BASELINE (I)ADL(s) (prior to admission):  [place]: [x ]Total  [ ] Moderate [ ]Dependent    Allergies    No Known Allergies    Intolerances    MEDICATIONS  (STANDING):  chlorhexidine 4% Liquid 1 Application(s) Topical <User Schedule>  cholecalciferol 400 Unit(s) Oral daily  dexamethasone  Injectable 4 milliGRAM(s) IV Push every 6 hours  enoxaparin Injectable 40 milliGRAM(s) SubCutaneous every 24 hours  lidocaine   Patch 1 Patch Transdermal daily  methadone    Tablet 10 milliGRAM(s) Oral three times a day  mirtazapine 15 milliGRAM(s) Oral at bedtime  nystatin Powder 1 Application(s) Topical two times a day  pantoprazole    Tablet 40 milliGRAM(s) Oral before breakfast  venlafaxine 50 milliGRAM(s) Oral daily    MEDICATIONS  (PRN):  acetaminophen   Tablet .. 650 milliGRAM(s) Oral every 6 hours PRN Temp greater or equal to 38C (100.4F), Mild Pain (1 - 3)  lactulose Syrup 10 Gram(s) Oral every 6 hours PRN constipation  LORazepam     Tablet 0.5 milliGRAM(s) Oral three times a day PRN Anxiety  morphine  - Injectable 8 milliGRAM(s) IV Push every 3 hours PRN moderate and severe pain  ondansetron Injectable 4 milliGRAM(s) IV Push every 6 hours PRN Nausea    PRESENT SYMPTOMS: [ ]Unable to obtain due to poor mentation   Source if other than patient:  [ ]Family   [ ]Team     Pain (Impact on QOL):  Patient complained of acute on chronic pain to right lower abdomen/groin area - throbbing - non radiating.  She states this pain began when she found out she had recurrence in 2018 - acutely worsening over the past several days.  Aggravated by movement and moderately relieved with opioids     PAIN AD Score:     [email or web address] (press ctrl +  left click to view)    Dyspnea:  Yes [ ] No [x ] - [ ]Mild [ ]Moderate [ ]Severe  Anxiety:    Yes [x ] No [ ] - [x ]Mild [ ]Moderate [ ]Severe  Fatigue:    Yes [x ] No [ ] - [x ]Mild [ ]Moderate [ ]Severe  Nausea:    Yes [ ] No [x ] - [ ]Mild [ ]Moderate [ ]Severe                         Loss of appetite: Yes [ ] No [x ] - [ ]Mild [ ]Moderate [ ]Severe             Constipation:  Yes [ ] No [ x] - [ ]Mild [ ]Moderate [ ]Severe  Grief: Yes [x ] No [ ]     Other Symptoms: +headache (new since this am) - +photophobia - (chronic) - denies vision/gait changes/dizziness  [x ]All other review of systems negative     Karnofsky Performance Score/Palliative Performance Status Version 2:   70-80%    http://palliative.info/resource_material/[email or web address].pdf    PHYSICAL EXAM:  Vital Signs Last 24 Hrs  T(C): 37.1 (2019 11:51), Max: 37.6 (2019 10:11)  T(F): 98.8 (2019 11:51), Max: 99.6 (2019 10:11)  HR: 93 (2019 10:11) (70 - 93)  BP: 106/56 (2019 12:49) (82/58 - 124/65)  BP(mean): --  RR: 18 (2019 10:11) (18 - 18)  SpO2: 96% (2019 10:11) (96% - 100%) I&O's Summary      GENERAL:  [x ]Alert  [x ]Oriented x 4  [ ]Lethargic  [ ]Cachexia  [ ]Unarousable  [ x]Verbal  [ ]Non-Verbal  PULMONARY:   [x ]Clear - anteriorly   [ ]Rhonchi        [ ]Right [ ]Left [ ]Bilateral  [ ]Crackles        [ ]Right [ ]Left [ ]Bilateral  [ ]Wheezing     [ ]Right [ ]Left [ ]Bilateral  CARDIOVASCULAR:    [x ]Regular [ ]Irregular [ ]Tachy  [ ]Tian [ ]Murmur [ ]Other  GASTROINTESTINAL:  [x ]Soft  [ ]Distended   [ x]+BS  [ x]Non tender [ ]Tender  [ ]PEG [ ]OGT/ NGT  Last BM: 19  GENITOURINARY:  [x ]Normal [ ] Incontinent   [ ]Oliguria/Anuria   [ ]Valentine  MUSCULOSKELETAL:   [ ]Normal   [x ]Weakness  [ ]Bed/Wheelchair bound [ ]Edema  NEUROLOGIC:   [x ]No focal deficits  [ ] Cognitive impairment  [ ] Dysphagia [ ]Dysarthria [ ] Paresis [ ]Other   SKIN:   [x ]Normal   [ ]Pressure ulcer(s)  [ ]Rash    LABS:                        8.3    5.56  )-----------( 92       ( 2019 09:17 )             27.2   04-    137  |  100  |  6<L>  ----------------------------<  92  3.8   |  25  |  0.73    Ca    8.7      2019 06:30    TPro  6.1  /  Alb  3.1<L>  /  TBili  0.5  /  DBili  x   /  AST  37<H>  /  ALT  50<H>  /  AlkPhos  228<H>  04-23  PT/INR - ( 2019 10:43 )   PT: 15.5 SEC;   INR: 1.35          PTT - ( 2019 10:43 )  PTT:31.6 SEC    Urinalysis Basic - ( 2019 12:36 )    Color: YELLOW / Appearance: CLEAR / S.016 / pH: 6.5  Gluc: NEGATIVE / Ketone: NEGATIVE  / Bili: NEGATIVE / Urobili: NORMAL   Blood: NEGATIVE / Protein: NEGATIVE / Nitrite: NEGATIVE   Leuk Esterase: NEGATIVE / RBC: x / WBC x   Sq Epi: x / Non Sq Epi: x / Bacteria: x      RADIOLOGY & ADDITIONAL STUDIES: Reviewed  CT Abdomen    IMPRESSION:  Interval progression of right lower quadrant peritoneal metastases, as   described above. Right pelvic lymphadenopathy, not significantly changed.    PROTEIN CALORIE MALNUTRITION PRESENT: [ ] Yes [ ] No  [ ] PPSV2 < or = to 30% [ ] significant weight loss  [ ] poor nutritional intake [ ] catabolic state [ ] anasarca     Albumin, Serum: 3.1 g/dL (19 @ 06:30)      REFERRALS:   [ ]Chaplaincy  [ ] Hospice  [ ]Child Life  [ ]Social Work  [ ]Case management [ ]Holistic Therapy   Goals of Care Discussion Document: HPI: Obtained from H&P  47 y/o F w/ hx clear cell ovarian ca (18 with a complaints of abdominal pain found to have a pelvic mass emergent lap s/p LT salpingo-oophrectomy path clear cell ca subsequent RT SO with resection Lt adexnal mass  and  s/p recurrent resection s/p carboplatin and pac s/p keytruda complicated by neuropathy and progression switched to doxil complicated mouth sores and rash now currently on palliative gemcitabine) p/w abdominal pain and fever. pt states abdominal pain worsening over last few days primarily on RT LQ and RT groin without nausea or vomiting. (2019 15:30)    Patient currently laying in bed in no acute distress     PERTINENT PM/SXH:   Anxiety  Vertigo  Ovarian cyst  Ovarian cancer  Deviated Nasal Septum    Port-a-cath in place  H/O right breast biopsy  H/O: hysterectomy  S/P unilateral salpingo-oophorectomy  History of Tonsillectomy    FAMILY HISTORY:  Family history of ovarian cancer (Grandparent)  Family history of prostate cancer in father      SOCIAL HISTORY:   Significant other/partner: Yes [x ]  No [ ] Children:  Yes [ ]  No [x ] Jain/Spirituality: Mu-ism   Substance hx: +medical marijuana Tobacco hx:  Yes [ ] No [x ]   Alcohol hx: Yes [ ] No [ x]   Home Opioid hx:  Yes [x ]   Living Situation: [x ]Home  [ ]Long term care  [ ]Rehab [ ]Other    ADVANCE DIRECTIVES:  Full Code   DNR  MOLST  Yes [ ] No [ ]  Living Will  Yes [ ]  No [ ]     [x ] Health Care Proxy(s)  [ ] Surrogate(s)  [ ] Guardian           Name(s): Phone Number(s):  curtis Shetty [phone]    BASELINE (I)ADL(s) (prior to admission):  [place]: [x ]Total  [ ] Moderate [ ]Dependent    Allergies    No Known Allergies    Intolerances    MEDICATIONS  (STANDING):  chlorhexidine 4% Liquid 1 Application(s) Topical <User Schedule>  cholecalciferol 400 Unit(s) Oral daily  dexamethasone  Injectable 4 milliGRAM(s) IV Push every 6 hours  enoxaparin Injectable 40 milliGRAM(s) SubCutaneous every 24 hours  lidocaine   Patch 1 Patch Transdermal daily  methadone    Tablet 10 milliGRAM(s) Oral three times a day  mirtazapine 15 milliGRAM(s) Oral at bedtime  nystatin Powder 1 Application(s) Topical two times a day  pantoprazole    Tablet 40 milliGRAM(s) Oral before breakfast  venlafaxine 50 milliGRAM(s) Oral daily    MEDICATIONS  (PRN):  acetaminophen   Tablet .. 650 milliGRAM(s) Oral every 6 hours PRN Temp greater or equal to 38C (100.4F), Mild Pain (1 - 3)  lactulose Syrup 10 Gram(s) Oral every 6 hours PRN constipation  LORazepam     Tablet 0.5 milliGRAM(s) Oral three times a day PRN Anxiety  morphine  - Injectable 8 milliGRAM(s) IV Push every 3 hours PRN moderate and severe pain  ondansetron Injectable 4 milliGRAM(s) IV Push every 6 hours PRN Nausea    PRESENT SYMPTOMS: [ ]Unable to obtain due to poor mentation   Source if other than patient:  [ ]Family   [ ]Team     Pain (Impact on QOL):  Patient complained of acute on chronic pain to right lower abdomen/groin area - throbbing - non radiating.  She states this pain began when she found out she had recurrence in 2018 - acutely worsening over the past several days.  Aggravated by movement and moderately relieved with opioids     PAIN AD Score:     [email or web address] (press ctrl +  left click to view)    Dyspnea:  Yes [ ] No [x ] - [ ]Mild [ ]Moderate [ ]Severe  Anxiety:    Yes [x ] No [ ] - [x ]Mild [ ]Moderate [ ]Severe  Fatigue:    Yes [x ] No [ ] - [x ]Mild [ ]Moderate [ ]Severe  Nausea:    Yes [ ] No [x ] - [ ]Mild [ ]Moderate [ ]Severe                         Loss of appetite: Yes [ ] No [x ] - [ ]Mild [ ]Moderate [ ]Severe             Constipation:  Yes [ ] No [ x] - [ ]Mild [ ]Moderate [ ]Severe  Grief: Yes [x ] No [ ]     Other Symptoms: +headache (new since this am) - +photophobia - (chronic) - denies vision/gait changes/dizziness - Dr. Novak aware.   [x ]All other review of systems negative     Karnofsky Performance Score/Palliative Performance Status Version 2:   70-80%    http://palliative.info/resource_material/[email or web address].pdf    PHYSICAL EXAM:  Vital Signs Last 24 Hrs  T(C): 37.1 (2019 11:51), Max: 37.6 (2019 10:11)  T(F): 98.8 (2019 11:51), Max: 99.6 (2019 10:11)  HR: 93 (2019 10:11) (70 - 93)  BP: 106/56 (2019 12:49) (82/58 - 124/65)  BP(mean): --  RR: 18 (2019 10:11) (18 - 18)  SpO2: 96% (2019 10:11) (96% - 100%) I&O's Summary      GENERAL:  [x ]Alert  [x ]Oriented x 4  [ ]Lethargic  [ ]Cachexia  [ ]Unarousable  [ x]Verbal  [ ]Non-Verbal  PULMONARY:   [x ]Clear - anteriorly   [ ]Rhonchi        [ ]Right [ ]Left [ ]Bilateral  [ ]Crackles        [ ]Right [ ]Left [ ]Bilateral  [ ]Wheezing     [ ]Right [ ]Left [ ]Bilateral  CARDIOVASCULAR:    [x ]Regular [ ]Irregular [ ]Tachy  [ ]Tian [ ]Murmur [ ]Other  GASTROINTESTINAL:  [x ]Soft  [ ]Distended   [ x]+BS  [ x]Non tender [ ]Tender  [ ]PEG [ ]OGT/ NGT  Last BM: 19  GENITOURINARY:  [x ]Normal [ ] Incontinent   [ ]Oliguria/Anuria   [ ]Valentine  MUSCULOSKELETAL:   [ ]Normal   [x ]Weakness  [ ]Bed/Wheelchair bound [ ]Edema  NEUROLOGIC:   [x ]No focal deficits  [ ] Cognitive impairment  [ ] Dysphagia [ ]Dysarthria [ ] Paresis [ ]Other   SKIN:   [x ]Normal   [ ]Pressure ulcer(s)  [ ]Rash    LABS:                        8.3    5.56  )-----------( 92       ( 2019 09:17 )             27.2   04-23    137  |  100  |  6<L>  ----------------------------<  92  3.8   |  25  |  0.73    Ca    8.7      2019 06:30    TPro  6.1  /  Alb  3.1<L>  /  TBili  0.5  /  DBili  x   /  AST  37<H>  /  ALT  50<H>  /  AlkPhos  228<H>    PT/INR - ( 2019 10:43 )   PT: 15.5 SEC;   INR: 1.35          PTT - ( 2019 10:43 )  PTT:31.6 SEC    Urinalysis Basic - ( 2019 12:36 )    Color: YELLOW / Appearance: CLEAR / S.016 / pH: 6.5  Gluc: NEGATIVE / Ketone: NEGATIVE  / Bili: NEGATIVE / Urobili: NORMAL   Blood: NEGATIVE / Protein: NEGATIVE / Nitrite: NEGATIVE   Leuk Esterase: NEGATIVE / RBC: x / WBC x   Sq Epi: x / Non Sq Epi: x / Bacteria: x      RADIOLOGY & ADDITIONAL STUDIES: Reviewed  CT Abdomen    IMPRESSION:  Interval progression of right lower quadrant peritoneal metastases, as   described above. Right pelvic lymphadenopathy, not significantly changed.    PROTEIN CALORIE MALNUTRITION PRESENT: [ ] Yes [ ] No  [ ] PPSV2 < or = to 30% [ ] significant weight loss  [ ] poor nutritional intake [ ] catabolic state [ ] anasarca     Albumin, Serum: 3.1 g/dL (19 @ 06:30)      REFERRALS:   [ ]Chaplaincy  [ ] Hospice  [ ]Child Life  [ ]Social Work  [ ]Case management [ ]Holistic Therapy   Goals of Care Discussion Document: HPI: Obtained from H&P  47 y/o F w/ hx clear cell ovarian ca (18 with a complaints of abdominal pain found to have a pelvic mass emergent lap s/p LT salpingo-oophrectomy path clear cell ca subsequent RT SO with resection Lt adexnal mass  and  s/p recurrent resection s/p carboplatin and pac s/p keytruda complicated by neuropathy and progression switched to doxil complicated mouth sores and rash now currently on palliative gemcitabine) p/w abdominal pain and fever. pt states abdominal pain worsening over last few days primarily on RT LQ and RT groin without nausea or vomiting. (2019 15:30)    Patient currently laying in bed in no acute distress     PERTINENT PM/SXH:   Anxiety  Vertigo  Ovarian cyst  Ovarian cancer  Deviated Nasal Septum    Port-a-cath in place  H/O right breast biopsy  H/O: hysterectomy  S/P unilateral salpingo-oophorectomy  History of Tonsillectomy    FAMILY HISTORY:  Family history of ovarian cancer (Grandparent)  Family history of prostate cancer in father    SOCIAL HISTORY:   Significant other/partner: Yes [x ]  No [ ] Children:  Yes [ ]  No [x ] Baptism/Spirituality: Uatsdin   Substance hx: +medical cannabis Tobacco hx:  Yes [ ] No [x ]   Alcohol hx: Yes [ ] No [ x]   Home Opioid hx:  Yes [x ]   Living Situation: [x ]Home  [ ]Long term care  [ ]Rehab [ ]Other    ADVANCE DIRECTIVES:  Full Code   DNR  MOLST  Yes [ ] No [x ]  Living Will  Yes [ ]  No [x ]     [x ] Health Care Proxy(s)  [ ] Surrogate(s)  [ ] Guardian           Name(s): Phone Number(s):  curtis Shetty [phone]    BASELINE (I)ADL(s) (prior to admission):  [place]: [x ]Total  [ ] Moderate [ ]Dependent    Allergies    No Known Allergies    Intolerances    MEDICATIONS  (STANDING):  chlorhexidine 4% Liquid 1 Application(s) Topical <User Schedule>  cholecalciferol 400 Unit(s) Oral daily  dexamethasone  Injectable 4 milliGRAM(s) IV Push every 6 hours  enoxaparin Injectable 40 milliGRAM(s) SubCutaneous every 24 hours  lidocaine   Patch 1 Patch Transdermal daily  methadone    Tablet 10 milliGRAM(s) Oral three times a day  mirtazapine 15 milliGRAM(s) Oral at bedtime  nystatin Powder 1 Application(s) Topical two times a day  pantoprazole    Tablet 40 milliGRAM(s) Oral before breakfast  venlafaxine 50 milliGRAM(s) Oral daily    MEDICATIONS  (PRN):  acetaminophen   Tablet .. 650 milliGRAM(s) Oral every 6 hours PRN Temp greater or equal to 38C (100.4F), Mild Pain (1 - 3)  lactulose Syrup 10 Gram(s) Oral every 6 hours PRN constipation  LORazepam     Tablet 0.5 milliGRAM(s) Oral three times a day PRN Anxiety  morphine  - Injectable 8 milliGRAM(s) IV Push every 3 hours PRN moderate and severe pain  ondansetron Injectable 4 milliGRAM(s) IV Push every 6 hours PRN Nausea    PRESENT SYMPTOMS: [ ]Unable to obtain due to poor mentation   Source if other than patient:  [ ]Family   [ ]Team     Pain (Impact on QOL):  Patient complained of acute on chronic pain to right lower abdomen/groin area - throbbing - non radiating.  She states this pain began when she found out she had recurrence in 2018 - acutely worsening over the past several days.  Aggravated by movement and moderately relieved with opioids     PAIN AD Score:     [email or web address] (press ctrl +  left click to view)    Dyspnea:  Yes [ ] No [x ] - [ ]Mild [ ]Moderate [ ]Severe  Anxiety:    Yes [x ] No [ ] - [x ]Mild [ ]Moderate [ ]Severe  Fatigue:    Yes [x ] No [ ] - [x ]Mild [ ]Moderate [ ]Severe  Nausea:    Yes [ ] No [x ] - [ ]Mild [ ]Moderate [ ]Severe                         Loss of appetite: Yes [ ] No [x ] - [ ]Mild [ ]Moderate [ ]Severe             Constipation:  Yes [ ] No [ x] - [ ]Mild [ ]Moderate [ ]Severe  Grief: Yes [x ] No [ ]     Other Symptoms: +headache (new since this am) - +photophobia - (chronic) - denies vision/gait changes/dizziness - Dr. Novak aware.   [x ]All other review of systems negative     Karnofsky Performance Score/Palliative Performance Status Version 2:   70-80%    http://palliative.info/resource_material/[email or web address].pdf    PHYSICAL EXAM:  Vital Signs Last 24 Hrs  T(C): 37.1 (2019 11:51), Max: 37.6 (2019 10:11)  T(F): 98.8 (2019 11:51), Max: 99.6 (2019 10:11)  HR: 93 (2019 10:11) (70 - 93)  BP: 106/56 (2019 12:49) (82/58 - 124/65)  BP(mean): --  RR: 18 (2019 10:11) (18 - 18)  SpO2: 96% (2019 10:11) (96% - 100%) I&O's Summary    GENERAL:  [x ]Alert  [x ]Oriented x 4  [ ]Lethargic  [ ]Cachexia  [ ]Unarousable  [ x]Verbal  [ ]Non-Verbal  PULMONARY:   [x ]Clear - anteriorly   [ ]Rhonchi        [ ]Right [ ]Left [ ]Bilateral  [ ]Crackles        [ ]Right [ ]Left [ ]Bilateral  [ ]Wheezing     [ ]Right [ ]Left [ ]Bilateral  CARDIOVASCULAR:    [x ]Regular [ ]Irregular [ ]Tachy  [ ]Tian [ ]Murmur [ ]Other  GASTROINTESTINAL:  [x ]Soft  [ ]Distended   [ x]+BS  [ x]Non tender [ ]Tender  [ ]PEG [ ]OGT/ NGT  Last BM: 19  GENITOURINARY:  [x ]Normal [ ] Incontinent   [ ]Oliguria/Anuria   [ ]Valentine  MUSCULOSKELETAL:   [ ]Normal   [x ]Weakness  [ ]Bed/Wheelchair bound [ ]Edema  NEUROLOGIC:   [x ]No focal deficits  [ ] Cognitive impairment  [ ] Dysphagia [ ]Dysarthria [ ] Paresis [ ]Other   SKIN:   [x ]Normal   [ ]Pressure ulcer(s)  [ ]Rash    LABS:                        8.3    5.56  )-----------( 92       ( 2019 09:17 )             27.2   04-23    137  |  100  |  6<L>  ----------------------------<  92  3.8   |  25  |  0.73    Ca    8.7      2019 06:30    TPro  6.1  /  Alb  3.1<L>  /  TBili  0.5  /  DBili  x   /  AST  37<H>  /  ALT  50<H>  /  AlkPhos  228<H>    PT/INR - ( 2019 10:43 )   PT: 15.5 SEC;   INR: 1.35        PTT - ( 2019 10:43 )  PTT:31.6 SEC    Urinalysis Basic - ( 2019 12:36 )    Color: YELLOW / Appearance: CLEAR / S.016 / pH: 6.5  Gluc: NEGATIVE / Ketone: NEGATIVE  / Bili: NEGATIVE / Urobili: NORMAL   Blood: NEGATIVE / Protein: NEGATIVE / Nitrite: NEGATIVE   Leuk Esterase: NEGATIVE / RBC: x / WBC x   Sq Epi: x / Non Sq Epi: x / Bacteria: x    RADIOLOGY & ADDITIONAL STUDIES: Reviewed    CT Abdomen    IMPRESSION:  Interval progression of right lower quadrant peritoneal metastases, as   described above. Right pelvic lymphadenopathy, not significantly changed.    PROTEIN CALORIE MALNUTRITION PRESENT: [ ] Yes [ ] No  [ ] PPSV2 < or = to 30% [ ] significant weight loss  [ ] poor nutritional intake [ ] catabolic state [ ] anasarca     Albumin, Serum: 3.1 g/dL (19 @ 06:30)    REFERRALS:   [ ]Chaplaincy  [ ] Hospice  [ ]Child Life  [ ]Social Work  [ ]Case management [ ]Holistic Therapy   Goals of Care Discussion Document:

## 2019-04-23 NOTE — CONSULT NOTE ADULT - PROBLEM SELECTOR RECOMMENDATION 9
As above.  Known to Dr. Arellano as an outpatient.  On Methadone 10mg Po TID with Dilaudid 8mg PO q4h PRN at home.  Patient reports that Dilaudid decreases her pain to 5/10 - while Morphien IV decreased her pain to 2/10 - Likely that patient needs opioid rotation due to tolerance of Dilaudid.  Given patient received 7.5mg IV Dilaudid and Morphine 8mg IV in 24hrs - and still reports pain is unchanged from home - will adjust methadone to 15mg PO TID to account 25% cross reduction.  QTc 445 on 4/22/19.  Methadone 15mg PO TID ordered with Morphine 8mg IV q3h PRN moderate and severe pain.  Dilaudid IV discontinued.  Bowel regimen.  Patient requesting lactulose as she takes as an outpatient PRN - ordered. As above.  Known to Dr. Arellano as an outpatient.  On Methadone 10mg Po TID with Dilaudid 8mg PO q4h PRN at home.  Patient reports that Dilaudid decreases her pain to 5/10 - while Morphine IV decreased her pain to 2/10 - Likely that patient needs opioid rotation due to tolerance of Dilaudid.  Given patient received 7.5mg IV Dilaudid and Morphine 8mg IV in 24hrs - and still reports pain is unchanged from home - will adjust methadone to 15mg PO TID to account 25% cross reduction.  QTc 445 on 4/22/19.  Methadone 15mg PO TID ordered with Morphine 8mg IV q3h PRN moderate and severe pain.  Dilaudid IV discontinued.  Bowel regimen.  Patient requesting lactulose as she takes as an outpatient PRN - ordered. As above.  Known to Dr. Arellano as an outpatient.  On Methadone 10mg Po TID with Dilaudid 8mg PO q4h PRN at home.  Patient reports that Dilaudid decreases her pain to 5/10 - while Morphine IV decreased her pain to 2/10 - Likely that patient needs opioid rotation due to tolerance of Dilaudid.  Given patient received 7.5mg IV Dilaudid and Morphine 8mg IV in 24hrs - and still reports pain is unchanged from home - will adjust methadone to 15mg PO TID to account for 25% cross reduction.  QTc 445 on 4/22/19.  Methadone 15mg PO TID ordered with Morphine 8mg IV q3h PRN moderate and severe pain.  Dilaudid IV discontinued.  Bowel regimen.  Patient requesting lactulose as she takes as an outpatient PRN - ordered. As above.  Known to Dr. Arellano as an outpatient.  On Methadone 10mg Po TID with Dilaudid 8mg PO q4h PRN at home.  Patient reports that Dilaudid decreases her pain to 5/10 - while Morphine IV decreased her pain to 2/10 - Likely that patient needs opioid rotation due to tolerance of Dilaudid.  Given patient received 7.5mg IV Dilaudid and Morphine 8mg IV in 24hrs - and still reports pain is unchanged from home - will adjust methadone to 15mg PO TID to account for 25% cross reduction.  QTc 445 on 4/22/19.  Methadone 15mg PO TID ordered with Morphine 8mg IV q3h PRN moderate and severe pain.  Dilaudid IV discontinued.  Bowel regimen.  Patient requesting lactulose as she takes as an outpatient PRN - ordered.  Follow-up with Dr. Arellano at [facility] scheduled for 4/29/19 at 1:30pm.  Patient aware.  Please include in discharge paperwork.  Endorsed to team.

## 2019-04-23 NOTE — CONSULT NOTE ADULT - ASSESSMENT
Thrombocytopenia in the setting of Gemzar  Worsening liver tests  Headache and photophobia    -Brain MRI with contrast  -Monitor plt, hold AC when plt<50  -Agree with restarting dexamethasone given low cortisole level and low bps  -consider starting antibiotics if bps remain low or becomes febrile  -CBC and CMP daily, including diff and liver tests 48f with advanced clear cell ovarian cancer, s/p multiple lines of therapy, most recently gemzar, presenting with fever.   Thrombocytopenia in the setting of Gemzar  Worsening liver tests  Headache and photophobia    -Brain MRI with contrast. Patient has had headaches for a month and brain imaging was planned as outpatient, she has an appointment this week.   -Monitor plt, hold AC when plt<50  -Agree with restarting dexamethasone given low cortisole level and low bps  -consider starting antibiotics if bps remain low or becomes febrile  -CBC and CMP daily, including diff and liver tests  -Supportive care, pain control, nutrition, PT, DVT ppx  -Outpatient oncology f/u    Will follow. Please do not hesitate to call back with questions.     Diamond Kamara MD  Medical Oncology Attending

## 2019-04-23 NOTE — CONSULT NOTE ADULT - PROBLEM SELECTOR RECOMMENDATION 2
Patient of Dr. Salazar as an outpatient, receiving Gemzar.  Continue management as per oncology.  MRI brain pending due to complaints of headache and thrombocytopenia. Patient of Dr. Cole as an outpatient, receiving Gemzar.  Continue management as per oncology.  MRI brain pending due to complaints of headache and thrombocytopenia. Chronic anxiety.  Patient takes Ativan 0.5mg PO q8h PRN - standing Ativan order changed to PRN.  Patient also utilizes medical marijuana.  Aware that if it is oil or capsule - her wife may bring in as long as team verifies marijuana card/dosing and she signs waiver form.  She is aware she must notify nursing staff prior to each dose of medical marijuana (she takes it TID).  Verbalized understanding. Chronic anxiety.  Patient takes Ativan 0.5mg PO q8h PRN - standing Ativan order changed to PRN.  Patient also utilizes medical marijuana.  Aware that if it is oil or capsule - her wife may bring in as long as team verifies marijuana card/dosing and she signs waiver form.  She is aware she must notify nursing staff prior to each dose of medical marijuana (she takes it TID).  Verbalized understanding. Follow-up with Dr. Arellano at [facility] scheduled for 4/29/19 at 1:30pm.  Patient aware.  Please include in discharge paperwork.  Endorsed to team.

## 2019-04-23 NOTE — CONSULT NOTE ADULT - ASSESSMENT
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 47 y/o F w/ hx clear cell ovarian ca - presents with pain - patient with peritoneal metastasis and pelvic lymphadenopathy - palliative consulted for pain management.

## 2019-04-23 NOTE — CONSULT NOTE ADULT - SUBJECTIVE AND OBJECTIVE BOX
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 pt states abdominal pain worsening over last few days primarily on RT LQ and RT groin without nausea or vomiting. (22 Apr 2019 15:30)       Oncologic History:      ROS: as above     PAST MEDICAL & SURGICAL HISTORY:  Anxiety  Vertigo  Ovarian cyst: (L) 1/2017  Ovarian cancer  Deviated Nasal Septum  Port-a-cath in place: Right 4/2017  H/O right breast biopsy: 2015  H/O: hysterectomy: robotic total laparoscopic hysterectomy, right salpingo oophorectomy 2/2017  S/P unilateral salpingo-oophorectomy: 1/25/17 (L)  History of Tonsillectomy      SOCIAL HISTORY:    FAMILY HISTORY:  Family history of ovarian cancer (Grandparent)  Family history of prostate cancer in father      MEDICATIONS  (STANDING):  chlorhexidine 4% Liquid 1 Application(s) Topical <User Schedule>  cholecalciferol 400 Unit(s) Oral daily  dexamethasone  Injectable 4 milliGRAM(s) IV Push every 6 hours  enoxaparin Injectable 40 milliGRAM(s) SubCutaneous every 24 hours  lidocaine   Patch 1 Patch Transdermal daily  LORazepam     Tablet 0.5 milliGRAM(s) Oral three times a day  methadone    Tablet 10 milliGRAM(s) Oral three times a day  mirtazapine 15 milliGRAM(s) Oral at bedtime  nystatin Powder 1 Application(s) Topical two times a day  pantoprazole    Tablet 40 milliGRAM(s) Oral before breakfast  venlafaxine 50 milliGRAM(s) Oral daily    MEDICATIONS  (PRN):  acetaminophen   Tablet .. 650 milliGRAM(s) Oral every 6 hours PRN Temp greater or equal to 38C (100.4F), Mild Pain (1 - 3)  HYDROmorphone  Injectable 2 milliGRAM(s) IV Push every 4 hours PRN Severe Pain (7 - 10)  HYDROmorphone  Injectable 1 milliGRAM(s) IV Push every 4 hours PRN Moderate Pain (4 - 6)  ondansetron Injectable 4 milliGRAM(s) IV Push every 6 hours PRN Nausea      Allergies    No Known Allergies    Intolerances        Vital Signs Last 24 Hrs  T(C): 37.1 (23 Apr 2019 11:51), Max: 37.6 (23 Apr 2019 10:11)  T(F): 98.8 (23 Apr 2019 11:51), Max: 99.6 (23 Apr 2019 10:11)  HR: 93 (23 Apr 2019 10:11) (70 - 93)  BP: 106/56 (23 Apr 2019 12:49) (82/58 - 124/65)  BP(mean): --  RR: 18 (23 Apr 2019 10:11) (18 - 18)  SpO2: 96% (23 Apr 2019 10:11) (96% - 100%)    PHYSICAL EXAM  General: adult in NAD  HEENT: clear oropharynx, anicteric sclera, pink conjunctiva  Neck: supple  CV: normal S1/S2 with no murmur rubs or gallops  Lungs: positive air movement b/l ant lungs, clear to auscultation, no wheezes, no rales  Abdomen: soft non-tender non-distended, no hepatosplenomegaly  Ext: no clubbing cyanosis or edema  Skin: no rashes and no petechiae  Neuro: alert and oriented X 3, none focal    LABS:                          8.3    5.56  )-----------( 92       ( 23 Apr 2019 09:17 )             27.2         Mean Cell Volume : 90.7 fL  Mean Cell Hemoglobin : 27.7 pg  Mean Cell Hemoglobin Concentration : 30.5 %  Auto Neutrophil # : x  Auto Lymphocyte # : x  Auto Monocyte # : x  Auto Eosinophil # : x  Auto Basophil # : x  Auto Neutrophil % : x  Auto Lymphocyte % : x  Auto Monocyte % : x  Auto Eosinophil % : x  Auto Basophil % : x      Serial CBC's  04-23 @ 09:17  Hct-27.2 / Hgb-8.3 / Plat-92 / RBC-3.00 / WBC-5.56  Serial CBC's  04-23 @ 06:30  Hct-27.0 / Hgb-8.3 / Plat--- / RBC-2.96 / WBC-5.85  Serial CBC's  04-22 @ 10:25  Hct-31.7 / Hgb-10.1 / Plat-129 / RBC-3.57 / WBC-7.09      04-23    137  |  100  |  6<L>  ----------------------------<  92  3.8   |  25  |  0.73    Ca    8.7      23 Apr 2019 06:30    TPro  6.1  /  Alb  3.1<L>  /  TBili  0.5  /  DBili  x   /  AST  37<H>  /  ALT  50<H>  /  AlkPhos  228<H>  04-23      PT/INR - ( 22 Apr 2019 10:43 )   PT: 15.5 SEC;   INR: 1.35          PTT - ( 22 Apr 2019 10:43 )  PTT:31.6 SEC                RADIOLOGY & ADDITIONAL STUDIES: Patient is a 48y old  Female who presents with a chief complaint of fever (22 Apr 2019 15:30)      HPI:  48f with advanced, platinum resistant, clear cell ovarian ca, s/p multiple lines of therapy (carbo/taxol,Keytruda, doxil), most recently gemcitabine, p/w abdominal pain and fever. pt states abdominal pain worsening over last few days primarily on RT LQ and RT groin without nausea or vomiting.    ROS: as above     PAST MEDICAL & SURGICAL HISTORY:  Anxiety  Vertigo  Ovarian cyst: (L) 1/2017  Ovarian cancer  Deviated Nasal Septum  Port-a-cath in place: Right 4/2017  H/O right breast biopsy: 2015  H/O: hysterectomy: robotic total laparoscopic hysterectomy, right salpingo oophorectomy 2/2017  S/P unilateral salpingo-oophorectomy: 1/25/17 (L)  History of Tonsillectomy      SOCIAL HISTORY:    FAMILY HISTORY:  Family history of ovarian cancer (Grandparent)  Family history of prostate cancer in father      MEDICATIONS  (STANDING):  chlorhexidine 4% Liquid 1 Application(s) Topical <User Schedule>  cholecalciferol 400 Unit(s) Oral daily  dexamethasone  Injectable 4 milliGRAM(s) IV Push every 6 hours  enoxaparin Injectable 40 milliGRAM(s) SubCutaneous every 24 hours  lidocaine   Patch 1 Patch Transdermal daily  LORazepam     Tablet 0.5 milliGRAM(s) Oral three times a day  methadone    Tablet 10 milliGRAM(s) Oral three times a day  mirtazapine 15 milliGRAM(s) Oral at bedtime  nystatin Powder 1 Application(s) Topical two times a day  pantoprazole    Tablet 40 milliGRAM(s) Oral before breakfast  venlafaxine 50 milliGRAM(s) Oral daily    MEDICATIONS  (PRN):  acetaminophen   Tablet .. 650 milliGRAM(s) Oral every 6 hours PRN Temp greater or equal to 38C (100.4F), Mild Pain (1 - 3)  HYDROmorphone  Injectable 2 milliGRAM(s) IV Push every 4 hours PRN Severe Pain (7 - 10)  HYDROmorphone  Injectable 1 milliGRAM(s) IV Push every 4 hours PRN Moderate Pain (4 - 6)  ondansetron Injectable 4 milliGRAM(s) IV Push every 6 hours PRN Nausea      Allergies    No Known Allergies    Intolerances        Vital Signs Last 24 Hrs  T(C): 37.1 (23 Apr 2019 11:51), Max: 37.6 (23 Apr 2019 10:11)  T(F): 98.8 (23 Apr 2019 11:51), Max: 99.6 (23 Apr 2019 10:11)  HR: 93 (23 Apr 2019 10:11) (70 - 93)  BP: 106/56 (23 Apr 2019 12:49) (82/58 - 124/65)  BP(mean): --  RR: 18 (23 Apr 2019 10:11) (18 - 18)  SpO2: 96% (23 Apr 2019 10:11) (96% - 100%)    PHYSICAL EXAM  General: adult in NAD  HEENT: clear oropharynx, anicteric sclera, pink conjunctiva  Neck: supple  CV: normal S1/S2 with no murmur rubs or gallops  Lungs: positive air movement b/l ant lungs, clear to auscultation, no wheezes, no rales  Abdomen: soft non-tender non-distended, no hepatosplenomegaly  Ext: no clubbing cyanosis or edema  Skin: no rashes and no petechiae  Neuro: alert and oriented X 3, none focal    LABS:                          8.3    5.56  )-----------( 92       ( 23 Apr 2019 09:17 )             27.2         Mean Cell Volume : 90.7 fL  Mean Cell Hemoglobin : 27.7 pg  Mean Cell Hemoglobin Concentration : 30.5 %  Auto Neutrophil # : x  Auto Lymphocyte # : x  Auto Monocyte # : x  Auto Eosinophil # : x  Auto Basophil # : x  Auto Neutrophil % : x  Auto Lymphocyte % : x  Auto Monocyte % : x  Auto Eosinophil % : x  Auto Basophil % : x      Serial CBC's  04-23 @ 09:17  Hct-27.2 / Hgb-8.3 / Plat-92 / RBC-3.00 / WBC-5.56  Serial CBC's  04-23 @ 06:30  Hct-27.0 / Hgb-8.3 / Plat--- / RBC-2.96 / WBC-5.85  Serial CBC's  04-22 @ 10:25  Hct-31.7 / Hgb-10.1 / Plat-129 / RBC-3.57 / WBC-7.09      04-23    137  |  100  |  6<L>  ----------------------------<  92  3.8   |  25  |  0.73    Ca    8.7      23 Apr 2019 06:30    TPro  6.1  /  Alb  3.1<L>  /  TBili  0.5  /  DBili  x   /  AST  37<H>  /  ALT  50<H>  /  AlkPhos  228<H>  04-23      PT/INR - ( 22 Apr 2019 10:43 )   PT: 15.5 SEC;   INR: 1.35          PTT - ( 22 Apr 2019 10:43 )  PTT:31.6 SEC                RADIOLOGY & ADDITIONAL STUDIES:

## 2019-04-23 NOTE — PROGRESS NOTE ADULT - SUBJECTIVE AND OBJECTIVE BOX
Patient is a 48y old  Female who presents with a chief complaint of fever (2019 13:28)      SUBJECTIVE / OVERNIGHT EVENTS:  Patient seen and examined this morning. She complains of severe intractable pain in her pelvic/lower abdominal area. BP was trending lower, 88/46- could be secondary to pain meds, but patient asymptomatic with otherwise stable heart rate, temp and respiratory rate.     MEDICATIONS  (STANDING):  chlorhexidine 4% Liquid 1 Application(s) Topical <User Schedule>  cholecalciferol 400 Unit(s) Oral daily  dexamethasone  Injectable 4 milliGRAM(s) IV Push every 6 hours  enoxaparin Injectable 40 milliGRAM(s) SubCutaneous every 24 hours  lidocaine   Patch 1 Patch Transdermal daily  methadone    Tablet 10 milliGRAM(s) Oral three times a day  mirtazapine 15 milliGRAM(s) Oral at bedtime  nystatin Powder 1 Application(s) Topical two times a day  pantoprazole    Tablet 40 milliGRAM(s) Oral before breakfast  venlafaxine 50 milliGRAM(s) Oral daily    MEDICATIONS  (PRN):  acetaminophen   Tablet .. 650 milliGRAM(s) Oral every 6 hours PRN Temp greater or equal to 38C (100.4F), Mild Pain (1 - 3)  lactulose Syrup 10 Gram(s) Oral every 6 hours PRN constipation  LORazepam     Tablet 0.5 milliGRAM(s) Oral three times a day PRN Anxiety  morphine  - Injectable 8 milliGRAM(s) IV Push every 3 hours PRN moderate and severe pain  ondansetron Injectable 4 milliGRAM(s) IV Push every 6 hours PRN Nausea      PHYSICAL EXAM:  T(C): 37.1 (19 @ 11:51), Max: 37.6 (19 @ 10:11)  HR: 93 (19 @ 10:11) (70 - 93)  BP: 106/56 (19 @ 12:49) (82/58 - 124/65)  RR: 18 (19 @ 10:11) (18 - 18)  SpO2: 96% (19 @ 10:11) (96% - 100%)  I&O's Summary    GENERAL: Moderate distress, well-developed  EYES: conjunctiva and sclera clear  CHEST/LUNG: Clear to auscultation bilaterally; No wheeze  HEART: Regular rate and rhythm; No murmurs, rubs, or gallops  ABDOMEN: Soft, Nondistended; Bowel sounds present- Tenderness in her lower abdominal pain/pelvic area  EXTREMITIES:  2+ Peripheral Pulses, No clubbing, cyanosis, or edema  PSYCH: AAOx3  NEUROLOGY: CN II-XII grossly intact, moving all extremities  SKIN: multiple tattoos    LABS:  CAPILLARY BLOOD GLUCOSE                              8.3    5.56  )-----------( 92       ( 2019 09:17 )             27.2         137  |  100  |  6<L>  ----------------------------<  92  3.8   |  25  |  0.73    Ca    8.7      2019 06:30    TPro  6.1  /  Alb  3.1<L>  /  TBili  0.5  /  DBili  x   /  AST  37<H>  /  ALT  50<H>  /  AlkPhos  228<H>      PT/INR - ( 2019 10:43 )   PT: 15.5 SEC;   INR: 1.35          PTT - ( 2019 10:43 )  PTT:31.6 SEC      Urinalysis Basic - ( 2019 12:36 )    Color: YELLOW / Appearance: CLEAR / S.016 / pH: 6.5  Gluc: NEGATIVE / Ketone: NEGATIVE  / Bili: NEGATIVE / Urobili: NORMAL   Blood: NEGATIVE / Protein: NEGATIVE / Nitrite: NEGATIVE   Leuk Esterase: NEGATIVE / RBC: x / WBC x   Sq Epi: x / Non Sq Epi: x / Bacteria: x        RADIOLOGY & ADDITIONAL TESTS:    Imaging Personally Reviewed:    Consultant(s) Notes Reviewed:      Care Discussed with Consultants/Other Providers: Patient care discussed with oncology and palliative care during interdisciplinary rounds, case management, nursing management  and social work were also present. Case later discussed with the oncologist separately.

## 2019-04-24 PROBLEM — R60.9 EDEMA: Status: ACTIVE | Noted: 2019-01-01

## 2019-04-24 NOTE — PROGRESS NOTE ADULT - PROBLEM SELECTOR PLAN 4
Patient of Dr. Cole as an outpatient.  Plan for follow-up upon discharge.  Continue management as per oncology.

## 2019-04-24 NOTE — CHART NOTE - NSCHARTNOTEFT_GEN_A_CORE
Notified by RN that patients heart rate is 44/mt. Stat EKG done which showed sinus bradycardia HR at 44/mt .Patient seen at bedside. Patient reports that she has history of low BP and low heart rate however patient unsure about baseline heart rate. EKG done on April 22 showed sinus rhythm, heart rate at 80/mt. Patient denies chest pain shortness of breath , dizziness. BP is 97/55. Will administer  normal saline 500 ml bolus and reassess BP and heart rate post bolus . Continue to monitor.

## 2019-04-24 NOTE — PROGRESS NOTE ADULT - SUBJECTIVE AND OBJECTIVE BOX
Patient is a 48y old  Female who presents with a chief complaint of fever (23 Apr 2019 14:12)      SUBJECTIVE / OVERNIGHT EVENTS:  Patient seen and examined this morning. States that she feels much better than yesterday but continues to have intermittent pain. Headaches mostly subsided, but these types of headaches have been intermittent for a while. Overnight had asymptomatic eric and hypotension.    MEDICATIONS  (STANDING):  chlorhexidine 4% Liquid 1 Application(s) Topical <User Schedule>  cholecalciferol 400 Unit(s) Oral daily  dexamethasone  Injectable 4 milliGRAM(s) IV Push every 6 hours  enoxaparin Injectable 40 milliGRAM(s) SubCutaneous every 24 hours  lidocaine   Patch 1 Patch Transdermal daily  methadone    Tablet 15 milliGRAM(s) Oral every 8 hours  mirtazapine 15 milliGRAM(s) Oral at bedtime  nystatin Powder 1 Application(s) Topical two times a day  pantoprazole    Tablet 40 milliGRAM(s) Oral before breakfast  venlafaxine 50 milliGRAM(s) Oral daily    MEDICATIONS  (PRN):  acetaminophen   Tablet .. 650 milliGRAM(s) Oral every 6 hours PRN Temp greater or equal to 38C (100.4F), Mild Pain (1 - 3)  lactulose Syrup 10 Gram(s) Oral every 6 hours PRN constipation  LORazepam     Tablet 0.5 milliGRAM(s) Oral three times a day PRN Anxiety  morphine  - Injectable 8 milliGRAM(s) IV Push every 3 hours PRN moderate and severe pain  ondansetron Injectable 4 milliGRAM(s) IV Push every 6 hours PRN Nausea      PHYSICAL EXAM:  T(C): 36.4 (04-24-19 @ 09:25), Max: 37.2 (04-23-19 @ 14:58)  HR: 66 (04-24-19 @ 11:22) (44 - 75)  BP: 116/64 (04-24-19 @ 11:22) (91/44 - 116/64)  RR: 18 (04-24-19 @ 09:25) (18 - 20)  SpO2: 98% (04-24-19 @ 09:25) (96% - 99%)  I&O's Summary    24 Apr 2019 07:01  -  24 Apr 2019 14:02  --------------------------------------------------------  IN: 0 mL / OUT: 200 mL / NET: -200 mL        GENERAL: NAD, well-developed  EYES: conjunctiva and sclera clear  CHEST/LUNG: Clear to auscultation bilaterally; No wheeze  HEART: Regular rate and rhythm; No murmurs, rubs, or gallops  ABDOMEN: Soft, Nondistended; Bowel sounds present- Tenderness in her lower abdominal pain/pelvic area  EXTREMITIES:  2+ Peripheral Pulses, No clubbing, cyanosis, or edema  PSYCH: AAOx3  NEUROLOGY: CN II-XII grossly intact, moving all extremities  SKIN: multiple tattoos      LABS:  CAPILLARY BLOOD GLUCOSE                              8.2    3.82  )-----------( 73       ( 24 Apr 2019 06:00 )             26.3     04-24    142  |  105  |  10  ----------------------------<  162<H>  3.7   |  24  |  0.60    Ca    9.1      24 Apr 2019 06:00    TPro  6.4  /  Alb  3.4  /  TBili  0.2  /  DBili  x   /  AST  49<H>  /  ALT  61<H>  /  AlkPhos  257<H>  04-24              RADIOLOGY & ADDITIONAL TESTS:    Imaging Personally Reviewed:    Consultant(s) Notes Reviewed:      Care Discussed with Consultants/Other Providers:  Patient care discussed with oncology and palliative care during interdisciplinary rounds, case management, nursing management  and social work were also present. Case later discussed with the oncologist separately.

## 2019-04-24 NOTE — PROGRESS NOTE ADULT - ATTENDING COMMENTS
Pt seen with NP.  Agree with above plan.   Pain better controlled on current regimen.  Please page for uncontrolled symptoms [phone].

## 2019-04-24 NOTE — PROGRESS NOTE ADULT - SUBJECTIVE AND OBJECTIVE BOX
INTERVAL HPI/OVERNIGHT EVENTS:  Pain overall improved     Code Status: Full Code   Allergies    No Known Allergies    Intolerances    MEDICATIONS  (STANDING):  chlorhexidine 4% Liquid 1 Application(s) Topical <User Schedule>  cholecalciferol 400 Unit(s) Oral daily  dexamethasone  Injectable 4 milliGRAM(s) IV Push every 6 hours  enoxaparin Injectable 40 milliGRAM(s) SubCutaneous every 24 hours  lidocaine   Patch 1 Patch Transdermal daily  methadone    Tablet 15 milliGRAM(s) Oral every 8 hours  mirtazapine 15 milliGRAM(s) Oral at bedtime  nystatin Powder 1 Application(s) Topical two times a day  pantoprazole    Tablet 40 milliGRAM(s) Oral before breakfast  venlafaxine 50 milliGRAM(s) Oral daily    MEDICATIONS  (PRN):  acetaminophen   Tablet .. 650 milliGRAM(s) Oral every 6 hours PRN Temp greater or equal to 38C (100.4F), Mild Pain (1 - 3)  lactulose Syrup 10 Gram(s) Oral every 6 hours PRN constipation  LORazepam     Tablet 0.5 milliGRAM(s) Oral three times a day PRN Anxiety  morphine  IR 30 milliGRAM(s) Oral every 4 hours PRN moderate and severe pain  ondansetron Injectable 4 milliGRAM(s) IV Push every 6 hours PRN Nausea      PRESENT SYMPTOMS: [ ]Unable to obtain due to poor mentation   Source if other than patient:  [ ]Family   [ ]Team     Pain (Impact on QOL):  Patient reports pain to her right lower abdomen, pelvic area, and right groin - constant - currently 4/10 - throbbing - non radiating, aggravated by movement and moderately relieved with opioids     Dyspnea:  Yes [ ] No [ x] - [ ]Mild [ ]Moderate [ ]Severe  Anxiety:    Yes [ ] No [x ] - [ ]Mild [ ]Moderate [ ]Severe  Fatigue:    Yes [ ] No [x ] - [ ]Mild [ ]Moderate [ ]Severe  Nausea:    Yes [ ] No [x ] - [ ]Mild [ ]Moderate [ ]Severe                         Loss of appetite: Yes [ ] No [x ] - [ ]Mild [ ]Moderate [ ]Severe             Constipation:  Yes [ ] No [x ] - [ ]Mild [ ]Moderate [ ]Severe  Grief: Yes [x ] No [ ]     PAIN AD Score:	  [email or web address] (Ctrl + left click to view)    Other Symptoms:  [x ]All other review of systems negative     Karnofsky Performance Score/Palliative Performance Status Version 2:   70-80 %    http://palliative.info/resource_material/[email or web address].pdf    PHYSICAL EXAM:  Vital Signs Last 24 Hrs  T(C): 37.1 (24 Apr 2019 13:55), Max: 37.2 (23 Apr 2019 14:58)  T(F): 98.8 (24 Apr 2019 13:55), Max: 99 (23 Apr 2019 14:58)  HR: 58 (24 Apr 2019 13:55) (44 - 75)  BP: 118/68 (24 Apr 2019 13:55) (91/44 - 118/68)  BP(mean): --  RR: 18 (24 Apr 2019 13:55) (18 - 20)  SpO2: 97% (24 Apr 2019 13:55) (96% - 99%) I&O's Summary    24 Apr 2019 07:01  -  24 Apr 2019 14:11  --------------------------------------------------------  IN: 0 mL / OUT: 200 mL / NET: -200 mL         GENERAL:  [x ]Alert  [x ]Oriented x4   [ ]Lethargic  [ ]Cachexia  [ ]Unarousable  [x ]Verbal  [ ]Non-Verbal  PULMONARY:   [x ]Clear anteriorly   [ ]Rhonchi        [ ]Right [ ]Left [ ]Bilateral  [ ]Crackles        [ ]Right [ ]Left [ ]Bilateral  [ ]Wheezing     [ ]Right [ ]Left [ ]Bilateral  CARDIOVASCULAR:    [x ]Regular [ ]Irregular [ ]Tachy  [x ]Tian - 50's.  [ ]Murmur [ ]Other  GASTROINTESTINAL:  [x ]Soft  [ ]Distended   [x ]+BS  [ x]Non tender [ ]Tender  [ ]PEG [ ]OGT/ NGT   Last BM: 4/24/19     GENITOURINARY:  [x ]Normal [ ] Incontinent   [ ]Oliguria/Anuria   [ ]Valentine  MUSCULOSKELETAL:   [ ]Normal   [x ]Weakness  [ ]Bed/Wheelchair bound [ ]Edema  NEUROLOGIC:   [x ]No focal deficits  [ ] Cognitive impairment  [ ] Dysphagia [ ]Dysarthria [ ] Paresis [ ]Other   SKIN:   [ x]Normal   [ ]Pressure ulcer(s)  [ ]Rash    CRITICAL CARE:  [ ] Shock Present  [ ]Septic [ ]Cardiogenic [ ]Neurologic [ ]Hypovolemic  [ ]  Vasopressors [ ]  Inotropes   [ ] Respiratory failure present  [ ] Acute  [ ] Chronic [ ] Hypoxic  [ ] Hypercarbic [ ] Other  [ ] Other organ failure     LABS:                        8.2    3.82  )-----------( 73       ( 24 Apr 2019 06:00 )             26.3   04-24    142  |  105  |  10  ----------------------------<  162<H>  3.7   |  24  |  0.60    Ca    9.1      24 Apr 2019 06:00    TPro  6.4  /  Alb  3.4  /  TBili  0.2  /  DBili  x   /  AST  49<H>  /  ALT  61<H>  /  AlkPhos  257<H>  04-24        RADIOLOGY & ADDITIONAL STUDIES:    Protein Calorie Malnutrition Present: [ ] yes [ ] no  [ ] PPSV2 < or = 30%  [ ] significant weight loss [ ] poor nutritional intake [ ] anasarca [ ] catabolic state Albumin, Serum: 3.4 g/dL (04-24-19 @ 06:00)      REFERRALS:   [ ]Chaplaincy  [ ] Hospice  [ ]Child Life  [ ]Social Work  [ ]Case management [ ]Holistic Therapy   Goals of Care Document: INTERVAL HPI/OVERNIGHT EVENTS:  Pain overall improved     Code Status: Full Code   Allergies    No Known Allergies    Intolerances    MEDICATIONS  (STANDING):  chlorhexidine 4% Liquid 1 Application(s) Topical <User Schedule>  cholecalciferol 400 Unit(s) Oral daily  dexamethasone  Injectable 4 milliGRAM(s) IV Push every 6 hours  enoxaparin Injectable 40 milliGRAM(s) SubCutaneous every 24 hours  lidocaine   Patch 1 Patch Transdermal daily  methadone    Tablet 15 milliGRAM(s) Oral every 8 hours  mirtazapine 15 milliGRAM(s) Oral at bedtime  nystatin Powder 1 Application(s) Topical two times a day  pantoprazole    Tablet 40 milliGRAM(s) Oral before breakfast  venlafaxine 50 milliGRAM(s) Oral daily    MEDICATIONS  (PRN):  acetaminophen   Tablet .. 650 milliGRAM(s) Oral every 6 hours PRN Temp greater or equal to 38C (100.4F), Mild Pain (1 - 3)  lactulose Syrup 10 Gram(s) Oral every 6 hours PRN constipation  LORazepam     Tablet 0.5 milliGRAM(s) Oral three times a day PRN Anxiety  morphine  IR 30 milliGRAM(s) Oral every 4 hours PRN moderate and severe pain  ondansetron Injectable 4 milliGRAM(s) IV Push every 6 hours PRN Nausea    PRESENT SYMPTOMS: [ ]Unable to obtain due to poor mentation   Source if other than patient:  [ ]Family   [ ]Team     Pain (Impact on QOL):  Patient reports pain to her right lower abdomen, pelvic area, and right groin - constant - currently 4/10 - throbbing - non radiating, aggravated by movement and moderately relieved with opioids     Dyspnea:  Yes [ ] No [ x] - [ ]Mild [ ]Moderate [ ]Severe  Anxiety:    Yes [ ] No [x ] - [ ]Mild [ ]Moderate [ ]Severe  Fatigue:    Yes [ ] No [x ] - [ ]Mild [ ]Moderate [ ]Severe  Nausea:    Yes [ ] No [x ] - [ ]Mild [ ]Moderate [ ]Severe                         Loss of appetite: Yes [ ] No [x ] - [ ]Mild [ ]Moderate [ ]Severe             Constipation:  Yes [ ] No [x ] - [ ]Mild [ ]Moderate [ ]Severe  Grief: Yes [x ] No [ ]     PAIN AD Score:	  [email or web address]/[email or web address] (Ctrl + left click to view)    Other Symptoms:  [x ]All other review of systems negative     Karnofsky Performance Score/Palliative Performance Status Version 2:   70-80 %    http://palliative.info/resource_material/[email or web address].pdf    PHYSICAL EXAM:  Vital Signs Last 24 Hrs  T(C): 37.1 (24 Apr 2019 13:55), Max: 37.2 (23 Apr 2019 14:58)  T(F): 98.8 (24 Apr 2019 13:55), Max: 99 (23 Apr 2019 14:58)  HR: 58 (24 Apr 2019 13:55) (44 - 75)  BP: 118/68 (24 Apr 2019 13:55) (91/44 - 118/68)  BP(mean): --  RR: 18 (24 Apr 2019 13:55) (18 - 20)  SpO2: 97% (24 Apr 2019 13:55) (96% - 99%) I&O's Summary    24 Apr 2019 07:01  -  24 Apr 2019 14:11  --------------------------------------------------------  IN: 0 mL / OUT: 200 mL / NET: -200 mL    GENERAL:  [x ]Alert  [x ]Oriented x4   [ ]Lethargic  [ ]Cachexia  [ ]Unarousable  [x ]Verbal  [ ]Non-Verbal  PULMONARY:   [x ]Clear anteriorly   [ ]Rhonchi        [ ]Right [ ]Left [ ]Bilateral  [ ]Crackles        [ ]Right [ ]Left [ ]Bilateral  [ ]Wheezing     [ ]Right [ ]Left [ ]Bilateral  CARDIOVASCULAR:    [x ]Regular [ ]Irregular [ ]Tachy  [x ]Tian - 50's.  [ ]Murmur [ ]Other  GASTROINTESTINAL:  [x ]Soft  [ ]Distended   [x ]+BS  [ x]Non tender [ ]Tender  [ ]PEG [ ]OGT/ NGT   Last BM: 4/24/19     GENITOURINARY:  [x ]Normal [ ] Incontinent   [ ]Oliguria/Anuria   [ ]Valentine  MUSCULOSKELETAL:   [ ]Normal   [x ]Weakness  [ ]Bed/Wheelchair bound [ ]Edema  NEUROLOGIC:   [x ]No focal deficits  [ ] Cognitive impairment  [ ] Dysphagia [ ]Dysarthria [ ] Paresis [ ]Other   SKIN:   [ x]Normal   [ ]Pressure ulcer(s)  [ ]Rash    CRITICAL CARE:  [ ] Shock Present  [ ]Septic [ ]Cardiogenic [ ]Neurologic [ ]Hypovolemic  [ ]  Vasopressors [ ]  Inotropes   [ ] Respiratory failure present  [ ] Acute  [ ] Chronic [ ] Hypoxic  [ ] Hypercarbic [ ] Other  [ ] Other organ failure     LABS:                        8.2    3.82  )-----------( 73       ( 24 Apr 2019 06:00 )             26.3   04-24    142  |  105  |  10  ----------------------------<  162<H>  3.7   |  24  |  0.60    Ca    9.1      24 Apr 2019 06:00    TPro  6.4  /  Alb  3.4  /  TBili  0.2  /  DBili  x   /  AST  49<H>  /  ALT  61<H>  /  AlkPhos  257<H>  04-24    RADIOLOGY & ADDITIONAL STUDIES: reviewed.     Protein Calorie Malnutrition Present: [ ] yes [ ] no  [ ] PPSV2 < or = 30%  [ ] significant weight loss [ ] poor nutritional intake [ ] anasarca [ ] catabolic state Albumin, Serum: 3.4 g/dL (04-24-19 @ 06:00)    REFERRALS:   [ ]Chaplaincy  [ ] Hospice  [ ]Child Life  [ ]Social Work  [ ]Case management [ ]Holistic Therapy   Goals of Care Document:

## 2019-04-25 NOTE — PROGRESS NOTE ADULT - SUBJECTIVE AND OBJECTIVE BOX
Patient is a 48y old  Female who presents with a chief complaint of fever (25 Apr 2019 14:00)      SUBJECTIVE / OVERNIGHT EVENTS:  Patient seen and examined this morning. No overnight events. Her pain and headaches have subsided. She continues to have episodes of bradycardia. She denies any chest pain, shortness of breath, fevers, chills, nausea or vomiting.    MEDICATIONS  (STANDING):  chlorhexidine 4% Liquid 1 Application(s) Topical <User Schedule>  cholecalciferol 400 Unit(s) Oral daily  dexamethasone  Injectable 4 milliGRAM(s) IV Push every 6 hours  enoxaparin Injectable 40 milliGRAM(s) SubCutaneous every 24 hours  lidocaine   Patch 1 Patch Transdermal daily  methadone    Tablet 15 milliGRAM(s) Oral every 8 hours  mirtazapine 15 milliGRAM(s) Oral at bedtime  nystatin Powder 1 Application(s) Topical two times a day  pantoprazole    Tablet 40 milliGRAM(s) Oral before breakfast  venlafaxine 50 milliGRAM(s) Oral daily    MEDICATIONS  (PRN):  acetaminophen   Tablet .. 650 milliGRAM(s) Oral every 6 hours PRN Temp greater or equal to 38C (100.4F), Mild Pain (1 - 3)  lactulose Syrup 10 Gram(s) Oral every 6 hours PRN constipation  LORazepam     Tablet 0.5 milliGRAM(s) Oral three times a day PRN Anxiety  morphine  IR 30 milliGRAM(s) Oral every 4 hours PRN moderate and severe pain  ondansetron Injectable 4 milliGRAM(s) IV Push every 6 hours PRN Nausea      PHYSICAL EXAM:  T(C): 36.6 (04-25-19 @ 06:40), Max: 36.8 (04-24-19 @ 17:45)  HR: 48 (04-25-19 @ 14:31) (45 - 61)  BP: 126/96 (04-25-19 @ 14:31) (105/56 - 126/96)  RR: 18 (04-25-19 @ 14:31) (18 - 19)  SpO2: 100% (04-25-19 @ 14:31) (98% - 100%)  I&O's Summary    24 Apr 2019 07:01  -  25 Apr 2019 07:00  --------------------------------------------------------  IN: 0 mL / OUT: 1180 mL / NET: -1180 mL      GENERAL: NAD, well-developed  EYES: conjunctiva and sclera clear  CHEST/LUNG: Clear to auscultation bilaterally; No wheeze  HEART: Regular rate and rhythm; No murmurs, rubs, or gallops  ABDOMEN: Soft, Nondistended; Bowel sounds present- Tenderness in her lower abdominal pain/pelvic area  EXTREMITIES:  2+ Peripheral Pulses, No clubbing, cyanosis, or edema  PSYCH: AAOx3  NEUROLOGY: CN II-XII grossly intact, moving all extremities  SKIN: multiple tattoos    LABS:  CAPILLARY BLOOD GLUCOSE                              8.1    5.81  )-----------( 80       ( 25 Apr 2019 06:50 )             26.6     04-25    144  |  106  |  13  ----------------------------<  153<H>  4.1   |  27  |  0.67    Ca    9.1      25 Apr 2019 06:10  Phos  3.4     04-25  Mg     2.1     04-25    TPro  6.4  /  Alb  3.3  /  TBili  < 0.2<L>  /  DBili  x   /  AST  72<H>  /  ALT  88<H>  /  AlkPhos  235<H>  04-25              RADIOLOGY & ADDITIONAL TESTS:    Imaging Personally Reviewed:  EXAM:  MR BRAIN IC        PROCEDURE DATE:  Apr 24 2019         INTERPRETATION:  Clinical indication: Ovarian cancer. Headaches.    MRI of the brain was performed using sagittal T1, axial T1 T2 T2 FLAIR   diffusion and susceptibility weighted sequence. The patient was injected   with approximately 7.5 cc of Gadavist IV and sagittal coronal and axial   T1-weighted sequences were performed.    Subcentimeter foci of T2 prolongation in the bifrontal subcortical region   is identified. These findings are nonspecific and has been described to   be associated with migraine headaches though other possibilities include   areas of infection inflammation ischemia or demyelinating disease,   clinical correlation is recommended.    There is no evidence acute hemorrhage mass mass effect in the posterior   fossa or supratentorial region.    Evaluation of the diffusion weighted sequence demonstrates no abnormal   areas of restricted diffusion to suggest acute infarct.    There are no abnormal intra or extra-axial collections seen    The large vessels demonstrate normal flow voids    The visualized paranasal sinuses mastoid and middle ear regions appear   clear.    Impression: Subcentimeter foci of abnormal T2 prolongation in the   bifrontal subcortical region as described above.      Consultant(s) Notes Reviewed:      Care Discussed with Consultants/Other Providers: Patient care discussed with oncology and palliative care during interdisciplinary rounds, case management, nursing management  and social work were also present.  Case later discussed with oncology.

## 2019-04-25 NOTE — DISCHARGE NOTE PROVIDER - PROVIDER TOKENS
FREE:[LAST:[Dr. Arellano],PHONE:[(   )    -],FAX:[(   )    -],ADDRESS:[Follow-up with Dr. Arellano at [facility] scheduled for 4/29/19 at 1:30pm.]]

## 2019-04-25 NOTE — DISCHARGE NOTE PROVIDER - NSDCCPCAREPLAN_GEN_ALL_CORE_FT
PRINCIPAL DISCHARGE DIAGNOSIS  Diagnosis: Malignant neoplasm of ovary, unspecified laterality  Assessment and Plan of Treatment: You presented to the emergency room with abdominal pain likely due to progression of cancer. Your pain medications were adjusted during your hospitalization. You were seen by the hematology team and palliative team during your hospitalization   - Follow-up with Dr. Arellano at [facility] scheduled for 4/29/19 at 1:30pm.      SECONDARY DISCHARGE DIAGNOSES  Diagnosis: Hypotension due to drugs  Assessment and Plan of Treatment: You were started on steroids during your hospitalization. You were given fluids for hypotension and bradycardia. Please taper off of the steroids as instructed. Please follow up with your PCP/cardiologist as outpatient within 1 week of discharge from the hospital.    Diagnosis: Fever, unspecified fever cause  Assessment and Plan of Treatment: Blood cultures adn urine cultures were obtained which were negative. You were monitored off antibiotics. Fever likely secondary to cancer. Continue your medications as directed and please follow-up as an outpatient with your primary care provider for further care and recommendations.    Diagnosis: Anemia in neoplastic disease  Assessment and Plan of Treatment: Follow-up with your outpatient provider for further care/recommendations. Monitor for signs/symptoms indicating worsening of disease, such as, easy bleeding/bruising, pale skin, fatigue, dizziness, increased heart rate, or chest pain.    Diagnosis: Acute non intractable tension-type headache  Assessment and Plan of Treatment: An MRI was obtained which showed no acute changed. Continue your medications as directed and please follow-up as an outpatient with your primary care provider for further care and recommendations.

## 2019-04-25 NOTE — DISCHARGE NOTE PROVIDER - HOSPITAL COURSE
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 CT shows progression of RT LQ peritoneal mets. UA neg WBC-WNL             Malignant neoplasm of ovary, unspecified laterality    - Heme/Onc c/s    Patient with hypotension 4/25/19, Cortisol level is 6, restarted steroids and will continue to monitor.     Brain MRI: Subcentimeter foci of abnormal T2 prolongation in the bifrontal subcortical region as described above.    - Follow-up with Dr. Arellano at [facility] scheduled for 4/29/19 at 1:30pm.         Hypotension due to drugs    - Patient is on a lot of pain meds at home    - This could be multifactorial and as a result of opioids, adrenal insufficiency. Unlikely to be an infection.             Fever, unspecified fever cause    - secondary to progression of disease no overt signs of infection     - f/u cx NGTD     - Monitor off antibiotics      -Heme c/s     - Palliative care consulted             Anemia in neoplastic disease    Hg stable            Acute non intractable tension-type headache    - Brain MRI: Subcentimeter foci of abnormal T2 prolongation in the bifrontal subcortical region as described above.            As per medical attending patient is medically stable for discharge to home    Dispo: home Pt is a 47 yo F w/ hx clear cell ovarian ca p/w fever and abdominal pain. CT shows progression of RT LQ peritoneal mets. UA neg WBC-WNL             Malignant neoplasm of ovary, unspecified laterality    - Heme/Onc c/s    Patient with hypotension 4/25/19, Cortisol level is 6, restarted steroids and will continue to monitor.     Brain MRI: Subcentimeter foci of abnormal T2 prolongation in the bifrontal subcortical region as described above.    - Follow-up with Dr. Arellano at [facility] scheduled for 4/29/19 at 1:30pm.         Hypotension due to drugs    - Patient is on a lot of pain meds at home    - This could be multifactorial and as a result of opioids, adrenal insufficiency. Unlikely to be an infection.    - Taper steroids on discharge          Fever, unspecified fever cause    - secondary to progression of disease no overt signs of infection     - f/u cx NGTD     - Monitor off antibiotics      -Heme c/s     - Palliative care consulted             Anemia in neoplastic disease    Hg stable            Acute non intractable tension-type headache    - Brain MRI: Subcentimeter foci of abnormal T2 prolongation in the bifrontal subcortical region as described above.            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 CT shows progression of RT LQ peritoneal mets. UA neg WBC-WNL         Malignant neoplasm of ovary, unspecified laterality    - Heme/Onc c/s    Patient with hypotension 4/25/19, Cortisol level is 6, restarted steroids and will continue to monitor.     Brain MRI: Subcentimeter foci of abnormal T2 prolongation in the bifrontal subcortical region as described above.    - Follow-up with Dr. Arellano at [facility] scheduled for 4/29/19 at 1:30pm.         Hypotension due to drugs    - Patient is on a lot of pain meds at home    - This could be multifactorial and as a result of opioids, adrenal insufficiency. Unlikely to be an infection.    - Taper steroids on discharge          Fever, unspecified fever cause    - secondary to progression of disease no overt signs of infection     - f/u cx NGTD     - Monitor off antibiotics      -Heme c/s     - Palliative care consulted         Anemia in neoplastic disease    Hg stable        Acute non intractable tension-type headache    - Brain MRI: Subcentimeter foci of abnormal T2 prolongation in the bifrontal subcortical region as described above.            Discharge medications were discussed with medical attending     As per medical attending patient is medically stable for discharge to home    Dispo: home

## 2019-04-25 NOTE — PROGRESS NOTE ADULT - ATTENDING COMMENTS
Pt seen with NP.  Agree with above plan.  D/c home today with Methadone 15mg TID, morphine IR 30mg q4hrs PRN (rotated opiate as pt felt improved relief from morphine), continue bowel regimen.   Plan is for pt to be d/c today, will f/u with Dr. Arellano as above.

## 2019-04-25 NOTE — PROVIDER CONTACT NOTE (OTHER) - ASSESSMENT
No c/o chest pain/ dizziness/ SOB. Pt resting comfortably.
Pt and family refusing Lovenox injection since admission. Pt states, "My platelets are low already, I don't need a blood thinner."
/59, HR 45/asymptomatic , 02 100%,

## 2019-04-25 NOTE — PROGRESS NOTE ADULT - PROBLEM SELECTOR PROBLEM 1
Pain, neoplasm-related
Malignant neoplasm of ovary, unspecified laterality
Pain, neoplasm-related

## 2019-04-25 NOTE — REASON FOR VISIT
[Follow-Up Visit] : a follow-up [Spouse] : spouse [FreeTextEntry2] : follow up on Cycle 2 D8 gemcitabine today

## 2019-04-25 NOTE — REVIEW OF SYSTEMS
[Fatigue] : fatigue [Negative] : Allergic/Immunologic [Fever] : no fever [Recent Change In Weight] : ~T no recent weight change [Night Sweats] : no night sweats [Chills] : no chills [Abdominal Pain] : no abdominal pain [Vomiting] : no vomiting [Diarrhea] : no diarrhea [Constipation] : no constipation

## 2019-04-25 NOTE — PROGRESS NOTE ADULT - PROBLEM SELECTOR PLAN 2
Ativan PRN.  No doses required, pleasant and calm today, smiling. Continue Ativan PRN.
Ativan PRN.  No doses required, pleasant and calm today, smiling. Continue Ativan PRN.
Patient is on a lot of pain meds at home  This could be multifactorial and as a result of opioids, adrenal insufficiency. Unlikely to be an infection.
Patient is on a lot of pain meds at home  This could be multifactorial and as a result of opioids, adrenal insufficiency. Unlikely to be an infection.
Patient is on a lot of pain meds at home  This could be multifactorial and as a result of opioids, adrenal insufficiency. Unlikely to be an infection.  Systemic steroids started, cortisol level wnl but still less than what would be expected given the patient's hypotension  On discharge placed on taper dose. Outpatient followup.

## 2019-04-25 NOTE — PROGRESS NOTE ADULT - SUBJECTIVE AND OBJECTIVE BOX
INTERVAL HPI/OVERNIGHT EVENTS:  Intermittent right hip/pelvic pain     Code Status: Full Code   Allergies    No Known Allergies    Intolerances    MEDICATIONS  (STANDING):  chlorhexidine 4% Liquid 1 Application(s) Topical <User Schedule>  cholecalciferol 400 Unit(s) Oral daily  dexamethasone  Injectable 4 milliGRAM(s) IV Push every 6 hours  enoxaparin Injectable 40 milliGRAM(s) SubCutaneous every 24 hours  lidocaine   Patch 1 Patch Transdermal daily  methadone    Tablet 15 milliGRAM(s) Oral every 8 hours  mirtazapine 15 milliGRAM(s) Oral at bedtime  nystatin Powder 1 Application(s) Topical two times a day  pantoprazole    Tablet 40 milliGRAM(s) Oral before breakfast  venlafaxine 50 milliGRAM(s) Oral daily    MEDICATIONS  (PRN):  acetaminophen   Tablet .. 650 milliGRAM(s) Oral every 6 hours PRN Temp greater or equal to 38C (100.4F), Mild Pain (1 - 3)  lactulose Syrup 10 Gram(s) Oral every 6 hours PRN constipation  LORazepam     Tablet 0.5 milliGRAM(s) Oral three times a day PRN Anxiety  morphine  IR 30 milliGRAM(s) Oral every 4 hours PRN moderate and severe pain  ondansetron Injectable 4 milliGRAM(s) IV Push every 6 hours PRN Nausea      PRESENT SYMPTOMS: [ ]Unable to obtain due to poor mentation   Source if other than patient:  [ ]Family   [ ]Team     Pain (Impact on QOL):  Patient reports pain to her right pelvic, lower abdominal, and groin area, currently 3/10 - throbbing, aggravated by movement and relieved with opioids    Dyspnea:  Yes [ ] No [x ] - [ ]Mild [ ]Moderate [ ]Severe  Anxiety:    Yes [ ] No [ x] - [ ]Mild [ ]Moderate [ ]Severe  Fatigue:    Yes [ ] No [x ] - [ ]Mild [ ]Moderate [ ]Severe  Nausea:    Yes [ ] No [x ] - [ ]Mild [ ]Moderate [ ]Severe                         Loss of appetite: Yes [ ] No [x ] - [ ]Mild [ ]Moderate [ ]Severe             Constipation:  Yes [ ] No [x ] - [ ]Mild [ ]Moderate [ ]Severe  Grief: Yes [x ] No [ ]     PAIN AD Score:	  [email or web address] (Ctrl + left click to view)    Other Symptoms:  [x ]All other review of systems negative     Karnofsky Performance Score/Palliative Performance Status Version 2:    80%    http://palliative.info/resource_material/[email or web address].pdf    PHYSICAL EXAM:  Vital Signs Last 24 Hrs  T(C): 36.6 (25 Apr 2019 06:40), Max: 37.1 (24 Apr 2019 13:55)  T(F): 97.9 (25 Apr 2019 06:40), Max: 98.8 (24 Apr 2019 13:55)  HR: 56 (25 Apr 2019 06:40) (55 - 61)  BP: 122/72 (25 Apr 2019 06:40) (105/56 - 122/98)  BP(mean): --  RR: 18 (25 Apr 2019 06:40) (18 - 19)  SpO2: 100% (25 Apr 2019 06:40) (97% - 100%) I&O's Summary    24 Apr 2019 07:01  -  25 Apr 2019 07:00  --------------------------------------------------------  IN: 0 mL / OUT: 1180 mL / NET: -1180 mL         GENERAL:  [x ]Alert  [x ]Oriented x  4 [ ]Lethargic  [ ]Cachexia  [ ]Unarousable  [ ]Verbal  [ ]Non-Verbal  PULMONARY:   [x ]Clear anteriorly   [ ]Rhonchi        [ ]Right [ ]Left [ ]Bilateral  [ ]Crackles        [ ]Right [ ]Left [ ]Bilateral  [ ]Wheezing     [ ]Right [ ]Left [ ]Bilateral  CARDIOVASCULAR:    [x ]Regular [ ]Irregular [ ]Tachy  [ ]Tian [ ]Murmur [ ]Other  GASTROINTESTINAL:  [x ]Soft  [ ]Distended   [x ]+BS  [x ]Non tender [ ]Tender  [ ]PEG [ ]OGT/ NGT   Last BM: 4/24/19     GENITOURINARY:  [ x]Normal [ ] Incontinent   [ ]Oliguria/Anuria   [ ]Valentine  MUSCULOSKELETAL:   [x ]Normal   [ ]Weakness  [ ]Bed/Wheelchair bound [ ]Edema  NEUROLOGIC:   [x ]No focal deficits  [ ] Cognitive impairment  [ ] Dysphagia [ ]Dysarthria [ ] Paresis [ ]Other   SKIN:   [x ]Normal   [ ]Pressure ulcer(s)  [ ]Rash    CRITICAL CARE:  [ ] Shock Present  [ ]Septic [ ]Cardiogenic [ ]Neurologic [ ]Hypovolemic  [ ]  Vasopressors [ ]  Inotropes   [ ] Respiratory failure present  [ ] Acute  [ ] Chronic [ ] Hypoxic  [ ] Hypercarbic [ ] Other  [ ] Other organ failure     LABS:                        8.1    5.81  )-----------( 80       ( 25 Apr 2019 06:50 )             26.6   04-25    144  |  106  |  13  ----------------------------<  153<H>  4.1   |  27  |  0.67    Ca    9.1      25 Apr 2019 06:10  Phos  3.4     04-25  Mg     2.1     04-25    TPro  6.4  /  Alb  3.3  /  TBili  < 0.2<L>  /  DBili  x   /  AST  72<H>  /  ALT  88<H>  /  AlkPhos  235<H>  04-25        RADIOLOGY & ADDITIONAL STUDIES:    Protein Calorie Malnutrition Present: [ ] yes [ ] no  [ ] PPSV2 < or = 30%  [ ] significant weight loss [ ] poor nutritional intake [ ] anasarca [ ] catabolic state Albumin, Serum: 3.3 g/dL (04-25-19 @ 06:10)      REFERRALS:   [ ]Chaplaincy  [ ] Hospice  [ ]Child Life  [ ]Social Work  [ ]Case management [ ]Holistic Therapy   Goals of Care Document: INTERVAL HPI/OVERNIGHT EVENTS:  Intermittent right hip/pelvic pain     Code Status: Full Code   Allergies    No Known Allergies    Intolerances    MEDICATIONS  (STANDING):  chlorhexidine 4% Liquid 1 Application(s) Topical <User Schedule>  cholecalciferol 400 Unit(s) Oral daily  dexamethasone  Injectable 4 milliGRAM(s) IV Push every 6 hours  enoxaparin Injectable 40 milliGRAM(s) SubCutaneous every 24 hours  lidocaine   Patch 1 Patch Transdermal daily  methadone    Tablet 15 milliGRAM(s) Oral every 8 hours  mirtazapine 15 milliGRAM(s) Oral at bedtime  nystatin Powder 1 Application(s) Topical two times a day  pantoprazole    Tablet 40 milliGRAM(s) Oral before breakfast  venlafaxine 50 milliGRAM(s) Oral daily    MEDICATIONS  (PRN):  acetaminophen   Tablet .. 650 milliGRAM(s) Oral every 6 hours PRN Temp greater or equal to 38C (100.4F), Mild Pain (1 - 3)  lactulose Syrup 10 Gram(s) Oral every 6 hours PRN constipation  LORazepam     Tablet 0.5 milliGRAM(s) Oral three times a day PRN Anxiety  morphine  IR 30 milliGRAM(s) Oral every 4 hours PRN moderate and severe pain  ondansetron Injectable 4 milliGRAM(s) IV Push every 6 hours PRN Nausea    PRESENT SYMPTOMS: [ ]Unable to obtain due to poor mentation   Source if other than patient:  [ ]Family   [ ]Team     Pain (Impact on QOL):  Patient reports pain to her right pelvic, lower abdominal, and groin area, currently 3/10 - throbbing, aggravated by movement and relieved with opioids    Dyspnea:  Yes [ ] No [x ] - [ ]Mild [ ]Moderate [ ]Severe  Anxiety:    Yes [ ] No [ x] - [ ]Mild [ ]Moderate [ ]Severe  Fatigue:    Yes [ ] No [x ] - [ ]Mild [ ]Moderate [ ]Severe  Nausea:    Yes [ ] No [x ] - [ ]Mild [ ]Moderate [ ]Severe                         Loss of appetite: Yes [ ] No [x ] - [ ]Mild [ ]Moderate [ ]Severe             Constipation:  Yes [ ] No [x ] - [ ]Mild [ ]Moderate [ ]Severe  Grief: Yes [x ] No [ ]     PAIN AD Score:	  [email or web address] (Ctrl + left click to view)    Other Symptoms:  [x ]All other review of systems negative     Karnofsky Performance Score/Palliative Performance Status Version 2:    80%    http://palliative.info/resource_material/[email or web address].pdf    PHYSICAL EXAM:  Vital Signs Last 24 Hrs  T(C): 36.6 (25 Apr 2019 06:40), Max: 37.1 (24 Apr 2019 13:55)  T(F): 97.9 (25 Apr 2019 06:40), Max: 98.8 (24 Apr 2019 13:55)  HR: 56 (25 Apr 2019 06:40) (55 - 61)  BP: 122/72 (25 Apr 2019 06:40) (105/56 - 122/98)  BP(mean): --  RR: 18 (25 Apr 2019 06:40) (18 - 19)  SpO2: 100% (25 Apr 2019 06:40) (97% - 100%) I&O's Summary    24 Apr 2019 07:01  -  25 Apr 2019 07:00  --------------------------------------------------------  IN: 0 mL / OUT: 1180 mL / NET: -1180 mL    GENERAL:  [x ]Alert  [x ]Oriented x  4 [ ]Lethargic  [ ]Cachexia  [ ]Unarousable  [x ]Verbal  [ ]Non-Verbal  PULMONARY:   [x ]Clear anteriorly   [ ]Rhonchi        [ ]Right [ ]Left [ ]Bilateral  [ ]Crackles        [ ]Right [ ]Left [ ]Bilateral  [ ]Wheezing     [ ]Right [ ]Left [ ]Bilateral  CARDIOVASCULAR:    [x ]Regular [ ]Irregular [ ]Tachy  [ ]Tian [ ]Murmur [ ]Other  GASTROINTESTINAL:  [x ]Soft  [ ]Distended   [x ]+BS  [x ]Non tender [ ]Tender  [ ]PEG [ ]OGT/ NGT   Last BM: 4/24/19     GENITOURINARY:  [ x]Normal [ ] Incontinent   [ ]Oliguria/Anuria   [ ]Valentine  MUSCULOSKELETAL:   [x ]Normal   [ ]Weakness  [ ]Bed/Wheelchair bound [ ]Edema  NEUROLOGIC:   [x ]No focal deficits  [ ] Cognitive impairment  [ ] Dysphagia [ ]Dysarthria [ ] Paresis [ ]Other   SKIN:   [x ]Normal   [ ]Pressure ulcer(s)  [ ]Rash    CRITICAL CARE:  [ ] Shock Present  [ ]Septic [ ]Cardiogenic [ ]Neurologic [ ]Hypovolemic  [ ]  Vasopressors [ ]  Inotropes   [ ] Respiratory failure present  [ ] Acute  [ ] Chronic [ ] Hypoxic  [ ] Hypercarbic [ ] Other  [ ] Other organ failure     LABS:                        8.1    5.81  )-----------( 80       ( 25 Apr 2019 06:50 )             26.6   04-25    144  |  106  |  13  ----------------------------<  153<H>  4.1   |  27  |  0.67    Ca    9.1      25 Apr 2019 06:10  Phos  3.4     04-25  Mg     2.1     04-25    TPro  6.4  /  Alb  3.3  /  TBili  < 0.2<L>  /  DBili  x   /  AST  72<H>  /  ALT  88<H>  /  AlkPhos  235<H>  04-25    RADIOLOGY & ADDITIONAL STUDIES: reviewed.     Protein Calorie Malnutrition Present: [ ] yes [ ] no  [ ] PPSV2 < or = 30%  [ ] significant weight loss [ ] poor nutritional intake [ ] anasarca [ ] catabolic state Albumin, Serum: 3.3 g/dL (04-25-19 @ 06:10)    REFERRALS:   [ ]Chaplaincy  [ ] Hospice  [ ]Child Life  [ ]Social Work  [ ]Case management [ ]Holistic Therapy   Goals of Care Document:

## 2019-04-25 NOTE — PROGRESS NOTE ADULT - PROBLEM SELECTOR PROBLEM 3
Debility
Debility
Fever, unspecified fever cause

## 2019-04-25 NOTE — PROGRESS NOTE ADULT - PROBLEM SELECTOR PLAN 1
As above.  Patient with 1 PRN dose of Morphine 30mg PO in 24 hours.  Patient performs an overall improvement of pain.  Methadone 15mg PO TID in place.  No changes.  Continue Methadone 15mg PO TID and MSIR 30mg PO q4h PRN.  Bowel regimen.  Follow-up with Dr. Arellano at [facility] scheduled for 4/29/19 at 1:30pm.  Patient aware.  Please include in discharge paperwork.
-Heme/Onc c/s  -states previously on decadron was taken off as per pharmacy last filled 3/12 30 days supply will hold for now if hypotensive then would restart and verify with Onc in AM if any chronic indication for steroids.  Patient with hypotension this morning, Cortisol level is 6, while it's wnl, in the setting of hypotension, it may actually be low.  Will restart steroids and will continue to monitor
-Heme/Onc c/s  -states previously on decadron was taken off as per pharmacy last filled 3/12 30 days supply will hold for now if hypotensive then would restart and verify with Onc in AM if any chronic indication for steroids.  Patient with hypotension this morning, Cortisol level is 6, while it's wnl, in the setting of hypotension, it may actually be low.  Will restart steroids and will continue to monitor.   Coordinate for oncology regarding the need for continued steroid use  F/U brain MRI
-Heme/Onc c/s  -states previously on decadron was taken off as per pharmacy last filled 3/12 30 days supply will hold for now if hypotensive then would restart and verify with Onc in AM if any chronic indication for steroids.  Patient with hypotension this morning, Cortisol level is 6, while it's wnl, in the setting of hypotension, it may actually be low.  Will restart steroids and will continue to monitor.   Coordinate for oncology regarding the need for continued steroid use  MRI not indicative of metastatic disease  Pain better controlled. Patient to be discharged home.
As above.  Patient with 3 PRN doses of Morphine 8mg IV in 24 hours.  Patient performs an overall improvement of pain and resolution of headache/photophobia.  Methadone increased to 15mg PO TID yesterday.  No changes.  Morphine IV discontinued and MSIR 30mg PO q4h PRN ordered in anticipation for discharge.  Bowel regimen.  Follow-up with Dr. Arellano at [facility] scheduled for 4/29/19 at 1:30pm.  Patient aware.  Please include in discharge paperwork.  Bradycardia noted.  Patient asymptomatic and reports bradycardia in the past that resolved on its own - repeat EKG today with QTc 427.

## 2019-04-25 NOTE — PHYSICAL EXAM
[Restricted in physically strenuous activity but ambulatory and able to carry out work of a light or sedentary nature] : Status 1- Restricted in physically strenuous activity but ambulatory and able to carry out work of a light or sedentary nature, e.g., light house work, office work [Normal] : full range of motion and no deformities appreciated [de-identified] : jaleesa [de-identified] : + BS, no tenderness on palpation;

## 2019-04-25 NOTE — PROVIDER CONTACT NOTE (OTHER) - ACTION/TREATMENT ORDERED:
CHRIS Chatterjee notified and ordered EKG. Will obtain and reassess.
CHRIS Chatterjee notified. No further orders at this time. Will continue to monitor.
EKG, IV tylenols, continue to assess

## 2019-04-25 NOTE — DISCHARGE NOTE PROVIDER - NSDCCAREPROVSEEN_GEN_ALL_CORE_FT
Miah Novak  [facility] Medicine, ADS  [facility] Hematology Oncology, Team  [facility] Palliative Care, Team

## 2019-04-25 NOTE — ASSESSMENT
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 She is currently on C2 of palliative regimen gemcitabine D8.  Pt was seen in the tx room with moon face appearance.  She is currently taking Dex 4 mg daily.  She was provided with taper to Dex 2 mg daily for Saturday and Sunday then stop drug.  Follow up two weeks.  \par \par

## 2019-04-25 NOTE — PROGRESS NOTE ADULT - ASSESSMENT
47 y/o F w/ hx clear cell ovarian ca - presents with pain - patient with peritoneal metastasis and pelvic lymphadenopathy - palliative consulted for pain management.
45 y/o F w/ hx clear cell ovarian ca - presents with pain - patient with peritoneal metastasis and pelvic lymphadenopathy - palliative consulted for pain management.
Pt is a 47 yo F w/ hx clear cell ovarian ca p/w fever and abdominal pain. CT shows progression of RT LQ peritoneal mets. UA neg WBC-WNL
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 CT shows progression of RT LQ peritoneal mets. UA neg WBC-WNL
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 CT shows progression of RT LQ peritoneal mets. UA neg WBC-WNL

## 2019-04-25 NOTE — DISCHARGE NOTE NURSING/CASE MANAGEMENT/SOCIAL WORK - NSDCDPATPORTLINK_GEN_ALL_CORE
You can access the [facility] Patient Portal, offered by [facility], by registering with the following website: [email or web address]

## 2019-04-25 NOTE — CHART NOTE - NSCHARTNOTEFT_GEN_A_CORE
Notified by RN that patient with heart rate is 44 bpm. BP stable. Stat EKG done which showed sinus bradycardia HR at 44 bpm. Patient seen at bedside. Patient seen laying in bed comfortably and in no acute distress. Patient asymptomatic. Patient denies chest pain shortness of breath , dizziness. Ordered normal saline 500 ml bolus and reassess BP and heart rate post bolus. Medical attending informed.

## 2019-04-25 NOTE — DISCHARGE NOTE PROVIDER - CARE PROVIDER_API CALL
Dr. Arellano,   Follow-up with Dr. Arellano at [facility] scheduled for 4/29/19 at 1:30pm.  Phone: (   )    -  Fax: (   )    -  Follow Up Time:

## 2019-04-25 NOTE — HISTORY OF PRESENT ILLNESS
[Disease: _____________________] : Disease: [unfilled] [AJCC Stage: ____] : AJCC Stage: [unfilled] [de-identified] : Age: 45 y/o diagnosed ovarian cancer. \victoriano Presented to the ED at [facility] on 1/25/17 with a complaints of abdominal pain. Had imaging that showed a suspicious pelvic mass. Taken emergently to the OR by general gyn out of concern for acute abdominal pain. Had a laparoscopy with conversion to ELAP via PFAN for left salpingo-oophorectomy. 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 1/31/2017- CT Abd/pelvis- no bowel obstruction (sent to the ED for evaluation of abdominal pain). Had been on Depo- provera for approximately 4 years, reportedly for management of endometriosis. Had a family history of ovarian cancer. On 2/28/17, she underwent RTLH, RSO, resection left adnexal remnant, omentectomy, P&PA LND, resection of pelvic nodules, staging . Final Pathology: Clear cell ovarian cancer, stage II B. She completed 6 cycles of carboplatin/ paclitaxel in 8/2017 and was in remission until 12/2017 which showed new foci in the rectus sheath. Foundation One sent and had PDL1 1%, MSI stable, TMB low and was started on Keytruda where she felt the neuropathy became worse: pain over the fingers and hands, fatigue, not feeling well. She had progression on immunotherapy and was switched to Doxil which she tolerated better except for rashes over the body and mouth sores. After review at tumor board, consensus was trial of palliative gemcitabine D1, D8 every 3 weeks.  [de-identified] : clear cell  [de-identified] : 1/25/2017- CA-125 = 186, CA 19-9 < 1 [de-identified] : DOS: 2/28/17\par Carboplatin and weekly Taxol 4/6/17- 6/1/17(three cycles)\par Carboplatin and Taxol q3w 6/29/17- 8/10/17( three cycles)\par Keytruda 7/9/18 -8/23/18 (3 cycles)\par Doxil: 8/2018 to 1/2019\par gemcitabine 2/2019 to present  [de-identified] : Pt in tx room for Gemzar \par Pt was evaluated w/ moon face\par She was provided w/ steroids for palliative management\par She reports increased appetite and more active\par She states her pain is well controlled and pleased with regimen and ability to perform ADLS. \par Denies fevers, chills, nausea, emesis or constipation.\par Denies SOB, dysphagia or decreased cervical ROM>

## 2019-05-07 NOTE — DISCUSSION/SUMMARY
[Reviewed Radiology Report(s)] : Radiology reports were reviewed. [Reviewed Clinical Lab Test(s)] : Results of clinical tests were reviewed. [Discuss Alternatives/Risks/Benefits w/Patient] : All alternatives, risks, and benefits were discussed with the patient/family and all questions were answered.  Patient expressed good understanding and appreciates the importance of follow up as recommended. [Reviewed Radiology Film/Image(s)] : Images from radiology studies were reviewed and examined.

## 2019-05-07 NOTE — ASSESSMENT
[______] : HCP: [unfilled] [FreeTextEntry1] : 48yoF with:\par \par 1. Ovarian cancer - On Gemcitabine, Med Onc follow up. \par Prognosis is poor given refractory disease; patient and wife are aware of the terminal nature of her diagnosis but hopeful that treatment will extend life as long as possible. Given the most recent progression of disease and hearing that there are few treatment options available at this point patient is more fearful about dying and is willing to try any treatment in order to extend her life.  \par \par 2. Neoplasm-related pain/CIPN - C/w Methadone 15mg TID, c/w PRN Hydromorphone 4mg-8mg. \par Voltaren gel refilled for knee pain. \par \par 3. Nausea - Reglan prescribed. \par \par 4. Anxiety/depression - C/w Mirtazapine 15mg QHS. C/w Lorazepam 0.5mg TID PRN\par \par 5. Encounter for Palliative Care - Emotional support provided to patient and her wife. \par HCP has already been done, is wife, Parvin Shetty ([phone]). In regard to advance directives, patient states she would not want to have heroic/aggressive interventions done on her if she has "no hope" of recovery.  She has previously communicated this to her wife and other family. \par \par RTO 1 month, sooner if needed

## 2019-05-07 NOTE — PHYSICAL EXAM
[General Appearance - Alert] : alert [General Appearance - In No Acute Distress] : in no acute distress [Sclera] : the sclera and conjunctiva were normal [Normal Oral Mucosa] : normal oral mucosa [Neck Appearance] : the appearance of the neck was normal [Auscultation Breath Sounds / Voice Sounds] : lungs were clear to auscultation bilaterally [Heart Rate And Rhythm] : heart rate was normal and rhythm regular [Heart Sounds] : normal S1 and S2 [Edema] : there was no peripheral edema [Bowel Sounds] : normal bowel sounds [Abdomen Soft] : soft [Abdomen Tenderness] : non-tender [Oriented To Time, Place, And Person] : oriented to person, place, and time [Affect] : the affect was normal [FreeTextEntry1] : +maculopapular rash on trunk, arms [No Focal Deficits] : no focal deficits

## 2019-05-07 NOTE — DATA REVIEWED
[FreeTextEntry1] : MRI Brain (4/24/19) - Impression: Subcentimeter foci of abnormal T2 prolongation in the bifrontal \par subcortical region as described above.

## 2019-05-08 NOTE — PHYSICAL EXAM
[Absent] : Adnexa(ae): Absent [Normal] : Anus and perineum: Normal sphincter tone, no masses, no prolapse. [de-identified] : well healed lsc incisions, palpable mass in right lower abdomen, no palpable abdominal wall masses, no bulge [Ambulatory and capable of all self care but unable to carry out any work activities] : Status 2- Ambulatory and capable of all self care but unable to carry out any work activities. Up and about more than 50% of waking hours

## 2019-05-08 NOTE — REVIEW OF SYSTEMS
[Negative] : Gastrointestinal [Neuropathy] : neuropathy [Fatigue] : fatigue [Depression] : depression [FreeTextEntry3] : and anxiety

## 2019-05-08 NOTE — HISTORY OF PRESENT ILLNESS
[FreeTextEntry1] : IIB clear cell ovarian cancer. \par \par Initially operated on by her general gynecologist at [facility] where she underwent an ELAP (Pfan) and LSO demonstrating a clear cell ovarian cancer, ruptured. \par \par Returned to the OR on 2/28/17 for RTLH, RSO, omentectomy and staging. \par \par Clear cell ovarian cancer involving right adnexa and pelvic peritoneum. \par \par Adjuvant chemotherapy with Dr. Arreola consisting of \par CB and weekly Taxol 4/6/17 -6/1/17 (3)\par CB and Taxol q3w 6/29/17 - 8/10/17 (3)\par  \par  = 15 11/29/17\par CT A/P 12/16/17 with two new areas of enhancement in the lower rectus musculature and right rectus muscle. \par \par Underwent resection of abdominal wall nodules with biologic mesh reconstruction on 3/9/18. Pathology showed clear cell adenocarcinoma with negative margins. TPC consensus was for observation. \par \par Seen in the [facility] ED 6/17/18 after coming in with pain. CT imaging showed probable recurrence disease again in the anterior abdominal wall as well as a persistent abdominal wall collection. \par \par She has seen medical oncology to discuss treatment options. Foundation testing on the tumor showed low PD1/ PDL 1 expression and immunotherapy with Keytruda was started. \par \par Imaging after 3 cycles of Keytruda shows POD with increase in size of the right peritoneal nodule and right pelvic LN. No new sites of disease. No ascites. \par \par She has subsequently been treated with Doxil and Gemzar with progression on recent imaging 4/22/19.\par \par Seeing Dr. Arellano and is on high doses of methadone and short acting narcotics as well as anxiolytics.

## 2019-05-09 NOTE — ASSESSMENT
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 She remains on palliative gemcitabine C4D8. We reviewed her hospitalization and CT without any infection source: showing increased abdominal masses. We reviewed her CBC today without any bandemia or neutropenia. She is hemodynamically stable. We reviewed fever differential: fever from malignancy, pain, or gemcitabine treatment. We reviewed repeat blood culture and urine culture. Will repeat cortisol level to see if still deficient. We reviewed goals of care for pain control: will coordinate RT with Dr Dc and stop gemcitabine while RT. We will re-evaluate role for further chemotherapy after RT. We reviewed diary of breakthrough doses and titrate up methadone. We reviewed gemcitabine fevers and will see if improved off therapy. Questions answered to their satisfaction. \par \par Pain: 10/10: pt was given hydromorphone x 1. Hemodynamically stable and repeat temperature 37 C.

## 2019-05-09 NOTE — REVIEW OF SYSTEMS
[Chills] : no chills [Fever] : fever [Night Sweats] : no night sweats [Recent Change In Weight] : ~T recent weight change [Fatigue] : no fatigue [Abdominal Pain] : abdominal pain [Vomiting] : no vomiting [Diarrhea] : no diarrhea [Constipation] : no constipation [Skin Rash] : no skin rash [Skin Wound] : no skin wound [Negative] : Heme/Lymph [FreeTextEntry2] : decreased appetite  [de-identified] : swelling of the legs improved

## 2019-05-09 NOTE — VITALS
[Least Pain Intensity: 7/10] : 7/10 [Maximal Pain Intensity: 10/10] : 10/10 [Pain Location: ___] : Pain Location: [unfilled] [Pain Description/Quality: ___] : Pain description/quality: [unfilled] [Pain Duration: ___] : Pain duration: [unfilled] [Pain Interferes with ADLs] : Pain interferes with activities of daily living. [Opioid] : opioid [ECOG Performance Status: 2 - Ambulatory and capable of all self care but unable to carry out any work activities] : Performance Status: 2 - Ambulatory and capable of all self care but unable to carry out any work activities. Up and about more than 50% of waking hours [70: Cares for self; unalbe to carry on normal activity or do active work.] : 70: Cares for self; unable to carry on normal activity or do active work. [10 - Extreme Distress] : Distress Level: 10 [Patient given social work contact information and resource sheet] : Patient was given social work contact information and resource sheet

## 2019-05-09 NOTE — REASON FOR VISIT
[Spouse] : spouse [Follow-Up Visit] : a follow-up [FreeTextEntry2] : follow up for metastatic ovarian cancer with low grade temperature

## 2019-05-09 NOTE — PHYSICAL EXAM
[Ambulatory and capable of all self care but unable to carry out any work activities] : Status 2- Ambulatory and capable of all self care but unable to carry out any work activities. Up and about more than 50% of waking hours [Ulcers] : no ulcers [Normal] : affect appropriate [de-identified] : sore over the R side of tongue with resolved white plaque  [de-identified] : + BS, tenderness on palpation; hernia belt on

## 2019-05-09 NOTE — HISTORY OF PRESENT ILLNESS
[Disease: _____________________] : Disease: [unfilled] [AJCC Stage: ____] : AJCC Stage: [unfilled] [de-identified] : Age: 45 y/o diagnosed ovarian cancer. \victoriano Presented to the ED at [facility] on 1/25/17 with a complaints of abdominal pain. Had imaging that showed a suspicious pelvic mass. Taken emergently to the OR by general gyn out of concern for acute abdominal pain. Had a laparoscopy with conversion to ELAP via PFAN for left salpingo-oophorectomy. 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 1/31/2017- CT Abd/pelvis- no bowel obstruction (sent to the ED for evaluation of abdominal pain). Had been on Depo- provera for approximately 4 years, reportedly for management of endometriosis. Had a family history of ovarian cancer. On 2/28/17, she underwent RTLH, RSO, resection left adnexal remnant, omentectomy, P&PA LND, resection of pelvic nodules, staging . Final Pathology: Clear cell ovarian cancer, stage II B. She completed 6 cycles of carboplatin/ paclitaxel in 8/2017 and was in remission until 12/2017 which showed new foci in the rectus sheath. [facility] sent and had PDL1 1%, MSI stable, TMB low and was started on Keytruda where she felt the neuropathy became worse: pain over the fingers and hands, fatigue, not feeling well. She had progression on immunotherapy and was switched to Doxil which she tolerated better except for rashes over the body and mouth sores. After review at tumor board, consensus was trial of palliative gemcitabine D1, D8 every 3 weeks.  [de-identified] : clear cell  [de-identified] : 1/25/2017- CA-125 = 186, CA 19-9 < 1 [de-identified] : DOS: 2/28/17\par Carboplatin and weekly Taxol 4/6/17- 6/1/17(three cycles)\par Carboplatin and Taxol q3w 6/29/17- 8/10/17( three cycles)\par Keytruda 7/9/18 -8/23/18 (3 cycles)\par Doxil: 8/2018 to 1/2019\par gemcitabine 2/2019 to present  [de-identified] : She is present for C4 D8 treatment today. She had noticed feeling warm and had low grade temperature 99 in treatment room. She has abdominal pain: 10/10: had taken methadone and wife had brought her here: last rescue morphine early in afternoon. She denies any cough or burning of the urine. She was at [facility]/19 with fever with negative work up and resolved with fluids and restart of dexamethasone. This was started since they felt she had low cortisol and adrenal suppression. She has currently been tapered off again. CT repeat showed after C3 D8 that the abdominal wall masses have increased in size. She is taking increased methadone: just increased last week and morphine rescue. She denies any constipation. Has drowsiness. Her appetite is decreased du e to abdominal pain. She has not been checking temperatures.

## 2019-05-19 NOTE — H&P ADULT - PROBLEM SELECTOR PLAN 5
Patient with severe opioid induced constipation  - c/w home lactulose 10g q4 hrs  - add miralax Hb currently 8s, at baseline.   - trend Hb  - maintain active T&S

## 2019-05-19 NOTE — H&P ADULT - ATTENDING COMMENTS
I personally saw and examined the patient.  Discussed with the resident physician and agree with the resident's findings and plan as documented above. I personally saw and examined the patient.  Discussed with the resident physician and agree with the resident's findings and plan as documented above.  Pt in severe pain from RLQ, up to 10/10, missed dose methadone, given IV dilaudid - titrate pain meds as needed, PC f/u.

## 2019-05-19 NOTE — ED PROVIDER NOTE - OBJECTIVE STATEMENT
48 YOF pmh ovarian ca, last chemo 5/9 p/w fever x few days and chronic right lower quadrant abdominal pain where her mass is. Pt denies URI symptoms, denies cough, denies chest pain, denies n/v/d. No dysuria, no frequency. Pt does endorse body aches.

## 2019-05-19 NOTE — H&P ADULT - PROBLEM SELECTOR PLAN 8
.  DVT lovenox SQ  Diet regular  AMbulate as tolerated with assistance Patient with significant progression of disease as well as refractory pain. Will consult palliative to help with addressing GOC as well as pain control. Follows Dr. Niels Ortega outpatient.

## 2019-05-19 NOTE — H&P ADULT - NSHPPHYSICALEXAM_GEN_ALL_CORE
PHYSICAL EXAM:    Vital Signs Last 24 Hrs  T(C): 36.2 (19 May 2019 16:00), Max: 38.3 (19 May 2019 12:05)  T(F): 97.2 (19 May 2019 16:00), Max: 100.9 (19 May 2019 12:05)  HR: 62 (19 May 2019 16:00) (62 - 110)  BP: 95/50 (19 May 2019 16:00) (95/50 - 114/71)  BP(mean): --  RR: 18 (19 May 2019 16:00) (18 - 18)  SpO2: 99% (19 May 2019 16:00) (98% - 99%)    General: No acute distress.  HEENT: NCAT.  PERRL.  EOMI.  No scleral icterus or injection.  + single sublingual ulcer   Neck: Supple.  Full ROM.  No JVD.  No thyromegaly. No lymphadenopathy.   Heart: RRR.  Normal S1 and S2.  No murmurs, rubs, or gallops.   Lungs: CTAB. No wheezes, crackles, or rhonchi.    Abdomen: BS+, soft, ND.  No organomegaly. + RLQ TTP  Skin: Warm and dry.  No rashes.  Extremities: No edema,.  2+ peripheral pulses b/l.  Musculoskeletal: No deformities.  No spinal or paraspinal tenderness.  Neuro: A&Ox3.  No focal neuro deficits PHYSICAL EXAM:    Vital Signs Last 24 Hrs  T(C): 36.2 (19 May 2019 16:00), Max: 38.3 (19 May 2019 12:05)  T(F): 97.2 (19 May 2019 16:00), Max: 100.9 (19 May 2019 12:05)  HR: 62 (19 May 2019 16:00) (62 - 110)  BP: 95/50 (19 May 2019 16:00) (95/50 - 114/71)  BP(mean): --  RR: 18 (19 May 2019 16:00) (18 - 18)  SpO2: 99% (19 May 2019 16:00) (98% - 99%)    General: No acute distress.  HEENT: NCAT.  PERRL.  EOMI.  No scleral icterus or injection.  + single sublingual ulcer   Neck: Supple.  Full ROM.  No JVD.  No thyromegaly. No lymphadenopathy.   Heart: RRR.  Normal S1 and S2.  No murmurs, rubs, or gallops.   Lungs: CTAB. No wheezes, crackles, or rhonchi.  R mediport no surround erythema or drainage  Abdomen: BS+, soft, ND.  No organomegaly. + RLQ TTP  Skin: Warm and dry.  No rashes.  Extremities: No edema,.  2+ peripheral pulses b/l.  Musculoskeletal: No deformities.  No spinal or paraspinal tenderness.  Neuro: A&Ox3.  No focal neuro deficits PHYSICAL EXAM:    Vital Signs Last 24 Hrs  T(C): 36.2 (19 May 2019 16:00), Max: 38.3 (19 May 2019 12:05)  T(F): 97.2 (19 May 2019 16:00), Max: 100.9 (19 May 2019 12:05)  HR: 62 (19 May 2019 16:00) (62 - 110)  BP: 95/50 (19 May 2019 16:00) (95/50 - 114/71)  BP(mean): --  RR: 18 (19 May 2019 16:00) (18 - 18)  SpO2: 99% (19 May 2019 16:00) (98% - 99%)    General: No acute distress.  HEENT: NCAT.  PERRL.  EOMI.  No scleral icterus or injection.  + single sublingual ulcer   Neck: Supple.  Full ROM.  No JVD.  No thyromegaly. No lymphadenopathy.   Heart: RRR.  Normal S1 and S2.  No murmurs, rubs, or gallops.   Lungs: CTAB. No wheezes, crackles, or rhonchi.  R mediport no surround erythema or drainage  Abdomen: BS+, soft, ND.  No organomegaly. + RLQ TTP  Skin: Warm and dry.  No rashes.  Extremities: No edema,.  2+ peripheral pulses b/l.  Musculoskeletal: No deformities.  No spinal or paraspinal tenderness.  Neuro: A&Ox3.  No focal neuro deficits  Psych: calm, appropriate

## 2019-05-19 NOTE — ED ADULT NURSE NOTE - OBJECTIVE STATEMENT
Pt A/Ox3 states she has been having pain and a fever since yesterday. Pt states her last chemo was 5/9 and now noted to have pain she cant control with her usual medications. Pt states last time her pain was uncontrolled she developed a fever. Denies cough/cold or urinary sx. Pt states her pain is "where the cancer is" pointing to the RT side of her pelvis. Pt appears weak, breathing well and non labored, skin warm, pink and dry. PIV inserted with aseptic technique, blood drawn, labeled at bedside with 2 pt identifiers and sent to lab. Pt and family aware of POC, NAD. Will continue to monitor.

## 2019-05-19 NOTE — H&P ADULT - PROBLEM SELECTOR PLAN 3
Patient with R ovarian cancer s/p extensive surgery and neoadjuvant therapy as well as recent treatment with gemzar with still progression of disease per last CT scan in 04/2019. LAst dose of chemo on 5/9.   - oncology emailed  - pain control as below Patient with R ovarian cancer s/p extensive surgery and neoadjuvant therapy as well as recent treatment with gemzar with still progression of disease per last CT scan in 04/2019. Last dose of chemo on 5/9.   - oncology emailed  - pain control as below

## 2019-05-19 NOTE — H&P ADULT - PROBLEM SELECTOR PLAN 7
Patient with significant progression of disease as well as refractory pain. Will consult palliative to help with addressing GOC as well as pain control. Follows Dr. Niels Ortega outpatient. On home venlafaxine 50 mg and ativan 0.5 mg prn  - c/w ativan 0.5 mg mg q8 prn  - c/w velafaxine 50 mg

## 2019-05-19 NOTE — ED PROVIDER NOTE - CLINICAL SUMMARY MEDICAL DECISION MAKING FREE TEXT BOX
Fever, tachycardia no obvious source, given pt is on chemo will treat with cefepime and check for source of infection.

## 2019-05-19 NOTE — H&P ADULT - PROBLEM SELECTOR PLAN 9
.  DVT lovenox SQ  Diet regular  AMbulate as tolerated with assistance DVT lovenox SQ  Diet regular  AMbulate as tolerated with assistance

## 2019-05-19 NOTE — H&P ADULT - PROBLEM SELECTOR PLAN 2
Patient with sublingual ulcer under the tongue. No evidence of thrush  - nystatin oral swish and spit Patient with significant pain due to ovarian malignancy. On home dose methadone 15 mg TID and morphine 45 mg PO q4 hrs  - ISTOP#: [id number]  - c/w methadone 15 mg TID  - c/w PO morhine 45 mg q4 hrs prn  - dilaudid for refractory pain  - palliative consult for further pain management

## 2019-05-19 NOTE — H&P ADULT - PROBLEM SELECTOR PLAN 4
Patient with significant pain due to ovarian malignancy. On home dose methadone 15 mg TID and morphine 45 mg PO q4 hrs  - ISTOP#: [id number]  - c/w methadone 15 mg TID  - c/w PO morhine 45 mg q4 hrs prn  - dilaudid for refractory pain  - palliative consult for further pain management Patient with sublingual ulcer under the tongue. No evidence of thrush  - nystatin oral swish and spit

## 2019-05-19 NOTE — ED PROVIDER NOTE - NS ED ROS FT
CONSTITUTIONAL: + fevers, no chills  Eyes: no visual changes  Ears: no ear drainage, no ear pain  Nose: no nasal congestion  Mouth/Throat: no sore throat  Cardiovascular: No Chest pain  Respiratory: No SOB  Gastrointestinal: No n/v/d, no abd pain  Genitourinary: no dysuria, no hematuria  SKIN: no rashes.  NEURO: no headache

## 2019-05-19 NOTE — H&P ADULT - NSHPREVIEWOFSYSTEMS_GEN_ALL_CORE
REVIEW OF SYSTEMS:    CONSTITUTIONAL: + fevers, chills  EYES/ENT: No visual changes;  No vertigo or throat pain   NECK: No pain or stiffness  RESPIRATORY: No cough, wheezing, hemoptysis; No shortness of breath  CARDIOVASCULAR: No chest pain or palpitations  GASTROINTESTINAL: No abdominal pain; + nausea, vomiting;  + constipation. No hemetemesis, melena or hematochezia.  GENITOURINARY: No dysuria, frequency or hematuria  NEUROLOGICAL: No numbness or weakness  SKIN: No itching, burning, rashes, or lesions   All other review of systems is negative unless indicated above. REVIEW OF SYSTEMS:  CONSTITUTIONAL: + fevers, chills  EYES/ENT: No visual changes;  No vertigo or throat pain   NECK: No pain or stiffness  RESPIRATORY: No cough, wheezing, hemoptysis; No shortness of breath  CARDIOVASCULAR: No chest pain or palpitations  GASTROINTESTINAL: No abdominal pain; + nausea, vomiting;  + constipation. No hematemesis, melena or hematochezia.  GENITOURINARY: No dysuria, frequency or hematuria  NEUROLOGICAL: No numbness or weakness  SKIN: No itching, burning, rashes, or lesions   All other review of systems is negative unless indicated above.

## 2019-05-19 NOTE — H&P ADULT - PROBLEM SELECTOR PLAN 1
Patient with fever, tachycardia, and soft BPs in the ED. Unclear etiology given CXR clear, UA neg. s/p ~3L fluids  - c/w vanco and cefepime for now  - f/u blood and urine ctx  - f/u RVP  - f/u CTAP to rule out other intra-abdominal infectious etiology ie proctitis  - address mucositis as below Patient with fever, tachycardia, and soft BPs in the ED. Unclear etiology given CXR clear, UA neg. s/p ~3L fluids  - c/w vanco and cefepime for now  - f/u blood and urine ctx  - s/p 3L fluids, c/w maintenance for now  - if further hypotension, consider stress dose steroids given recent dexa taper  - f/u RVP  - f/u CTAP to rule out other intra-abdominal infectious etiology ie proctitis  - address mucositis as below Patient with fever, tachycardia, and soft BPs in the ED. Unclear etiology given CXR clear, UA neg, RVP neg. s/p ~3L fluids. Possible source line infection, GI  - c/w vanco and cefepime for now  - f/u blood and urine ctx  - s/p 3L fluids, c/w maintenance for now  - if further hypotension, consider stress dose steroids given recent dexa taper  - f/u CTAP to rule out other intra-abdominal infectious etiology ie proctitis  - address mucositis as below

## 2019-05-19 NOTE — H&P ADULT - HISTORY OF PRESENT ILLNESS
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 On  Patient overall denying headache, cough, chest pain, nausea/vomiting, diarrhea, or dysuria. On day of admission, had 2 episodes of NBNB vomiting which prompted patient's wife to bring patient into the hospital. Since increase in her methadone  and morphine dose, she's had constipation for which she is on lactulose 15 g q6 hours, but still at times has difficulty passing stools. She otherwise denies rectal pain. She significant RLQ pain where her malignancy is.       In ED, T 100.9, , /71, and 98% on RA. WBC wnl. CXR with clear lungs and mediport in place. RVP pending. 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 On  Patient overall denying headache, cough, chest pain, nausea/vomiting, diarrhea, or dysuria. On day of admission, had 2 episodes of NBNB vomiting which prompted patient's wife to bring patient into the hospital. Since increase in her methadone  and morphine dose, she's had constipation for which she is on lactulose 15 g q6 hours, but still at times has difficulty passing stools. She otherwise denies rectal pain. She significant RLQ pain where her malignancy is.       In ED, T 100.9, , /71, and 98% on RA. WBC wnl. CXR with clear lungs and mediport in place. RVP pending. She was given 2.8 L fluids and cefepime. 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 Patient overall denying headache, cough, chest pain, nausea/vomiting, diarrhea, or dysuria. On day of admission, had 2 episodes of NBNB vomiting which prompted patient's wife to bring patient into the hospital. Since increase in her methadone  and morphine dose, she's had constipation for which she is on lactulose 15 g q6 hours, but still at times has difficulty passing stools. She otherwise denies rectal pain. She significant RLQ pain where her malignancy is.     In ED, T 100.9, , /71, and 98% on RA. WBC wnl. CXR with clear lungs and mediport in place. RVP pending. She was given 2.8 L fluids and cefepime.

## 2019-05-19 NOTE — H&P ADULT - PROBLEM SELECTOR PLAN 6
On home venlafaxine 50 mg and ativan 0.5 mg prn  - c/w ativan 0.5 mg mg q8 prn  - c/w velafaxine 50 mg Patient with severe opioid induced constipation  - c/w home lactulose 10g q4 hrs  - add miralax

## 2019-05-19 NOTE — H&P ADULT - ASSESSMENT
48 F with PMH of anxiety/depression abd Stage II clear cell ovarian cancer (01/2017) s/p hysterectomy, R salpingooophorectomy omentectomy and adjuvant chemotherapy with progression of disease, now switched to Gemzar (last dose chemo on 5/9), who presents with fevers x 2 days, concerning for underlying infection

## 2019-05-20 NOTE — PROGRESS NOTE ADULT - SUBJECTIVE AND OBJECTIVE BOX
=========================================  CONTACT WARREN Lebron M.D., PGY-1  Pager: NS- [phone] [employer]- [phone]    Mon-Fri: pager covered by day team 7am-7pm;   Sa/Sun: see chart, primary physician assigned available 7am-12pm  Sat/[place] Coverage 12pm-7pm: NS- page [phone] for Team1-4, [facility] - pager forwarded to covering Resident  For Night coverage 7pm-7am: NS- page [phone] Team1-3, page [phone] Team4 & Care Model; [facility]- page [phone] for Red, [phone] for Blue  =========================================    Patient is a 48y old  Female who presents with a chief complaint of fever (19 May 2019 17:52)      SUBJECTIVE / OVERNIGHT EVENTS:  Patient seen and examined at bedside. Wife present. This morning, she is resting comfortably in bed and reports continued abdominal pain, throbbing, in the lower right part of her abdomen going down to the ground.  She had continued fevers overnight and felt chills requiring additional blankets.    She reports:  [  ] CP  [  ] SOB  [X] Fever  [  ] N /  [  ] V  [  ] Diarrhea  [  ] None of the above    Other Review of Systems Negative.    MEDICATIONS  (STANDING):  ascorbic acid 500 milliGRAM(s) Oral daily  cefepime   IVPB 2000 milliGRAM(s) IV Intermittent every 8 hours  cholecalciferol 400 Unit(s) Oral daily  enoxaparin Injectable 40 milliGRAM(s) SubCutaneous every 24 hours  lactated ringers. 1000 milliLiter(s) (75 mL/Hr) IV Continuous <Continuous>  lactulose Syrup 10 Gram(s) Oral every 4 hours  methadone    Tablet 15 milliGRAM(s) Oral every 8 hours  mirtazapine 15 milliGRAM(s) Oral at bedtime  nystatin    Suspension 321066 Unit(s) Oral two times a day  pantoprazole    Tablet 40 milliGRAM(s) Oral before breakfast  polyethylene glycol 3350 17 Gram(s) Oral daily  vancomycin  IVPB 1000 milliGRAM(s) IV Intermittent every 12 hours  venlafaxine 50 milliGRAM(s) Oral daily    MEDICATIONS  (PRN):  acetaminophen   Tablet .. 650 milliGRAM(s) Oral every 6 hours PRN Temp greater or equal to 38C (100.4F), Mild Pain (1 - 3)  HYDROmorphone  Injectable 2 milliGRAM(s) IV Push every 3 hours PRN severe pain refractory morphine  LORazepam     Tablet 0.5 milliGRAM(s) Oral three times a day PRN Anxiety  morphine  IR 45 milliGRAM(s) Oral every 4 hours PRN Severe Pain (7 - 10)      OBJECTIVE:    Vital Signs Last 24 Hrs  T(C): 37 (20 May 2019 11:21), Max: 38.9 (20 May 2019 06:29)  T(F): 98.6 (20 May 2019 11:21), Max: 102 (20 May 2019 06:29)  HR: 66 (20 May 2019 11:21) (62 - 110)  BP: 88/35 (20 May 2019 11:21) (78/43 - 124/64)  BP(mean): --  RR: 18 (20 May 2019 11:21) (18 - 18)  SpO2: 98% (20 May 2019 11:21) (97% - 100%)     CAPILLARY BLOOD GLUCOSE        I&O's Summary      PHYSICAL EXAM:  GENERAL: NAD, well-developed, appears somewhat sleepy but awake  HEAD:  Atraumatic, Normocephalic  EYES: EOMI, PERRLA, conjunctiva and sclera clear  NECK: Supple, No JVD  CHEST/LUNG: R mediport; Clear to auscultation bilaterally; No wheeze  HEART: Regular rate and rhythm; No murmurs, rubs, or gallops  ABDOMEN: Soft, TTP in RLQ, Nondistended; Bowel sounds present  EXTREMITIES:  2+ Peripheral Pulses, No clubbing, cyanosis, or edema  PSYCH: AAOx3  NEUROLOGY: non-focal  SKIN: No rashes or lesions    LABS:                        7.8    8.28  )-----------( 163      ( 20 May 2019 10:15 )             25.3     Auto Eosinophil # 0.06  / Auto Eosinophil % 0.7   / Auto Neutrophil # 6.37  / Auto Neutrophil % 77.1  / BANDS % x                            8.7    7.14  )-----------( 127      ( 19 May 2019 12:40 )             29.2     Auto Eosinophil # 0.07  / Auto Eosinophil % 1.0   / Auto Neutrophil # 5.38  / Auto Neutrophil % 75.4  / BANDS % x            136  |  101  |  4<L>  ----------------------------<  145<H>  3.5   |  24  |  0.69      134<L>  |  95<L>  |  6<L>  ----------------------------<  96  3.7   |  26  |  0.78    Ca    8.7      20 May 2019 10:15  Mg     2.0       Phos  3.4       TPro  5.8<L>  /  Alb  3.0<L>  /  TBili  0.6  /  DBili  x   /  AST  29  /  ALT  50<H>  /  AlkPhos  180<H>    TPro  6.5  /  Alb  3.4  /  TBili  0.6  /  DBili  x   /  AST  47<H>  /  ALT  71<H>  /  AlkPhos  205<H>            Urinalysis Basic - ( 19 May 2019 14:00 )    Color: LIGHT YELLOW / Appearance: CLEAR / S.008 / pH: 6.5  Gluc: NEGATIVE / Ketone: NEGATIVE  / Bili: NEGATIVE / Urobili: NORMAL   Blood: NEGATIVE / Protein: NEGATIVE / Nitrite: NEGATIVE   Leuk Esterase: NEGATIVE / RBC: x / WBC x   Sq Epi: x / Non Sq Epi: x / Bacteria: x            ABG:     VBG: ( 12:40 ) - VBG - pH: 7.36  | pCO2: 54    | pO2: 27    | Lactate: 1.9        Microbiology: =========================================  CONTACT WARREN Lebron M.D., PGY-1  Pager: NS- [phone] [employer]- [phone]    Mon-Fri: pager covered by day team 7am-7pm;   Sa/Sun: see chart, primary physician assigned available 7am-12pm  Sat/[place] Coverage 12pm-7pm: NS- page [phone] for Team1-4, [facility] - pager forwarded to covering Resident  For Night coverage 7pm-7am: NS- page [phone] Team1-3, page [phone] Team4 & Care Model; [facility]- page [phone] for Red, [phone] for Blue  =========================================    Patient is a 48y old  Female who presents with a chief complaint of fever (19 May 2019 17:52)      SUBJECTIVE / OVERNIGHT EVENTS:  Patient seen and examined at bedside. Wife present. This morning, she is resting comfortably in bed and reports continued abdominal pain, throbbing, in the lower right part of her abdomen going down to the ground.  She had continued fevers overnight and felt chills requiring additional blankets.    She reports:  [  ] CP  [  ] SOB  [X] Fever  [  ] N /  [  ] V  [  ] Diarrhea  [  ] None of the above    Other Review of Systems Negative.    MEDICATIONS  (STANDING):  ascorbic acid 500 milliGRAM(s) Oral daily  cefepime   IVPB 2000 milliGRAM(s) IV Intermittent every 8 hours  cholecalciferol 400 Unit(s) Oral daily  enoxaparin Injectable 40 milliGRAM(s) SubCutaneous every 24 hours  lactated ringers. 1000 milliLiter(s) (75 mL/Hr) IV Continuous <Continuous>  lactulose Syrup 10 Gram(s) Oral every 4 hours  methadone    Tablet 15 milliGRAM(s) Oral every 8 hours  mirtazapine 15 milliGRAM(s) Oral at bedtime  nystatin    Suspension 286220 Unit(s) Oral two times a day  pantoprazole    Tablet 40 milliGRAM(s) Oral before breakfast  polyethylene glycol 3350 17 Gram(s) Oral daily  vancomycin  IVPB 1000 milliGRAM(s) IV Intermittent every 12 hours  venlafaxine 50 milliGRAM(s) Oral daily    MEDICATIONS  (PRN):  acetaminophen   Tablet .. 650 milliGRAM(s) Oral every 6 hours PRN Temp greater or equal to 38C (100.4F), Mild Pain (1 - 3)  HYDROmorphone  Injectable 2 milliGRAM(s) IV Push every 3 hours PRN severe pain refractory morphine  LORazepam     Tablet 0.5 milliGRAM(s) Oral three times a day PRN Anxiety  morphine  IR 45 milliGRAM(s) Oral every 4 hours PRN Severe Pain (7 - 10)      OBJECTIVE:    Vital Signs Last 24 Hrs  T(C): 37 (20 May 2019 11:21), Max: 38.9 (20 May 2019 06:29)  T(F): 98.6 (20 May 2019 11:21), Max: 102 (20 May 2019 06:29)  HR: 66 (20 May 2019 11:21) (62 - 110)  BP: 88/35 (20 May 2019 11:21) (78/43 - 124/64)  BP(mean): --  RR: 18 (20 May 2019 11:21) (18 - 18)  SpO2: 98% (20 May 2019 11:21) (97% - 100%)     CAPILLARY BLOOD GLUCOSE        I&O's Summary      PHYSICAL EXAM:  GENERAL: NAD, well-developed, appears somewhat sleepy but awake  HEAD:  Atraumatic, Normocephalic  EYES: EOMI, PERRLA, conjunctiva and sclera clear  NECK: Supple, No JVD  CHEST/LUNG: R mediport; site CDI  Clear to auscultation bilaterally; No wheeze  HEART: Regular rate and rhythm; No murmurs, rubs, or gallops  ABDOMEN: Soft, TTP in RLQ, Nondistended; Bowel sounds present  EXTREMITIES:  2+ Peripheral Pulses, No clubbing, cyanosis, or edema  PSYCH: AAOx3  NEUROLOGY: non-focal  SKIN: No rashes or lesions    LABS:                        7.8    8.28  )-----------( 163      ( 20 May 2019 10:15 )             25.3     Auto Eosinophil # 0.06  / Auto Eosinophil % 0.7   / Auto Neutrophil # 6.37  / Auto Neutrophil % 77.1  / BANDS % x                            8.7    7.14  )-----------( 127      ( 19 May 2019 12:40 )             29.2     Auto Eosinophil # 0.07  / Auto Eosinophil % 1.0   / Auto Neutrophil # 5.38  / Auto Neutrophil % 75.4  / BANDS % x            136  |  101  |  4<L>  ----------------------------<  145<H>  3.5   |  24  |  0.69      134<L>  |  95<L>  |  6<L>  ----------------------------<  96  3.7   |  26  |  0.78    Ca    8.7      20 May 2019 10:15  Mg     2.0       Phos  3.4       TPro  5.8<L>  /  Alb  3.0<L>  /  TBili  0.6  /  DBili  x   /  AST  29  /  ALT  50<H>  /  AlkPhos  180<H>    TPro  6.5  /  Alb  3.4  /  TBili  0.6  /  DBili  x   /  AST  47<H>  /  ALT  71<H>  /  AlkPhos  205<H>            Urinalysis Basic - ( 19 May 2019 14:00 )    Color: LIGHT YELLOW / Appearance: CLEAR / S.008 / pH: 6.5  Gluc: NEGATIVE / Ketone: NEGATIVE  / Bili: NEGATIVE / Urobili: NORMAL   Blood: NEGATIVE / Protein: NEGATIVE / Nitrite: NEGATIVE   Leuk Esterase: NEGATIVE / RBC: x / WBC x   Sq Epi: x / Non Sq Epi: x / Bacteria: x        BC NGTD    RVP neg

## 2019-05-20 NOTE — PROVIDER CONTACT NOTE (OTHER) - RECOMMENDATIONS
Continue to try to obtain IV access & consider administering fluid bolus.
keep monitoring pt. as per MD pt may received her pain meds.
Consider ordering fluid bolus

## 2019-05-20 NOTE — CONSULT NOTE ADULT - SUBJECTIVE AND OBJECTIVE BOX
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     ROS: as above     PAST MEDICAL & SURGICAL HISTORY:  Anxiety  Vertigo  Ovarian cyst: (L) 1/2017  Ovarian cancer  Deviated Nasal Septum  Port-a-cath in place: Right 4/2017  H/O right breast biopsy: 2015  H/O: hysterectomy: robotic total laparoscopic hysterectomy, right salpingo oophorectomy 2/2017  S/P unilateral salpingo-oophorectomy: 1/25/17 (L)  History of Tonsillectomy      SOCIAL HISTORY:    FAMILY HISTORY:  Family history of ovarian cancer (Grandparent)  Family history of prostate cancer in father      MEDICATIONS  (STANDING):  ascorbic acid 500 milliGRAM(s) Oral daily  cefepime   IVPB 2000 milliGRAM(s) IV Intermittent every 8 hours  cholecalciferol 400 Unit(s) Oral daily  enoxaparin Injectable 40 milliGRAM(s) SubCutaneous every 24 hours  lactated ringers. 1000 milliLiter(s) (75 mL/Hr) IV Continuous <Continuous>  lactulose Syrup 10 Gram(s) Oral every 4 hours  methadone    Tablet 15 milliGRAM(s) Oral every 8 hours  mirtazapine 15 milliGRAM(s) Oral at bedtime  nystatin    Suspension 946826 Unit(s) Oral two times a day  pantoprazole    Tablet 40 milliGRAM(s) Oral before breakfast  polyethylene glycol 3350 17 Gram(s) Oral daily  vancomycin  IVPB 1000 milliGRAM(s) IV Intermittent every 12 hours  venlafaxine 50 milliGRAM(s) Oral daily    MEDICATIONS  (PRN):  acetaminophen   Tablet .. 650 milliGRAM(s) Oral every 6 hours PRN Temp greater or equal to 38C (100.4F), Mild Pain (1 - 3)  HYDROmorphone  Injectable 2 milliGRAM(s) IV Push every 3 hours PRN severe pain refractory morphine  LORazepam     Tablet 0.5 milliGRAM(s) Oral three times a day PRN Anxiety  morphine  IR 45 milliGRAM(s) Oral every 4 hours PRN Severe Pain (7 - 10)      Allergies    No Known Allergies    Intolerances        Vital Signs Last 24 Hrs  T(C): 37 (20 May 2019 11:21), Max: 38.9 (20 May 2019 06:29)  T(F): 98.6 (20 May 2019 11:21), Max: 102 (20 May 2019 06:29)  HR: 66 (20 May 2019 11:21) (62 - 96)  BP: 88/35 (20 May 2019 11:21) (78/43 - 124/64)  BP(mean): --  RR: 18 (20 May 2019 11:21) (18 - 18)  SpO2: 98% (20 May 2019 11:21) (97% - 100%)    PHYSICAL EXAM  General: adult in NAD  HEENT: clear oropharynx, anicteric sclera, pink conjunctiva  Neck: supple  CV: normal S1/S2 with no murmur rubs or gallops  Lungs: positive air movement b/l ant lungs, clear to auscultation, no wheezes, no rales  Abdomen: soft non-tender non-distended, no hepatosplenomegaly  Ext: no clubbing cyanosis or edema  Skin: no rashes and no petechiae  Neuro: alert and oriented X 3, none focal    LABS:                          7.8    8.28  )-----------( 163      ( 20 May 2019 10:15 )             25.3         Mean Cell Volume : 90.4 fL  Mean Cell Hemoglobin : 27.9 pg  Mean Cell Hemoglobin Concentration : 30.8 %  Auto Neutrophil # : 6.37 K/uL  Auto Lymphocyte # : 0.59 K/uL  Auto Monocyte # : 1.17 K/uL  Auto Eosinophil # : 0.06 K/uL  Auto Basophil # : 0.02 K/uL  Auto Neutrophil % : 77.1 %  Auto Lymphocyte % : 7.1 %  Auto Monocyte % : 14.1 %  Auto Eosinophil % : 0.7 %  Auto Basophil % : 0.2 %      Serial CBC's  05-20 @ 10:15  Hct-25.3 / Hgb-7.8 / Plat-163 / RBC-2.80 / WBC-8.28  Serial CBC's  05-19 @ 12:40  Hct-29.2 / Hgb-8.7 / Plat-127 / RBC-3.19 / WBC-7.14      05-20    136  |  101  |  4<L>  ----------------------------<  145<H>  3.5   |  24  |  0.69    Ca    8.7      20 May 2019 10:15  Phos  3.4     05-20  Mg     2.0     05-20    TPro  5.8<L>  /  Alb  3.0<L>  /  TBili  0.6  /  DBili  x   /  AST  29  /  ALT  50<H>  /  AlkPhos  180<H>  05-20                      RADIOLOGY & ADDITIONAL STUDIES:

## 2019-05-20 NOTE — CHART NOTE - NSCHARTNOTEFT_GEN_A_CORE
Patient and wife at bedside this morning noted that they would like to refuse lovenox and heparin injections. They report no history of DVTs. Discussed the need for prophylaxis in a patient with malignancy, patient and wife were understanding and were open to further discussion but would like to hold off for now.   Orders d/saira and will note.

## 2019-05-20 NOTE — CONSULT NOTE ADULT - ASSESSMENT
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     -CT a/p with contrast  -Rad onc consult  -Pal care consult  -infectious work up, broad spectrum abx  -Supportive care, pain control, Nutrition, PT, DVT ppx  -Outpatient oncology f/u    Will follow. Please do not hesitate to call back with questions.     Diamond Kamara MD  Medical Oncology Attending

## 2019-05-20 NOTE — PROVIDER CONTACT NOTE (OTHER) - ASSESSMENT
Pt. is asymptomatic, resting comfortably.  VS otherwise stable, no s/s of acute distress noted.  Pt. reports that having BP this low at home is not abnormal.

## 2019-05-20 NOTE — PROGRESS NOTE ADULT - PROBLEM SELECTOR PLAN 7
On home venlafaxine 50 mg and ativan 0.5 mg prn  - c/w ativan 0.5 mg mg q8 prn  - c/w velafaxine 50 mg

## 2019-05-20 NOTE — PROGRESS NOTE ADULT - PROBLEM SELECTOR PLAN 8
Patient with significant progression of disease as well as refractory pain. Will consult palliative to help with addressing GOC as well as pain control. Follows Dr. Niels Ortega outpatient.

## 2019-05-20 NOTE — PROVIDER CONTACT NOTE (OTHER) - SITUATION
Pt. found to have BP of 80/56, temp of 102.  Pt. currently has no IV access after multiple attempts.

## 2019-05-20 NOTE — PROVIDER CONTACT NOTE (OTHER) - ASSESSMENT
Pt. is currently asymptomatic, resting comfortably with no s/s of acute distress noted other than abd pain r/t cancer.  Pt. states this is around her normal BP at home.

## 2019-05-21 NOTE — CONSULT NOTE ADULT - SUBJECTIVE AND OBJECTIVE BOX
HPI:  Obtained from H&P   48 F with PMH of Stage II clear cell ovarian cancer (2017) s/p hysterectomy, R salpingooophorectomy omentectomy and adjuvant chemotherapy with progression of disease, now switched to Gemzar (last dose chemo on ), who presents with fevers x 2 days. Patient overall denying headache, cough, chest pain, nausea/vomiting, diarrhea, or dysuria. On day of admission, had 2 episodes of NBNB vomiting which prompted patient's wife to bring patient into the hospital. Since increase in her methadone  and morphine dose, she's had constipation for which she is on lactulose 15 g q6 hours, but still at times has difficulty passing stools. She otherwise denies rectal pain. She significant RLQ pain where her malignancy is.     Patient currently laying in bed in no acute distress     PERTINENT PM/SXH:   Anxiety  Vertigo  Ovarian cyst  Ovarian cancer  Deviated Nasal Septum    Port-a-cath in place  H/O right breast biopsy  H/O: hysterectomy  S/P unilateral salpingo-oophorectomy  History of Tonsillectomy    FAMILY HISTORY:  Family history of ovarian cancer (Grandparent)  Family history of prostate cancer in father      SOCIAL HISTORY:   Significant other/partner: Yes [ x]  No [ ] Children:  Yes [ ]  No [ x] Rastafari/Spirituality: Buddhist   Substance hx: Yes[ ]  No [x ]   Tobacco hx:  Yes [ ] No [x ]   Alcohol hx: Yes [ ] No [x ]   Home Opioid hx:  Yes [x ]  Living Situation: [ x]Home  [ ]Long term care  [ ]Rehab [ ]Other    ADVANCE DIRECTIVES:  Full Code     [x ] Health Care Proxy(s)  [ ] Surrogate(s)  [ ] Guardian           Name(s): Phone Number(s): curtis Shetty [phone]    BASELINE (I)ADL(s) (prior to admission):  [place]: [x ]Total  [ ] Moderate [ ]Dependent    Allergies    No Known Allergies    Intolerances    MEDICATIONS  (STANDING):  ascorbic acid 500 milliGRAM(s) Oral daily  cefepime   IVPB 2000 milliGRAM(s) IV Intermittent every 8 hours  cholecalciferol 400 Unit(s) Oral daily  lactulose Syrup 10 Gram(s) Oral every 4 hours  methadone    Tablet 15 milliGRAM(s) Oral every 8 hours  mirtazapine 15 milliGRAM(s) Oral at bedtime  nystatin    Suspension 218254 Unit(s) Oral two times a day  pantoprazole    Tablet 40 milliGRAM(s) Oral before breakfast  polyethylene glycol 3350 17 Gram(s) Oral daily  vancomycin  IVPB 1000 milliGRAM(s) IV Intermittent every 12 hours  venlafaxine 50 milliGRAM(s) Oral daily    MEDICATIONS  (PRN):  acetaminophen   Tablet .. 650 milliGRAM(s) Oral every 6 hours PRN Temp greater or equal to 38C (100.4F), Mild Pain (1 - 3)  HYDROmorphone   Tablet 8 milliGRAM(s) Oral every 3 hours PRN moderate and severe pain  LORazepam     Tablet 0.5 milliGRAM(s) Oral three times a day PRN Anxiety    PRESENT SYMPTOMS: [ ]Unable to obtain due to poor mentation   Source if other than patient:  [ ]Family   [ ]Team     Pain (Impact on QOL):  Acute on chronic pain to right lower abdomen/pelvic/groin pain (currently 8/10) - acutely worsened since this past  - sharp - non-radiating - aggravated by movement and mildly relieved with opioids     PAIN AD Score:     [email or web address] (press ctrl +  left click to view)    Dyspnea:  Yes [ ] No [x ] - [ ]Mild [ ]Moderate [ ]Severe  Anxiety:    Yes [ ] No [x ] - [ ]Mild [ ]Moderate [ ]Severe  Fatigue:    Yes [ ] No [x ] - [ ]Mild [ ]Moderate [ ]Severe  Nausea:    Yes [ ] No [x ] - [ ]Mild [ ]Moderate [ ]Severe                         Loss of appetite: Yes [ ] No [x ] - [ ]Mild [ ]Moderate [ ]Severe             Constipation:  Yes [ ] No [x ] - [ ]Mild [ ]Moderate [ ]Severe  Grief: Yes [x ] No [ ]     Other Symptoms:  [x ]All other review of systems negative     Karnofsky Performance Score/Palliative Performance Status Version 2: 80%    http://palliative.info/resource_material/[email or web address].pdf    PHYSICAL EXAM:  Vital Signs Last 24 Hrs  T(C): 37 (21 May 2019 05:50), Max: 38.3 (20 May 2019 16:55)  T(F): 98.6 (21 May 2019 05:50), Max: 100.9 (20 May 2019 16:55)  HR: 97 (21 May 2019 05:50) (75 - 97)  BP: 105/65 (21 May 2019 05:50) (95/50 - 116/59)  BP(mean): --  RR: 17 (21 May 2019 05:50) (17 - 18)  SpO2: 96% (21 May 2019 05:50) (96% - 100%) I&O's Summary      GENERAL:  [x ]Alert  [ x]Oriented x4   [ ]Lethargic  [ ]Cachexia  [ ]Unarousable  [x ]Verbal  [ ]Non-Verbal  PULMONARY:   [x ]Clear - anteriorly   CARDIOVASCULAR:    [ x]Regular [ ]Irregular [ ]Tachy  [ ]Tian [ ]Murmur [ ]Other  GASTROINTESTINAL:  [x ]Soft  [ ]Distended   [x ]+BS  [x ]Non tender [ ]Tender  [ ]PEG [ ]OGT/ NGT  Last BM: 19  GENITOURINARY:  [x ]Normal [ ] Incontinent   [ ]Oliguria/Anuria   [ ]Valentine  MUSCULOSKELETAL:   [x]Normal   [ ]Weakness  [ ]Bed/Wheelchair bound [ ]Edema  NEUROLOGIC:   [ x]No focal deficits  [ ] Cognitive impairment  [ ] Dysphagia [ ]Dysarthria [ ] Paresis [ ]Other   SKIN:   [x ]Normal   [ ]Pressure ulcer(s)  [ ]Rash    LABS:                        8.0    8.39  )-----------( 133      ( 21 May 2019 05:20 )             26.0   05-21    140  |  100  |  4<L>  ----------------------------<  93  3.4<L>   |  26  |  0.70    Ca    9.0      21 May 2019 05:20  Phos  3.3     05-21  Mg     1.9     05-21    TPro  5.9<L>  /  Alb  2.9<L>  /  TBili  0.5  /  DBili  x   /  AST  20  /  ALT  40<H>  /  AlkPhos  173<H>  05-21  PT/INR - ( 21 May 2019 05:20 )   PT: 16.4 SEC;   INR: 1.42          PTT - ( 21 May 2019 05:20 )  PTT:31.1 SEC    Urinalysis Basic - ( 19 May 2019 14:00 )    Color: LIGHT YELLOW / Appearance: CLEAR / S.008 / pH: 6.5  Gluc: NEGATIVE / Ketone: NEGATIVE  / Bili: NEGATIVE / Urobili: NORMAL   Blood: NEGATIVE / Protein: NEGATIVE / Nitrite: NEGATIVE   Leuk Esterase: NEGATIVE / RBC: x / WBC x   Sq Epi: x / Non Sq Epi: x / Bacteria: x      RADIOLOGY & ADDITIONAL STUDIES: Reviewed  Ct abdomen    IMPRESSION:     Continued increase in size of a soft tissue mass in the right lower   quadrant.     Suspicion of additional disease within the right rectus musculature.    Right external iliac adenopathy without significant change.    PROTEIN CALORIE MALNUTRITION PRESENT: [ ] Yes [ ] No  [ ] PPSV2 < or = to 30% [ ] significant weight loss  [ ] poor nutritional intake [ ] catabolic state [ ] anasarca     Albumin, Serum: 2.9 g/dL (19 @ 05:20)      REFERRALS:   [ ]Chaplaincy  [ ] Hospice  [ ]Child Life  [ ]Social Work  [ ]Case management [ ]Holistic Therapy   Goals of Care Discussion Document:

## 2019-05-21 NOTE — CONSULT NOTE ADULT - PROBLEM SELECTOR RECOMMENDATION 9
Patient known well to palliative service and to Dr. Arellano as an outpatient.  Patient reports being well managed at home with Methadone 15mg PO TID and requiring MSIR sparingly - starting Sunday she began using MSIR 2-3 times a day without relief.  Currently patient rates her pain as 8/10 - 10/10 at its worst - unrelieved with MSIR 45mg PO.  Patient previously was utilizing Dilaudid 8mg PO PRN and was rotated to MSIR on her previous admission.  Will try Dilaudid 8mg PO q3h PRN and assess the need for increase in Methadone based on her PRN usage. EKG pending.  Please page [phone] for any questions or concerns. Patient known well to palliative service and to Dr. Arellano as an outpatient.  Patient reports being well managed at home with Methadone 15mg PO TID and requiring MSIR sparingly - starting Sunday she began using MSIR 2-3 times a day without relief.  Currently patient rates her pain as 8/10 - 10/10 at its worst - unrelieved with MSIR 45mg PO.  Patient previously was utilizing Dilaudid 8mg PO PRN and was rotated to MSIR on her previous admission.  Will try Dilaudid 12mg PO q3h PRN and assess the need for increase in Methadone based on her PRN usage. EKG pending.  Please page [phone] for any questions or concerns. Patient known well to palliative service and to Dr. Arellano as an outpatient.  Patient reports being well managed at home with Methadone 15mg PO TID and requiring MSIR sparingly - starting Sunday she began using MSIR 2-3 times a day without relief.  Currently patient rates her pain as 8/10 - 10/10 at its worst - unrelieved with MSIR 45mg PO.  Patient previously was utilizing Dilaudid 8mg PO PRN and was rotated to MSIR on her previous admission.  Will try Dilaudid 12mg PO q3h PRN and assess the need for increase in Methadone based on her PRN usage. EKG pending.  Please page [phone] for any questions or concerns.    Addendum 4pm- notified by team that patient expressed no relief from Dilaudid 8mg PO - Dilaudid increased to 12mg PO q3h PRN.

## 2019-05-21 NOTE — PROGRESS NOTE ADULT - PROBLEM SELECTOR PLAN 6
Patient with severe opioid induced constipation  - c/w home lactulose 10g q4 hrs  - add miralax - bms now comfortable

## 2019-05-21 NOTE — CONSULT NOTE ADULT - ASSESSMENT
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  Palliative consulted for pain management.

## 2019-05-21 NOTE — CONSULT NOTE ADULT - ATTENDING COMMENTS
Pt seen with NP.  Agree with above.  Increase dilaudid to 12mg po Q 3 hours prn.  Check ekg.  If ok, would increase methadont to 20mg tid.  aggressive bowel regimen.  iv abx as per primary team

## 2019-05-21 NOTE — PROGRESS NOTE ADULT - SUBJECTIVE AND OBJECTIVE BOX
=========================================  CONTACT WARREN Lebron M.D., PGY-1  Pager: NS- [phone] [facility]- [phone]    Mon-Fri: pager covered by day team 7am-7pm;   Sa/Sun: see chart, primary physician assigned available 7am-12pm  Sat/[place] [place] Coverage 12pm-7pm: NS- page [phone] for Team1-4, [facility] - pager forwarded to covering Resident  For Night coverage 7pm-7am: NS- page [phone] Team1-3, page [phone] Team4 & Care Model; [facility]- page [phone] for Red, [phone] for Blue  =========================================    Patient is a 48y old  Female who presents with a chief complaint of fever (20 May 2019 12:45)      SUBJECTIVE / OVERNIGHT EVENTS:  Patient seen and examined at bedside. This morning, she is resting comfortably in bed and reports continued RLQ pain, but no subjective fevers or chills overnight. Her bowel movements are comfortable now     She reports:  [  ] CP  [  ] SOB  [  ] Fever  [  ] N /  [  ] V  [  ] Diarrhea  [X] None of the above    Other Review of Systems Negative.    MEDICATIONS  (STANDING):  ascorbic acid 500 milliGRAM(s) Oral daily  cefepime   IVPB 2000 milliGRAM(s) IV Intermittent every 8 hours  cholecalciferol 400 Unit(s) Oral daily  lactated ringers. 1000 milliLiter(s) (75 mL/Hr) IV Continuous <Continuous>  lactulose Syrup 10 Gram(s) Oral every 4 hours  methadone    Tablet 15 milliGRAM(s) Oral every 8 hours  mirtazapine 15 milliGRAM(s) Oral at bedtime  nystatin    Suspension 447075 Unit(s) Oral two times a day  pantoprazole    Tablet 40 milliGRAM(s) Oral before breakfast  polyethylene glycol 3350 17 Gram(s) Oral daily  potassium chloride    Tablet ER 20 milliEquivalent(s) Oral every 2 hours  vancomycin  IVPB 1000 milliGRAM(s) IV Intermittent every 12 hours  venlafaxine 50 milliGRAM(s) Oral daily    MEDICATIONS  (PRN):  acetaminophen   Tablet .. 650 milliGRAM(s) Oral every 6 hours PRN Temp greater or equal to 38C (100.4F), Mild Pain (1 - 3)  HYDROmorphone  Injectable 2 milliGRAM(s) IV Push every 3 hours PRN severe pain refractory morphine  LORazepam     Tablet 0.5 milliGRAM(s) Oral three times a day PRN Anxiety  morphine  IR 45 milliGRAM(s) Oral every 4 hours PRN Severe Pain (7 - 10)      OBJECTIVE:    Vital Signs Last 24 Hrs  T(C): 37 (21 May 2019 05:50), Max: 38.3 (20 May 2019 16:55)  T(F): 98.6 (21 May 2019 05:50), Max: 100.9 (20 May 2019 16:55)  HR: 97 (21 May 2019 05:50) (66 - 97)  BP: 105/65 (21 May 2019 05:50) (88/35 - 116/59)  BP(mean): --  RR: 17 (21 May 2019 05:50) (17 - 18)  SpO2: 96% (21 May 2019 05:50) (96% - 100%)     CAPILLARY BLOOD GLUCOSE        I&O's Summary      PHYSICAL EXAM:  GENERAL: NAD, well-developed, appears somewhat sleepy but awake  HEAD:  Atraumatic, Normocephalic  EYES: EOMI, PERRLA, conjunctiva and sclera clear  NECK: Supple, No JVD  CHEST/LUNG: R mediport; site CDI  Clear to auscultation bilaterally; No wheeze  HEART: Regular rate and rhythm; No murmurs, rubs, or gallops  ABDOMEN: Soft, TTP in RLQ, Nondistended; Bowel sounds present  EXTREMITIES:  2+ Peripheral Pulses, No clubbing, cyanosis, or edema  PSYCH: AAOx3  NEUROLOGY: non-focal  SKIN: No rashes or lesions    LABS:                        8.0    8.39  )-----------( 133      ( 21 May 2019 05:20 )             26.0     Auto Eosinophil # 0.16  / Auto Eosinophil % 1.9   / Auto Neutrophil # 6.34  / Auto Neutrophil % 75.6  / BANDS % x                            7.8    8.28  )-----------( 163      ( 20 May 2019 10:15 )             25.3     Auto Eosinophil # 0.06  / Auto Eosinophil % 0.7   / Auto Neutrophil # 6.37  / Auto Neutrophil % 77.1  / BANDS % x                            8.7    7.14  )-----------( 127      ( 19 May 2019 12:40 )             29.2     Auto Eosinophil # 0.07  / Auto Eosinophil % 1.0   / Auto Neutrophil # 5.38  / Auto Neutrophil % 75.4  / BANDS % x            140  |  100  |  4<L>  ----------------------------<  93  3.4<L>   |  26  |  0.70      136  |  101  |  4<L>  ----------------------------<  145<H>  3.5   |  24  |  0.69      134<L>  |  95<L>  |  6<L>  ----------------------------<  96  3.7   |  26  |  0.78    Ca    9.0      21 May 2019 05:20  Mg     1.9       Phos  3.3       TPro  5.9<L>  /  Alb  2.9<L>  /  TBili  0.5  /  DBili  x   /  AST  20  /  ALT  40<H>  /  AlkPhos  173<H>    TPro  5.8<L>  /  Alb  3.0<L>  /  TBili  0.6  /  DBili  x   /  AST  29  /  ALT  50<H>  /  AlkPhos  180<H>    TPro  6.5  /  Alb  3.4  /  TBili  0.6  /  DBili  x   /  AST  47<H>  /  ALT  71<H>  /  AlkPhos  205<H>      PT/INR - ( 21 May 2019 05:20 )   PT: 16.4 SEC;   INR: 1.42          PTT - ( 21 May 2019 05:20 )  PTT:31.1 SEC      Urinalysis Basic - ( 19 May 2019 14:00 )    Color: LIGHT YELLOW / Appearance: CLEAR / S.008 / pH: 6.5  Gluc: NEGATIVE / Ketone: NEGATIVE  / Bili: NEGATIVE / Urobili: NORMAL   Blood: NEGATIVE / Protein: NEGATIVE / Nitrite: NEGATIVE   Leuk Esterase: NEGATIVE / RBC: x / WBC x   Sq Epi: x / Non Sq Epi: x / Bacteria: x            ABG:     VBG:     Microbiology: =========================================  CONTACT WARREN Lebron M.D., PGY-1  Pager: NS- [phone] [facility]- [phone]    Mon-Fri: pager covered by day team 7am-7pm;   Sa/Sun: see chart, primary physician assigned available 7am-12pm  Sat/[place] Coverage 12pm-7pm: NS- page [phone] for Team1-4, [facility] - pager forwarded to covering Resident  For Night coverage 7pm-7am: NS- page [phone] Team1-3, page [phone] Team4 & Care Model; [facility]- page [phone] for Red, [phone] for Blue  =========================================    Patient is a 48y old  Female who presents with a chief complaint of fever (20 May 2019 12:45)      SUBJECTIVE / OVERNIGHT EVENTS:  Patient seen and examined at bedside. This morning, she is resting comfortably in bed and reports continued RLQ pain, but no subjective fevers or chills overnight. Her bowel movements are comfortable now     She reports:  [  ] CP  [  ] SOB  [  ] Fever  [  ] N /  [  ] V  [  ] Diarrhea  [X] None of the above    Other Review of Systems Negative.    MEDICATIONS  (STANDING):  ascorbic acid 500 milliGRAM(s) Oral daily  cefepime   IVPB 2000 milliGRAM(s) IV Intermittent every 8 hours  cholecalciferol 400 Unit(s) Oral daily  lactated ringers. 1000 milliLiter(s) (75 mL/Hr) IV Continuous <Continuous>  lactulose Syrup 10 Gram(s) Oral every 4 hours  methadone    Tablet 15 milliGRAM(s) Oral every 8 hours  mirtazapine 15 milliGRAM(s) Oral at bedtime  nystatin    Suspension 865001 Unit(s) Oral two times a day  pantoprazole    Tablet 40 milliGRAM(s) Oral before breakfast  polyethylene glycol 3350 17 Gram(s) Oral daily  potassium chloride    Tablet ER 20 milliEquivalent(s) Oral every 2 hours  vancomycin  IVPB 1000 milliGRAM(s) IV Intermittent every 12 hours  venlafaxine 50 milliGRAM(s) Oral daily    MEDICATIONS  (PRN):  acetaminophen   Tablet .. 650 milliGRAM(s) Oral every 6 hours PRN Temp greater or equal to 38C (100.4F), Mild Pain (1 - 3)  HYDROmorphone  Injectable 2 milliGRAM(s) IV Push every 3 hours PRN severe pain refractory morphine  LORazepam     Tablet 0.5 milliGRAM(s) Oral three times a day PRN Anxiety  morphine  IR 45 milliGRAM(s) Oral every 4 hours PRN Severe Pain (7 - 10)      OBJECTIVE:    Vital Signs Last 24 Hrs  T(C): 37 (21 May 2019 05:50), Max: 38.3 (20 May 2019 16:55)  T(F): 98.6 (21 May 2019 05:50), Max: 100.9 (20 May 2019 16:55)  HR: 97 (21 May 2019 05:50) (66 - 97)  BP: 105/65 (21 May 2019 05:50) (88/35 - 116/59)  BP(mean): --  RR: 17 (21 May 2019 05:50) (17 - 18)  SpO2: 96% (21 May 2019 05:50) (96% - 100%)     CAPILLARY BLOOD GLUCOSE        I&O's Summary      PHYSICAL EXAM:  GENERAL: NAD, well-developed, appears somewhat sleepy but awake  HEAD:  Atraumatic, Normocephalic  EYES: EOMI, PERRLA, conjunctiva and sclera clear  NECK: Supple, No JVD  CHEST/LUNG: R mediport; site CDI  Clear to auscultation bilaterally; No wheeze  HEART: Regular rate and rhythm; No murmurs, rubs, or gallops  ABDOMEN: Soft, TTP in RLQ, Nondistended; Bowel sounds present  EXTREMITIES:  2+ Peripheral Pulses, No clubbing, cyanosis, or edema  PSYCH: AAOx3  NEUROLOGY: non-focal  SKIN: No rashes or lesions    LABS:                        8.0    8.39  )-----------( 133      ( 21 May 2019 05:20 )             26.0     Auto Eosinophil # 0.16  / Auto Eosinophil % 1.9   / Auto Neutrophil # 6.34  / Auto Neutrophil % 75.6  / BANDS % x                            7.8    8.28  )-----------( 163      ( 20 May 2019 10:15 )             25.3     Auto Eosinophil # 0.06  / Auto Eosinophil % 0.7   / Auto Neutrophil # 6.37  / Auto Neutrophil % 77.1  / BANDS % x                            8.7    7.14  )-----------( 127      ( 19 May 2019 12:40 )             29.2     Auto Eosinophil # 0.07  / Auto Eosinophil % 1.0   / Auto Neutrophil # 5.38  / Auto Neutrophil % 75.4  / BANDS % x            140  |  100  |  4<L>  ----------------------------<  93  3.4<L>   |  26  |  0.70      136  |  101  |  4<L>  ----------------------------<  145<H>  3.5   |  24  |  0.69      134<L>  |  95<L>  |  6<L>  ----------------------------<  96  3.7   |  26  |  0.78    Ca    9.0      21 May 2019 05:20  Mg     1.9       Phos  3.3       TPro  5.9<L>  /  Alb  2.9<L>  /  TBili  0.5  /  DBili  x   /  AST  20  /  ALT  40<H>  /  AlkPhos  173<H>    TPro  5.8<L>  /  Alb  3.0<L>  /  TBili  0.6  /  DBili  x   /  AST  29  /  ALT  50<H>  /  AlkPhos  180<H>    TPro  6.5  /  Alb  3.4  /  TBili  0.6  /  DBili  x   /  AST  47<H>  /  ALT  71<H>  /  AlkPhos  205<H>      PT/INR - ( 21 May 2019 05:20 )   PT: 16.4 SEC;   INR: 1.42          PTT - ( 21 May 2019 05:20 )  PTT:31.1 SEC      Urinalysis Basic - ( 19 May 2019 14:00 )    Color: LIGHT YELLOW / Appearance: CLEAR / S.008 / pH: 6.5  Gluc: NEGATIVE / Ketone: NEGATIVE  / Bili: NEGATIVE / Urobili: NORMAL   Blood: NEGATIVE / Protein: NEGATIVE / Nitrite: NEGATIVE   Leuk Esterase: NEGATIVE / RBC: x / WBC x   Sq Epi: x / Non Sq Epi: x / Bacteria: x    CT a/p:  progression of disease, new lesion in muscle    Pall note reviewed, changing prn meds to dilaudid PO

## 2019-05-21 NOTE — PROGRESS NOTE ADULT - PROBLEM SELECTOR PLAN 8
Patient with significant progression of disease as well as refractory pain. Consulted palliative to help with addressing GOC as well as pain control. Follows Dr. Niels Ortega outpatient.

## 2019-05-22 NOTE — PROGRESS NOTE ADULT - ASSESSMENT
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

## 2019-05-22 NOTE — CHART NOTE - NSCHARTNOTEFT_GEN_A_CORE
as per my discussion today with Dr Kamara, patient will need inpatient radiation, had outpatient simulation on 5/16, we will re-run the plan for the [facility] at [facility] and start treatment either Friday or Tuesday...  thanks, Tello Fallon MD  [phone]

## 2019-05-22 NOTE — PROGRESS NOTE ADULT - ASSESSMENT
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   Patient with worsening pain in the RLQ. Plan was for palliative RT for pain control, Sim was done, treatment was to start this week, however pt was admitted.    Pain has been difficult to control. I have requested rad onc eval, if pain remains uncontrolled with medical management, palliative inpt RT should be considered.      -Rad onc consult  -Pal care input appreciated  -Supportive care, pain control, Nutrition, PT, DVT ppx  -Outpatient oncology f/u    Will follow. Please do not hesitate to call back with questions.     Diamond Kamara MD  Medical Oncology Attending

## 2019-05-22 NOTE — PHYSICAL THERAPY INITIAL EVALUATION ADULT - ADDITIONAL COMMENTS
Pt. reports owning DME of straight cane, rollator, wheelchair. Pt. uses rollator for long distances.     Pt. was left supine in bed post PT Evaluation, NAD, call bell within reach.

## 2019-05-22 NOTE — PROGRESS NOTE ADULT - SUBJECTIVE AND OBJECTIVE BOX
INTERVAL HPI/OVERNIGHT EVENTS:  Patient seen at bedside.  Pain is uncontrolled, 8/10.     VITAL SIGNS:  T(F): 97.6 (05-22-19 @ 10:10)  HR: 72 (05-22-19 @ 10:10)  BP: 105/59 (05-22-19 @ 10:10)  RR: 16 (05-22-19 @ 10:10)  SpO2: 97% (05-22-19 @ 10:10)  Wt(kg): --    PHYSICAL EXAM:    Constitutional: NAD, resting in bed comfortably  Eyes: EOMI, sclera non-icteric  Neck: supple, no LAP  Respiratory: CTA b/l, good air entry b/l, no wheezing, rhonchi or crackels  Cardiovascular: RRR, normal S1S2, no M/R/G  Gastrointestinal: soft, NTND  Extremities: no edema  Neurological: AAOx3, non focal  Skin: Normal temperature    MEDICATIONS  (STANDING):  ascorbic acid 500 milliGRAM(s) Oral daily  cholecalciferol 400 Unit(s) Oral daily  lactulose Syrup 10 Gram(s) Oral every 4 hours  lidocaine   Patch 1 Patch Transdermal daily  methadone    Tablet 20 milliGRAM(s) Oral every 8 hours  mirtazapine 15 milliGRAM(s) Oral at bedtime  nystatin    Suspension 860762 Unit(s) Oral two times a day  pantoprazole    Tablet 40 milliGRAM(s) Oral before breakfast  polyethylene glycol 3350 17 Gram(s) Oral daily  venlafaxine 50 milliGRAM(s) Oral daily    MEDICATIONS  (PRN):  acetaminophen   Tablet .. 650 milliGRAM(s) Oral every 6 hours PRN Temp greater or equal to 38C (100.4F), Mild Pain (1 - 3)  HYDROmorphone   Tablet 12 milliGRAM(s) Oral every 3 hours PRN moderate and severe pain  LORazepam     Tablet 0.5 milliGRAM(s) Oral three times a day PRN Anxiety      Allergies    No Known Allergies    Intolerances        LABS:                        8.6    7.47  )-----------( 214      ( 22 May 2019 06:35 )             28.2     05-22    138  |  100  |  5<L>  ----------------------------<  99  3.7   |  25  |  0.69    Ca    9.5      22 May 2019 06:35  Phos  3.7     05-22  Mg     2.1     05-22    TPro  6.4  /  Alb  3.2<L>  /  TBili  0.4  /  DBili  x   /  AST  17  /  ALT  33  /  AlkPhos  183<H>  05-22    PT/INR - ( 22 May 2019 06:35 )   PT: 15.9 SEC;   INR: 1.42          PTT - ( 22 May 2019 06:35 )  PTT:30.5 SEC      RADIOLOGY & ADDITIONAL TESTS:  Studies reviewed.

## 2019-05-22 NOTE — PROGRESS NOTE ADULT - SUBJECTIVE AND OBJECTIVE BOX
=========================================  CONTACT WARREN Lebron M.D., PGY-1  Pager: NS- [phone] [employer]- [phone]    Mon-Fri: pager covered by day team 7am-7pm;   Sa/Sun: see chart, primary physician assigned available 7am-12pm  Sat/[place] Coverage 12pm-7pm: NS- page 1443 for Team1-4, [facility] - pager forwarded to covering Resident  For Night coverage 7pm-7am: NS- page 1443 Team1-3, page 1446 Team4 & Care Model; [facility]- page [phone] for Red, [phone] for Blue  =========================================    Patient is a 48y old  Female who presents with a chief complaint of fever (21 May 2019 13:40)      SUBJECTIVE / OVERNIGHT EVENTS:  Patient seen and examined at bedside. This morning, she is resting comfortably in bed and reports improvement in control of her pain down to a 4.  She does also report new left sided abdominal pain that came twice and went away on its own.    She reports:  [  ] CP  [  ] SOB  [  ] Fever  [  ] N /  [  ] V  [  ] Diarrhea  [X] None of the above    Other Review of Systems Negative.    MEDICATIONS  (STANDING):  ascorbic acid 500 milliGRAM(s) Oral daily  cholecalciferol 400 Unit(s) Oral daily  lactulose Syrup 10 Gram(s) Oral every 4 hours  lidocaine   Patch 1 Patch Transdermal daily  methadone    Tablet 15 milliGRAM(s) Oral every 8 hours  mirtazapine 15 milliGRAM(s) Oral at bedtime  nystatin    Suspension 482097 Unit(s) Oral two times a day  pantoprazole    Tablet 40 milliGRAM(s) Oral before breakfast  polyethylene glycol 3350 17 Gram(s) Oral daily  venlafaxine 50 milliGRAM(s) Oral daily    MEDICATIONS  (PRN):  acetaminophen   Tablet .. 650 milliGRAM(s) Oral every 6 hours PRN Temp greater or equal to 38C (100.4F), Mild Pain (1 - 3)  HYDROmorphone   Tablet 12 milliGRAM(s) Oral every 3 hours PRN moderate and severe pain  LORazepam     Tablet 0.5 milliGRAM(s) Oral three times a day PRN Anxiety      OBJECTIVE:    Vital Signs Last 24 Hrs  T(C): 36.4 (22 May 2019 10:10), Max: 37.2 (21 May 2019 21:49)  T(F): 97.6 (22 May 2019 10:10), Max: 98.9 (21 May 2019 21:49)  HR: 72 (22 May 2019 10:10) (68 - 97)  BP: 105/59 (22 May 2019 10:10) (99/52 - 107/55)  BP(mean): --  RR: 16 (22 May 2019 10:10) (16 - 18)  SpO2: 97% (22 May 2019 10:10) (97% - 100%)     CAPILLARY BLOOD GLUCOSE        I&O's Summary      PHYSICAL EXAM:  GENERAL: NAD, well-developed, appears somewhat sleepy but awake  HEAD:  Atraumatic, Normocephalic  EYES: EOMI, PERRLA, conjunctiva and sclera clear  NECK: Supple, No JVD  CHEST/LUNG: R mediport; site CDI  Clear to auscultation bilaterally; No wheeze  HEART: Regular rate and rhythm; No murmurs, rubs, or gallops  ABDOMEN: Soft, TTP diffusely over abdomen, Nondistended; Bowel sounds present  EXTREMITIES:  2+ Peripheral Pulses, No clubbing, cyanosis, or edema  PSYCH: AAOx3  NEUROLOGY: non-focal  SKIN: No rashes or lesions    LABS:                        8.6    7.47  )-----------( 214      ( 22 May 2019 06:35 )             28.2     Auto Eosinophil # 0.24  / Auto Eosinophil % 3.2   / Auto Neutrophil # 5.33  / Auto Neutrophil % 71.3  / BANDS % x                            8.0    8.39  )-----------( 133      ( 21 May 2019 05:20 )             26.0     Auto Eosinophil # 0.16  / Auto Eosinophil % 1.9   / Auto Neutrophil # 6.34  / Auto Neutrophil % 75.6  / BANDS % x        05-22    138  |  100  |  5<L>  ----------------------------<  99  3.7   |  25  |  0.69  05-21    140  |  100  |  4<L>  ----------------------------<  93  3.4<L>   |  26  |  0.70    Ca    9.5      22 May 2019 06:35  Mg     2.1     05-22  Phos  3.7     05-22  TPro  6.4  /  Alb  3.2<L>  /  TBili  0.4  /  DBili  x   /  AST  17  /  ALT  33  /  AlkPhos  183<H>  05-22  TPro  5.9<L>  /  Alb  2.9<L>  /  TBili  0.5  /  DBili  x   /  AST  20  /  ALT  40<H>  /  AlkPhos  173<H>  05-21    PT/INR - ( 22 May 2019 06:35 )   PT: 15.9 SEC;   INR: 1.42          PTT - ( 22 May 2019 06:35 )  PTT:30.5 SEC              ABG:     VBG:     Microbiology: =========================================  CONTACT WARREN Lebron M.D., PGY-1  Pager: NS- [phone] [email or web address]- [phone]    Mon-Fri: pager covered by day team 7am-7pm;   Sa/Sun: see chart, primary physician assigned available 7am-12pm  Sat/[facility] [place] Coverage 12pm-7pm: NS- page 1443 for Team1-4, [facility] - pager forwarded to covering Resident  For Night coverage 7pm-7am: NS- page 1443 Team1-3, page 1446 Team4 & Care Model; [facility]- page [phone] for Red, [phone] for Blue  =========================================    Patient is a 48y old  Female who presents with a chief complaint of fever (21 May 2019 13:40)      SUBJECTIVE / OVERNIGHT EVENTS:  Patient seen and examined at bedside. This morning, she is resting comfortably in bed and reports improvement in control of her pain down to a 4.  She does also report new left sided abdominal pain that came twice and went away on its own.    She reports:  [  ] CP  [  ] SOB  [  ] Fever  [  ] N /  [  ] V  [  ] Diarrhea  [X] None of the above    Other Review of Systems Negative.    MEDICATIONS  (STANDING):  ascorbic acid 500 milliGRAM(s) Oral daily  cholecalciferol 400 Unit(s) Oral daily  lactulose Syrup 10 Gram(s) Oral every 4 hours  lidocaine   Patch 1 Patch Transdermal daily  methadone    Tablet 15 milliGRAM(s) Oral every 8 hours  mirtazapine 15 milliGRAM(s) Oral at bedtime  nystatin    Suspension 363113 Unit(s) Oral two times a day  pantoprazole    Tablet 40 milliGRAM(s) Oral before breakfast  polyethylene glycol 3350 17 Gram(s) Oral daily  venlafaxine 50 milliGRAM(s) Oral daily    MEDICATIONS  (PRN):  acetaminophen   Tablet .. 650 milliGRAM(s) Oral every 6 hours PRN Temp greater or equal to 38C (100.4F), Mild Pain (1 - 3)  HYDROmorphone   Tablet 12 milliGRAM(s) Oral every 3 hours PRN moderate and severe pain  LORazepam     Tablet 0.5 milliGRAM(s) Oral three times a day PRN Anxiety      OBJECTIVE:    Vital Signs Last 24 Hrs  T(C): 36.4 (22 May 2019 10:10), Max: 37.2 (21 May 2019 21:49)  T(F): 97.6 (22 May 2019 10:10), Max: 98.9 (21 May 2019 21:49)  HR: 72 (22 May 2019 10:10) (68 - 97)  BP: 105/59 (22 May 2019 10:10) (99/52 - 107/55)  BP(mean): --  RR: 16 (22 May 2019 10:10) (16 - 18)  SpO2: 97% (22 May 2019 10:10) (97% - 100%)     CAPILLARY BLOOD GLUCOSE        I&O's Summary      PHYSICAL EXAM:  GENERAL: NAD, well-developed, appears somewhat sleepy but awake  HEAD:  Atraumatic, Normocephalic  EYES: EOMI, PERRLA, conjunctiva and sclera clear  NECK: Supple, No JVD  CHEST/LUNG: R mediport; site CDI  Clear to auscultation bilaterally; No wheeze  HEART: Regular rate and rhythm; No murmurs, rubs, or gallops  ABDOMEN: Soft, TTP diffusely over abdomen, Nondistended; Bowel sounds present  EXTREMITIES:  2+ Peripheral Pulses, No clubbing, cyanosis, or edema  PSYCH: AAOx3  NEUROLOGY: non-focal  SKIN: No rashes or lesions    LABS:                        8.6    7.47  )-----------( 214      ( 22 May 2019 06:35 )             28.2     Auto Eosinophil # 0.24  / Auto Eosinophil % 3.2   / Auto Neutrophil # 5.33  / Auto Neutrophil % 71.3  / BANDS % x                            8.0    8.39  )-----------( 133      ( 21 May 2019 05:20 )             26.0     Auto Eosinophil # 0.16  / Auto Eosinophil % 1.9   / Auto Neutrophil # 6.34  / Auto Neutrophil % 75.6  / BANDS % x        05-22    138  |  100  |  5<L>  ----------------------------<  99  3.7   |  25  |  0.69  05-21    140  |  100  |  4<L>  ----------------------------<  93  3.4<L>   |  26  |  0.70    Ca    9.5      22 May 2019 06:35  Mg     2.1     05-22  Phos  3.7     05-22  TPro  6.4  /  Alb  3.2<L>  /  TBili  0.4  /  DBili  x   /  AST  17  /  ALT  33  /  AlkPhos  183<H>  05-22  TPro  5.9<L>  /  Alb  2.9<L>  /  TBili  0.5  /  DBili  x   /  AST  20  /  ALT  40<H>  /  AlkPhos  173<H>  05-21    PT/INR - ( 22 May 2019 06:35 )   PT: 15.9 SEC;   INR: 1.42          PTT - ( 22 May 2019 06:35 )  PTT:30.5 SEC

## 2019-05-22 NOTE — PHYSICAL THERAPY INITIAL EVALUATION ADULT - PERTINENT HX OF CURRENT PROBLEM, REHAB EVAL
Pt. admitted for fever. Sepsis. 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

## 2019-05-22 NOTE — CHART NOTE - NSCHARTNOTEFT_GEN_A_CORE
discussed with Rosie De Luna..please let us know if patient will be getting discharged tomorrow/this  week , and we will arrange for outpatient radiation at [facility]- patient is tentatively scheduled for Vsim tomorrow in our department at [facility]  Agustin Fallon MD  [phone]

## 2019-05-23 NOTE — PROGRESS NOTE ADULT - ASSESSMENT
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

## 2019-05-23 NOTE — PROGRESS NOTE ADULT - SUBJECTIVE AND OBJECTIVE BOX
INTERVAL HPI/OVERNIGHT EVENTS:    Code Status:   Allergies    No Known Allergies    Intolerances    MEDICATIONS  (STANDING):  ascorbic acid 500 milliGRAM(s) Oral daily  cholecalciferol 400 Unit(s) Oral daily  lactulose Syrup 10 Gram(s) Oral every 4 hours  lidocaine   Patch 1 Patch Transdermal daily  methadone    Tablet 20 milliGRAM(s) Oral every 8 hours  mirtazapine 15 milliGRAM(s) Oral at bedtime  nystatin    Suspension 057371 Unit(s) Oral two times a day  pantoprazole    Tablet 40 milliGRAM(s) Oral before breakfast  polyethylene glycol 3350 17 Gram(s) Oral daily  venlafaxine 50 milliGRAM(s) Oral daily    MEDICATIONS  (PRN):  acetaminophen   Tablet .. 650 milliGRAM(s) Oral every 6 hours PRN Temp greater or equal to 38C (100.4F), Mild Pain (1 - 3)  HYDROmorphone  Injectable 3 milliGRAM(s) IV Push every 2 hours PRN moderate and severe pain  LORazepam     Tablet 0.5 milliGRAM(s) Oral three times a day PRN Anxiety      PRESENT SYMPTOMS: [ ]Unable to obtain due to poor mentation   Source if other than patient:  [ ]Family   [ ]Team     Pain (Impact on QOL):    Location:  Severity:  Minimal acceptable level (0-10 scale):       Quality:       Onset:  Duration:  Aggravating factors:  Relieving Factors  Radiation:    Dyspnea:  Yes [ ] No [ ] - [ ]Mild [ ]Moderate [ ]Severe  Anxiety:    Yes [ ] No [ ] - [ ]Mild [ ]Moderate [ ]Severe  Fatigue:    Yes [ ] No [ ] - [ ]Mild [ ]Moderate [ ]Severe  Nausea:    Yes [ ] No [ ] - [ ]Mild [ ]Moderate [ ]Severe                         Loss of appetite: Yes [ ] No [ ] - [ ]Mild [ ]Moderate [ ]Severe             Constipation:  Yes [ ] No [ ] - [ ]Mild [ ]Moderate [ ]Severe  Grief: Yes [ ] No [ ]     PAIN AD Score:	  [email or web address].[email or web address]/[email or web address] (Ctrl + left click to view)    Other Symptoms:  [ ]All other review of systems negative     Karnofsky Performance Score/Palliative Performance Status Version 2:         %    http://palliative.info/resource_material/[email or web address].pdf    PHYSICAL EXAM:  Vital Signs Last 24 Hrs  T(C): 36.8 (23 May 2019 05:36), Max: 37.1 (22 May 2019 21:37)  T(F): 98.3 (23 May 2019 05:36), Max: 98.7 (22 May 2019 21:37)  HR: 69 (23 May 2019 11:37) (66 - 76)  BP: 103/48 (23 May 2019 11:37) (96/46 - 103/58)  BP(mean): --  RR: 16 (23 May 2019 05:36) (16 - 18)  SpO2: 100% (23 May 2019 05:36) (97% - 100%) I&O's Summary       GENERAL:  [ ]Alert  [ ]Oriented x   [ ]Lethargic  [ ]Cachexia  [ ]Unarousable  [ ]Verbal  [ ]Non-Verbal  Behavioral:   [ ] Anxiety  [ ] Delirium [ ] Agitation [ ] Other  HEENT:  [ ]Normal   [ ]Dry mouth   [ ]ET Tube/Trach  [ ]Oral lesions  PULMONARY:   [ ]Clear [ ]Tachypnea  [ ]Audible excessive secretions   [ ]Rhonchi        [ ]Right [ ]Left [ ]Bilateral  [ ]Crackles        [ ]Right [ ]Left [ ]Bilateral  [ ]Wheezing     [ ]Right [ ]Left [ ]Bilateral  CARDIOVASCULAR:    [ ]Regular [ ]Irregular [ ]Tachy  [ ]Tian [ ]Murmur [ ]Other  GASTROINTESTINAL:  [ ]Soft  [ ]Distended   [ ]+BS  [ ]Non tender [ ]Tender  [ ]PEG [ ]OGT/ NGT   Last BM:    GENITOURINARY:  [ ]Normal [ ] Incontinent   [ ]Oliguria/Anuria   [ ][place]ine  MUSCULOSKELETAL:   [ ]Normal   [ ]Weakness  [ ]Bed/Wheelchair bound [ ]Edema  NEUROLOGIC:   [ ]No focal deficits  [ ] Cognitive impairment  [ ] Dysphagia [ ]Dysarthria [ ] Paresis [ ]Other   SKIN:   [ ]Normal   [ ]Pressure ulcer(s)  [ ]Rash    CRITICAL CARE:  [ ] Shock Present  [ ]Septic [ ]Cardiogenic [ ]Neurologic [ ]Hypovolemic  [ ]  Vasopressors [ ]  Inotropes   [ ] Respiratory failure present  [ ] Acute  [ ] Chronic [ ] Hypoxic  [ ] Hypercarbic [ ] Other  [ ] Other organ failure     LABS:                        9.5    6.21  )-----------( 325      ( 23 May 2019 06:00 )             32.1   05-23    139  |  99  |  6<L>  ----------------------------<  96  4.1   |  27  |  0.73    Ca    10.0      23 May 2019 06:00  Phos  4.3     05-23  Mg     2.1     05-23    TPro  6.7  /  Alb  3.3  /  TBili  0.3  /  DBili  x   /  AST  21  /  ALT  30  /  AlkPhos  193<H>  05-23  PT/INR - ( 23 May 2019 06:00 )   PT: 14.1 SEC;   INR: 1.26          PTT - ( 23 May 2019 06:00 )  PTT:31.4 SEC      RADIOLOGY & ADDITIONAL STUDIES:    Protein Calorie Malnutrition Present: [ ] yes [ ] no  [ ] PPSV2 < or = 30%  [ ] significant weight loss [ ] poor nutritional intake [ ] anasarca [ ] catabolic state Albumin, Serum: 3.3 g/dL (05-23-19 @ 06:00)      REFERRALS:   [ ]Chaplaincy  [ ] Hospice  [ ]Child Life  [ ]Social Work  [ ]Case management [ ]Holistic Therapy   Goals of Care Document:

## 2019-05-23 NOTE — PROGRESS NOTE ADULT - SUBJECTIVE AND OBJECTIVE BOX
=========================================  CONTACT WARREN Lebron M.D., PGY-1  Pager: NS- [phone] [employer]- [phone]    Mon-Fri: pager covered by day team 7am-7pm;   Sa/Sun: see chart, primary physician assigned available 7am-12pm  Sat/[place] Coverage 12pm-7pm: NS- page [phone] for Team1-4, [facility] - pager forwarded to covering Resident  For Night coverage 7pm-7am: NS- page [phone] Team1-3, page [phone] Team4 & Care Model; [facility]- page [phone] for Red, [phone] for Blue  =========================================    Patient is a 48y old  Female who presents with a chief complaint of fever (22 May 2019 14:19)      SUBJECTIVE / OVERNIGHT EVENTS:  Patient seen and examined at bedside. This morning, she is in significant discomfort and appears to be in pain. She reports the pain got worse yesterday, and is in the abdomen mostly around the R side. She has required dilaudid ATC. Bowel movements have been good.     She reports:  [  ] CP  [  ] SOB  [  ] Fever  [  ] N /  [  ] V  [  ] Diarrhea  [X] None of the above    Other Review of Systems Negative.    MEDICATIONS  (STANDING):  ascorbic acid 500 milliGRAM(s) Oral daily  cholecalciferol 400 Unit(s) Oral daily  lactulose Syrup 10 Gram(s) Oral every 4 hours  lidocaine   Patch 1 Patch Transdermal daily  methadone    Tablet 20 milliGRAM(s) Oral every 8 hours  mirtazapine 15 milliGRAM(s) Oral at bedtime  nystatin    Suspension 135280 Unit(s) Oral two times a day  pantoprazole    Tablet 40 milliGRAM(s) Oral before breakfast  polyethylene glycol 3350 17 Gram(s) Oral daily  venlafaxine 50 milliGRAM(s) Oral daily    MEDICATIONS  (PRN):  acetaminophen   Tablet .. 650 milliGRAM(s) Oral every 6 hours PRN Temp greater or equal to 38C (100.4F), Mild Pain (1 - 3)  HYDROmorphone  Injectable 3 milliGRAM(s) IV Push every 2 hours PRN moderate and severe pain  LORazepam     Tablet 0.5 milliGRAM(s) Oral three times a day PRN Anxiety      OBJECTIVE:    Vital Signs Last 24 Hrs  T(C): 36.8 (23 May 2019 05:36), Max: 37.1 (22 May 2019 21:37)  T(F): 98.3 (23 May 2019 05:36), Max: 98.7 (22 May 2019 21:37)  HR: 68 (23 May 2019 11:22) (63 - 76)  BP: 96/46 (23 May 2019 11:22) (94/51 - 103/58)  BP(mean): --  RR: 16 (23 May 2019 05:36) (16 - 18)  SpO2: 100% (23 May 2019 05:36) (97% - 100%)     CAPILLARY BLOOD GLUCOSE        I&O's Summary      PHYSICAL EXAM:  GENERAL: Discomfort noted, well-developed,   HEAD:  Atraumatic, Normocephalic  EYES: EOMI, PERRLA, conjunctiva and sclera clear  NECK: Supple, No JVD  CHEST/LUNG: R mediport; site CDI  Clear to auscultation bilaterally; No wheeze  HEART: Regular rate and rhythm; No murmurs, rubs, or gallops  ABDOMEN: Soft, TTP over R abdomen and with deep palpation to L side, Nondistended; Bowel sounds present  EXTREMITIES:  2+ Peripheral Pulses, No clubbing, cyanosis, or edema  PSYCH: AAOx3  NEUROLOGY: non-focal  SKIN: No rashes or lesions    LABS:                        9.5    6.21  )-----------( 325      ( 23 May 2019 06:00 )             32.1     Auto Eosinophil # 0.27  / Auto Eosinophil % 4.3   / Auto Neutrophil # 4.20  / Auto Neutrophil % 67.7  / BANDS % x                            8.6    7.47  )-----------( 214      ( 22 May 2019 06:35 )             28.2     Auto Eosinophil # 0.24  / Auto Eosinophil % 3.2   / Auto Neutrophil # 5.33  / Auto Neutrophil % 71.3  / BANDS % x        05-23    139  |  99  |  6<L>  ----------------------------<  96  4.1   |  27  |  0.73  05-22    138  |  100  |  5<L>  ----------------------------<  99  3.7   |  25  |  0.69    Ca    10.0      23 May 2019 06:00  Mg     2.1     05-23  Phos  4.3     05-23  TPro  6.7  /  Alb  3.3  /  TBili  0.3  /  DBili  x   /  AST  21  /  ALT  30  /  AlkPhos  193<H>  05-23  TPro  6.4  /  Alb  3.2<L>  /  TBili  0.4  /  DBili  x   /  AST  17  /  ALT  33  /  AlkPhos  183<H>  05-22    PT/INR - ( 23 May 2019 06:00 )   PT: 14.1 SEC;   INR: 1.26          PTT - ( 23 May 2019 06:00 )  PTT:31.4 SEC              ABG:     VBG:     Microbiology: =========================================  CONTACT WARREN Lebron M.D., PGY-1  Pager: NS- [phone] [employer]- [phone]    Mon-Fri: pager covered by day team 7am-7pm;   Sa/Sun: see chart, primary physician assigned available 7am-12pm  Sat/[place] Coverage 12pm-7pm: NS- page [phone] for Team1-4, [facility] - pager forwarded to covering Resident  For Night coverage 7pm-7am: NS- page 1443 Team1-3, page [phone] Team4 & Care Model; [facility]- page [phone] for Red, [phone] for Blue  =========================================    Patient is a 48y old  Female who presents with a chief complaint of fever (22 May 2019 14:19)      SUBJECTIVE / OVERNIGHT EVENTS:  Patient seen and examined at bedside. This morning, she is in significant discomfort and appears to be in pain. She reports the pain got worse yesterday, and is in the abdomen mostly around the R side. She has required dilaudid ATC. Bowel movements have been good.     She reports:  [  ] CP  [  ] SOB  [  ] Fever  [  ] N /  [  ] V  [  ] Diarrhea  [X] None of the above    Other Review of Systems Negative.    MEDICATIONS  (STANDING):  ascorbic acid 500 milliGRAM(s) Oral daily  cholecalciferol 400 Unit(s) Oral daily  lactulose Syrup 10 Gram(s) Oral every 4 hours  lidocaine   Patch 1 Patch Transdermal daily  methadone    Tablet 20 milliGRAM(s) Oral every 8 hours  mirtazapine 15 milliGRAM(s) Oral at bedtime  nystatin    Suspension 743168 Unit(s) Oral two times a day  pantoprazole    Tablet 40 milliGRAM(s) Oral before breakfast  polyethylene glycol 3350 17 Gram(s) Oral daily  venlafaxine 50 milliGRAM(s) Oral daily    MEDICATIONS  (PRN):  acetaminophen   Tablet .. 650 milliGRAM(s) Oral every 6 hours PRN Temp greater or equal to 38C (100.4F), Mild Pain (1 - 3)  HYDROmorphone  Injectable 3 milliGRAM(s) IV Push every 2 hours PRN moderate and severe pain  LORazepam     Tablet 0.5 milliGRAM(s) Oral three times a day PRN Anxiety      OBJECTIVE:    Vital Signs Last 24 Hrs  T(C): 36.8 (23 May 2019 05:36), Max: 37.1 (22 May 2019 21:37)  T(F): 98.3 (23 May 2019 05:36), Max: 98.7 (22 May 2019 21:37)  HR: 68 (23 May 2019 11:22) (63 - 76)  BP: 96/46 (23 May 2019 11:22) (94/51 - 103/58)  BP(mean): --  RR: 16 (23 May 2019 05:36) (16 - 18)  SpO2: 100% (23 May 2019 05:36) (97% - 100%)     CAPILLARY BLOOD GLUCOSE        I&O's Summary      PHYSICAL EXAM:  GENERAL: Discomfort noted, well-developed,   HEAD:  Atraumatic, Normocephalic  EYES: EOMI, PERRLA, conjunctiva and sclera clear  NECK: Supple, No JVD  CHEST/LUNG: R mediport; site CDI  Clear to auscultation bilaterally; No wheeze  HEART: Regular rate and rhythm; No murmurs, rubs, or gallops  ABDOMEN: Soft, TTP over R abdomen and with deep palpation to L side, Nondistended; Bowel sounds present  EXTREMITIES:  2+ Peripheral Pulses, No clubbing, cyanosis, or edema  PSYCH: AAOx3  NEUROLOGY: non-focal  SKIN: No rashes or lesions    LABS:                        9.5    6.21  )-----------( 325      ( 23 May 2019 06:00 )             32.1     Auto Eosinophil # 0.27  / Auto Eosinophil % 4.3   / Auto Neutrophil # 4.20  / Auto Neutrophil % 67.7  / BANDS % x                            8.6    7.47  )-----------( 214      ( 22 May 2019 06:35 )             28.2     Auto Eosinophil # 0.24  / Auto Eosinophil % 3.2   / Auto Neutrophil # 5.33  / Auto Neutrophil % 71.3  / BANDS % x        05-23    139  |  99  |  6<L>  ----------------------------<  96  4.1   |  27  |  0.73  05-22    138  |  100  |  5<L>  ----------------------------<  99  3.7   |  25  |  0.69    Ca    10.0      23 May 2019 06:00  Mg     2.1     05-23  Phos  4.3     05-23  TPro  6.7  /  Alb  3.3  /  TBili  0.3  /  DBili  x   /  AST  21  /  ALT  30  /  AlkPhos  193<H>  05-23  TPro  6.4  /  Alb  3.2<L>  /  TBili  0.4  /  DBili  x   /  AST  17  /  ALT  33  /  AlkPhos  183<H>  05-22    PT/INR - ( 23 May 2019 06:00 )   PT: 14.1 SEC;   INR: 1.26          PTT - ( 23 May 2019 06:00 )  PTT:31.4 SEC            Case discussed with Dr. Beck ([facility]),dc planning

## 2019-05-24 NOTE — PROGRESS NOTE ADULT - SUBJECTIVE AND OBJECTIVE BOX
=========================================  CONTACT WARREN Lebron M.D., PGY-1  Pager: NS- [phone] [employer]- [phone]    Mon-Fri: pager covered by day team 7am-7pm;   Sa/Sun: see chart, primary physician assigned available 7am-12pm  Sat/[place] Coverage 12pm-7pm: NS- page 1443 for Team1-4, [facility] - pager forwarded to covering Resident  For Night coverage 7pm-7am: NS- page 1443 Team1-3, page [phone] Team4 & Care Model; [facility]- page [phone] for Red, [phone] for Blue  =========================================    Patient is a 48y old  Female who presents with a chief complaint of fever (23 May 2019 13:42)      SUBJECTIVE / OVERNIGHT EVENTS:  Patient seen and examined at bedside. This morning, she is resting comfortably in bed and reports  improvement in pain, stating she feels a little better.. She used the prn IV dilaudid yesterday afternoon, evening, and then again this morning.     She reports:  [  ] CP  [  ] SOB  [  ] Fever  [  ] N /  [  ] V  [  ] Diarrhea  [X] None of the above    Other Review of Systems Negative.    MEDICATIONS  (STANDING):  ascorbic acid 500 milliGRAM(s) Oral daily  cholecalciferol 400 Unit(s) Oral daily  lactulose Syrup 10 Gram(s) Oral every 4 hours  lidocaine   Patch 1 Patch Transdermal daily  methadone    Tablet 20 milliGRAM(s) Oral every 8 hours  mirtazapine 15 milliGRAM(s) Oral at bedtime  nystatin    Suspension 499889 Unit(s) Oral two times a day  pantoprazole    Tablet 40 milliGRAM(s) Oral before breakfast  polyethylene glycol 3350 17 Gram(s) Oral daily  venlafaxine 50 milliGRAM(s) Oral daily    MEDICATIONS  (PRN):  acetaminophen   Tablet .. 650 milliGRAM(s) Oral every 6 hours PRN Temp greater or equal to 38C (100.4F), Mild Pain (1 - 3)  HYDROmorphone   Tablet 16 milliGRAM(s) Oral every 4 hours PRN Severe Pain (7 - 10)  LORazepam     Tablet 0.5 milliGRAM(s) Oral three times a day PRN Anxiety      OBJECTIVE:    Vital Signs Last 24 Hrs  T(C): 36.8 (24 May 2019 06:04), Max: 36.8 (24 May 2019 06:04)  T(F): 98.3 (24 May 2019 06:04), Max: 98.3 (24 May 2019 06:04)  HR: 73 (24 May 2019 08:46) (66 - 73)  BP: 104/65 (24 May 2019 08:46) (94/52 - 105/70)  BP(mean): --  RR: 17 (24 May 2019 08:46) (16 - 17)  SpO2: 99% (24 May 2019 08:46) (99% - 100%)     CAPILLARY BLOOD GLUCOSE        I&O's Summary      PHYSICAL EXAM:  GENERAL: Discomfort noted, well-developed,   HEAD:  Atraumatic, Normocephalic  EYES: EOMI, PERRLA, conjunctiva and sclera clear  NECK: Supple, No JVD  CHEST/LUNG: R mediport; site CDI  Clear to auscultation bilaterally; No wheeze  HEART: Regular rate and rhythm; No murmurs, rubs, or gallops  ABDOMEN: Soft, TTP over R abdomen and with deep palpation to L side, Nondistended; Bowel sounds present  EXTREMITIES:  2+ Peripheral Pulses, No clubbing, cyanosis, or edema  PSYCH: AAOx3  NEUROLOGY: non-focal  SKIN: No rashes or lesions      LABS:                        9.4    5.18  )-----------( 312      ( 24 May 2019 06:30 )             31.5     Auto Eosinophil # 0.23  / Auto Eosinophil % 4.4   / Auto Neutrophil # 3.41  / Auto Neutrophil % 65.9  / BANDS % x                            9.5    6.21  )-----------( 325      ( 23 May 2019 06:00 )             32.1     Auto Eosinophil # 0.27  / Auto Eosinophil % 4.3   / Auto Neutrophil # 4.20  / Auto Neutrophil % 67.7  / BANDS % x        05-24    140  |  100  |  6<L>  ----------------------------<  100<H>  4.7   |  25  |  0.66  05-23    139  |  99  |  6<L>  ----------------------------<  96  4.1   |  27  |  0.73    Ca    10.2      24 May 2019 06:00  Mg     2.1     05-24  Phos  4.2     05-24  TPro  6.9  /  Alb  3.3  /  TBili  0.3  /  DBili  x   /  AST  36<H>  /  ALT  29  /  AlkPhos  199<H>  05-24  TPro  6.7  /  Alb  3.3  /  TBili  0.3  /  DBili  x   /  AST  21  /  ALT  30  /  AlkPhos  193<H>  05-23    PT/INR - ( 24 May 2019 06:30 )   PT: 12.8 SEC;   INR: 1.15          PTT - ( 24 May 2019 06:30 )  PTT:33.2 SEC              ABG:     VBG:     Microbiology: =========================================  CONTACT WARREN Lebron M.D., PGY-1  Pager: NS- [phone] [employer]- [phone]    Mon-Fri: pager covered by day team 7am-7pm;   Sa/Sun: see chart, primary physician assigned available 7am-12pm  Sat/[place] Coverage 12pm-7pm: NS- page [phone] for Team1-4, [facility] - pager forwarded to covering Resident  For Night coverage 7pm-7am: NS- page 1443 Team1-3, page [phone] Team4 & Care Model; [facility]- page [phone] for Red, [phone] for Blue  =========================================    Patient is a 48y old  Female who presents with a chief complaint of fever (23 May 2019 13:42)      SUBJECTIVE / OVERNIGHT EVENTS:  Patient seen and examined at bedside. This morning, she is resting comfortably in bed and reports  improvement in pain, stating she feels a little better.. She used the prn IV dilaudid yesterday afternoon, evening, and then again this morning.     She reports:  [  ] CP  [  ] SOB  [  ] Fever  [  ] N /  [  ] V  [  ] Diarrhea  [X] None of the above    Other Review of Systems Negative.    MEDICATIONS  (STANDING):  ascorbic acid 500 milliGRAM(s) Oral daily  cholecalciferol 400 Unit(s) Oral daily  lactulose Syrup 10 Gram(s) Oral every 4 hours  lidocaine   Patch 1 Patch Transdermal daily  methadone    Tablet 20 milliGRAM(s) Oral every 8 hours  mirtazapine 15 milliGRAM(s) Oral at bedtime  nystatin    Suspension 146539 Unit(s) Oral two times a day  pantoprazole    Tablet 40 milliGRAM(s) Oral before breakfast  polyethylene glycol 3350 17 Gram(s) Oral daily  venlafaxine 50 milliGRAM(s) Oral daily    MEDICATIONS  (PRN):  acetaminophen   Tablet .. 650 milliGRAM(s) Oral every 6 hours PRN Temp greater or equal to 38C (100.4F), Mild Pain (1 - 3)  HYDROmorphone   Tablet 16 milliGRAM(s) Oral every 4 hours PRN Severe Pain (7 - 10)  LORazepam     Tablet 0.5 milliGRAM(s) Oral three times a day PRN Anxiety      OBJECTIVE:    Vital Signs Last 24 Hrs  T(C): 36.8 (24 May 2019 06:04), Max: 36.8 (24 May 2019 06:04)  T(F): 98.3 (24 May 2019 06:04), Max: 98.3 (24 May 2019 06:04)  HR: 73 (24 May 2019 08:46) (66 - 73)  BP: 104/65 (24 May 2019 08:46) (94/52 - 105/70)  BP(mean): --  RR: 17 (24 May 2019 08:46) (16 - 17)  SpO2: 99% (24 May 2019 08:46) (99% - 100%)     CAPILLARY BLOOD GLUCOSE        I&O's Summary      PHYSICAL EXAM:  GENERAL: Discomfort noted, well-developed,   HEAD:  Atraumatic, Normocephalic  EYES: EOMI, PERRLA, conjunctiva and sclera clear  NECK: Supple, No JVD  CHEST/LUNG: R mediport; site CDI  Clear to auscultation bilaterally; No wheeze  HEART: Regular rate and rhythm; No murmurs, rubs, or gallops  ABDOMEN: Soft, TTP over R abdomen and with deep palpation to L side, Nondistended; Bowel sounds present  EXTREMITIES:  2+ Peripheral Pulses, No clubbing, cyanosis, or edema  PSYCH: AAOx3  NEUROLOGY: non-focal  SKIN: No rashes or lesions      LABS:                        9.4    5.18  )-----------( 312      ( 24 May 2019 06:30 )             31.5     Auto Eosinophil # 0.23  / Auto Eosinophil % 4.4   / Auto Neutrophil # 3.41  / Auto Neutrophil % 65.9  / BANDS % x                            9.5    6.21  )-----------( 325      ( 23 May 2019 06:00 )             32.1     Auto Eosinophil # 0.27  / Auto Eosinophil % 4.3   / Auto Neutrophil # 4.20  / Auto Neutrophil % 67.7  / BANDS % x        05-24    140  |  100  |  6<L>  ----------------------------<  100<H>  4.7   |  25  |  0.66  05-23    139  |  99  |  6<L>  ----------------------------<  96  4.1   |  27  |  0.73    Ca    10.2      24 May 2019 06:00  Mg     2.1     05-24  Phos  4.2     05-24  TPro  6.9  /  Alb  3.3  /  TBili  0.3  /  DBili  x   /  AST  36<H>  /  ALT  29  /  AlkPhos  199<H>  05-24  TPro  6.7  /  Alb  3.3  /  TBili  0.3  /  DBili  x   /  AST  21  /  ALT  30  /  AlkPhos  193<H>  05-23    PT/INR - ( 24 May 2019 06:30 )   PT: 12.8 SEC;   INR: 1.15          PTT - ( 24 May 2019 06:30 )  PTT:33.2 SEC    Pall note reviewed

## 2019-05-24 NOTE — DISCHARGE NOTE PROVIDER - HOSPITAL COURSE
Patient admitted with severe abdominal pain and meeting SIRS criteria, concern for sepsis of abdominal source, however CT imaging and blood cultures unrevealing of infectious etiology. Initial fevers suspected to be 2/2 oncologic progress. Patient's pain was controlled with adjustments to her medications including increasing her methadone and changing her PRN to dilaudid for breakthrough pain. These changes were made with palliative team's recommendations. The patient was seen and evaluated and deemed stable for discharge with appropriate follow-up and resumption of therapy for her cancer.

## 2019-05-24 NOTE — PROGRESS NOTE ADULT - SUBJECTIVE AND OBJECTIVE BOX
INTERVAL HPI/OVERNIGHT EVENTS:  Pain to right groin/pelvic area improved.     Code Status: Full Code   Allergies    No Known Allergies    Intolerances    MEDICATIONS  (STANDING):  ascorbic acid 500 milliGRAM(s) Oral daily  cholecalciferol 400 Unit(s) Oral daily  lactulose Syrup 10 Gram(s) Oral every 4 hours  lidocaine   Patch 1 Patch Transdermal daily  methadone    Tablet 20 milliGRAM(s) Oral every 8 hours  mirtazapine 15 milliGRAM(s) Oral at bedtime  nystatin    Suspension 218051 Unit(s) Oral two times a day  pantoprazole    Tablet 40 milliGRAM(s) Oral before breakfast  polyethylene glycol 3350 17 Gram(s) Oral daily  venlafaxine 50 milliGRAM(s) Oral daily    MEDICATIONS  (PRN):  acetaminophen   Tablet .. 650 milliGRAM(s) Oral every 6 hours PRN Temp greater or equal to 38C (100.4F), Mild Pain (1 - 3)  HYDROmorphone   Tablet 16 milliGRAM(s) Oral every 4 hours PRN Severe Pain (7 - 10)  LORazepam     Tablet 0.5 milliGRAM(s) Oral three times a day PRN Anxiety      PRESENT SYMPTOMS: [ ]Unable to obtain due to poor mentation   Source if other than patient:  [ ]Family   [ ]Team     Pain (Impact on QOL):  Denies currently.  Intermittent "gnawing" right lower abdomen/pelvic/groin pain - aggravated by movement and relieved with opioids     Dyspnea:  Yes [ ] No [x ] - [ ]Mild [ ]Moderate [ ]Severe  Anxiety:    Yes [ ] No [x ] - [ ]Mild [ ]Moderate [ ]Severe  Fatigue:    Yes [ ] No [x ] - [ ]Mild [ ]Moderate [ ]Severe  Nausea:    Yes [ ] No [x ] - [ ]Mild [ ]Moderate [ ]Severe                         Loss of appetite: Yes [ ] No [x ] - [ ]Mild [ ]Moderate [ ]Severe             Constipation:  Yes [ ] No [x ] - [ ]Mild [ ]Moderate [ ]Severe  Grief: Yes [ x] No [ ]     PAIN AD Score:	  [email or web address] (Ctrl + left click to view)    Other Symptoms:  [x ]All other review of systems negative     Karnofsky Performance Score/Palliative Performance Status Version 2:    60-70%    http://palliative.info/resource_material/[email or web address].pdf    PHYSICAL EXAM:  Vital Signs Last 24 Hrs  T(C): 36.8 (24 May 2019 06:04), Max: 36.8 (24 May 2019 06:04)  T(F): 98.3 (24 May 2019 06:04), Max: 98.3 (24 May 2019 06:04)  HR: 73 (24 May 2019 08:46) (66 - 73)  BP: 104/65 (24 May 2019 08:46) (94/52 - 105/70)  BP(mean): --  RR: 17 (24 May 2019 08:46) (16 - 17)  SpO2: 99% (24 May 2019 08:46) (99% - 100%) I&O's Summary       GENERAL:  [x ]Alert  [x ]Oriented x 4  [ ]Lethargic  [ ]Cachexia  [ ]Unarousable  [ x]Verbal  [ ]Non-Verbal  PULMONARY:   [ x]Clear- anteriorly   CARDIOVASCULAR:    [x ]Regular [ ]Irregular [ ]Tachy  [ ]Tian [ ]Murmur [ ]Other  GASTROINTESTINAL:  [x ]Soft  [ ]Distended   [ x]+BS  [x ]Non tender [ ]Tender  [ ]PEG [ ]OGT/ NGT   Last BM:  5/23/19 (as per patient)  GENITOURINARY:  [x ]Normal [ ] Incontinent   [ ]Oliguria/Anuria   [ ][place]  MUSCULOSKELETAL:   [ ]Normal   [ x]Weakness  [ ]Bed/Wheelchair bound [ ]Edema  NEUROLOGIC:   [ x]No focal deficits  [ ] Cognitive impairment  [ ] Dysphagia [ ]Dysarthria [ ] Paresis [ ]Other   SKIN:   [x]Normal   [ ]Pressure ulcer(s)  [ ]Rash    CRITICAL CARE:  [ ] Shock Present  [ ]Septic [ ]Cardiogenic [ ]Neurologic [ ]Hypovolemic  [ ]  Vasopressors [ ]  Inotropes   [ ] Respiratory failure present  [ ] Acute  [ ] Chronic [ ] Hypoxic  [ ] Hypercarbic [ ] Other  [ ] Other organ failure     LABS:                        9.4    5.18  )-----------( 312      ( 24 May 2019 06:30 )             31.5   05-24    140  |  100  |  6<L>  ----------------------------<  100<H>  4.7   |  25  |  0.66    Ca    10.2      24 May 2019 06:00  Phos  4.2     05-24  Mg     2.1     05-24    TPro  6.9  /  Alb  3.3  /  TBili  0.3  /  DBili  x   /  AST  36<H>  /  ALT  29  /  AlkPhos  199<H>  05-24  PT/INR - ( 24 May 2019 06:30 )   PT: 12.8 SEC;   INR: 1.15          PTT - ( 24 May 2019 06:30 )  PTT:33.2 SEC      RADIOLOGY & ADDITIONAL STUDIES:    Protein Calorie Malnutrition Present: [ ] yes [ ] no  [ ] PPSV2 < or = 30%  [ ] significant weight loss [ ] poor nutritional intake [ ] anasarca [ ] catabolic state Albumin, Serum: 3.3 g/dL (05-24-19 @ 06:00)      REFERRALS:   [ ]Chaplaincy  [ ] Hospice  [ ]Child Life  [ ]Social Work  [ ]Case management [ ]Holistic Therapy   Goals of Care Document:

## 2019-05-24 NOTE — DISCHARGE NOTE PROVIDER - NSDCCPCAREPLAN_GEN_ALL_CORE_FT
PRINCIPAL DISCHARGE DIAGNOSIS  Diagnosis: Malignant neoplasm of right ovary  Assessment and Plan of Treatment: Continue follow-up and care as planned for your ovarian cancer, including chemotherapy and radiation, as an outpatient. Control your pain with the pain medications as prescribed, and see your doctors or return to the ED if you become very ill or in severe pain that is not controlled with the medications.

## 2019-05-24 NOTE — DISCHARGE NOTE PROVIDER - CARE PROVIDERS DIRECT ADDRESSES
[email or web address]

## 2019-05-24 NOTE — PROGRESS NOTE ADULT - ATTENDING COMMENTS
Pt seen with NP.  Agree with above.   Continue methadone 20mg tid with dilaudid 16mg po Q 4 hours prn.  Bowel regimen.  RT as outpatient
Pt seen with NP.  Agree with above.  Continue methadone 20mg tid with dilaudid prn.  IV added for overnight.  Goal will be home on 16mg po dilaudid prn.  RT as outpatient and to follow up with Dr. Arellano
Pt seen with NP.  Agree with above.  Increase to methadone 20mg tid with dilaudid 12mg prn. RT pending.
Pt seen and examined with HS on above date.  Agree with above HS note.  Plan discussed with House staff.    Pain control much better this am.  Plan for discharge today if pain controlled on increased dose of po dilaudid.  f/u plan for RT as outpatient.  dc time 40 minutes
Pt seen and examined with HS on above date.  Agree with above HS note.  Plan discussed with House staff.    Pain control not great this AM, currently better with additional dose dilaudid.  dc planning tomorrow if pain improved
Pt seen and examined with HS on above date.  Agree with above HS note.  Plan discussed with House staff.    48 F ovarian ca on chemo, plan for RT, a/w worsening pain and SIRS/fever.  Fever workup negative thus far.  will dc abx if cultures negative tomorrow.  Pain regimen increased today.  Case discussed with Dr. Kamara, rad onc to see.  pall note reviewed
Pt seen and examined with HS on above date.  Agree with above HS note.  Plan discussed with House staff.    48 F with ovarian cancer, a/w SIRS, unclear source, CT AP pending.  onc, pall notes reviewed.  c/w broad spectrum abx
Pt seen and examined with HS on above date.  Agree with above HS note.  Plan discussed with House staff.    48 F with ovarian Ca, a/w SIRS, fever likely in setting of malignancy.  Pain control improved but still not tolerable.  Increase to methadone today.    Case discussed with Dr. Kamara (Onc), will contact RT.  RT note reviewed, may need inpatient RT.

## 2019-05-25 NOTE — PROGRESS NOTE ADULT - PROVIDER SPECIALTY LIST ADULT
Heme/Onc
Internal Medicine
Palliative Care
Internal Medicine

## 2019-05-25 NOTE — PROGRESS NOTE ADULT - PROBLEM SELECTOR PROBLEM 3
Malignant neoplasm of right ovary
Mucositis due to chemotherapy
Malignant neoplasm of right ovary
Mucositis due to chemotherapy
Mucositis due to chemotherapy

## 2019-05-25 NOTE — PROGRESS NOTE ADULT - PROBLEM SELECTOR PLAN 3
Patient of Dr. Cole, currently undergoing disease modifying therapies.  Plan for follow-up with Dr. Cole upon discharge.  RT to her groin area planned - simulated as an outpatient.  Plan for outpatient RT.
Patient of Dr. Cole, currently undergoing disease modifying therapies.  Plan for follow-up with Dr. Cole upon discharge.  RT to her groin area planned - simulated as an outpatient.  Plan for inpatient vs outpatient RT.
Patient of Dr. Cole, currently undergoing disease modifying therapies.  Plan for follow-up with Dr. Cole upon discharge.  RT to her groin area planned - simulated as an outpatient.  Plan for outpatient RT.
Patient with R ovarian cancer s/p extensive surgery and neoadjuvant therapy as well as recent treatment with gemzar with still progression of disease per last CT scan in 04/2019. Last dose of chemo on 5/9.   - oncology and rad onc c/sed  - pain control as above
Patient with R ovarian cancer s/p extensive surgery and neoadjuvant therapy as well as recent treatment with gemzar with still progression of disease per last CT scan in 04/2019. Last dose of chemo on 5/9.   - oncology and rad onc c/sed  - pain control as below
Patient with R ovarian cancer s/p extensive surgery and neoadjuvant therapy as well as recent treatment with gemzar with still progression of disease per last CT scan in 04/2019. Last dose of chemo on 5/9.   - oncology emailed  - pain control as below
Patient with sublingual ulcer under the tongue. No evidence of thrush  - nystatin oral swish and spit  - pt tolerating pain

## 2019-05-25 NOTE — PROGRESS NOTE ADULT - PROBLEM SELECTOR PLAN 1
Patient with significant pain due to ovarian malignancy. On home dose methadone 15 mg TID and morphine 45 mg PO q4 hrs  - ISTOP#: [id number]  - significant PAIN noted today with minimal relief from current regimen  - c/w methadone 20 mg TID  - palliative consult f/u, changed IV to 16mg po q4h prn today  -addtl recorded dose of morphine today due to issues with half of scheduled dose of standing order
As above.  Patient required 5 doses of Dilaudid 12mg PO since last night.  Reports some relief from Dilaudid 12mg but states her pain to her groin/pelvic area is still 8/10.  Given usage and adjusting for cross tolerance, Methadone increased to 20mg PO TID.  QTc 410 on 5/22/19. Continue Dilaudid 12mg PO q3h PRN.
As above.  Patient required 6 doses of Dilaudid 12mg PO since last night.  Reports no relief from Dilaudid 12mg and reports having a terrible night, Methadone increased to 20mg PO TID yesterday.  Given increase in Methadone yesterday, will keep Methadone dose at 20mg PO TID due to its long half life - explained to patient at length.  PO dilaudid discontinued and Dilaudid 3mg IV q2h PRN ordered. Bowel regimen.
Patient with fever, tachycardia, and soft BPs in the ED. Unclear etiology given CXR clear, UA neg, RVP neg. s/p ~3L fluids. Possible source line infection, GI. Hx of similar admissions with suspected etiology related to primary oncological process  - c/w vanco and cefepime for now  - 5/19 blood NGTD and urine ctx pending  - s/p 3L fluids, c/w maintenance for now  - if further hypotension, consider stress dose steroids given recent dexa taper  - CTAP w/o e/o intra-abdominal infectious etiology ie proctitis  - address mucositis as below
Patient with fever, tachycardia, and soft BPs in the ED. Unclear etiology given CXR clear, UA neg, RVP neg. s/p ~3L fluids. Possible source line infection, GI. Hx of similar admissions with suspected etiology related to primary oncological process  - c/w vanco and cefepime for now  - f/u blood and urine ctx  - s/p 3L fluids, c/w maintenance for now  - if further hypotension, consider stress dose steroids given recent dexa taper  - f/u CTAP to rule out other intra-abdominal infectious etiology ie proctitis  - address mucositis as below
Patient with fever, tachycardia, and soft BPs in the ED. Unclear etiology given CXR clear, UA neg, RVP neg. s/p ~3L fluids. Possible source line infection, GI. Hx of similar admissions with suspected etiology related to primary oncological process  - stopped antibiotics as Blood cultures and Urine Cultures negative after 48 hours and patient afebrile  - CTAP w/o e/o intra-abdominal infectious etiology ie proctitis
Patient with significant pain due to ovarian malignancy. On home dose methadone 15 mg TID and morphine 45 mg PO q4 hrs  - ISTOP#: [id number]  - significant PAIN noted today with minimal relief from current regimen  - c/w methadone 20 mg TID  - palliative consult f/u, changed IV to 16mg po q4h prn and plan for d/c on this regimen
Patient with significant pain due to ovarian malignancy. On home dose methadone 15 mg TID and morphine 45 mg PO q4 hrs  - ISTOP#: [id number]  - significant PAIN noted today with minimal relief from current regimen  - c/w methadone 20 mg TID  - palliative consult and medication adjustment appreciated for IV dilaudid prn
As above.  Patient required 4 doses of Dilaudid 3mg IV in 24 hours.  She reports pain is much improved and Dilaudid 3mg IV has been sufficient in relieving her pain.  Dilaudid 3mg IV converted to Dilaudid 16mg PO q4h PRN.  Continue Methadone 20mg PO TID.  Bowel regimen.  Patient has a follow-up appointment (scheduled by patient) on 6/13/19 at 2pm.

## 2019-05-25 NOTE — PROGRESS NOTE ADULT - PROBLEM SELECTOR PROBLEM 1
Right lower quadrant abdominal pain
Pain, neoplasm-related
Pain, neoplasm-related
Right lower quadrant abdominal pain
Right lower quadrant abdominal pain
Sepsis due to undetermined organism
Pain, neoplasm-related

## 2019-05-25 NOTE — PROGRESS NOTE ADULT - PROBLEM SELECTOR PLAN 4
Will continue to follow for ongoing symptom management.  Psychosocial support provided.
Hb currently 8s, at baseline.   - trend Hb  - maintain active T&S
Patient with sublingual ulcer under the tongue. No evidence of thrush  - nystatin oral swish and spit
Patient with sublingual ulcer under the tongue. No evidence of thrush  - nystatin oral swish and spit  - pt tolerating pain
Patient with sublingual ulcer under the tongue. No evidence of thrush  - nystatin oral swish and spit  - pt tolerating pain
Will continue to follow for ongoing symptom management.  Psychosocial support provided.
Will continue to follow for ongoing symptom management.  Psychosocial support provided.

## 2019-05-25 NOTE — DISCHARGE NOTE NURSING/CASE MANAGEMENT/SOCIAL WORK - NSDCDPATPORTLINK_GEN_ALL_CORE
You can access the [facility] Patient Portal, offered by [facility], by registering with the following website: [email or web address]

## 2019-05-25 NOTE — PROGRESS NOTE ADULT - SUBJECTIVE AND OBJECTIVE BOX
=========================================  CONTACT WARREN Lebron M.D., PGY-1  Pager: NS- [phone] [employer]- [phone]    Mon-Fri: pager covered by day team 7am-7pm;   Sa/Sun: see chart, primary physician assigned available 7am-12pm  Sat/[place] Coverage 12pm-7pm: [facility]- page [phone] for Team1-4, [facility] - pager forwarded to covering Resident  For Night coverage 7pm-7am: [facility]- page [phone] Team1-3, page [phone] Team4 & Care Model; [facility]- page [phone] for Red, [phone] for Blue  =========================================    Patient is a 48y old  Female who presents with a chief complaint of fever (24 May 2019 15:15)      SUBJECTIVE / OVERNIGHT EVENTS:  Patient seen and examined at bedside. This morning, she is resting comfortably in bed and reports no new issues or overnight events. She feels ready to go home once her wife is available to pick her up in the afternoon.    She reports:  [  ] CP  [  ] SOB  [  ] Fever  [  ] N /  [  ] V  [  ] Diarrhea  [X] None of the above    Other Review of Systems Negative.    MEDICATIONS  (STANDING):  ascorbic acid 500 milliGRAM(s) Oral daily  cholecalciferol 400 Unit(s) Oral daily  lactulose Syrup 10 Gram(s) Oral every 4 hours  lidocaine   Patch 1 Patch Transdermal daily  methadone    Tablet 20 milliGRAM(s) Oral every 8 hours  mirtazapine 15 milliGRAM(s) Oral at bedtime  nystatin    Suspension 257702 Unit(s) Oral two times a day  pantoprazole    Tablet 40 milliGRAM(s) Oral before breakfast  polyethylene glycol 3350 17 Gram(s) Oral daily  venlafaxine 50 milliGRAM(s) Oral daily    MEDICATIONS  (PRN):  acetaminophen   Tablet .. 650 milliGRAM(s) Oral every 6 hours PRN Temp greater or equal to 38C (100.4F), Mild Pain (1 - 3)  dibucaine 1% Ointment 1 Application(s) Topical three times a day PRN hemorrhoids  HYDROmorphone   Tablet 16 milliGRAM(s) Oral every 4 hours PRN Severe Pain (7 - 10)  LORazepam     Tablet 0.5 milliGRAM(s) Oral three times a day PRN Anxiety      OBJECTIVE:    Vital Signs Last 24 Hrs  T(C): 36.9 (25 May 2019 07:42), Max: 37.1 (25 May 2019 02:01)  T(F): 98.5 (25 May 2019 07:42), Max: 98.8 (25 May 2019 02:01)  HR: 85 (25 May 2019 07:42) (72 - 90)  BP: 100/51 (25 May 2019 07:42) (100/51 - 119/66)  BP(mean): --  RR: 17 (25 May 2019 07:42) (17 - 17)  SpO2: 96% (25 May 2019 07:42) (96% - 98%)     CAPILLARY BLOOD GLUCOSE        I&O's Summary      PHYSICAL EXAM:  GENERAL: NAD, well-developed  HEAD:  Atraumatic, Normocephalic  EYES: EOMI, PERRLA, conjunctiva and sclera clear  NECK: Supple, No JVD  CHEST/LUNG: Clear to auscultation bilaterally; No wheeze  HEART: Regular rate and rhythm; No murmurs, rubs, or gallops  ABDOMEN: Soft, TTP over RLQ and some TTP with deep palpation of LLQ, Nondistended; Bowel sounds present  EXTREMITIES:  2+ Peripheral Pulses, No clubbing, cyanosis, or edema  PSYCH: AAOx3  NEUROLOGY: non-focal  SKIN: No rashes or lesions    LABS:                        9.3    5.81  )-----------( 342      ( 25 May 2019 06:00 )             31.5     Auto Eosinophil # 0.19  / Auto Eosinophil % 3.3   / Auto Neutrophil # 3.53  / Auto Neutrophil % 60.7  / BANDS % x                            9.4    5.18  )-----------( 312      ( 24 May 2019 06:30 )             31.5     Auto Eosinophil # 0.23  / Auto Eosinophil % 4.4   / Auto Neutrophil # 3.41  / Auto Neutrophil % 65.9  / BANDS % x        05-25    140  |  100  |  7   ----------------------------<  98  4.9   |  27  |  0.73  05-24    140  |  100  |  6<L>  ----------------------------<  100<H>  4.7   |  25  |  0.66    Ca    10.3      25 May 2019 06:00  Mg     2.1     05-25  Phos  3.9     05-25  TPro  6.8  /  Alb  3.4  /  TBili  0.2  /  DBili  x   /  AST  33<H>  /  ALT  27  /  AlkPhos  208<H>  05-25  TPro  6.9  /  Alb  3.3  /  TBili  0.3  /  DBili  x   /  AST  36<H>  /  ALT  29  /  AlkPhos  199<H>  05-24    PT/INR - ( 25 May 2019 06:00 )   PT: 13.6 SEC;   INR: 1.19          PTT - ( 25 May 2019 06:00 )  PTT:32.1 SEC              ABG:     VBG:     Microbiology:

## 2019-05-25 NOTE — PROGRESS NOTE ADULT - PROBLEM SELECTOR PLAN 2
Assist with ADL's as needed.  Fall precautions.
Patient with R ovarian cancer s/p extensive surgery and neoadjuvant therapy as well as recent treatment with gemzar with still progression of disease per last CT scan in 04/2019. Last dose of chemo on 5/9.   - oncology and rad onc c/sed  - pain control as above  - patient prefers to optimize pain control and have RT as outpatient rather than inpatient; will attempt to gain control of pain to faciliate outpatient treatment but may talk to pt and wife again if pain unable to be managed
Assist with ADL's as needed.  Fall precautions.
Assist with ADL's as needed.  Fall precautions.
Patient with R ovarian cancer s/p extensive surgery and neoadjuvant therapy as well as recent treatment with gemzar with still progression of disease per last CT scan in 04/2019. Last dose of chemo on 5/9.   - oncology and rad onc c/sed  - pain control as above  - patient prefers to optimize pain control and have RT as outpatient rather than inpatient; will attempt to gain control of pain to faciliate outpatient treatment but may talk to pt and wife again if pain unable to be managed
Patient with R ovarian cancer s/p extensive surgery and neoadjuvant therapy as well as recent treatment with gemzar with still progression of disease per last CT scan in 04/2019. Last dose of chemo on 5/9.   - oncology and rad onc c/sed  - pain control as above  - patient prefers to optimize pain control and have RT as outpatient rather than inpatient; will attempt to gain control of pain to faciliate outpatient treatment but may talk to pt and wife again if pain unable to be managed
Patient with significant pain due to ovarian malignancy. On home dose methadone 15 mg TID and morphine 45 mg PO q4 hrs  - ISTOP#: [id number]  - c/w methadone 15 mg TID  - c/w PO dilaudid 12 mg for refractory pain  - palliative consult and medication adjustment appreciated
Patient with significant pain due to ovarian malignancy. On home dose methadone 15 mg TID and morphine 45 mg PO q4 hrs  - ISTOP#: [id number]  - c/w methadone 15 mg TID  - c/w PO morhine 45 mg q4 hrs prn  - dilaudid for refractory pain  - palliative consult for further pain management
Patient with significant pain due to ovarian malignancy. On home dose methadone 15 mg TID and morphine 45 mg PO q4 hrs  - ISTOP#: [id number]  - c/w methadone 15 mg TID  - c/w PO morhine 45 mg q4 hrs prn  - dilaudid for refractory pain  - palliative consult for further pain management

## 2019-05-25 NOTE — PROGRESS NOTE ADULT - PROBLEM SELECTOR PROBLEM 2
Malignant neoplasm of right ovary
Debility
Debility
Malignant neoplasm of right ovary
Malignant neoplasm of right ovary
Right lower quadrant abdominal pain
Debility

## 2019-05-29 NOTE — DISCHARGE NOTE ADULT - MEDICATION SUMMARY - MEDICATIONS TO CHANGE
4 17 19 Reviewed OPTIONS including AVASTIN/EYLEA/LUCENTIS and patient wants to proceed with LUCENTIS treatment AND REPEAT EVERY 5 WKS X 3. I will SWITCH the dose or number of times a day I take the medications listed below when I get home from the hospital:  None

## 2019-05-31 PROBLEM — T14.8XXA HEMATOMA: Status: ACTIVE | Noted: 2019-01-01

## 2019-06-04 NOTE — VITALS
[Maximal Pain Intensity: 4/10] : 4/10 [Pain Duration: ___] : Pain duration: [unfilled] [Pain Description/Quality: ___] : Pain description/quality: [unfilled] [Pain Location: ___] : Pain Location: [unfilled] [Pain Interferes with ADLs] : Pain interferes with activities of daily living. [Opioid] : opioid [ECOG Performance Status: 0 - Fully active, able to carry on all pre-disease performance without restriction] : Performance Status: 0 - Fully active, able to carry on all pre-disease performance without restriction [Least Pain Intensity: 10/10] : 10/10 [70: Cares for self; unalbe to carry on normal activity or do active work.] : 70: Cares for self; unable to carry on normal activity or do active work.

## 2019-06-04 NOTE — HISTORY OF PRESENT ILLNESS
[FreeTextEntry1] : Ms. 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

## 2019-06-04 NOTE — REASON FOR VISIT
[Spouse] : spouse [Other: ___] : [unfilled] [Consideration for Non-Curative Therapy] : consideration for non-curative therapy for

## 2019-06-04 NOTE — REVIEW OF SYSTEMS
[Vaginal Infection: Grade 0] : Vaginal Infection: Grade 0  [Vaginal Stricture: Grade 0] : Vaginal Stricture: Grade 0 [Genital Edema: Grade 0] : Genital Edema: Grade 0

## 2019-06-04 NOTE — DISEASE MANAGEMENT
[Pathological] : TNM Stage: p [IIB] : IIB [TTNM] : 2b [MTNM] : 0 [NTNM] : 0 [de-identified] : 1000 [de-identified] : 1012 [de-identified] : right pelvis

## 2019-06-05 NOTE — ASSESSMENT
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   She had been on palliative chemotherapy but had two consecutive hospitalizations in April and May for tumor fevers and uncontrolled pain.  CT 5/20/19 revealed increase in right soft tissue mass  6.9 x 4.9 x cm (April 2019 CT scan prior measurement  5.4 x 3.9 cm).  She is seeing Dr. Dc and has completed 2/15 fractions of palliative RT to this area.  We discussed that at conclusion of RT we are recommending evaluation with Dr. Cole for treatment change due to progression of disease.  We discussed Topotecan Days 1,8 and 15 every 21 days.  We discussed side effects not limited to alopecia, fatigue, myelosuppression, GI distress, arthralgias/myalgias.   She will see us 6/20.  I discussed that sometimes patients may have more fatigue toward the end of Radiation and may even have more pain before hopefully shrinkage of mass.  I have encouraged their follow up with both Med and Rad Onc. She is welcomed to come in next week for repeat count checks and we will evaluate for worsening anemia. She will try warm tea and 1 Colace  in between lactulose  use for constipation.   Urinalysis for reported pink tinged urine pending.  Pt and Parvin both expressed their appreciation for care.  They also were provided letters stating her course of treatment since 2017 and they both verbalized understanding that chemo is palliative and we are attempting to control mass progression.  Emotional support provided for both. \par \par \par

## 2019-06-05 NOTE — HISTORY OF PRESENT ILLNESS
[Disease: _____________________] : Disease: [unfilled] [AJCC Stage: ____] : AJCC Stage: [unfilled] [de-identified] : Age: 45 y/o diagnosed ovarian cancer. \victoriano Presented to the ED at [facility] on 1/25/17 with a complaints of abdominal pain. Had imaging that showed a suspicious pelvic mass. Taken emergently to the OR by general gyn out of concern for acute abdominal pain. Had a laparoscopy with conversion to ELAP via PFAN for left salpingo-oophorectomy. 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 1/31/2017- CT Abd/pelvis- no bowel obstruction (sent to the ED for evaluation of abdominal pain). Had been on Depo- provera for approximately 4 years, reportedly for management of endometriosis. Had a family history of ovarian cancer. On 2/28/17, she underwent RTLH, RSO, resection left adnexal remnant, omentectomy, P&PA LND, resection of pelvic nodules, staging . Final Pathology: Clear cell ovarian cancer, stage II B. She completed 6 cycles of carboplatin/ paclitaxel in 8/2017 and was in remission until 12/2017 which showed new foci in the rectus sheath. Foundation One sent and had PDL1 1%, MSI stable, TMB low and was started on Keytruda where she felt the neuropathy became worse: pain over the fingers and hands, fatigue, not feeling well. She had progression on immunotherapy and was switched to Doxil which she tolerated better except for rashes over the body and mouth sores. After review at tumor board, consensus was trial of palliative gemcitabine D1, D8 every 3 weeks.  [de-identified] : clear cell  [de-identified] : 1/25/2017- CA-125 = 186, CA 19-9 < 1 [de-identified] : Pt presented for follow up after hospitalization for suspected tumor fevers and pain crisis 5/20/19\par No etiology for fever discovered \par She remains afebrile since then. \par With advisement from Pain and Palliative she has had increase to Methadone 45 mg TID and breakthru Dilaudid, Neurontin, Mirtazapine, Medical marijuana. \par Pain has remained limited to the right quadrant. \par Patient and Ernestine note more sedation, constipation.  She required disimpaction.  Continued Lactulose use but more constipation with increased titration. \par She is eating and drinking less. 4 lb weight loss. They have increased ensure intake to help when appetite decreased. \par She had one episode of nausea yesterday \par She started palliative RT to soft tissue mass  5/29 for 15 fx- last tx 6/19/19\par She denies falls or trauma. \par Denies chest pain, palpitations, SOB, FUENTES\par Reported pink tinged urine x 1 incident but no active bleeding.  [de-identified] : DOS: 2/28/17\par Carboplatin and weekly Taxol 4/6/17- 6/1/17(three cycles)\par Carboplatin and Taxol q3w 6/29/17- 8/10/17( three cycles)\par Keytruda 7/9/18 -8/23/18 (3 cycles)\par Doxil: 8/2018 to 1/2019\par gemcitabine 2/2019 to present

## 2019-06-05 NOTE — REASON FOR VISIT
[Follow-Up Visit] : a follow-up [Spouse] : spouse [FreeTextEntry2] : follow up- ON Radiation for soft tissue mass

## 2019-06-05 NOTE — REVIEW OF SYSTEMS
[Recent Change In Weight] : ~T recent weight change [Abdominal Pain] : abdominal pain [Negative] : Allergic/Immunologic [Fatigue] : fatigue [Fever] : no fever [Night Sweats] : no night sweats [Chills] : no chills [Vomiting] : no vomiting [Constipation] : no constipation [Diarrhea] : no diarrhea [Skin Rash] : no skin rash [Skin Wound] : no skin wound [FreeTextEntry2] : decreased appetite  [FreeTextEntry7] : Right quad- recent CT with increase in soft tissue mass from April and repeat in May 2019  [de-identified] : swelling of the legs improved

## 2019-06-05 NOTE — PHYSICAL EXAM
[de-identified] : sore over the R side of tongue with resolved white plaque  [Ambulatory and capable of all self care but unable to carry out any work activities] : Status 2- Ambulatory and capable of all self care but unable to carry out any work activities. Up and about more than 50% of waking hours [Normal] : affect appropriate [Ulcers] : no ulcers [de-identified] : + BS,  right quad tenderness on palpation;  [de-identified] : [place]

## 2019-06-11 NOTE — PHYSICAL EXAM
[] : no respiratory distress [General Appearance - In No Acute Distress] : in no acute distress [Cervical Lymph Nodes Enlarged Anterior Bilaterally] : anterior cervical [Inguinal Lymph Nodes Enlarged Bilaterally] : inguinal [Supraclavicular Lymph Nodes Enlarged Bilaterally] : supraclavicular [Normal] : oriented to person, place and time, the affect was normal, the mood was normal and not anxious [de-identified] : tender right mid to lower quadrant with palpable mass [de-identified] : intact

## 2019-06-11 NOTE — HISTORY OF PRESENT ILLNESS
[FreeTextEntry1] : 48 yr old woman diagnosed with IIB clear cell ovarian cancer 1/25/17. Initially operated on by her general gynecologist at [facility] where she underwent an ELAP (Pfan) and LSO demonstrating a clear cell ovarian cancer, ruptured. \par \par Returned to the OR on 2/28/17 for RTLH, RSO, omentectomy and staging. \par \par Clear cell ovarian cancer involving right adnexa and pelvic peritoneum. \par \par Adjuvant chemotherapy with Dr. Arreola consisting of \par CB and weekly Taxol 4/6/17 -6/1/17 (3)\par CB and Taxol q3w 6/29/17 - 8/10/17 (3)\par  \par  = 15 11/29/17\par CT A/P 12/16/17 with two new areas of enhancement in the lower rectus musculature and right rectus muscle. \par \par Underwent resection of abdominal wall nodules with biologic mesh reconstruction on 3/9/18. Pathology showed clear cell adenocarcinoma with negative margins. TPC consensus was for observation. \par \par Seen in the [facility] ED 6/17/18 after coming in with pain. CT imaging showed probable recurrence disease again in the anterior abdominal wall as well as a persistent abdominal wall collection. \par \par She has seen medical oncology to discuss treatment options. Foundation testing on the tumor showed low PD1/ PDL 1 expression and immunotherapy with Keytruda was started. \par \par Imaging after 3 cycles of Keytruda shows POD with increase in size of the right peritoneal nodule and right pelvic LN. No new sites of disease. No ascites. \par \par She has subsequently been treated with Doxil and Gemzar with progression on recent imaging 4/22/19.\par \par Seeing Dr. Arellano and is on high doses of methadone and short acting narcotics as well as anxiolytics. Currently with pain 8-10/10 to right lower abdomen. \par Currently on Gemzar under the care of Dr. Uriel Cole.\par \par

## 2019-06-11 NOTE — HISTORY OF PRESENT ILLNESS
[FreeTextEntry1] : Ms. Shetty is currently receiving palliative radiation for progression of recurrent clear cell carcinoma of the ovary.\par \par She reports pain to the right side of the abdomen this morning that started yesterday. Is on a pain regimen which helps with pain but did not take any medication this morning. No urinary complaints. No bowel complaints. Reports decreased appetite, primarily because of a sensation of fullness.  She is eating small amounts and taking 3 ensure per day. Weight loss from last visit- 5 lbs.

## 2019-06-11 NOTE — OB/GYN HISTORY
[Approximately ___ (Month)] : the LMP was approximately [unfilled] month(s) ago [Menopause Age: ____] : patient was [unfilled] years old at menopause [___] : Total Pregnancies: [unfilled] [History of Birth Control Pills] : Patient has no history of taking birth control pills [History of Hormone Replacement Therapy] : no history of hormone replacement therapy

## 2019-06-11 NOTE — VITALS
[Pain Description/Quality: ___] : Pain description/quality: [unfilled] [Pain Duration: ___] : Pain duration: [unfilled] [Pain Location: ___] : Pain Location: [unfilled] [Pain Interferes with ADLs] : Pain interferes with activities of daily living. [Opioid] : opioid [70: Cares for self; unalbe to carry on normal activity or do active work.] : 70: Cares for self; unable to carry on normal activity or do active work. [ECOG Performance Status: 0 - Fully active, able to carry on all pre-disease performance without restriction] : Performance Status: 0 - Fully active, able to carry on all pre-disease performance without restriction [Maximal Pain Intensity: 10/10] : 10/10 [Least Pain Intensity: 8/10] : 8/10

## 2019-06-11 NOTE — DISEASE MANAGEMENT
[Pathological] : TNM Stage: p [IIB] : IIB [TTNM] : 2b [NTNM] : 0 [MTNM] : 0 [de-identified] : 9755 [de-identified] : 3038 [de-identified] : right pelvis

## 2019-06-11 NOTE — PHYSICAL EXAM
[General Appearance - In No Acute Distress] : in no acute distress [de-identified] : peristent abdominal mass tender to palpation

## 2019-06-11 NOTE — REVIEW OF SYSTEMS
[Genital Edema: Grade 0] : Genital Edema: Grade 0 [Vaginal Stricture: Grade 0] : Vaginal Stricture: Grade 0 [Vaginal Infection: Grade 0] : Vaginal Infection: Grade 0  [Fatigue: Grade 0] : Fatigue: Grade 0 [Urinary Retention: Grade 0] : Urinary Retention: Grade 0 [Urinary Tract Pain: Grade 0] : Urinary Tract Pain: Grade 0 [Urinary Urgency: Grade 0] : Urinary Urgency: Grade 0 [Urinary Frequency: Grade 0] : Urinary Frequency: Grade 0

## 2019-06-11 NOTE — DISEASE MANAGEMENT
[Pathological] : TNM Stage: p [IIB] : IIB [FreeTextEntry4] : ovary [TTNM] : 2b [NTNM] : 0 [MTNM] : 0

## 2019-06-11 NOTE — REVIEW OF SYSTEMS
[Night Sweats] : night sweats [Chills] : chills [Fever] : fever [Fatigue] : fatigue [Shortness Of Breath] : shortness of breath [SOB on Exertion] : shortness of breath during exertion [Abdominal Pain] : abdominal pain [Vomiting] : vomiting [Constipation] : constipation [Urinary Frequency] : urinary frequency [Nocturia] : nocturia [Joint Pain] : joint pain [Gait Disturbance] : gait disturbance [Anxiety] : anxiety [Insomnia] : insomnia [Depression] : depression [Hot Flashes] : hot flashes [Easy Bleeding] : a tendency for easy bleeding [Easy Bruising] : a tendency for easy bruising [Negative] : Integumentary [Recent Change In Weight] : ~T no recent weight change [Wheezing] : no wheezing [Cough] : no cough [Diarrhea] : no diarrhea [Vaginal Discharge] : no vaginal discharge [Dysmenorrhea/Abn Vaginal Bleeding] : no dysmenorrhea/abnormal vaginal bleeding [Muscle Pain] : no muscle pain [Suicidal] : not suicidal [Proptosis] : no proptosis [Muscle Weakness] : no muscle weakness [Deepening Of The Voice] : no deepening of the voice [Swollen Glands] : no swollen glands [FreeTextEntry9] : Knees [de-identified] : Low platelets

## 2019-06-13 PROBLEM — R51 WORSENING HEADACHES: Status: ACTIVE | Noted: 2019-01-01

## 2019-06-13 NOTE — H&P ADULT - NSICDXPASTMEDICALHX_GEN_ALL_CORE_FT
General
PAST MEDICAL HISTORY:  Anxiety     Deviated Nasal Septum     Ovarian cancer     Ovarian cyst (L) 1/2017    Vertigo

## 2019-06-14 NOTE — DATA REVIEWED
[FreeTextEntry1] : CT ABDOMEN AND PELVIS 5/20/2019 \par \par FINDINGS: \par \par LOWER CHEST: Within normal limits. \par \par LIVER: Within normal limits. \par BILE DUCTS: Normal caliber. \par GALLBLADDER: Within normal limits. \par SPLEEN: Within normal limits. \par PANCREAS: Within normal limits. \par ADRENALS: Within normal limits. \par KIDNEYS/URETERS: Within normal limits. \par \par BLADDER: Within normal limits. \par REPRODUCTIVE ORGANS: Hysterectomy. \par \par BOWEL: No bowel obstruction. \par PERITONEUM: Soft tissue mass in the right lower quadrant (2:78) 6.9 x 4.9 \par cm, previously 5.4 x 3.9 cm (2:78. \par VESSELS: Within normal limits. \par RETROPERITONEUM: Right external iliac node (2:98) 2.0 x 1.6 cm, previously \par 2.0 x 1.3 cm. \par ABDOMINAL WALL: Nodularity within the right rectus musculature. Tiny \par fat-containing umbilical hernia. \par BONES: Mild degenerative changes. \par \par IMPRESSION: \par \par Continued increase in size of a soft tissue mass in the right lower \par quadrant. \par \par Suspicion of additional disease within the right rectus musculature. \par \par Right external iliac adenopathy without significant change.

## 2019-06-14 NOTE — ASSESSMENT
[______] : HCP: [unfilled] [Completed Healthcare Proxy] : completed healthcare proxy [FreeTextEntry1] : 48yoF with:\par \par 1. Ovarian cancer - Receiving XRT, Med Onc follow up to determine treatment plan\par Prognosis is poor given refractory disease; patient and wife are aware of the terminal nature of her diagnosis but hopeful that treatment will extend life as long as possible. \par \par 2. Neoplasm-related pain/CIPN - Patient's pain is very well controlled at this time. Discussed opportunity to wean hydromorphone PRN dose as it is unclear if pain is truly opioid responsive given the doses she is taking but is still reporting pain.  Patient admantly maintains that pain is well controlled and she feels comfortable on this regimen. C/w Methadone 20mg TID, c/w PRN Hydromorphone 16mg. Will continue to address ability to wean.  \par Voltaren gel refilled for knee and elbow pain. \par \par 3. Nausea - C/W reglan 5mg q8h PRN\par \par 4. Anxiety/depression - C/w Mirtazapine 15mg QHS. C/w Lorazepam 0.5mg TID PRN\par \par 5. Appetite loss- Start dronabinol 5mg BID  Patient cannot afford medical cannabis.\par \par 6. Encounter for Palliative Care - Emotional support provided to patient and her wife. \par HCP completed in office today. Primary- wife, Parvin Shetty ([phone]). Original given to patient.  In regard to advance directives, patient states she would not want to have heroic/aggressive interventions done on her if she has "no hope" of recovery.  She has previously communicated this to her wife and other family. \par \par Patient advised to return to office in 1 month.  Instructed to call should any questions or issues arise.

## 2019-06-14 NOTE — PHYSICAL EXAM
[General Appearance - Alert] : alert [General Appearance - In No Acute Distress] : in no acute distress [Sclera] : the sclera and conjunctiva were normal [Normal Oral Mucosa] : normal oral mucosa [Neck Appearance] : the appearance of the neck was normal [Heart Rate And Rhythm] : heart rate was normal and rhythm regular [Auscultation Breath Sounds / Voice Sounds] : lungs were clear to auscultation bilaterally [Heart Sounds] : normal S1 and S2 [Bowel Sounds] : normal bowel sounds [Abdomen Soft] : soft [Abdomen Tenderness] : non-tender [No Focal Deficits] : no focal deficits [Oriented To Time, Place, And Person] : oriented to person, place, and time [Affect] : the affect was normal [FreeTextEntry1] : +1 edema

## 2019-06-14 NOTE — HISTORY OF PRESENT ILLNESS
[FreeTextEntry1] : 48yoF with ovarian cancer Stage IIb s/p p RTLH, RSO, resection left adnexal remnant, omentectomy, P&PA LND (2/28/17), s/p Carboplatin, Taxol with disease recurrence presents for follow up visit.  PMH also significant for vertigo, migraines.  Had POD on Pembrolizumab and on Doxil. Patient now under the care of Dr. Cole.  Patient is currently receiving only XRT at this time.  Plan is to receive chemotherapy after XRT is complete on 6/19/19. \par \par Patient admitted to [facility] 5/19/19-5/25/19 with severe abdominal pain and meeting SIRS criteria, concern for sepsis of abdominal source.  CT imaging and blood cultures did not reveal infectious etiology. Initial fevers suspected to be 2/2 oncologic progress.  During admission she was seen by the inpatient palliative care team, dosing was adjusted to Methadone 20mg TID and hydromorphone 16mg q4h PRN.\par \par Patient continues to be complaining of pain in right lower abdomen, elbows, and knees.   Utilizing voltaren gel on joints with compression wrapping.  As per patient, abdominal pain is well controlled with current pain regimen. Currently rates pain at 4/10; which is tolerable to the patient.\par \par Current pain regimen:\par Methadone 20mg TID\par Hydromorphone 16mg q4h PRN\par \par ROS:\par +loss of appetite--supplementing with ensure x3 per day\par +infrequent migraine headaches-- reports significant decrease once chemotherapy was stopped\par +nausea with first XRT treatment--controlled with reglan\par +constipation- controlled with lactulose daily which is very helpful.\par +anxiety/ outbursts- controlled with lorazepam 0.5mg TID\par \par Patient was denied pension and is currently appealing.  Patient and spouse report financial difficulties at the time due the circumstances.\par \par [employer] Ref#: [id number] (6/13/19)

## 2019-06-17 NOTE — REVIEW OF SYSTEMS
[Negative] : Heme/Lymph [Dysphagia] : no dysphagia [Nosebleeds] : no nosebleeds [Loss of Hearing] : no loss of hearing [Mucosal Pain] : no mucosal pain [Hoarseness] : no hoarseness [Odynophagia] : no odynophagia [Dizziness] : no dizziness [Confused] : no confusion [Difficulty Walking] : no difficulty walking [Fainting] : no fainting [de-identified] : headaches - MRI negative 4.24.19

## 2019-06-17 NOTE — PHYSICAL EXAM
[Ambulatory and capable of all self care but unable to carry out any work activities] : Status 2- Ambulatory and capable of all self care but unable to carry out any work activities. Up and about more than 50% of waking hours [Normal] : affect appropriate [Ulcers] : no ulcers [de-identified] : sore over the R side of tongue with resolved white plaque  [de-identified] : + BS, no tenderness on palpation

## 2019-06-17 NOTE — REASON FOR VISIT
[Spouse] : spouse [Follow-Up Visit] : a follow-up [FreeTextEntry2] : follow up for metastatic ovarian cancer  on Gemzar

## 2019-06-17 NOTE — HISTORY OF PRESENT ILLNESS
[Disease: _____________________] : Disease: [unfilled] [AJCC Stage: ____] : AJCC Stage: [unfilled] [Cycle: ___] : Cycle: [unfilled] [Day: ___] : Day: [unfilled] [de-identified] : Age: 47 y/o diagnosed ovarian cancer. \victoriano Presented to the ED at [facility] on 1/25/17 with a complaints of abdominal pain. Had imaging that showed a suspicious pelvic mass. Taken emergently to the OR by general gyn out of concern for acute abdominal pain. Had a laparoscopy with conversion to ELAP via PFAN for left salpingo-oophorectomy. 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 1/31/2017- CT Abd/pelvis- no bowel obstruction (sent to the ED for evaluation of abdominal pain). Had been on Depo- provera for approximately 4 years, reportedly for management of endometriosis. Had a family history of ovarian cancer. On 2/28/17, she underwent RTLH, RSO, resection left adnexal remnant, omentectomy, P&PA LND, resection of pelvic nodules, staging . Final Pathology: Clear cell ovarian cancer, stage II B. She completed 6 cycles of carboplatin/ paclitaxel in 8/2017 and was in remission until 12/2017 which showed new foci in the rectus sheath. Foundation One sent and had PDL1 1%, MSI stable, TMB low and was started on Keytruda where she felt the neuropathy became worse: pain over the fingers and hands, fatigue, not feeling well. She had progression on immunotherapy and was switched to Doxil which she tolerated better except for rashes over the body and mouth sores. After review at tumor board, consensus was trial of palliative gemcitabine D1, D8 every 3 weeks.  [de-identified] : clear cell  [de-identified] : 1/25/2017- CA-125 = 186, CA 19-9 < 1 [de-identified] : DOS: 2/28/17\par Carboplatin and weekly Taxol 4/6/17- 6/1/17(three cycles)\par Carboplatin and Taxol q3w 6/29/17- 8/10/17( three cycles)\par Keytruda 7/9/18 -8/23/18 (3 cycles)\par Doxil: 8/2018 to 1/2019\par gemcitabine 2/2019 to present  [de-identified] : D/c from [facility] 4.26.16 after 4 day inpatient stay for fevers, abdominal pain with CT revealing progression of peritoneal disease\par No discernible etiology/+cultures as  tumor fever \par Denies new h headaches with  MRI of the head with no  acute changes\par She was started on steroids for this \par She denies any further facial swelling. \par She denies any abdominal pain or back pain today\par Pain medication adjustments with palliative \par Denies any new fevers, chills, cough, SOB, FUENTES \par Denies any falls or trauma.  [FreeTextEntry1] : GEMZAR

## 2019-06-17 NOTE — ASSESSMENT
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 She remains on palliative gemcitabine with expected fatigue with recent hospitalization for tumor fevers and abdominal fever revealing progression of RLQ peritoneal mets.  We reviewed dex taper  and ondansetron to decrease any water retention and leg swelling.  We reviewed stopping ondansetron to decrease potential of antiemetic causing headaches.  MRI of the brain w/ no acute findings.  . We reviewed supportive measures.   We reviewed change of chemotherapy for progression as well as Radiation Oncology evaluation for local control for peritoneal disease.   Referral placed.  We reviewed potential urinary retention from pain medications. LAxative for narcotic induced constipation.  Reviewed ice chips for prevention of mouth sores. Questions answered to her and her spouse's satisfaction.   Follow up three weeks. Contact office if no response for RT consult

## 2019-06-18 NOTE — VITALS
[Pain Description/Quality: ___] : Pain description/quality: [unfilled] [Least Pain Intensity: 8/10] : 8/10 [Maximal Pain Intensity: 10/10] : 10/10 [Pain Duration: ___] : Pain duration: [unfilled] [Pain Location: ___] : Pain Location: [unfilled] [Pain Interferes with ADLs] : Pain interferes with activities of daily living. [Opioid] : opioid [60: Requires occasional assistance, but is able to care for most of his/her needs] : 60: Requires occasional assistance, but is able to care for most of his/her needs [ECOG Performance Status: 2 - Ambulatory and capable of all self care but unable to carry out any work activities] : Performance Status: 2 - Ambulatory and capable of all self care but unable to carry out any work activities. Up and about more than 50% of waking hours

## 2019-06-18 NOTE — HISTORY OF PRESENT ILLNESS
[FreeTextEntry1] : Ms. Shetty is currently receiving palliative radiation for progression of recurrent clear cell carcinoma of the ovary.\par \par She continues to report pain to the right abdomen which has worsen. Taking methadone TID and Dilaudid 16 mg Q4 hrs prn.  However, she doesn’t always remember to take her pain medications. No urinary complaints. No bowel complaints. Reports decreased appetite, primarily because of a sensation of fullness.  She is eating small amounts and taking 3 ensure per day. Weight loss from last visit another 5 lbs.\par \par Pain management/supportive care: Niels Hays

## 2019-06-18 NOTE — REVIEW OF SYSTEMS
[Fatigue: Grade 0] : Fatigue: Grade 0 [Urinary Tract Pain: Grade 0] : Urinary Tract Pain: Grade 0 [Urinary Retention: Grade 0] : Urinary Retention: Grade 0 [Genital Edema: Grade 0] : Genital Edema: Grade 0 [Urinary Frequency: Grade 0] : Urinary Frequency: Grade 0 [Urinary Urgency: Grade 0] : Urinary Urgency: Grade 0 [Vaginal Stricture: Grade 0] : Vaginal Stricture: Grade 0 [Vaginal Infection: Grade 0] : Vaginal Infection: Grade 0

## 2019-06-20 PROBLEM — R43.2 DYSGEUSIA: Status: ACTIVE | Noted: 2019-01-01

## 2019-06-20 NOTE — REASON FOR VISIT
[Spouse] : spouse [Follow-Up Visit] : a follow-up [FreeTextEntry2] : follow up for recurrent ovarian cancer s/p RT to the abdominal wall

## 2019-06-20 NOTE — REVIEW OF SYSTEMS
[Chills] : no chills [Fever] : no fever [Abdominal Pain] : abdominal pain [Night Sweats] : no night sweats [Fatigue] : fatigue [Recent Change In Weight] : ~T recent weight change [Constipation] : constipation [Vomiting] : no vomiting [Diarrhea] : no diarrhea [Suicidal] : not suicidal [Insomnia] : no insomnia [Depression] : depression [Anxiety] : no anxiety [Negative] : Heme/Lymph [FreeTextEntry2] : Has lost 5 lbs over the last month  [FreeTextEntry7] : nausea

## 2019-06-20 NOTE — HISTORY OF PRESENT ILLNESS
[Disease: _____________________] : Disease: [unfilled] [AJCC Stage: ____] : AJCC Stage: [unfilled] [de-identified] : 1/25/2017- CA-125 = 186, CA 19-9 < 1 [de-identified] : clear cell  [de-identified] : Age: 47 y/o diagnosed ovarian cancer. \par Presented to the ED at [facility] on 1/25/17 with a complaints of abdominal pain. Had imaging that showed a suspicious pelvic mass. Taken emergently to the OR by general gyn out of concern for acute abdominal pain. Had a laparoscopy with conversion to ELAP via PFAN for left salpingo-oophorectomy. 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 1/31/2017- CT Abd/pelvis- no bowel obstruction (sent to the ED for evaluation of abdominal pain). Had been on Depo- provera for approximately 4 years, reportedly for management of endometriosis. Had a family history of ovarian cancer. On 2/28/17, she underwent RTLH, RSO, resection left adnexal remnant, omentectomy, P&PA LND, resection of pelvic nodules, staging . Final Pathology: Clear cell ovarian cancer, stage II B. She completed 6 cycles of carboplatin/ paclitaxel in 8/2017 and was in remission until 12/2017 which showed new foci in the rectus sheath. [facility] sent and had PDL1 1%, MSI stable, TMB low and was started on Keytruda where she felt the neuropathy became worse: pain over the fingers and hands, fatigue, not feeling well. She had progression on immunotherapy and was switched to Doxil which she tolerated better except for rashes over the body and mouth sores. After review at tumor board, consensus was trial of palliative gemcitabine D1, D8 every 3 weeks. CT interval imaging showed interval progression of right lower quadrant peritoneal metastases. She underwent palliative RT to RLQ peritoneal metastases with Dr Dc on 6/2019 (3750 dose).  [de-identified] : DOS: 2/28/17\par Carboplatin and weekly Taxol 4/6/17- 6/1/17(three cycles)\par Carboplatin and Taxol q3w 6/29/17- 8/10/17( three cycles)\par Keytruda 7/9/18 -8/23/18 (3 cycles)\par Doxil: 8/2018 to 1/2019\par Foundation testing was BRCA negative \par gemcitabine 2/2019 to 5/9/19 \par topotecan 6/20/19 to present  [de-identified] : She has been taking Marinol every 12 hours and metoclopramide 5 mg prior to meals with persistent nausea and fullness sensation. She is drinking Ensure and eating like a bird: feels too much food causes discomfort and bloating. She has been having BM after drinking her magic juice but stool is hard. She is trying to drink the magic juice sooner and see if will work better. Has seen nutritionist to help her maintain weight. She has no taste for meat or beans anymore. Her wife has bought her ice cream. She rates abdominal pain as 8/10 and the hydromorphone works best with the pain and methadone helps. Hernia belt helps her pain and able to get up. She has been more weak and has been in bed most of the day. When she is not feeling well, wife has trouble getting her out of bed and moving around the house is difficult. She needs support for walking where a rolling walker would not be enough. There are times when she wants to be out of the bed and cannot. Her wife wants to see if she could have wheelchair to help her transfer around the home.

## 2019-06-20 NOTE — PHYSICAL EXAM
[Ambulatory and capable of all self care but unable to carry out any work activities] : Status 2- Ambulatory and capable of all self care but unable to carry out any work activities. Up and about more than 50% of waking hours [Ulcers] : no ulcers [de-identified] : + BS, tenderness on palpation; hernia belt   [Normal] : affect appropriate

## 2019-06-22 NOTE — PROGRESS NOTE ADULT - PROBLEM SELECTOR PROBLEM 4
Palliative care encounter
Mucositis due to chemotherapy
Normocytic anemia
Palliative care encounter
Palliative care encounter
97

## 2019-07-05 PROBLEM — R29.898 ARM WEAKNESS: Status: ACTIVE | Noted: 2018-08-24

## 2019-07-05 PROBLEM — Z86.19 HISTORY OF CANDIDIASIS OF MOUTH: Status: RESOLVED | Noted: 2019-01-01 | Resolved: 2019-01-01

## 2019-07-05 PROBLEM — K12.30 MUCOSITIS: Status: RESOLVED | Noted: 2018-12-18 | Resolved: 2019-01-01

## 2019-07-05 NOTE — ASSESSMENT
[FreeTextEntry1] : She is a 47 y/o F with recurrent clear cell ovarian cancer that has progressed through 4th line chemotherapy and platinum resistant. We reviewed goals of palliative chemotherapy. She has completed palliative RT to the abdominal wall. We reviewed continued support for symptom control. We reviewed abdominal pain: continuation of hydromorphone and methadone. She is currently on topotecan 5th line therapy C1D15 with pancytopenia. Will hold D15 treatment and will rearrange future topotecan to D1 and 8 every 3 weeks. We reviewed folic acid 1 mg daily to help with counts. We reviewed if more fatigue, may consider blood transfusion. She will continue with pain regimen and bowel regimen as per Dr Niels Hays.

## 2019-07-05 NOTE — REVIEW OF SYSTEMS
[Fever] : no fever [Night Sweats] : no night sweats [Chills] : no chills [Recent Change In Weight] : ~T no recent weight change [Fatigue] : fatigue [Vomiting] : no vomiting [Abdominal Pain] : abdominal pain [Constipation] : constipation [Diarrhea] : no diarrhea [Joint Pain] : joint pain [Joint Stiffness] : no joint stiffness [Muscle Pain] : no muscle pain [Muscle Weakness] : no muscle weakness [Skin Rash] : no skin rash [Skin Wound] : no skin wound [Negative] : Heme/Lymph [FreeTextEntry4] : resolved mouth sores  [de-identified] : thinning of the hair

## 2019-07-05 NOTE — HISTORY OF PRESENT ILLNESS
[Disease: _____________________] : Disease: [unfilled] [AJCC Stage: ____] : AJCC Stage: [unfilled] [de-identified] : Age: 47 y/o diagnosed ovarian cancer. \par Presented to the ED at [facility] on 1/25/17 with a complaints of abdominal pain. Had imaging that showed a suspicious pelvic mass. Taken emergently to the OR by general gyn out of concern for acute abdominal pain. Had a laparoscopy with conversion to ELAP via PFAN for left salpingo-oophorectomy. 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 1/31/2017- CT Abd/pelvis- no bowel obstruction (sent to the ED for evaluation of abdominal pain). Had been on Depo- provera for approximately 4 years, reportedly for management of endometriosis. Had a family history of ovarian cancer. On 2/28/17, she underwent RTLH, RSO, resection left adnexal remnant, omentectomy, P&PA LND, resection of pelvic nodules, staging . Final Pathology: Clear cell ovarian cancer, stage II B. She completed 6 cycles of carboplatin/ paclitaxel in 8/2017 and was in remission until 12/2017 which showed new foci in the rectus sheath. [facility] sent and had PDL1 1%, MSI stable, TMB low and was started on Keytruda where she felt the neuropathy became worse: pain over the fingers and hands, fatigue, not feeling well. She had progression on immunotherapy and was switched to Doxil which she tolerated better except for rashes over the body and mouth sores. After review at tumor board, consensus was trial of palliative gemcitabine D1, D8 every 3 weeks. CT interval imaging showed interval progression of right lower quadrant peritoneal metastases. She underwent palliative RT to RLQ peritoneal metastases with Dr Dc on 6/2019 (3750 dose). She started on topotecan 6/20/19.  [de-identified] : clear cell  [de-identified] : 1/25/2017- CA-125 = 186, CA 19-9 < 1 [de-identified] : DOS: 2/28/17\par Carboplatin and weekly Taxol 4/6/17- 6/1/17(three cycles)\par Carboplatin and Taxol q3w 6/29/17- 8/10/17( three cycles)\par Keytruda 7/9/18 -8/23/18 (3 cycles)\par Doxil: 8/2018 to 1/2019\par Foundation testing was BRCA negative \par gemcitabine 2/2019 to 5/9/19 \par topotecan 6/20/19 to present  [de-identified] : Denied any mouth sores or nausea. She did not feel more fatigued by the topotecan. She feels her knee pain was main issue and required more pain medication. She saw Dr Coles who increased the medication. She has been constipated and now on Movantik and taking the Magic juice more often. She used the lidocaine patch x 2 over the abdomen last night since it was hurting more. Currently abdominal pain is better. Has been eating better this week with her mother visiting and cooking for her. Denies any vomiting or cramping after eating. She has not gotten the wheelchair yet but it has been obtained and looking forward to go to museum.

## 2019-07-05 NOTE — PHYSICAL EXAM
[Ambulatory and capable of all self care but unable to carry out any work activities] : Status 2- Ambulatory and capable of all self care but unable to carry out any work activities. Up and about more than 50% of waking hours [Ulcers] : no ulcers [Normal] : affect appropriate [de-identified] : + BS, tenderness on palpation; no erythema or calor

## 2019-07-11 NOTE — PHYSICAL EXAM
[General Appearance - Alert] : alert [Sclera] : the sclera and conjunctiva were normal [General Appearance - In No Acute Distress] : in no acute distress [Normal Oral Mucosa] : normal oral mucosa [Neck Appearance] : the appearance of the neck was normal [Auscultation Breath Sounds / Voice Sounds] : lungs were clear to auscultation bilaterally [Heart Rate And Rhythm] : heart rate was normal and rhythm regular [Heart Sounds] : normal S1 and S2 [Abdomen Soft] : soft [Bowel Sounds] : normal bowel sounds [Abdomen Tenderness] : non-tender [Oriented To Time, Place, And Person] : oriented to person, place, and time [No Focal Deficits] : no focal deficits [Affect] : the affect was normal [FreeTextEntry1] : +antalgic gait. +bilateral knee and elbow pain with movement, , no crepitus. no warmth or erythema

## 2019-07-11 NOTE — ASSESSMENT
[Completed Healthcare Proxy] : completed healthcare proxy [______] : HCP: [unfilled] [FreeTextEntry1] : 48yoF with:\par \par 1. Ovarian cancer - currently on topotecan.  Follow up with Med Onc today.\par Prognosis is poor given refractory disease; patient and wife are aware of the terminal nature of her diagnosis but hopeful that treatment will extend life as long as possible. \par \par 2. Neoplasm-related pain/CIPN - Patient reports patient is not well controlled.  Increase methadone to 25mg TID. with hopes to be able to decrase hyrdomorphone usage, c/w PRN Hydromorphone 16mg. Will continue to address ability to wean.  C/w lidocaine patches. \par \par 3. Nausea - C/W reglan 5mg q8h PRN\par \par 4. Anxiety/depression - C/w Mirtazapine 15mg QHS. C/w Lorazepam 0.5mg TID PRN\par \par 5. Appetite loss- Patient may benefit from medical cannabis.  \par \par 6. Incontinence- Patient utilizes incontinence pads QHS.  \par \par 7. Encounter for Palliative Care - Emotional support provided to patient and her wife. \par HCP completed in office today. Primary- wife, Parvin Shetty ([phone]). Original given to patient.  In regard to advance directives, patient states she would not want to have heroic/aggressive interventions done on her if she has "no hope" of recovery.  She has previously communicated this to her wife and other family. \par \par Patient advised to return to office in 1 month.  Instructed to call should any questions or issues arise.

## 2019-07-11 NOTE — HISTORY OF PRESENT ILLNESS
[FreeTextEntry1] : 48yoF with ovarian cancer Stage IIb s/p p RTLH, RSO, resection left adnexal remnant, omentectomy, P&PA LND (2/28/17), s/p Carboplatin, Taxol with disease recurrence presents for follow up visit.  PMH also significant for vertigo, migraines.  Had POD on Pembrolizumab and on Doxil. Patient now under the care of Dr. Cole.  Patient received palliative RT to RLQ peritoneal metastases with Dr. Dc.  Started Topotecan therapy since last visit.\par \par Patient's main complaint today is pain.   Significant increase in knee pain and swelling. Continues to complain of RLQ abdominal pain and elbow pain as well.  Patient has obtained new compression stockings which has helped but minimally.  Patient has had decline of functioning 2/2 pain.  She now requires a wheelchair.Patient continues to be complaining of pain in right lower abdomen, elbows, and knees.   Utilizing voltaren gel on joints with compression wrapping.  As per patient, abdominal pain is well controlled with current pain regimen. Currently rates pain at 4/10; which is tolerable to the patient.\par \par Current pain regimen:\par Methadone 20mg TID\par Hydromorphone 16mg q4h PRN   (4 times per day)\par \par ROS:\par +productive cough with yellow phelgm--currently on phelgm\par +loss of appetite--supplementing with ensure x3 per day. Minimal po intake\par +infrequent migraine headaches-- reports significant decrease once chemotherapy was stopped\par +nausea with first XRT treatment--controlled with reglan\par +constipation-- was controlled on lactulose but now required addition of movantiq.  Requires manual disimpaction at times\par +anxiety/ outbursts- controlled with lorazepam 0.5mg TID\par \par Patient was denied pension and is currently appealing.  Patient and spouse report financial difficulties at the time due the circumstances.    \par \par [employer] Ref#: [id number]

## 2019-07-17 NOTE — REASON FOR VISIT
[Follow-Up Visit] : a follow-up [Spouse] : spouse [FreeTextEntry2] : follow up for recurrent ovarian cancer s/p RT to the abdominal wall and Topotecan C2D1

## 2019-07-17 NOTE — REVIEW OF SYSTEMS
[Fatigue] : fatigue [Recent Change In Weight] : ~T recent weight change [Abdominal Pain] : abdominal pain [Constipation] : constipation [Depression] : depression [Negative] : Allergic/Immunologic [Fever] : no fever [Chills] : no chills [Night Sweats] : no night sweats [Vomiting] : no vomiting [Diarrhea] : no diarrhea [Suicidal] : not suicidal [Insomnia] : no insomnia [Anxiety] : no anxiety [FreeTextEntry2] : Has lost 5 lbs over the last month  [FreeTextEntry7] : nausea

## 2019-07-17 NOTE — PHYSICAL EXAM
[Ambulatory and capable of all self care but unable to carry out any work activities] : Status 2- Ambulatory and capable of all self care but unable to carry out any work activities. Up and about more than 50% of waking hours [Normal] : affect appropriate [Ulcers] : no ulcers [de-identified] : + BS, tenderness on palpation; hernia belt

## 2019-07-17 NOTE — ASSESSMENT
[Palliative] : Goals of care discussed with patient: Palliative [FreeTextEntry1] : She is a 47 y/o F with recurrent clear cell ovarian cancer that has progressed through 4th line chemotherapy and platinum resistant and is now on Cycle 2 of Topotecan.  She had dose delay 2 weeks ago 2/2 thrombocytopenia.  We reviewed abdominal pain: continuation of hydromorphone as a pRN, not just when her pain is extreme.   and methadone.  Prescribed one single dose of Hydromorphone 8 mg oral pill before starting Topotecan C2 D1 to relieve the symptom since her VItal signs are WNL ( /70, HR 78, O2 98 T 37.11C) and will continue to monitor pt's reaction and pain scales during treatment. Advise pt to report any worsening abdominal symptoms-reviewed if  no BM, no flatulence, N/V etc to please contact the office.  Questions answered to their satisfaction. \par \par Wt loss: reviewed continued follow up with nutritionist and adjusted marinol to 1/2 hour before lunch or dinner. Metoclopramide 10mg three times a day prior to meals to decrease nausea and improve GI motility. \par \par Constipation: continue with bowel regimen\par \par Decreased performance status: reviewed with her wife her increasing weakness and expectation that this will get worse over time due to her disease. We reviewed getting wheelchair to help with ease of caregiving and moving patient in the home. \par \par SW referral pending for PT eval /GAIT/Safety needs.

## 2019-07-17 NOTE — HISTORY OF PRESENT ILLNESS
[Disease: _____________________] : Disease: [unfilled] [AJCC Stage: ____] : AJCC Stage: [unfilled] [de-identified] : Age: 47 y/o diagnosed ovarian cancer. \par Presented to the ED at [facility] on 1/25/17 with a complaints of abdominal pain. Had imaging that showed a suspicious pelvic mass. Taken emergently to the OR by general gyn out of concern for acute abdominal pain. Had a laparoscopy with conversion to ELAP via PFAN for left salpingo-oophorectomy. 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 1/31/2017- CT Abd/pelvis- no bowel obstruction (sent to the ED for evaluation of abdominal pain). Had been on Depo- provera for approximately 4 years, reportedly for management of endometriosis. Had a family history of ovarian cancer. On 2/28/17, she underwent RTLH, RSO, resection left adnexal remnant, omentectomy, P&PA LND, resection of pelvic nodules, staging . Final Pathology: Clear cell ovarian cancer, stage II B. She completed 6 cycles of carboplatin/ paclitaxel in 8/2017 and was in remission until 12/2017 which showed new foci in the rectus sheath. [facility] sent and had PDL1 1%, MSI stable, TMB low and was started on Keytruda where she felt the neuropathy became worse: pain over the fingers and hands, fatigue, not feeling well. She had progression on immunotherapy and was switched to Doxil which she tolerated better except for rashes over the body and mouth sores. After review at tumor board, consensus was trial of palliative gemcitabine D1, D8 every 3 weeks. CT interval imaging showed interval progression of right lower quadrant peritoneal metastases. She underwent palliative RT to RLQ peritoneal metastases with Dr Dc on 6/2019 (3750 dose).  [de-identified] : clear cell  [de-identified] : 1/25/2017- CA-125 = 186, CA 19-9 < 1 [de-identified] : DOS: 2/28/17\par Carboplatin and weekly Taxol 4/6/17- 6/1/17(three cycles)\par Carboplatin and Taxol q3w 6/29/17- 8/10/17( three cycles)\par Keytruda 7/9/18 -8/23/18 (3 cycles)\par Doxil: 8/2018 to 1/2019\par Foundation testing was BRCA negative \par gemcitabine 2/2019 to 5/9/19 \par topotecan 6/20/19 to present  [de-identified] : She c/o RLQ abdominal pain before the Topotecan C2 D1 treatment, described the pain as aching / dull, comes and goes, score 6 out of 10 at this time\par SHe continues with pain management with methadone titration and PRN Dilaudid \par Pts wife states she only uses the dilaudid as an emergency and Hailey last received it approximately 7 hours ago \par Pt denies nausea/emesis/constipation at this time\par Pts wife notes last  episode of nausea yesterday\par She has been taking Marinol every 12 hours and metoclopramide 5 mg prior to meals with persistent nausea and fullness sensation\par SOme improvement w/ appetite over the last few days. \par She denies fevers, chills or sick contacts. \par She notes the BLE has improved since last week. S\par

## 2019-07-18 NOTE — HISTORY OF PRESENT ILLNESS
[Disease: _____________________] : Disease: [unfilled] [AJCC Stage: ____] : AJCC Stage: [unfilled] [de-identified] : Age: 47 y/o diagnosed ovarian cancer. \par Presented to the ED at [facility] on 1/25/17 with a complaints of abdominal pain. Had imaging that showed a suspicious pelvic mass. Taken emergently to the OR by general gyn out of concern for acute abdominal pain. Had a laparoscopy with conversion to ELAP via PFAN for left salpingo-oophorectomy. 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 1/31/2017- CT Abd/pelvis- no bowel obstruction (sent to the ED for evaluation of abdominal pain). Had been on Depo- provera for approximately 4 years, reportedly for management of endometriosis. Had a family history of ovarian cancer. On 2/28/17, she underwent RTLH, RSO, resection left adnexal remnant, omentectomy, P&PA LND, resection of pelvic nodules, staging . Final Pathology: Clear cell ovarian cancer, stage II B. She completed 6 cycles of carboplatin/ paclitaxel in 8/2017 and was in remission until 12/2017 which showed new foci in the rectus sheath. [facility] sent and had PDL1 1%, MSI stable, TMB low and was started on Keytruda where she felt the neuropathy became worse: pain over the fingers and hands, fatigue, not feeling well. She had progression on immunotherapy and was switched to Doxil which she tolerated better except for rashes over the body and mouth sores. After review at tumor board, consensus was trial of palliative gemcitabine D1, D8 every 3 weeks. CT interval imaging showed interval progression of right lower quadrant peritoneal metastases. She underwent palliative RT to RLQ peritoneal metastases with Dr Dc on 6/2019 (3750 dose). She started on topotecan weekly treatment.  [de-identified] : clear cell  [de-identified] : 1/25/2017- CA-125 = 186, CA 19-9 < 1 [de-identified] : DOS: 2/28/17\par Carboplatin and weekly Taxol 4/6/17- 6/1/17(three cycles)\par Carboplatin and Taxol q3w 6/29/17- 8/10/17( three cycles)\par Keytruda 7/9/18 -8/23/18 (3 cycles)\par Doxil: 8/2018 to 1/2019\par Foundation testing was BRCA negative \par gemcitabine 2/2019 to 5/9/19 \par topotecan 6/20/19 to present (counts were too low for D15 and topotecan changed to D1 and D8 every 3 weeks)   [de-identified] : She is using a heating pad for RLQ pain. She continues with breakthrough pain medication: she has better control of the pain with methadone increase. Feels she is sleeping well and pain overall better controlled. Still using lidocaine topical patch and wearing binder. She is hoping mass has shrunk. She has some days when she is eating more and some days where she has poorer appetite. Intermittent nausea/emesis. Intermittent constipation: magic juice helps: had BM today. They received wheelchair which is helpful for her mobility around the home and for medical visits. Her wife is dealing with stress and tearful. \par

## 2019-07-18 NOTE — PHYSICAL EXAM
[Ambulatory and capable of all self care but unable to carry out any work activities] : Status 2- Ambulatory and capable of all self care but unable to carry out any work activities. Up and about more than 50% of waking hours [Normal] : affect appropriate [Ulcers] : no ulcers [de-identified] : + BS, tenderness on palpation; hernia belt

## 2019-07-18 NOTE — ASSESSMENT
[Palliative] : Goals of care discussed with patient: Palliative [FreeTextEntry1] : She is a 49 y/o F with recurrent clear cell ovarian cancer that has progressed through 4th line chemotherapy and platinum resistant and is now on C2 D8 of topotecan. We reviewed her pain control which is improved with current pain regimen: methadone, dilaudid, lidocaine patch and abdominal binder. We reviewed constipation which is under control with bowel regimen. Overall, her QOL has improved and is wiling to continue with palliative chemotherapy. We reviewed CT after 3rd cycle of topotecan in 3 weeks. We reviewed her and her wife's questions and concerns. Next follow up in 3 weeks. \par \par Will ask social work assistance in obtaining chucks: pt had been getting from [employer] previously but the surgical supply does not take new insurance [employer].

## 2019-07-18 NOTE — REASON FOR VISIT
[Follow-Up Visit] : a follow-up [Spouse] : spouse [FreeTextEntry2] : follow up for recurrent ovarian cancer s/p RT to the abdominal wall and Topotecan C2D8

## 2019-07-30 NOTE — REVIEW OF SYSTEMS
[Constipation: Grade 0] : Constipation: Grade 0 [Diarrhea: Grade 0] : Diarrhea: Grade 0 [Fatigue: Grade 0] : Fatigue: Grade 0 [Urinary Incontinence: Grade 0] : Urinary Incontinence: Grade 0  [Urinary Retention: Grade 0] : Urinary Retention: Grade 0 [Urinary Tract Pain: Grade 0] : Urinary Tract Pain: Grade 0 [Urinary Frequency: Grade 0] : Urinary Frequency: Grade 0 [Urinary Urgency: Grade 0] : Urinary Urgency: Grade 0 [FreeTextEntry2] : on bowel regimen

## 2019-07-30 NOTE — PHYSICAL EXAM
[General Appearance - In No Acute Distress] : in no acute distress [Nondistended] : nondistended [Abdomen Tenderness] : non-tender [Abdomen Soft] : soft [de-identified] : palpable right lower quadrant mass

## 2019-07-30 NOTE — HISTORY OF PRESENT ILLNESS
[FreeTextEntry1] : Ms. Shetty is a 48-year-old female with recurrent ovarian cancer. We treated a rght peritoneal /pelvic mass for pailliation of pain. \par \par She received 3750 cGy in 15 fractions. Treatment was delivered from 5/30/19-6/19/19. Ms. Shetty tolerated her radiation well. She did not develop any grade 3 or higher acute toxicities as a result of the treatment.  At the conclusion of therapy, no palliation of pain had been achieved. She continued to see Dr. Tolbert. \par \par Today she is feeling better. Denies any pain. Continues on Dilaudid 16mg Q4 prn, Methadone has been raised to 25 md tid. TID. Weight stable, able to eat but some days, she is not eating. Drinking up to 6 Ensures a day when appetite is poor. No urinary or bowel complaints. On topotecan, next cycle due next week.

## 2019-08-01 NOTE — REVIEW OF SYSTEMS
[Fatigue] : fatigue [Recent Change In Weight] : ~T recent weight change [Abdominal Pain] : abdominal pain [Constipation] : constipation [Depression] : depression [Negative] : Allergic/Immunologic [Fever] : no fever [Chills] : no chills [Night Sweats] : no night sweats [Vomiting] : no vomiting [Diarrhea] : no diarrhea [Suicidal] : not suicidal [Insomnia] : no insomnia [Anxiety] : no anxiety [FreeTextEntry2] : Has lost 5 lbs over the last month  [FreeTextEntry7] : nausea intermittent /Bowel regimen in place

## 2019-08-01 NOTE — PHYSICAL EXAM
[Ambulatory and capable of all self care but unable to carry out any work activities] : Status 2- Ambulatory and capable of all self care but unable to carry out any work activities. Up and about more than 50% of waking hours [Normal] : affect appropriate [Ulcers] : no ulcers [de-identified] : + BS, tenderness on palpation; hernia belt

## 2019-08-01 NOTE — REASON FOR VISIT
[Pre-Treatment Visit] : a pre-treatment [Follow-Up Visit] : a follow-up [Spouse] : spouse [FreeTextEntry2] : follow up for recurrent ovarian cancer TOPOTECAN D8

## 2019-08-01 NOTE — HISTORY OF PRESENT ILLNESS
[Disease: _____________________] : Disease: [unfilled] [AJCC Stage: ____] : AJCC Stage: [unfilled] [Cycle: ___] : Cycle: [unfilled] [Day: ___] : Day: [unfilled] [de-identified] : Age: 45 y/o diagnosed ovarian cancer. \par Presented to the ED at [facility] on 1/25/17 with a complaints of abdominal pain. Had imaging that showed a suspicious pelvic mass. Taken emergently to the OR by general gyn out of concern for acute abdominal pain. Had a laparoscopy with conversion to ELAP via PFAN for left salpingo-oophorectomy. 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 1/31/2017- CT Abd/pelvis- no bowel obstruction (sent to the ED for evaluation of abdominal pain). Had been on Depo- provera for approximately 4 years, reportedly for management of endometriosis. Had a family history of ovarian cancer. On 2/28/17, she underwent RTLH, RSO, resection left adnexal remnant, omentectomy, P&PA LND, resection of pelvic nodules, staging . Final Pathology: Clear cell ovarian cancer, stage II B. She completed 6 cycles of carboplatin/ paclitaxel in 8/2017 and was in remission until 12/2017 which showed new foci in the rectus sheath. Foundation One sent and had PDL1 1%, MSI stable, TMB low and was started on Keytruda where she felt the neuropathy became worse: pain over the fingers and hands, fatigue, not feeling well. She had progression on immunotherapy and was switched to Doxil which she tolerated better except for rashes over the body and mouth sores. After review at tumor board, consensus was trial of palliative gemcitabine D1, D8 every 3 weeks. CT interval imaging showed interval progression of right lower quadrant peritoneal metastases. She underwent palliative RT to RLQ peritoneal metastases with Dr Dc on 6/2019 (3750 dose).  [de-identified] : clear cell  [de-identified] : 1/25/2017- CA-125 = 186, CA 19-9 < 1 [de-identified] : DOS: 2/28/17\par Carboplatin and weekly Taxol 4/6/17- 6/1/17(three cycles)\par Carboplatin and Taxol q3w 6/29/17- 8/10/17( three cycles)\par Keytruda 7/9/18 -8/23/18 (3 cycles)\par Doxil: 8/2018 to 1/2019\par Foundation testing was BRCA negative \par gemcitabine 2/2019 to 5/9/19 \par topotecan 6/20/19 to present  [de-identified] : 6/27/19\par Day 8 Topotecan \par Notes more noted fatigue\par Feels like she has cough getting worse over past 2 days\par She was on a Z-PACK recently with PCP \par She notes some productive mucous\par Denies SOB/FUENTES\par Stable abdominal pain to the MID quadrant \par Denies current Urinary complaints\par No current change in Pain medications/ appetite stimulant * Marinol every 12 hours and metoclopramide 5 mg prior to meals)\par Some improvement with Reglan with intermittent nausea at times. \par

## 2019-08-06 PROBLEM — Z87.898 HISTORY OF FEVER: Status: RESOLVED | Noted: 2019-01-01 | Resolved: 2019-01-01

## 2019-08-06 PROBLEM — Z87.898 HISTORY OF DIARRHEA: Status: RESOLVED | Noted: 2018-11-08 | Resolved: 2019-01-01

## 2019-08-06 NOTE — REVIEW OF SYSTEMS
[Negative] : Musculoskeletal [Neuropathy] : neuropathy [Depression] : depression [Fatigue] : fatigue [FreeTextEntry3] : and anxiety

## 2019-08-06 NOTE — PHYSICAL EXAM
[Absent] : Adnexa(ae): Absent [Normal] : Recto-Vaginal Exam: Normal [Ambulatory and capable of all self care but unable to carry out any work activities] : Status 2- Ambulatory and capable of all self care but unable to carry out any work activities. Up and about more than 50% of waking hours [de-identified] : well healed lsc incisions, palpable mass in right lower abdomen, no palpable abdominal wall masses, no bulge

## 2019-08-06 NOTE — HISTORY OF PRESENT ILLNESS
[FreeTextEntry1] : IIB clear cell ovarian cancer. \par \par Initially operated on by her general gynecologist at [facility] where she underwent an ELAP (Pfan) and LSO demonstrating a clear cell ovarian cancer, ruptured. \par \par Returned to the OR on 2/28/17 for RTLH, RSO, omentectomy and staging. \par \par Clear cell ovarian cancer involving right adnexa and pelvic peritoneum. \par \par Adjuvant chemotherapy with Dr. Arreola consisting of \par CB and weekly Taxol 4/6/17 -6/1/17 (3)\par CB and Taxol q3w 6/29/17 - 8/10/17 (3)\par  \par  = 15 11/29/17\par CT A/P 12/16/17 with two new areas of enhancement in the lower rectus musculature and right rectus muscle. \par \par Underwent resection of abdominal wall nodules with biologic mesh reconstruction on 3/9/18. Pathology showed clear cell adenocarcinoma with negative margins. TPC consensus was for observation. \par \par Seen in the [facility] ED 6/17/18 after coming in with pain. CT imaging showed probable recurrence disease again in the anterior abdominal wall as well as a persistent abdominal wall collection. \par \par She has seen medical oncology to discuss treatment options. Foundation testing on the tumor showed low PD1/ PDL 1 expression and immunotherapy with Keytruda was started. \par \par Imaging after 3 cycles of Keytruda shows POD with increase in size of the right peritoneal nodule and right pelvic LN. No new sites of disease. No ascites. \par \par She has subsequently been treated with Doxil and Gemzar with progression.\par She received palliative radiation to a right pelvic mass for pain control. \par She is now receiving topotecan. \par \par Seeing Dr. Arellano and is on high doses of methadone and short acting narcotics as well as anxiolytics. Pain is better controlled lately. She has completed her health care proxy paperwork.

## 2019-08-07 NOTE — ED PROVIDER NOTE - CARE PLAN
Principal Discharge DX:	Right leg swelling Principal Discharge DX:	Right leg swelling  Secondary Diagnosis:	FUENTES (dyspnea on exertion)

## 2019-08-07 NOTE — ED PROVIDER NOTE - MDM PATIENT STATEMENT FOR ADDL TREATMENT
Pt remains in the hospital, pt discharge cancelled from yesterday due to pt having a MET called this AM, [place] will continue to follow.       01/23/19 1537   Discharge Reassessment   Assessment Type Discharge Planning Reassessment      Patient with one or more new problems requiring additional work-up/treatment.

## 2019-08-07 NOTE — ED ADULT NURSE NOTE - NSIMPLEMENTINTERV_GEN_ALL_ED
Implemented All Universal Safety Interventions:  [place] to call system. Call bell, personal items and telephone within reach. Instruct patient to call for assistance. Room bathroom lighting operational. Non-slip footwear when patient is off stretcher. Physically safe environment: no spills, clutter or unnecessary equipment. Stretcher in lowest position, wheels locked, appropriate side rails in place.

## 2019-08-07 NOTE — ED PROVIDER NOTE - OBJECTIVE STATEMENT
48F hx of bradycardia, vertigo, clear cell metastatic ovarian ca (rodolfo, newly on topetecan since june, prev trialled on carbotaxol, keytruda, doxal), tomorrow to have her last cycle of topetecan, and had a history of RT 6/19, c/o RLE swelling x 1 month, pain in the right leg, worse with exertion and  w/ dyspnea on exertion x 1 month, no new dysonea or chest pain.

## 2019-08-07 NOTE — ED ADULT TRIAGE NOTE - CHIEF COMPLAINT QUOTE
A&Ox3 c/o right swollen foot x five days bruising noted to right inner ankle, reports chills denies n/v/ cp pt actively receiving chemo PMH ovarian CA last dose 8/2,

## 2019-08-07 NOTE — ED PROVIDER NOTE - PROGRESS NOTE DETAILS
Meng PGY3: spoke w/ pt earlier, states dyspnea is only up stairs, ambulatory throughout ed without symptoms, duplex and ct scan and labs reviewed w/ pt, pt stable for dc, known hx of anemia

## 2019-08-07 NOTE — ED PROVIDER NOTE - ATTENDING CONTRIBUTION TO CARE
48F h/o Ov CA metastatic, new chemo in [place], since then RLE swelling, pain was worse today so decided to come in .  (+)FUENTES x 1 month.  No CP no fever / chills.  RLE swelling on exam.  Pt in pain on large doses of narcotics at home, methadone plus 16mg dilaudid q4.  Rx 2mg IV dilaudid for now and reass.  Check CTA and duplex, xray given eccymosis R ankle.  Wife in room reports that she had gotten lasix to try and reduce swelling, but RLE persists.    VS:  unremarkable    GEN - mild distress RLE pain, malaise; A+O x3   HEAD - NC/AT     ENT - PEERL, EOMI, mucous membranes dry , no discharge      NECK: Neck supple, non-tender without lymphadenopathy, no masses, no JVD  PULM - CTA b/l,  symmetric breath sounds  COR -  normal heart sounds    ABD - , ND, NT, soft,  BACK - no CVA tenderness, nontender spine     EXTREMS - (+)RLE edema and ecchymosis diffuse 2+ no deformity, warm and well perfused    SKIN - no rash or bruising    except RLE bruising.  NEUROLOGIC - alert, CN 2-12 intact, sensation nl, motor no focal deficit.

## 2019-08-07 NOTE — ED PROVIDER NOTE - CLINICAL SUMMARY MEDICAL DECISION MAKING FREE TEXT BOX
48F hx ovarian ca w/ right leg swelling > left leg swelling, improved w/ leg elevation, given malignancy, and reported dyspnea up stairs (though ongoing x 1 month_) will duplex and consider cta rule out pe, ekg unremarkable,

## 2019-08-07 NOTE — ED PROVIDER NOTE - NS ED ROS FT
Gen: No fever, normal appetite  Eyes: No eye irritation or discharge  ENT: No ear pain, congestion, sore throat  Resp: + dyspnea going up stairs   Cardiovascular: No chest pain or palpitation  Gastroenteric: No nausea/vomiting, diarrhea, constipation  :  No change in urine output; no dysuria  MS: No joint or muscle pain  Skin: No rashes  Neuro: No headache; no abnormal movements  Remainder negative, except as per the HPI

## 2019-08-07 NOTE — ED PROVIDER NOTE - NSFOLLOWUPINSTRUCTIONS_ED_ALL_ED_FT
You were seen today for right leg swelling. You did not have a blood clot in the right leg or in the lungs. You should return to the emergency room for chest pain, shortness of breath, dizziness, weakness or any new or worsening symptoms.   Your xrays were within normal.     Please continue your home pain medications.     Follow up with your primary care doctor within 5 days for a reassessment.

## 2019-08-07 NOTE — ED ADULT NURSE NOTE - OBJECTIVE STATEMENT
Pt received in rm 5, 48Y F Aox3, ambulatory c/o right ankle swelling and pain. Pmhx metastatic ovarian CA, vertigo and bradycardia. Pt reports started topetecan chemo in june. Pt recently has last cycle of chemo thursday and noticed right ankle swelling on saturday. Pt reports has had previous episodes of swelling in legs previously but has taken a water pill with relief. Pt reports swelling and pain has gotten worse endorsing ER visit. Upon arrival to room pt breathing even and unlabored, IV 20G Rt forearm placed, labs sent as ordered, pt medicated for pain as ordered. Pt appears in NAD, VS as charted, family at bedside, will continue to monitor.

## 2019-08-08 NOTE — ED ADULT NURSE REASSESSMENT NOTE - NS ED NURSE REASSESS COMMENT FT1
Received report from break JANE MICHAEL Pt Aox3, breathing even and unlabored, waiting on CT, family at bedside, appears in NAD, will continue ot monitor.

## 2019-08-08 NOTE — PATIENT PROFILE ADULT. - NS SC CAGE ALCOHOL ANNOYED YOU
2/2 UTI, dehydration, medication (macrobid)  treat underlying cause as below  PT eval when more stable no

## 2019-08-22 PROBLEM — I89.0 LYMPHEDEMA OF LIMB: Status: ACTIVE | Noted: 2019-01-01

## 2019-08-22 NOTE — ASSESSMENT
[Completed Healthcare Proxy] : completed healthcare proxy [______] : HCP: [unfilled] [FreeTextEntry1] : 48yoF with:\par \par 1. Ovarian cancer - currently on topotecan.  Follow up with Med Onc today.\par Prognosis is poor given refractory disease; patient and wife are aware of the terminal nature of her diagnosis but hopeful that treatment will extend life as long as possible. \par \par 2. Neoplasm-related pain/CIPN - Patient reports patient is not well controlled.  Increase methadone to 25mg TID. with hopes to be able to decrase hyrdomorphone usage, c/w PRN Hydromorphone 16mg. Will continue to address ability to wean.  C/w lidocaine patches. \par \par 3. Nausea - C/W reglan 5mg q8h PRN\par \par 4. Anxiety/depression - C/w Mirtazapine 15mg QHS. C/w Lorazepam 0.5mg TID PRN.  Recommend establishing care with psych-oncology.  Arranged initial visit with Dr. Xie.\par \par 5. Appetite loss- Patient may benefit from medical cannabis.  \par \par 6. Incontinence- Patient utilizes incontinence pads QHS.  \par \par 7. LE edema-  Increase furosemide to 60mg once a day with supplement.  Will refer to lymphedema clinic.\par \par 8. Encounter for Palliative Care - Emotional support provided to patient and her wife. \par HCP previously completed.  Primary- wife, Parvin Shetty ([phone]).  In regard to advance directives, patient states she would not want to have heroic/aggressive interventions done on her if she has "no hope" of recovery.  She has previously communicated this to her wife and other family. Will continue to re-address at follow-up.\par \par Patient advised to return to office in 1 month.  Instructed to call should any questions or issues arise.

## 2019-08-22 NOTE — HISTORY OF PRESENT ILLNESS
[Disease: _____________________] : Disease: [unfilled] [AJCC Stage: ____] : AJCC Stage: [unfilled] [de-identified] : Age: 47 y/o diagnosed ovarian cancer. \par Presented to the ED at [facility] on 1/25/17 with a complaints of abdominal pain. Had imaging that showed a suspicious pelvic mass. Taken emergently to the OR by general gyn out of concern for acute abdominal pain. Had a laparoscopy with conversion to ELAP via PFAN for left salpingo-oophorectomy. 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 1/31/2017- CT Abd/pelvis- no bowel obstruction (sent to the ED for evaluation of abdominal pain). Had been on Depo- provera for approximately 4 years, reportedly for management of endometriosis. Had a family history of ovarian cancer. On 2/28/17, she underwent RTLH, RSO, resection left adnexal remnant, omentectomy, P&PA LND, resection of pelvic nodules, staging . Final Pathology: Clear cell ovarian cancer, stage II B. She completed 6 cycles of carboplatin/ paclitaxel in 8/2017 and was in remission until 12/2017 which showed new foci in the rectus sheath. Foundation One sent and had PDL1 1%, MSI stable, TMB low and was started on Keytruda where she felt the neuropathy became worse: pain over the fingers and hands, fatigue, not feeling well. She had progression on immunotherapy and was switched to Doxil which she tolerated better except for rashes over the body and mouth sores. After review at tumor board, consensus was trial of palliative gemcitabine D1, D8 every 3 weeks. CT interval imaging showed interval progression of right lower quadrant peritoneal metastases. She underwent palliative RT to RLQ peritoneal metastases with Dr Dc on 6/2019 (3750 dose). She started on topotecan weekly treatment. CT abd/ pelvis done after C3 treatment and RT showed decrease in size of peritoneal implant and R rectus muscle lesion.  [de-identified] : clear cell  [de-identified] : 1/25/2017- CA-125 = 186, CA 19-9 < 1 [de-identified] : She feels her pain control currently is good. She denies any nausea or vomiting. She is eating: small amounts without vomiting. She has decreased appetite and using the medical marijuana. She has energy: is making sure she is not overwhelming herself: mowing lawn and then resting the next day. She feels her RLE swelling is still bothersome but improved. She is wearing compression stocking.  [de-identified] : DOS: 2/28/17\par Carboplatin and weekly Taxol 4/6/17- 6/1/17(three cycles)\par Carboplatin and Taxol q3w 6/29/17- 8/10/17( three cycles)\par Keytruda 7/9/18 -8/23/18 (3 cycles)\par Doxil: 8/2018 to 1/2019\par Foundation testing was BRCA negative \par gemcitabine 2/2019 to 5/9/19 \par topotecan 6/20/19 to present (counts were too low for D15 and topotecan changed to D1 and D8 every 3 weeks)

## 2019-08-22 NOTE — REASON FOR VISIT
[Follow-Up Visit] : a follow-up [Spouse] : spouse [FreeTextEntry2] : follow up for ovarian cancer on topotecan C4D1 treatment

## 2019-08-22 NOTE — REVIEW OF SYSTEMS
[Fever] : no fever [Night Sweats] : no night sweats [Chills] : no chills [Fatigue] : fatigue [Recent Change In Weight] : ~T no recent weight change [Chest Pain] : no chest pain [Palpitations] : no palpitations [Leg Claudication] : no intermittent leg claudication [Lower Ext Edema] : lower extremity edema [Vomiting] : no vomiting [Abdominal Pain] : abdominal pain [Constipation] : constipation [Diarrhea] : no diarrhea [Negative] : Allergic/Immunologic

## 2019-08-22 NOTE — DATA REVIEWED
[FreeTextEntry1] : \par EXAM: CT ABDOMEN AND PELVIS OC IC \par PROCEDURE DATE: 08/15/2019 \par \par INTERPRETATION: CLINICAL INFORMATION: Abdominal mass. History of ovarian \par cancer. \par \par COMPARISON: CT abdomen and pelvis dated 5/20/2019. \par \par PROCEDURE: \par CT of the Abdomen and Pelvis was performed with intravenous contrast. \par Intravenous contrast: 90 ml Omnipaque 350. 10 ml discarded. \par Oral contrast: positive contrast was administered. \par Sagittal and coronal reformats were performed. \par \par FINDINGS: \par \par LOWER CHEST: Within normal limits. \par \par LIVER: Within normal limits. \par BILE DUCTS: Normal caliber. \par GALLBLADDER: Within normal limits. \par SPLEEN: Within normal limits. \par PANCREAS: Within normal limits. \par ADRENALS: Within normal limits. \par KIDNEYS/URETERS: Within normal limits. \par \par BLADDER: Within normal limits. \par REPRODUCTIVE ORGANS: Status post hysterectomy. \par \par BOWEL: No bowel obstruction. Appendix is not visualized. \par PERITONEUM: Decrease in size of peritoneal soft tissue mass in the right \par lower quadrant, now measuring 5.9 x 3.9 cm (2, 80), previously measuring 6.9 \par x 4.9 cm. \par VESSELS: Within normal limits. \par RETROPERITONEUM/LYMPH NODES: Right external iliac node is unchanged in size \par from prior study, measuring 2.1 x 1.6 cm (2, 90). \par ABDOMINAL WALL: Mass in the right inferior rectus muscle measuring 5.5 x 3.3 \par cm (series 2 image 96) previously measuring 5.2 x 3.5 cm. \par \par Nodularity within the right rectus muscle in the midline measuring 2.1 x 1.8 \par (series 2 image 85), previously measuring 2.8 x 2.2 cm. \par BONES: Within normal limits. \par \par IMPRESSION: \par \par Peritoneal, external iliac and right rectus muscle tumor implants. The \par peritoneal implant and lesion in the right rectus muscle in the midline have \par decreased in size compared with the prior study. \par

## 2019-08-22 NOTE — HISTORY OF PRESENT ILLNESS
[FreeTextEntry1] : 48 F with ovarian cancer Stage IIb s/p p RTLH, RSO, resection left adnexal remnant, omentectomy, P&PA LND (2/28/17), s/p Carboplatin, Taxol with disease recurrence presents for follow up visit.  PMH also significant for vertigo, migraines.  Had POD on Pembrolizumab and on Doxil. Patient now under the care of Dr. Cole.  Patient received palliative RT to RLQ peritoneal metastases with Dr. Dc. Currently on topotecan therapy since last visit.\par \par Patient recently went to the ED complaining of LE pain.   DVT/PE negative.   CT scan showed lung nodule. \par CT A/P pending.  Discharged home.\par \par Patient's main complaint today is pain.  Continues to complain of RLQ abdominal pain and elbow pain as well.   Significant increase in LE pain accompanied with edema- trial of furosemide did decrease pain.   Continues to use compression stockings which has helped but minimally.  Patient's function improved since last visit. Patient continues to be complaining of pain in right lower abdomen, elbows, and knees.   Utilizing voltaren gel on joints with compression wrapping.  As per patient, abdominal pain is well controlled with current pain regimen. Currently rates pain at 4/10; which is tolerable to the patient.\par \par Current pain regimen:\par Methadone 25mg TID\par Hydromorphone 16mg q4h PRN   (3-4 times per day)\par \par ROS:\par +loss of appetite--supplementing with ensure x3 per day. \par +infrequent migraine headaches-- reports significant decrease once chemotherapy was stopped\par +nausea with first XRT treatment--controlled with reglan\par +constipation-- was controlled on lactulose but now required addition of movantiq.  Requires manual disimpaction at times\par +poor PO intake\par +anxiety/ outbursts- controlled with lorazepam 0.5mg TID\par \par \par Patient's disability has been approved. However, partial pension is being provided which concerns both patient and spouse in light of previous financial difficulties.  Meeting with financial aid.  Patient and spouse report financial difficulties at the time due the circumstances.    \par \par [employer] Ref#: [id number]

## 2019-08-22 NOTE — PHYSICAL EXAM
[General Appearance - Alert] : alert [General Appearance - In No Acute Distress] : in no acute distress [Sclera] : the sclera and conjunctiva were normal [Normal Oral Mucosa] : normal oral mucosa [Neck Appearance] : the appearance of the neck was normal [Auscultation Breath Sounds / Voice Sounds] : lungs were clear to auscultation bilaterally [Heart Rate And Rhythm] : heart rate was normal and rhythm regular [Heart Sounds] : normal S1 and S2 [Abdomen Soft] : soft [Bowel Sounds] : normal bowel sounds [Abdomen Tenderness] : non-tender [Oriented To Time, Place, And Person] : oriented to person, place, and time [No Focal Deficits] : no focal deficits [Affect] : the affect was normal [FreeTextEntry1] : +antalgic gait. +bilateral knee and elbow pain with movement, , no crepitus. no warmth or erythema

## 2019-08-22 NOTE — ASSESSMENT
[FreeTextEntry1] : She is a 47 y/o F with recurrent clear cell ovarian cancer that has progressed through 4th line chemotherapy and platinum resistant and is now on C4D1 treatment. We reviewed her CT of the abd/ pelvis which shows response to RT/ topotecan. We reviewed plan to continue with topotecan since she is tolerating therapy with expected fatigue. She will continue with pain control as per Dr Niels Hays and constipation prophylaxis for pain medications. We reviewed lymphedema RLE physical therapy at [facility] and Rx given along with information. Questions answered to her satisfaction. Next follow up C5 D8 treatment.

## 2019-08-22 NOTE — PHYSICAL EXAM
[Restricted in physically strenuous activity but ambulatory and able to carry out work of a light or sedentary nature] : Status 1- Restricted in physically strenuous activity but ambulatory and able to carry out work of a light or sedentary nature, e.g., light house work, office work [Ulcers] : no ulcers [Normal] : affect appropriate [de-identified] : wearing abdominal binder  [de-identified] : RLE > LLE edema

## 2019-08-28 NOTE — DISEASE MANAGEMENT
[Pathological] : TNM Stage: p [IIB] : IIB [TTNM] : 2b [NTNM] : 0 [MTNM] : 0 [de-identified] : 0021 [de-identified] : 0861 [de-identified] : right pelvis 68

## 2019-09-12 PROBLEM — R06.2 WHEEZING: Status: ACTIVE | Noted: 2019-01-01

## 2019-09-15 NOTE — H&P ADULT - PROBLEM SELECTOR PLAN 4
Normocytic anemia 2/2 active cancer and chemotherapy.  - CBC qD, transfuse if hb<7  - keep active T&S - Ativan 0.5mg PO TID prn for anxiety ([facility] #[id number])  - Mirtazapine 15mg qHS  - venlafaxine 50mg qD - hold Ativan 0.5mg PO TID prn for anxiety ([facility]OP #[id number]) given hypotension  - Mirtazapine 15mg qHS  - venlafaxine 50mg qD Severe abdominal pain 2/2 progression of disease based on CT A/P now with probable liver mets and disease progression in the RLQ. No evidence of colitis on imaging, hold off on stool studies for now  - methadone 25 mg PO TID  - IV Tylenol prn

## 2019-09-15 NOTE — ED ADULT NURSE REASSESSMENT NOTE - NS ED NURSE REASSESS COMMENT FT1
Received reports from AM RN, Pt on bed AOx3, PT noted on Hypotensive and Tian as noted on VS flowsheet, MD aware w/o further orders. reports weakness, Denies Nausea Vomiting Diaphoresis Palpitation Headache Dizziness CP will monitor

## 2019-09-15 NOTE — ED ADULT NURSE NOTE - OBJECTIVE STATEMENT
48 year old female with ovarian cancer presents with abdominal pain pts wife states she is not eating, and has been having abdominal pain for several days but did not want to come to the ED. Pt is currently receiving chemo last treatment of IV chemo on Thursday (has right CW port).   PIV placed labs drawn and sent pt medicated with dilaudid as ordered plan of care discussed with pt and wife. will continue to monitor closely

## 2019-09-15 NOTE — H&P ADULT - ASSESSMENT
48 YOF with PMHx of clear cell metastatic ovarian cancer (diagnosed in 2017, follows at [facility], on Topetecan since June, previously on Carbotaxol, Keytruda and s/p palliative RT to RLQ peritoneal mets with Dr. Dc on 6/2019) presenting with severe abdominal pain since yesterday concern for progression of disease

## 2019-09-15 NOTE — ED ADULT TRIAGE NOTE - CHIEF COMPLAINT QUOTE
Hx of terminal status ovarian cancer, last IV chemo on Thursday (has right CW port). Co severe abdominal pain since yesterday but didn't want to come to hospital because she doesn't want to be admitted. States pain is getting worse. On high dose pain meds at home which are not helping. Also co diarrhea since yesterday. Denies nausea or vomiting.

## 2019-09-15 NOTE — H&P ADULT - NSHPLABSRESULTS_GEN_ALL_CORE
7.8    7.56  )-----------( 295      ( 15 Sep 2019 14:50 )             27.3     09-15    136  |  98  |  10  ----------------------------<  132<H>  3.5   |  26  |  0.73    Ca    9.0      15 Sep 2019 14:50    TPro  7.5  /  Alb  3.9  /  TBili  1.1  /  DBili  x   /  AST  30  /  ALT  36<H>  /  AlkPhos  291<H>  09-15    INR= 1.56  Lipase- WNL  VBG- WNL    Urinalysis Basic - ( 15 Sep 2019 16:50 )    Color: YELLOW / Appearance: CLEAR / S.017 / pH: 6.0  Gluc: NEGATIVE / Ketone: NEGATIVE  / Bili: TRACE / Urobili: MODERATE   Blood: NEGATIVE / Protein: 20 / Nitrite: NEGATIVE   Leuk Esterase: NEGATIVE / RBC: 0-2 / WBC 0-2   Sq Epi: OCC / Non Sq Epi: x / Bacteria: NEGATIVE    CT Abdomen and Pelvis w/ IV Cont (09.15.19 @ 18:49)    IMPRESSION:     No convincing focal bowel wall thickening given history of diarrhea.    Compared to the recent study of August 15, 2019, there has been interval   development of vague ill-defined hepatic hypodensities measuring up to   1.3 cm near the dome concerning for progression of disease/hepatic   metastases.  Peritoneal, external iliac, right rectus muscle tumor implants. The   peritoneal implant has increased in size since the prior exam compatible   with progression of disease. Inflammatory change centered around the   right lower quadrant peritoneal implants which has a matted appearance   and appears is inseparable from adjacent small bowel and colonic loops.   No free air or discrete focal drainable collection.

## 2019-09-15 NOTE — ED PROVIDER NOTE - PROGRESS NOTE DETAILS
a sepsis reassessment was performed, pt received pain medications, blood pressure borderline, more ivf ordered, pain improved but requests more- discussed risks and benefits, and pt would like meds as still uncomfortable- discussed fentanyl as a shorter acting option, signed out to Dr. Garsia, pending CT and admission.

## 2019-09-15 NOTE — H&P ADULT - PROBLEM SELECTOR PLAN 9
Diet: Regular  DVT: Lovenox subQ    Gavino Reynaga MD PGY-2  Department of Internal Medicine  Pager: [phone] (NS) /[phone] (PATRICIA)

## 2019-09-15 NOTE — ED PROVIDER NOTE - OBJECTIVE STATEMENT
48F hx of bradycardia, vertigo, clear cell metastatic ovarian ca (rodolfo, newly on topetecan since june, prev trialled on carbotaxol, keytruda, doxal), presenting w/ abdominal pain, initially started on right side and now feels it on the left side, without associated fevers at home, without nausea or vomiting but notes some episodes of loose stool- states that she was constipated for a while and took lactulose so she had loose stool due to that, no hematochezia or melena. No chest pain, notes 48F hx of bradycardia, vertigo, clear cell metastatic ovarian ca (rodolfo, newly on topetecan since june, prev trialled on carbotaxol, keytruda, doxal), presenting w/ abdominal pain, initially started on right side and now feels it on the left side, ongoing x 1 day, without associated fevers at home, without nausea or vomiting but notes some episodes of loose stool- states that she was constipated for a while and took lactulose so she had loose stool due to that, no hematochezia or melena. No chest pain but notes sensation of sob 2/2 to abd pain. Normally takes methadone 3x/day and dilaudid 16mg po q6h and did take a dose of her dilaudid one hour prior to arrival.

## 2019-09-15 NOTE — H&P ADULT - PROBLEM SELECTOR PLAN 6
- prescribed lactulose 10g q6hrs prn History of bradycardia, asymptomatic.   - Will get EKG to evaluate for heart blocks Normocytic anemia 2/2 active cancer and chemotherapy.  - CBC qD, transfuse if hb<7  - keep active T&S

## 2019-09-15 NOTE — H&P ADULT - PROBLEM SELECTOR PLAN 1
Diagnosed with stage 2b clear cell metastatic ovarian carcinoma with treatment course as outlined in HPI. Severe abdominal pain 2/2 progression of disease based on CT A/P now with probable liver mets and disease progression in the RLQ.   - Heme onc c/s in the AM  - Takes methadone, Dilaudid for abdominal pain.  - metoclopramide 10mg PO TID prn for nausea Diagnosed with stage 2b clear cell metastatic ovarian carcinoma with treatment course as outlined in HPI. Severe abdominal pain 2/2 progression of disease based on CT A/P now with probable liver mets and disease progression in the RLQ.   - Heme onc c/s in the AM  - Takes methadone and Dilaudid for abdominal pain at home ([facility]OP #[id number])  - metoclopramide 10mg PO TID prn for nausea Diagnosed with stage 2b clear cell metastatic ovarian carcinoma with treatment course as outlined in HPI.   - Heme onc c/s in the AM  - c/w methadone but hold Dilaudid given hypotension ([facility]OP #[id number])  - metoclopramide 10mg PO TID prn for nausea Febrile to 100.3 F, . Unclear source however CT A/P suggestive of cystitis. U/a negative however patient with urinary frequency. Other possibilities are SBP however small free fluid on CT vs central venous catheter infection. Hypotension can be related to sepsis however can also be 2/2 decreased PO intake.  - would favor continuing with vanc/zosyn pending workup  - f/u UCx, BCx

## 2019-09-15 NOTE — H&P ADULT - PROBLEM SELECTOR PLAN 7
Diet: Regular  DVT: Lovenox subQ    Gavino Reynaga MD PGY-2  Department of Internal Medicine  Pager: [phone] (NS) /[phone] (PATRICIA) - prescribed lactulose 10g q6hrs prn History of bradycardia, asymptomatic.   - EKG: Sinus bradycardia  -likely exacerbated by midodrine use. Currently HD stable, continue to monitor

## 2019-09-15 NOTE — H&P ADULT - PROBLEM SELECTOR PLAN 2
Pt presenting with hypotension as low as 85/45 not responsive to 3L of NS. Unclear why hypotension, is not septic. Can be related to decreased PO intake.  - midodrine 5mg PO TID Febrile to 100.3 F, . Unclear source however CT A/P suggestive of cystitis. U/a negative however patient with urinary frequency. Other possibilities are SBP however small free fluid on CT vs central venous catheter infection. Hypotension can be related to sepsis however can also be 2/2 decreased PO intake.  - would favor continuing with vanc/zosyn   - f/u UCx, BCx -distributive shock in the setting of infection vs hypovolemic  -baseline SBPs in the 100s-110s. On chronic midodrine  -s/p 4L IVF. Hold off on additional fluids, encourage PO intake  -monitor VS q4h

## 2019-09-15 NOTE — ED PROVIDER NOTE - ATTENDING CONTRIBUTION TO CARE
I performed a face to face evaluation of this patient and performed a full history and physical examination on the patient.  I agree with the resident's history, physical examination, and plan of the patient.  Pt with ovarian cancer on chemotherapy and home pain medications with moderate to severe abd pain, sweats, possible fever, abd with diffuse ttp, some distention secondary to possible fluid, cancer, for labs, pain meds, ivf, antibiotics, ct and admission. lungs equal and clear, heart wnl, pt very uncomfortable, will need pain control, ct and admission.

## 2019-09-15 NOTE — H&P ADULT - PROBLEM SELECTOR PLAN 5
History of bradycardia, asymptomatic.   - Will get EKG to evaluate for heart blocks Normocytic anemia 2/2 active cancer and chemotherapy.  - CBC qD, transfuse if hb<7  - keep active T&S - hold Ativan 0.5mg PO TID prn for anxiety ([facility]OP #[id number]) given hypotension  - Mirtazapine 15mg qHS  - venlafaxine 50mg qD

## 2019-09-15 NOTE — ED PROVIDER NOTE - CLINICAL SUMMARY MEDICAL DECISION MAKING FREE TEXT BOX
Pt with ovarian cancer on chemotherapy and home pain medications with moderate to severe abd pain, sweats, possible fever, abd with diffuse ttp, some distention secondary to possible fluid, cancer, for labs, pain meds, ivf, antibiotics, ct and admission.

## 2019-09-15 NOTE — H&P ADULT - HISTORY OF PRESENT ILLNESS
48 YOF with PMHx of clear cell metastatic ovarian cancer (diagnosed in 2017, follows at [facility], on Topetecan since June, previously on Carbotaxol, Keytruda and s/p palliative RT to RLQ peritoneal mets with Dr. Dc on 6/2019) presenting with severe abdominal pain since yesterday. Pt describes the pain as very similar to the pain she had before but this time it was more diffuse (previously was only RLQ, now felt it everywhere). In addition, she had loose stools 2/2 taking lactulose since she was constipated. She also felt warm and had urinary frequency. Denies CP, SOB, n/v. No recent travel history. Wife at bedside.    VS:  97.9 F, HR 50, BP 85/45, RR 17, SpO2 98% on RA  Given Fentanyl 25m 48 YOF with PMHx of clear cell metastatic ovarian cancer (diagnosed in 2017, follows at [facility], on Topetecan since June, previously on Carbotaxol, Keytruda and s/p palliative RT to RLQ peritoneal mets with Dr. Dc on 6/2019) presenting with severe abdominal pain since yesterday. Pt describes the pain as very similar to the pain she had before but this time it was more diffuse (previously was only RLQ, now felt it everywhere). In addition, she had loose stools 2/2 taking lactulose since she was constipated. She also felt warm and had urinary frequency. Denies CP, SOB, n/v. No recent travel history. Wife at bedside.     As per allscripts:   DOS: 2/28/17  Carboplatin and weekly Taxol 4/6/17- 6/1/17(three cycles)  Carboplatin and Taxol q3w 6/29/17- 8/10/17( three cycles)  Keytruda 7/9/18 -8/23/18 (3 cycles)  Doxil: 8/2018 to 1/2019  Foundation testing was BRCA negative   gemcitabine 2/2019 to 5/9/19   topotecan 6/20/19 to present (counts were too low for D15 and topotecan changed to D1 and D8 every 3 weeks)     VS:  97.9 F, HR 50, BP 85/45, RR 17, SpO2 98% on RA  Given Fentanyl 25mg IVP x2, Dilaudid 1mg IVP x2, vanc 1g x1, zosyn 3.375g x1, Tylenol 650mg, 3L NS 48 YOF with PMHx of clear cell metastatic ovarian cancer (diagnosed in 2017, follows at [facility], on Topetecan since June, previously on Carbotaxol, Keytruda and s/p palliative RT to RLQ peritoneal mets with Dr. Dc on 6/2019) presenting with severe abdominal pain since yesterday. Pt describes the pain as very similar to the pain she had before but this time it was more diffuse (previously was only RLQ, now felt it everywhere). In addition, she had loose stools 2/2 taking lactulose since she was constipated. She also felt warm and had urinary frequency. Denies CP, SOB, n/v. No recent travel history. Wife at bedside.     As per allscripts:   DOS: 2/28/17  Carboplatin and weekly Taxol 4/6/17- 6/1/17(three cycles)  Carboplatin and Taxol q3w 6/29/17- 8/10/17( three cycles)  Keytruda 7/9/18 -8/23/18 (3 cycles)  Doxil: 8/2018 to 1/2019  Foundation testing was BRCA negative   gemcitabine 2/2019 to 5/9/19   topotecan 6/20/19 to present (counts were too low for D15 and topotecan changed to D1 and D8 every 3 weeks)     VS:  T max 100.3, HR 50, BP 85/45, RR 17, SpO2 98% on RA  Given Fentanyl 25mg IVP x2, Dilaudid 1mg IVP x2, vanc 1g x1, zosyn 3.375g x1, Tylenol 650mg, 3L NS

## 2019-09-15 NOTE — H&P ADULT - NSHPREVIEWOFSYSTEMS_GEN_ALL_CORE
CONSTITUTIONAL: No weakness, fevers or chills  EYES/ENT: No visual changes;  No vertigo or throat pain   NECK: No pain or stiffness  RESPIRATORY: No cough, wheezing, hemoptysis; No shortness of breath  CARDIOVASCULAR: No chest pain or palpitations  GASTROINTESTINAL: + abdominal pain. No nausea, vomiting, or hematemesis; No diarrhea or constipation. No melena or hematochezia.  GENITOURINARY: + frequency, No dysuria,   NEUROLOGICAL: No numbness or weakness  SKIN: No itching, rashes

## 2019-09-15 NOTE — H&P ADULT - PROBLEM SELECTOR PLAN 3
- Ativan 0.5mg PO TID prn for anxiety  - Mirtazapine 15mg qHS  - venlafaxine 50mg qD - Ativan 0.5mg PO TID prn for anxiety ([facility]OP #[id number])  - Mirtazapine 15mg qHS  - venlafaxine 50mg qD Severe abdominal pain 2/2 progression of disease based on CT A/P now with probable liver mets and disease progression in the RLQ.   - methadone 25 mg PO TID  - IV Tylenol prn Diagnosed with stage 2b clear cell metastatic ovarian carcinoma with treatment course as outlined in HPI.   - Heme onc c/s in the AM  - c/w methadone but hold Dilaudid given hypotension ([facility]OP #[id number])  - metoclopramide 10mg PO TID prn for nausea

## 2019-09-16 NOTE — CONSULT NOTE ADULT - SUBJECTIVE AND OBJECTIVE BOX
HPI: Obtained from H&P   48 YOF with PMHx of clear cell metastatic ovarian cancer (diagnosed in , follows at [facility], on Topetecan since , previously on Carbotaxol, Keytruda and s/p palliative RT to RLQ peritoneal mets with Dr. Dc on 2019) presenting with severe abdominal pain since yesterday. Pt describes the pain as very similar to the pain she had before but this time it was more diffuse (previously was only RLQ, now felt it everywhere). In addition, she had loose stools 2/2 taking lactulose since she was constipated. She also felt warm and had urinary frequency. Denies CP, SOB, n/v. No recent travel history. Wife at bedside.     As per allscripts:   DOS: 17  Carboplatin and weekly Taxol 17- 17(three cycles)  Carboplatin and Taxol q3w 17- 8/10/17( three cycles)  Keytruda 18 -18 (3 cycles)  Doxil: 2018 to 2019  Foundation testing was BRCA negative   gemcitabine 2019 to 19   topotecan 19 to present (counts were too low for D15 and topotecan changed to D1 and D8 every 3 weeks)     VS:  T max 100.3, HR 50, BP 85/45, RR 17, SpO2 98% on RA  Given Fentanyl 25mg IVP x2, Dilaudid 1mg IVP x2, vanc 1g x1, zosyn 3.375g x1, Tylenol 650mg, 3L NS (15 Sep 2019 22:58)    PERTINENT PM/SXH:   Anxiety  Vertigo  Ovarian cyst  Ovarian cancer  Deviated Nasal Septum    Port-a-cath in place  H/O right breast biopsy  H/O: hysterectomy  S/P unilateral salpingo-oophorectomy  History of Tonsillectomy    FAMILY HISTORY:  Family history of ovarian cancer (Grandparent)  Family history of prostate cancer in father      SOCIAL HISTORY:   Significant other/partner: Yes [x ]  No [ ] Children:  Yes [ ]  No [ x] Nondenominational/Spirituality: Mormon   Substance hx: Yes[ ]  No [x ]   Tobacco hx:  Yes [ ] No [x ]   Alcohol hx: Yes [ ] No [x ]   Home Opioid hx:  Yes [x ]   Living Situation: [x ]Home  [ ]Long term care  [ ]Rehab [ ]Other    ADVANCE DIRECTIVES:  Full Code     [x ] Health Care Proxy(s)  [ ] Surrogate(s)  [ ] Guardian           Name(s): Phone Number(s): curtis Shetty [phone]    BASELINE (I)ADL(s) (prior to admission):  Labette: [ x]Total  [ ] Moderate [ ]Dependent    Allergies    No Known Allergies    Intolerances    MEDICATIONS  (STANDING):  ascorbic acid 500 milliGRAM(s) Oral daily  cholecalciferol 400 Unit(s) Oral daily  enoxaparin Injectable 40 milliGRAM(s) SubCutaneous daily  ferrous    sulfate 325 milliGRAM(s) Oral daily  HYDROmorphone PCA (5 mG/mL) 30 milliLiter(s) PCA Continuous PCA Continuous  lidocaine   Patch 2 Patch Transdermal daily  methadone    Tablet 25 milliGRAM(s) Oral every 8 hours  midodrine. 5 milliGRAM(s) Oral three times a day  mirtazapine 15 milliGRAM(s) Oral at bedtime  piperacillin/tazobactam IVPB.. 3.375 Gram(s) IV Intermittent every 8 hours  vancomycin  IVPB 1000 milliGRAM(s) IV Intermittent every 12 hours  venlafaxine 50 milliGRAM(s) Oral daily    MEDICATIONS  (PRN):  acetaminophen   Tablet .. 650 milliGRAM(s) Oral every 4 hours PRN Temp greater or equal to 38C (100.4F), Mild Pain (1 - 3)  HYDROmorphone  Injectable 3 milliGRAM(s) IV Push every 2 hours PRN moderate and severe pain  lactulose Syrup 10 Gram(s) Oral every 4 hours PRN Constipation  LORazepam     Tablet 0.5 milliGRAM(s) Oral three times a day PRN Anxiety  metoclopramide 10 milliGRAM(s) Oral three times a day PRN Nausea  naloxone Injectable 0.1 milliGRAM(s) IV Push every 3 minutes PRN For ANY of the following changes in patient status:  A. RR LESS THAN 10 breaths per minute, B. Oxygen saturation LESS THAN 90%, C. Sedation score of 6    PRESENT SYMPTOMS: [ ]Unable to obtain due to poor mentation   Source if other than patient:  [ ]Family   [ ]Team     Pain (Impact on QOL):  Patient with generalized abdominal pain - currently 10/10 - sharp/throbbing - aggravated by movement and moderately improved with opioids - pain is acute on chronic.     PAIN AD Score:     [email or web address] (press ctrl +  left click to view)    Dyspnea:  Yes [ ] No [x ] - [ ]Mild [ ]Moderate [ ]Severe  Anxiety:    Yes [ ] No [ x] - [ ]Mild [ ]Moderate [ ]Severe  Fatigue:    Yes [x ] No [ ] - [ x]Mild [ ]Moderate [ ]Severe  Nausea:    Yes [ ] No [ x] - [ ]Mild [ ]Moderate [ ]Severe                         Loss of appetite: Yes [x ] No [ ] - [x ]Mild [ ]Moderate [ ]Severe             Constipation:  Yes [ ] No [x ] - [ ]Mild [ ]Moderate [ ]Severe  Grief: Yes [x ] No [ ]     Other Symptoms:  [ x]All other review of systems negative     Karnofsky Performance Score/Palliative Performance Status Version 2:   80%    http://palliative.info/resource_material/[email or web address].pdf    PHYSICAL EXAM:  Vital Signs Last 24 Hrs  T(C): 36.4 (16 Sep 2019 08:30), Max: 36.7 (16 Sep 2019 02:27)  T(F): 97.6 (16 Sep 2019 08:30), Max: 98 (16 Sep 2019 02:27)  HR: 68 (16 Sep 2019 11:45) (50 - 68)  BP: 112/53 (16 Sep 2019 11:45) (85/45 - 112/53)  BP(mean): 63 (15 Sep 2019 22:44) (63 - 63)  RR: 20 (16 Sep 2019 11:45) (16 - 20)  SpO2: 100% (16 Sep 2019 11:45) (96% - 100%) I&O's Summary    15 Sep 2019 07:  -  16 Sep 2019 07:00  --------------------------------------------------------  IN: 0 mL / OUT: 0 mL / NET: 0 mL    16 Sep 2019 07:01  -  16 Sep 2019 16:08  --------------------------------------------------------  IN: 490 mL / OUT: 250 mL / NET: 240 mL        GENERAL:  [x ]Alert  [x ]Oriented x 4   [ ]Lethargic  [ ]Cachexia  [ ]Unarousable  [x ]Verbal  [ ]Non-Verbal  PULMONARY:   [x ]Clear - anteriorly   CARDIOVASCULAR:    [x]Regular [ ]Irregular [ ]Tachy  [ ]Tian [ ]Murmur [ ]Other  GASTROINTESTINAL:  [x ]Soft  [ x]Distended   [ x]+BS  [ ]Non tender [x ]Tender - diffuse mild   [ ]PEG [ ]OGT/ NGT  Last BM: 19    GENITOURINARY:  [x ]Normal [ ] Incontinent   [ ]Oliguria/Anuria   [ ]Valentine  MUSCULOSKELETAL:   [ ]Normal   [x ]Weakness  [ ]Bed/Wheelchair bound [ ]Edema  NEUROLOGIC:   [x ]No focal deficits  [ ] Cognitive impairment  [ ] Dysphagia [ ]Dysarthria [ ] Paresis [ ]Other   SKIN:   [x ]Normal   [ ]Pressure ulcer(s)  [ ]Rash    LABS:                        6.8    4.97  )-----------( 171      ( 16 Sep 2019 10:00 )             23.9   -    141  |  102  |  7   ----------------------------<  94  3.5   |  26  |  0.68    Ca    8.8      16 Sep 2019 10:00  Phos  2.7       Mg     2.1         TPro  6.2  /  Alb  3.2<L>  /  TBili  0.5  /  DBili  x   /  AST  21  /  ALT  28  /  AlkPhos  224<H>    PT/INR - ( 15 Sep 2019 14:50 )   PT: 17.5 SEC;   INR: 1.56          PTT - ( 15 Sep 2019 14:50 )  PTT:31.7 SEC    Urinalysis Basic - ( 15 Sep 2019 16:50 )    Color: YELLOW / Appearance: CLEAR / S.017 / pH: 6.0  Gluc: NEGATIVE / Ketone: NEGATIVE  / Bili: TRACE / Urobili: MODERATE   Blood: NEGATIVE / Protein: 20 / Nitrite: NEGATIVE   Leuk Esterase: NEGATIVE / RBC: 0-2 / WBC 0-2   Sq Epi: OCC / Non Sq Epi: x / Bacteria: NEGATIVE      RADIOLOGY & ADDITIONAL STUDIES: Reviewed  CT abdomen  IMPRESSION:     No convincing focal bowel wall thickening given history of diarrhea.    Compared to the recent study of August 15, 2019, there has been interval   development of vague ill-defined hepatic hypodensities measuring up to   1.3 cm near the dome concering for progression of disease/hepatic   metastases.  Peritoneal, external iliac, right rectus muscle tumor implants. The   peritoneal implant has increased in size since the prior exam compatible   with progression of disease. Inflammatory change centered around the   right lower quadrant peritoneal implants which has a matted appearance   and appears is inseparable from adjacent small bowel and colonic loops.   No free air or discrete focal drainable collection.    Likely cystitis. Recommend correlation with urinalysis.    PROTEIN CALORIE MALNUTRITION PRESENT: [ ] Yes [ ] No  [ ] PPSV2 < or = to 30% [ ] significant weight loss  [ ] poor nutritional intake [ ] catabolic state [ ] anasarca     Albumin, Serum: 3.2 g/dL (19 @ 10:00)      REFERRALS:   [ ]Chaplaincy  [ ] Hospice  [ ]Child Life  [ ]Social Work  [ ]Case management [ ]Holistic Therapy   Goals of Care Discussion Document:

## 2019-09-16 NOTE — PROGRESS NOTE ADULT - PROBLEM SELECTOR PLAN 2
-resolved   -baseline SBPs in the 100s-110s. On chronic midodrine  -s/p 4L IVF. Hold off on additional fluids, encourage PO intake  -monitor VS q4h

## 2019-09-16 NOTE — CONSULT NOTE ADULT - PROBLEM SELECTOR RECOMMENDATION 9
As above.  Known to palliative service.  Patient of Dr. Arellano as an outpatient on Methadone 25mg PO TID with Dilaudid 16mg PO q4h PRN. Patient received Dilaudid 3mg IV earlier today with minimal relief.  Discussed with Dr. Arellano.  Plan for Dilaudid PCA.  Dilaudid - continuous 0.5mg/hr, demand 3mg q 30min PRN.  Education regarding appropriate PCA usage provided to patient and wife at bedside.  verbalized understanding.  Bowel regimen.  please page [phone] for any questions or concerns. As above.  Known to palliative service.  Patient of Dr. Arellano as an outpatient on Methadone 25mg PO TID with Dilaudid 16mg PO q4h PRN. Patient received Dilaudid 3mg IV earlier today with minimal relief.  Discussed with Dr. Arellano.  Plan for Dilaudid PCA.  Dilaudid - continuous 0.5mg/hr, demand 3mg q 30min PRN.  Education regarding appropriate PCA usage provided to patient and wife at bedside.  verbalized understanding.  Bowel regimen.  please page [phone] for any questions or concerns.  Patient states she takes Lactulose PRN at home and does not use a standing bowel regimen - reports regular bowel movements q2-3 days.  Education provided on opioid induced constipation.  lactulose PRN in place. No

## 2019-09-16 NOTE — PROGRESS NOTE ADULT - PROBLEM SELECTOR PLAN 9
Diet: Regular  DVT: Lovenox subQ    Gavino Reynaga MD PGY-2  Department of Internal Medicine  Pager: [phone] (NS) /[phone] (PATRICIA)

## 2019-09-16 NOTE — CONSULT NOTE ADULT - PROBLEM SELECTOR RECOMMENDATION 4
Psychosocial support provided to patient and wife.  Plan for DC home with PCA and follow-up with Dr. Arellano and Dr. Cole.

## 2019-09-16 NOTE — CONSULT NOTE ADULT - ATTENDING COMMENTS
Pt seen with NP.  Agree with above.  Continue methadone 25mg tid  and start dilaudid pca 0.5mg/hr with 3mg Q 30 min prn.  Bowel regimen.  Goal to follow up with Dr. Cole as outpatient.  Overall prognosis is poor.  Wife at bedside.  Having difficult time accepting.

## 2019-09-16 NOTE — CONSULT NOTE ADULT - ASSESSMENT
48 YOF with PMHx of clear cell metastatic ovarian cancer (diagnosed in 2017, follows at [facility], on Topetecan since June, previously on Carbotaxol, Keytruda and s/p palliative RT to RLQ peritoneal mets with Dr. Dc on 6/2019) presenting with severe abdominal pain since yesterday.  Palliative consulted for pain management.

## 2019-09-16 NOTE — PROGRESS NOTE ADULT - ASSESSMENT
48 YOF with PMHx of clear cell metastatic ovarian cancer (diagnosed in 2017, follows at [facility], on Topetecan since June, previously on Carbotaxol, Keytruda and s/p palliative RT to RLQ peritoneal mets with Dr. Dc on 6/2019) presenting with severe abdominal pain since yesterday concern for progression of disease

## 2019-09-16 NOTE — CONSULT NOTE ADULT - PROBLEM SELECTOR RECOMMENDATION 3
Patient of Dr. Cole - receiving disease modifying treatment as an outpatient.  Will follow-up with Dr. Cole upon discharge.

## 2019-09-16 NOTE — PROGRESS NOTE ADULT - SUBJECTIVE AND OBJECTIVE BOX
Patient is a 48y old  Female who presents with a chief complaint of Abdominal pain (16 Sep 2019 13:39)        SUBJECTIVE / OVERNIGHT EVENTS:    pt's wife Parvin at bedside  pt expressing 10/10 abdominal pain  denies other complaints      MEDICATIONS  (STANDING):  ascorbic acid 500 milliGRAM(s) Oral daily  cholecalciferol 400 Unit(s) Oral daily  enoxaparin Injectable 40 milliGRAM(s) SubCutaneous daily  ferrous    sulfate 325 milliGRAM(s) Oral daily  lidocaine   Patch 2 Patch Transdermal daily  methadone    Tablet 25 milliGRAM(s) Oral every 8 hours  midodrine. 5 milliGRAM(s) Oral three times a day  mirtazapine 15 milliGRAM(s) Oral at bedtime  piperacillin/tazobactam IVPB.. 3.375 Gram(s) IV Intermittent every 8 hours  vancomycin  IVPB 1000 milliGRAM(s) IV Intermittent every 12 hours  venlafaxine 50 milliGRAM(s) Oral daily    MEDICATIONS  (PRN):  acetaminophen   Tablet .. 650 milliGRAM(s) Oral every 4 hours PRN Temp greater or equal to 38C (100.4F), Mild Pain (1 - 3)  HYDROmorphone  Injectable 3 milliGRAM(s) IV Push every 2 hours PRN moderate and severe pain  lactulose Syrup 10 Gram(s) Oral every 4 hours PRN Constipation  metoclopramide 10 milliGRAM(s) Oral three times a day PRN Nausea      Vital Signs Last 24 Hrs  T(C): 36.4 (16 Sep 2019 08:30), Max: 37.9 (15 Sep 2019 15:10)  T(F): 97.6 (16 Sep 2019 08:30), Max: 100.3 (15 Sep 2019 15:10)  HR: 68 (16 Sep 2019 11:45) (50 - 80)  BP: 112/53 (16 Sep 2019 11:45) (85/45 - 112/53)  BP(mean): 63 (15 Sep 2019 22:44) (63 - 63)  RR: 20 (16 Sep 2019 11:45) (16 - 20)  SpO2: 100% (16 Sep 2019 11:45) (96% - 100%)  CAPILLARY BLOOD GLUCOSE        I&O's Summary    15 Sep 2019 07:01  -  16 Sep 2019 07:00  --------------------------------------------------------  IN: 0 mL / OUT: 0 mL / NET: 0 mL    16 Sep 2019 07:01  -  16 Sep 2019 14:09  --------------------------------------------------------  IN: 490 mL / OUT: 250 mL / NET: 240 mL          PHYSICAL EXAM  GENERAL: Chronically ill-appearing  CHEST/LUNG: Decreased BS b/l lower lung fields; No wheeze/rhonchi/rales   HEART: Bradycardic, normal S1 S2,  No murmurs, rubs, or gallops  ABDOMEN: diffusely tender to palpation, no rebound, no guarding   EXTREMITIES:  2+ Peripheral Pulses, No clubbing, cyanosis, or edema  NEUROLOGY: non-focal      LABS:                        6.8    4.97  )-----------( 171      ( 16 Sep 2019 10:00 )             23.9         141  |  102  |  7   ----------------------------<  94  3.5   |  26  |  0.68    Ca    8.8      16 Sep 2019 10:00  Phos  2.7       Mg     2.1         TPro  6.2  /  Alb  3.2<L>  /  TBili  0.5  /  DBili  x   /  AST  21  /  ALT  28  /  AlkPhos  224<H>      PT/INR - ( 15 Sep 2019 14:50 )   PT: 17.5 SEC;   INR: 1.56          PTT - ( 15 Sep 2019 14:50 )  PTT:31.7 SEC      Urinalysis Basic - ( 15 Sep 2019 16:50 )    Color: YELLOW / Appearance: CLEAR / S.017 / pH: 6.0  Gluc: NEGATIVE / Ketone: NEGATIVE  / Bili: TRACE / Urobili: MODERATE   Blood: NEGATIVE / Protein: 20 / Nitrite: NEGATIVE   Leuk Esterase: NEGATIVE / RBC: 0-2 / WBC 0-2   Sq Epi: OCC / Non Sq Epi: x / Bacteria: NEGATIVE        RADIOLOGY & ADDITIONAL TESTS:    Imaging Personally Reviewed:  Consultant(s) Notes Reviewed:    Care Discussed with Consultants/Other Providers:

## 2019-09-16 NOTE — CONSULT NOTE ADULT - SUBJECTIVE AND OBJECTIVE BOX
Patient is a 48y old  Female who presents with a chief complaint of Abdominal pain (15 Sep 2019 22:58)      HPI:  48f with metastatic clear cell metastatic ovarian cancer (diagnosed in 2017, follows at [facility], on Topetecan since June, previously on Carbotaxol, Keytruda and s/p palliative RT to RLQ peritoneal mets with Dr. Dc on 6/2019) presenting with severe abdominal pain since yesterday. Pt describes the pain as very similar to the pain she had before but this time it was more diffuse (previously was only RLQ, now felt it everywhere). In addition, she had loose stools 2/2 taking lactulose since she was constipated. She also felt warm and had urinary frequency. Denies CP, SOB, n/v. No recent travel history. Wife at bedside.     ROS: as above     PAST MEDICAL & SURGICAL HISTORY:  Anxiety  Vertigo  Ovarian cyst: (L) 1/2017  Ovarian cancer  Deviated Nasal Septum  Port-a-cath in place: Right 4/2017  H/O right breast biopsy: 2015  H/O: hysterectomy: robotic total laparoscopic hysterectomy, right salpingo oophorectomy 2/2017  S/P unilateral salpingo-oophorectomy: 1/25/17 (L)  History of Tonsillectomy      SOCIAL HISTORY:    FAMILY HISTORY:  Family history of ovarian cancer (Grandparent)  Family history of prostate cancer in father      MEDICATIONS  (STANDING):  ascorbic acid 500 milliGRAM(s) Oral daily  cholecalciferol 400 Unit(s) Oral daily  enoxaparin Injectable 40 milliGRAM(s) SubCutaneous daily  ferrous    sulfate 325 milliGRAM(s) Oral daily  lidocaine   Patch 2 Patch Transdermal daily  methadone    Tablet 25 milliGRAM(s) Oral every 8 hours  midodrine. 5 milliGRAM(s) Oral three times a day  mirtazapine 15 milliGRAM(s) Oral at bedtime  piperacillin/tazobactam IVPB.. 3.375 Gram(s) IV Intermittent every 8 hours  vancomycin  IVPB 1000 milliGRAM(s) IV Intermittent every 12 hours  venlafaxine 50 milliGRAM(s) Oral daily    MEDICATIONS  (PRN):  acetaminophen   Tablet .. 650 milliGRAM(s) Oral every 4 hours PRN Temp greater or equal to 38C (100.4F), Mild Pain (1 - 3)  HYDROmorphone  Injectable 3 milliGRAM(s) IV Push every 2 hours PRN moderate and severe pain  lactulose Syrup 10 Gram(s) Oral every 4 hours PRN Constipation  metoclopramide 10 milliGRAM(s) Oral three times a day PRN Nausea      Allergies    No Known Allergies    Intolerances        Vital Signs Last 24 Hrs  T(C): 36.4 (16 Sep 2019 08:30), Max: 37.9 (15 Sep 2019 15:10)  T(F): 97.6 (16 Sep 2019 08:30), Max: 100.3 (15 Sep 2019 15:10)  HR: 68 (16 Sep 2019 11:45) (50 - 101)  BP: 112/53 (16 Sep 2019 11:45) (85/45 - 116/70)  BP(mean): 63 (15 Sep 2019 22:44) (63 - 63)  RR: 20 (16 Sep 2019 11:45) (16 - 20)  SpO2: 100% (16 Sep 2019 11:45) (96% - 100%)    PHYSICAL EXAM  General: adult in NAD  HEENT: clear oropharynx, anicteric sclera, pink conjunctiva  Neck: supple  CV: normal S1/S2 with no murmur rubs or gallops  Lungs: positive air movement b/l ant lungs, clear to auscultation, no wheezes, no rales  Abdomen: soft non-tender non-distended, no hepatosplenomegaly  Ext: no clubbing cyanosis or edema  Skin: no rashes and no petechiae  Neuro: alert and oriented X 3, none focal    LABS:                          6.8    4.97  )-----------( 171      ( 16 Sep 2019 10:00 )             23.9         Mean Cell Volume : 91.2 fL  Mean Cell Hemoglobin : 26.0 pg  Mean Cell Hemoglobin Concentration : 28.5 %  Auto Neutrophil # : 4.24 K/uL  Auto Lymphocyte # : 0.36 K/uL  Auto Monocyte # : 0.25 K/uL  Auto Eosinophil # : 0.07 K/uL  Auto Basophil # : 0.03 K/uL  Auto Neutrophil % : 85.4 %  Auto Lymphocyte % : 7.2 %  Auto Monocyte % : 5.0 %  Auto Eosinophil % : 1.4 %  Auto Basophil % : 0.6 %      Serial CBC's  09-16 @ 10:00  Hct-23.9 / Hgb-6.8 / Plat-171 / RBC-2.62 / WBC-4.97  Serial CBC's  09-15 @ 14:50  Hct-27.3 / Hgb-7.8 / Plat-295 / RBC-3.02 / WBC-7.56  Serial CBC's  09-12 @ 15:50  Hct-26.5 / Hgb-8.3 / Plat-231 / RBC-3.13 / WBC-6.1      09-16    141  |  102  |  7   ----------------------------<  94  3.5   |  26  |  0.68    Ca    8.8      16 Sep 2019 10:00  Phos  2.7     09-16  Mg     2.1     09-16    TPro  6.2  /  Alb  3.2<L>  /  TBili  0.5  /  DBili  x   /  AST  21  /  ALT  28  /  AlkPhos  224<H>  09-16      PT/INR - ( 15 Sep 2019 14:50 )   PT: 17.5 SEC;   INR: 1.56          PTT - ( 15 Sep 2019 14:50 )  PTT:31.7 SEC          RADIOLOGY & ADDITIONAL STUDIES:  IMPRESSION:     No convincing focal bowel wall thickening given history of diarrhea.    Compared to the recent study of August 15, 2019, there has been interval   development of vague ill-defined hepatic hypodensities measuring up to   1.3 cm near the dome concering for progression of disease/hepatic   metastases.  Peritoneal, external iliac, right rectus muscle tumor implants. The   peritoneal implant has increased in size since the prior exam compatible   with progression of disease. Inflammatory change centered around the   right lower quadrant peritoneal implants which has a matted appearance   and appears is inseparable from adjacent small bowel and colonic loops.   No free air or discrete focal drainable collection.    Likely cystitis. Recommend correlation with urinalysis. Patient is a 48y old  Female who presents with a chief complaint of Abdominal pain (15 Sep 2019 22:58)      HPI:  48f with metastatic clear cell metastatic ovarian cancer (diagnosed in 2017, follows at [facility], currently on Topetecan since June 2019, previously on Carbo/taxol, Keytruda, doxil, gemcitabine with progression of disease, and s/p palliative RT to RLQ peritoneal mets with Dr. Dc on 6/2019) presenting with severe abdominal pain since yesterday. Pt describes the pain as very similar to the pain she had before but this time it was more diffuse (previously was only RLQ, now felt it everywhere). In addition, she had loose stools 2/2 taking lactulose since she was constipated. She also felt warm and had urinary frequency. Denies CP, SOB, n/v. No recent travel history. Wife at bedside.     ROS: as above     PAST MEDICAL & SURGICAL HISTORY:  Anxiety  Vertigo  Ovarian cyst: (L) 1/2017  Ovarian cancer  Deviated Nasal Septum  Port-a-cath in place: Right 4/2017  H/O right breast biopsy: 2015  H/O: hysterectomy: robotic total laparoscopic hysterectomy, right salpingo oophorectomy 2/2017  S/P unilateral salpingo-oophorectomy: 1/25/17 (L)  History of Tonsillectomy      SOCIAL HISTORY:    FAMILY HISTORY:  Family history of ovarian cancer (Grandparent)  Family history of prostate cancer in father      MEDICATIONS  (STANDING):  ascorbic acid 500 milliGRAM(s) Oral daily  cholecalciferol 400 Unit(s) Oral daily  enoxaparin Injectable 40 milliGRAM(s) SubCutaneous daily  ferrous    sulfate 325 milliGRAM(s) Oral daily  lidocaine   Patch 2 Patch Transdermal daily  methadone    Tablet 25 milliGRAM(s) Oral every 8 hours  midodrine. 5 milliGRAM(s) Oral three times a day  mirtazapine 15 milliGRAM(s) Oral at bedtime  piperacillin/tazobactam IVPB.. 3.375 Gram(s) IV Intermittent every 8 hours  vancomycin  IVPB 1000 milliGRAM(s) IV Intermittent every 12 hours  venlafaxine 50 milliGRAM(s) Oral daily    MEDICATIONS  (PRN):  acetaminophen   Tablet .. 650 milliGRAM(s) Oral every 4 hours PRN Temp greater or equal to 38C (100.4F), Mild Pain (1 - 3)  HYDROmorphone  Injectable 3 milliGRAM(s) IV Push every 2 hours PRN moderate and severe pain  lactulose Syrup 10 Gram(s) Oral every 4 hours PRN Constipation  metoclopramide 10 milliGRAM(s) Oral three times a day PRN Nausea      Allergies    No Known Allergies    Intolerances        Vital Signs Last 24 Hrs  T(C): 36.4 (16 Sep 2019 08:30), Max: 37.9 (15 Sep 2019 15:10)  T(F): 97.6 (16 Sep 2019 08:30), Max: 100.3 (15 Sep 2019 15:10)  HR: 68 (16 Sep 2019 11:45) (50 - 101)  BP: 112/53 (16 Sep 2019 11:45) (85/45 - 116/70)  BP(mean): 63 (15 Sep 2019 22:44) (63 - 63)  RR: 20 (16 Sep 2019 11:45) (16 - 20)  SpO2: 100% (16 Sep 2019 11:45) (96% - 100%)    PHYSICAL EXAM  General: adult in NAD. Pale  HEENT: clear oropharynx, anicteric sclera, pink conjunctiva  Neck: supple  CV: normal S1/S2 with no murmur rubs or gallops  Lungs: positive air movement b/l ant lungs, clear to auscultation, no wheezes, no rales  Abdomen: soft non-tender non-distended, no hepatosplenomegaly  Ext: no clubbing cyanosis or edema  Skin: no rashes and no petechiae  Neuro: alert and oriented X 3, none focal    LABS:                          6.8    4.97  )-----------( 171      ( 16 Sep 2019 10:00 )             23.9         Mean Cell Volume : 91.2 fL  Mean Cell Hemoglobin : 26.0 pg  Mean Cell Hemoglobin Concentration : 28.5 %  Auto Neutrophil # : 4.24 K/uL  Auto Lymphocyte # : 0.36 K/uL  Auto Monocyte # : 0.25 K/uL  Auto Eosinophil # : 0.07 K/uL  Auto Basophil # : 0.03 K/uL  Auto Neutrophil % : 85.4 %  Auto Lymphocyte % : 7.2 %  Auto Monocyte % : 5.0 %  Auto Eosinophil % : 1.4 %  Auto Basophil % : 0.6 %      Serial CBC's  09-16 @ 10:00  Hct-23.9 / Hgb-6.8 / Plat-171 / RBC-2.62 / WBC-4.97  Serial CBC's  09-15 @ 14:50  Hct-27.3 / Hgb-7.8 / Plat-295 / RBC-3.02 / WBC-7.56  Serial CBC's  09-12 @ 15:50  Hct-26.5 / Hgb-8.3 / Plat-231 / RBC-3.13 / WBC-6.1      09-16    141  |  102  |  7   ----------------------------<  94  3.5   |  26  |  0.68    Ca    8.8      16 Sep 2019 10:00  Phos  2.7     09-16  Mg     2.1     09-16    TPro  6.2  /  Alb  3.2<L>  /  TBili  0.5  /  DBili  x   /  AST  21  /  ALT  28  /  AlkPhos  224<H>  09-16      PT/INR - ( 15 Sep 2019 14:50 )   PT: 17.5 SEC;   INR: 1.56          PTT - ( 15 Sep 2019 14:50 )  PTT:31.7 SEC          RADIOLOGY & ADDITIONAL STUDIES:  IMPRESSION:     No convincing focal bowel wall thickening given history of diarrhea.    Compared to the recent study of August 15, 2019, there has been interval   development of vague ill-defined hepatic hypodensities measuring up to   1.3 cm near the dome concering for progression of disease/hepatic   metastases.  Peritoneal, external iliac, right rectus muscle tumor implants. The   peritoneal implant has increased in size since the prior exam compatible   with progression of disease. Inflammatory change centered around the   right lower quadrant peritoneal implants which has a matted appearance   and appears is inseparable from adjacent small bowel and colonic loops.   No free air or discrete focal drainable collection.    Likely cystitis. Recommend correlation with urinalysis.

## 2019-09-16 NOTE — CONSULT NOTE ADULT - ASSESSMENT
Abd pain  POD  Cystitis  Anemia 48f with metastatic clear cell metastatic ovarian cancer (diagnosed in 2017, follows at [facility], currently on Topetecan since June 2019, previously on Carbo/taxol, Keytruda, doxil, gemcitabine with progression of disease, and s/p palliative RT to RLQ peritoneal mets with Dr. Dc on 6/2019) presenting with severe abdominal pain since yesterday.     Abd pain  POD  Cystitis  Anemia    -Please transfuse 1 u prbc today  -Maintain active type and screen. Transfuse hgb<7 or in case of symptoms.  -Has POD, has progressed on 5 lines now, will discuss next treatment options with Dr Cole, primary oncologist.  -Agree with treatment of cystitis  -Palliative care consult for pain control  -Supportive care, pain control, Nutrition, PT, DVT ppx  -Outpatient oncology f/u    Will follow. Please do not hesitate to call back with questions.     Diamond Kamara MD  Medical Oncology Attending  C: [phone]

## 2019-09-17 NOTE — PROGRESS NOTE ADULT - SUBJECTIVE AND OBJECTIVE BOX
Patient is a 48y old  Female who presents with a chief complaint of Abdominal pain (16 Sep 2019 16:08)        SUBJECTIVE / OVERNIGHT EVENTS:    Pt received 1 u PRBC last night  Doing well on Dilaudid PCA  Feels energy much improved and abd pain also improved, although still rates 7/10  denies sob/cp  wife at bedside       MEDICATIONS  (STANDING):  ascorbic acid 500 milliGRAM(s) Oral daily  cholecalciferol 400 Unit(s) Oral daily  enoxaparin Injectable 40 milliGRAM(s) SubCutaneous daily  ferrous    sulfate 325 milliGRAM(s) Oral daily  HYDROmorphone PCA (5 mG/mL) 30 milliLiter(s) PCA Continuous PCA Continuous  lidocaine   Patch 2 Patch Transdermal daily  methadone    Tablet 25 milliGRAM(s) Oral every 8 hours  midodrine. 5 milliGRAM(s) Oral three times a day  mirtazapine 15 milliGRAM(s) Oral at bedtime  piperacillin/tazobactam IVPB.. 3.375 Gram(s) IV Intermittent every 8 hours  vancomycin  IVPB 1000 milliGRAM(s) IV Intermittent every 12 hours  venlafaxine 50 milliGRAM(s) Oral daily    MEDICATIONS  (PRN):  acetaminophen   Tablet .. 650 milliGRAM(s) Oral every 4 hours PRN Temp greater or equal to 38C (100.4F), Mild Pain (1 - 3)  lactulose Syrup 10 Gram(s) Oral every 4 hours PRN Constipation  LORazepam     Tablet 0.5 milliGRAM(s) Oral three times a day PRN Anxiety  metoclopramide 10 milliGRAM(s) Oral three times a day PRN Nausea  naloxone Injectable 0.1 milliGRAM(s) IV Push every 3 minutes PRN For ANY of the following changes in patient status:  A. RR LESS THAN 10 breaths per minute, B. Oxygen saturation LESS THAN 90%, C. Sedation score of 6      Vital Signs Last 24 Hrs  T(C): 36.7 (17 Sep 2019 09:45), Max: 36.8 (16 Sep 2019 15:45)  T(F): 98 (17 Sep 2019 09:45), Max: 98.3 (16 Sep 2019 21:45)  HR: 62 (17 Sep 2019 09:45) (51 - 100)  BP: 91/38 (17 Sep 2019 09:45) (90/65 - 123/103)  BP(mean): --  RR: 17 (17 Sep 2019 09:45) (17 - 18)  SpO2: 100% (17 Sep 2019 09:45) (100% - 100%)  CAPILLARY BLOOD GLUCOSE        I&O's Summary    16 Sep 2019 07:01  -  17 Sep 2019 07:00  --------------------------------------------------------  IN: 1560 mL / OUT: 870 mL / NET: 690 mL          PHYSICAL EXAM  GENERAL: Chronically ill-appearing  CHEST/LUNG: Decreased BS b/l lower lung fields; No wheeze/rhonchi/rales   HEART: RRR no m/r/g  ABDOMEN: diffusely tender to palpation, no rebound, no guarding   EXTREMITIES:  2+ Peripheral Pulses, No clubbing, cyanosis, or edema  NEUROLOGY: non-focal    LABS:                        7.3    3.98  )-----------( 171      ( 17 Sep 2019 04:20 )             25.5         142  |  109<H>  |  7   ----------------------------<  85  3.4<L>   |  27  |  0.66    Ca    9.1      17 Sep 2019 04:20  Phos  3.3       Mg     1.9         TPro  6.2  /  Alb  3.2<L>  /  TBili  0.5  /  DBili  x   /  AST  21  /  ALT  28  /  AlkPhos  224<H>      PT/INR - ( 15 Sep 2019 14:50 )   PT: 17.5 SEC;   INR: 1.56          PTT - ( 15 Sep 2019 14:50 )  PTT:31.7 SEC      Urinalysis Basic - ( 15 Sep 2019 16:50 )    Color: YELLOW / Appearance: CLEAR / S.017 / pH: 6.0  Gluc: NEGATIVE / Ketone: NEGATIVE  / Bili: TRACE / Urobili: MODERATE   Blood: NEGATIVE / Protein: 20 / Nitrite: NEGATIVE   Leuk Esterase: NEGATIVE / RBC: 0-2 / WBC 0-2   Sq Epi: OCC / Non Sq Epi: x / Bacteria: NEGATIVE        RADIOLOGY & ADDITIONAL TESTS:    Imaging Personally Reviewed:  Consultant(s) Notes Reviewed:    Care Discussed with Consultants/Other Providers:

## 2019-09-17 NOTE — PROGRESS NOTE ADULT - PROBLEM SELECTOR PLAN 9
Diet: Regular  DVT: Lovenox subQ    Gavino Reynaga MD PGY-2  Department of Internal Medicine  Pager: [phone] (NS) /[phone] (PATRICIA)

## 2019-09-17 NOTE — PROGRESS NOTE ADULT - PROBLEM SELECTOR PLAN 2
Patient reports last BM was 3 days ago.  Instructed to take lactulose today.  Patient verbalized understanding.  Endorsed to bedside RN.

## 2019-09-17 NOTE — PROGRESS NOTE ADULT - ASSESSMENT
48 YOF with PMHx of clear cell metastatic ovarian cancer (diagnosed in 2017, follows at [facility], on Topetecan since June, previously on Carbotaxol, Keytruda and s/p palliative RT to RLQ peritoneal mets with Dr. Dc on 6/2019) presenting with severe abdominal pain since yesterday.  Palliative consulted for pain management.

## 2019-09-17 NOTE — PROGRESS NOTE ADULT - SUBJECTIVE AND OBJECTIVE BOX
INTERVAL HPI/OVERNIGHT EVENTS:  Patient with intermittent abdominal pain     Code Status: Full Code   Allergies    No Known Allergies    Intolerances    MEDICATIONS  (STANDING):  ascorbic acid 500 milliGRAM(s) Oral daily  cholecalciferol 400 Unit(s) Oral daily  enoxaparin Injectable 40 milliGRAM(s) SubCutaneous daily  ferrous    sulfate 325 milliGRAM(s) Oral daily  HYDROmorphone PCA (5 mG/mL) 30 milliLiter(s) PCA Continuous PCA Continuous  lidocaine   Patch 2 Patch Transdermal daily  methadone    Tablet 25 milliGRAM(s) Oral every 8 hours  midodrine. 5 milliGRAM(s) Oral three times a day  mirtazapine 15 milliGRAM(s) Oral at bedtime  piperacillin/tazobactam IVPB.. 3.375 Gram(s) IV Intermittent every 8 hours  vancomycin  IVPB 1000 milliGRAM(s) IV Intermittent every 12 hours  venlafaxine 50 milliGRAM(s) Oral daily    MEDICATIONS  (PRN):  acetaminophen   Tablet .. 650 milliGRAM(s) Oral every 4 hours PRN Temp greater or equal to 38C (100.4F), Mild Pain (1 - 3)  lactulose Syrup 10 Gram(s) Oral every 4 hours PRN Constipation  LORazepam     Tablet 0.5 milliGRAM(s) Oral three times a day PRN Anxiety  metoclopramide 10 milliGRAM(s) Oral three times a day PRN Nausea  naloxone Injectable 0.1 milliGRAM(s) IV Push every 3 minutes PRN For ANY of the following changes in patient status:  A. RR LESS THAN 10 breaths per minute, B. Oxygen saturation LESS THAN 90%, C. Sedation score of 6      PRESENT SYMPTOMS: [ ]Unable to obtain due to poor mentation   Source if other than patient:  [ ]Family   [ ]Team     Pain (Impact on QOL):  Patient reports intermittent abdominal pain - diffuse - currently 2/10 - aggravated by movement and relieved with opioids - sharp/throbbing     Dyspnea:  Yes [ ] No [x ] - [ ]Mild [ ]Moderate [ ]Severe  Anxiety:    Yes [ ] No [x ] - [ ]Mild [ ]Moderate [ ]Severe  Fatigue:    Yes [x ] No [ ] - [x ]Mild [ ]Moderate [ ]Severe  Nausea:    Yes [ ] No [x ] - [ ]Mild [ ]Moderate [ ]Severe                         Loss of appetite: Yes [x ] No [ ] - [ x]Mild [ ]Moderate [ ]Severe             Constipation:  Yes [x ] No [ ] - [ ]Mild [ ]Moderate [ ]Severe  Grief: Yes [x ] No [ ]     PAIN AD Score:	  [email or web address] (Ctrl + left click to view)    Other Symptoms:  [x]All other review of systems negative     Karnofsky Performance Score/Palliative Performance Status Version 2:  70%    http://palliative.info/resource_material/[email or web address].pdf    PHYSICAL EXAM:  Vital Signs Last 24 Hrs  T(C): 36.5 (17 Sep 2019 13:43), Max: 36.8 (16 Sep 2019 21:45)  T(F): 97.7 (17 Sep 2019 13:43), Max: 98.3 (16 Sep 2019 21:45)  HR: 54 (17 Sep 2019 13:43) (51 - 100)  BP: 138/70 (17 Sep 2019 13:43) (90/65 - 138/70)  BP(mean): --  RR: 16 (17 Sep 2019 13:43) (16 - 18)  SpO2: 100% (17 Sep 2019 13:43) (100% - 100%) I&O's Summary    16 Sep 2019 07:01  -  17 Sep 2019 07:00  --------------------------------------------------------  IN: 1560 mL / OUT: 870 mL / NET: 690 mL         GENERAL:  [x ]Alert  [x ]Oriented x 4  [ ]Lethargic  [ ]Cachexia  [ ]Unarousable  [x ]Verbal  [ ]Non-Verbal  PULMONARY:   [x ]Clear anteriorly   CARDIOVASCULAR:    [ x]Regular [ ]Irregular [ ]Tachy  [ ]Tian [ ]Murmur [ ]Other  GASTROINTESTINAL:  [ x]Soft  [ ]Distended   [x ]+BS  [ ]Non tender [ x]Tender -diffuse [ ]PEG [ ]OGT/ NGT   Last BM: 19     GENITOURINARY:  [x ]Normal [ ] Incontinent   [ ]Oliguria/Anuria   [ ]Valentine  MUSCULOSKELETAL:   [ ]Normal   [x ]Weakness  [ ]Bed/Wheelchair bound [ ]Edema  NEUROLOGIC:   [ x]No focal deficits  [ ] Cognitive impairment  [ ] Dysphagia [ ]Dysarthria [ ] Paresis [ ]Other   SKIN:   [ x]Normal   [ ]Pressure ulcer(s)  [ ]Rash    CRITICAL CARE:  [ ] Shock Present  [ ]Septic [ ]Cardiogenic [ ]Neurologic [ ]Hypovolemic  [ ]  Vasopressors [ ]  Inotropes   [ ] Respiratory failure present  [ ] Acute  [ ] Chronic [ ] Hypoxic  [ ] Hypercarbic [ ] Other  [ ] Other organ failure     LABS:                        8.3    4.34  )-----------( 221      ( 17 Sep 2019 14:00 )             28.3       142  |  109<H>  |  7   ----------------------------<  85  3.4<L>   |  27  |  0.66    Ca    9.1      17 Sep 2019 04:20  Phos  3.3       Mg     1.9         TPro  6.2  /  Alb  3.2<L>  /  TBili  0.5  /  DBili  x   /  AST  21  /  ALT  28  /  AlkPhos  224<H>        Urinalysis Basic - ( 15 Sep 2019 16:50 )    Color: YELLOW / Appearance: CLEAR / S.017 / pH: 6.0  Gluc: NEGATIVE / Ketone: NEGATIVE  / Bili: TRACE / Urobili: MODERATE   Blood: NEGATIVE / Protein: 20 / Nitrite: NEGATIVE   Leuk Esterase: NEGATIVE / RBC: 0-2 / WBC 0-2   Sq Epi: OCC / Non Sq Epi: x / Bacteria: NEGATIVE      RADIOLOGY & ADDITIONAL STUDIES:    Protein Calorie Malnutrition Present: [ ] yes [ ] no  [ ] PPSV2 < or = 30%  [ ] significant weight loss [ ] poor nutritional intake [ ] anasarca [ ] catabolic state Albumin, Serum: 3.2 g/dL (19 @ 10:00)      REFERRALS:   [ ]Chaplaincy  [ ] Hospice  [ ]Child Life  [ ]Social Work  [ ]Case management [ ]Holistic Therapy   Goals of Care Document:

## 2019-09-17 NOTE — PROGRESS NOTE ADULT - ASSESSMENT
48 YOF with PMHx of clear cell metastatic ovarian cancer (diagnosed in 2017, follows at [facility], on Topetecan since June, previously on Carbotaxol, Keytruda and s/p palliative RT to RLQ peritoneal mets with Dr. Dc on 6/2019) presenting with severe abdominal pain since yesterday concern for progression of disease

## 2019-09-17 NOTE — PROGRESS NOTE ADULT - SUBJECTIVE AND OBJECTIVE BOX
INTERVAL HPI/OVERNIGHT EVENTS:  Patient seen at bedside.  Pain has improved but still feels it.        VITAL SIGNS:  T(F): 98 (19 @ 09:45)  HR: 62 (19 @ 09:45)  BP: 91/38 (19 @ 09:45)  RR: 17 (19 @ 09:45)  SpO2: 100% (19 @ 09:45)  Wt(kg): --    PHYSICAL EXAM:    Constitutional: NAD, resting in bed comfortably  Eyes: EOMI, sclera non-icteric  Neck: supple, no LAP  Respiratory: CTA b/l, good air entry b/l, no wheezing, rhonchi or crackels  Cardiovascular: RRR, normal S1S2, no M/R/G  Gastrointestinal: soft, NTND  Extremities: no edema  Neurological: AAOx3, non focal  Skin: Normal temperature    MEDICATIONS  (STANDING):  ascorbic acid 500 milliGRAM(s) Oral daily  cholecalciferol 400 Unit(s) Oral daily  enoxaparin Injectable 40 milliGRAM(s) SubCutaneous daily  ferrous    sulfate 325 milliGRAM(s) Oral daily  HYDROmorphone PCA (5 mG/mL) 30 milliLiter(s) PCA Continuous PCA Continuous  lidocaine   Patch 2 Patch Transdermal daily  methadone    Tablet 25 milliGRAM(s) Oral every 8 hours  midodrine. 5 milliGRAM(s) Oral three times a day  mirtazapine 15 milliGRAM(s) Oral at bedtime  piperacillin/tazobactam IVPB.. 3.375 Gram(s) IV Intermittent every 8 hours  vancomycin  IVPB 1000 milliGRAM(s) IV Intermittent every 12 hours  venlafaxine 50 milliGRAM(s) Oral daily    MEDICATIONS  (PRN):  acetaminophen   Tablet .. 650 milliGRAM(s) Oral every 4 hours PRN Temp greater or equal to 38C (100.4F), Mild Pain (1 - 3)  lactulose Syrup 10 Gram(s) Oral every 4 hours PRN Constipation  LORazepam     Tablet 0.5 milliGRAM(s) Oral three times a day PRN Anxiety  metoclopramide 10 milliGRAM(s) Oral three times a day PRN Nausea  naloxone Injectable 0.1 milliGRAM(s) IV Push every 3 minutes PRN For ANY of the following changes in patient status:  A. RR LESS THAN 10 breaths per minute, B. Oxygen saturation LESS THAN 90%, C. Sedation score of 6      Allergies    No Known Allergies    Intolerances        LABS:                        7.3    3.98  )-----------( 171      ( 17 Sep 2019 04:20 )             25.5         142  |  109<H>  |  7   ----------------------------<  85  3.4<L>   |  27  |  0.66    Ca    9.1      17 Sep 2019 04:20  Phos  3.3       Mg     1.9         TPro  6.2  /  Alb  3.2<L>  /  TBili  0.5  /  DBili  x   /  AST  21  /  ALT  28  /  AlkPhos  224<H>      PT/INR - ( 15 Sep 2019 14:50 )   PT: 17.5 SEC;   INR: 1.56          PTT - ( 15 Sep 2019 14:50 )  PTT:31.7 SEC  Urinalysis Basic - ( 15 Sep 2019 16:50 )    Color: YELLOW / Appearance: CLEAR / S.017 / pH: 6.0  Gluc: NEGATIVE / Ketone: NEGATIVE  / Bili: TRACE / Urobili: MODERATE   Blood: NEGATIVE / Protein: 20 / Nitrite: NEGATIVE   Leuk Esterase: NEGATIVE / RBC: 0-2 / WBC 0-2   Sq Epi: OCC / Non Sq Epi: x / Bacteria: NEGATIVE        RADIOLOGY & ADDITIONAL TESTS:  Studies reviewed.

## 2019-09-18 NOTE — PROGRESS NOTE ADULT - ASSESSMENT
48 YOF with PMHx of clear cell metastatic ovarian cancer (diagnosed in 2017, follows at [facility], on Topetecan since June, previously on Carbotaxol, Keytruda and s/p palliative RT to RLQ peritoneal mets with Dr. Dc on 6/2019) presenting with severe abdominal pain since yesterday.  Palliative consulted for pain management.

## 2019-09-18 NOTE — PROGRESS NOTE ADULT - SUBJECTIVE AND OBJECTIVE BOX
INTERVAL HPI/OVERNIGHT EVENTS: Patient with persistent abdominal pain     Code Status: Full Code   Allergies    No Known Allergies    Intolerances    MEDICATIONS  (STANDING):  ascorbic acid 500 milliGRAM(s) Oral daily  cholecalciferol 400 Unit(s) Oral daily  enoxaparin Injectable 40 milliGRAM(s) SubCutaneous daily  ferrous    sulfate 325 milliGRAM(s) Oral daily  HYDROmorphone PCA (5 mG/mL) 30 milliLiter(s) PCA Continuous PCA Continuous  lidocaine   Patch 2 Patch Transdermal daily  methadone    Tablet 25 milliGRAM(s) Oral every 8 hours  midodrine. 5 milliGRAM(s) Oral three times a day  mirtazapine 15 milliGRAM(s) Oral at bedtime  venlafaxine 50 milliGRAM(s) Oral daily    MEDICATIONS  (PRN):  acetaminophen   Tablet .. 650 milliGRAM(s) Oral every 4 hours PRN Temp greater or equal to 38C (100.4F), Mild Pain (1 - 3)  lactulose Syrup 10 Gram(s) Oral every 4 hours PRN Constipation  LORazepam     Tablet 0.5 milliGRAM(s) Oral three times a day PRN Anxiety  metoclopramide 10 milliGRAM(s) Oral three times a day PRN Nausea  naloxone Injectable 0.1 milliGRAM(s) IV Push every 3 minutes PRN For ANY of the following changes in patient status:  A. RR LESS THAN 10 breaths per minute, B. Oxygen saturation LESS THAN 90%, C. Sedation score of 6      PRESENT SYMPTOMS: [ ]Unable to obtain due to poor mentation   Source if other than patient:  [ ]Family   [ ]Team     Pain (Impact on QOL):  patient reports abdominal pain - diffuse - currently 8/10 - 6/10 at it's best (5/10 tolerable to her) - aggravated by movement and moderately relieved with opioids - sharp/throbbing    Dyspnea:  Yes [ ] No x[ ] - [ ]Mild [ ]Moderate [ ]Severe  Anxiety:    Yes [ ] No [x ] - [ ]Mild [ ]Moderate [ ]Severe  Fatigue:    Yes [x ] No [ ] - [x ]Mild [x ]Moderate [ ]Severe  Nausea:    Yes [ ] No [x ] - [ ]Mild [ ]Moderate [ ]Severe                         Loss of appetite: Yes [x ] No [ ] - [ ]Mild [ x]Moderate [ ]Severe             Constipation:  Yes [x ] No [ ] - [ ]Mild [ x]Moderate [ ]Severe  Grief: Yes [x ] No [ ]     PAIN AD Score:	  [email or web address]/[email or web address] (Ctrl + left click to view)    Other Symptoms:  [ x]All other review of systems negative     Karnofsky Performance Score/Palliative Performance Status Version 2:     70%    http://palliative.info/resource_material/[email or web address].pdf    PHYSICAL EXAM:  Vital Signs Last 24 Hrs  T(C): 36.5 (18 Sep 2019 15:01), Max: 36.8 (18 Sep 2019 11:04)  T(F): 97.7 (18 Sep 2019 15:01), Max: 98.2 (18 Sep 2019 11:04)  HR: 61 (18 Sep 2019 15:01) (50 - 100)  BP: 98/53 (18 Sep 2019 15:01) (94/45 - 121/44)  BP(mean): --  RR: 15 (18 Sep 2019 15:01) (15 - 16)  SpO2: 97% (18 Sep 2019 15:01) (95% - 100%) I&O's Summary       GENERAL:  [x ]Alert  [x ]Oriented x  4 [ ]Lethargic  [ ]Cachexia  [ ]Unarousable  [x ]Verbal  [ ]Non-Verbal  PULMONARY:   [x ]Clear anteriorly   CARDIOVASCULAR:    [x ]Regular [ ]Irregular [ ]Tachy  [ ]Tian [ ]Murmur [ ]Other  GASTROINTESTINAL:  [x ]Soft  [ ]Distended   [x ]+BS  x[ ]Non tender [ ]Tender  [ ]PEG [ ]OGT/ NGT   Last BM: 9/16/19     GENITOURINARY:  [x ]Normal [ ] Incontinent   [ ]Oliguria/Anuria   [ ]Valentine  MUSCULOSKELETAL:   [ ]Normal   [ x]Weakness  [ ]Bed/Wheelchair bound [ ]Edema  NEUROLOGIC:   [ x]No focal deficits  [ ] Cognitive impairment  [ ] Dysphagia [ ]Dysarthria [ ] Paresis [ ]Other   SKIN:   [x ]Normal   [ ]Pressure ulcer(s)  [ ]Rash    CRITICAL CARE:  [ ] Shock Present  [ ]Septic [ ]Cardiogenic [ ]Neurologic [ ]Hypovolemic  [ ]  Vasopressors [ ]  Inotropes   [ ] Respiratory failure present  [ ] Acute  [ ] Chronic [ ] Hypoxic  [ ] Hypercarbic [ ] Other  [ ] Other organ failure     LABS:                        7.9    3.47  )-----------( 211      ( 18 Sep 2019 07:35 )             28.2   09-18    143  |  104  |  6<L>  ----------------------------<  88  4.0   |  28  |  0.85    Ca    9.8      18 Sep 2019 07:35  Phos  3.8     09-18  Mg     2.0     09-18          RADIOLOGY & ADDITIONAL STUDIES:    Protein Calorie Malnutrition Present: [ ] yes [ ] no  [ ] PPSV2 < or = 30%  [ ] significant weight loss [ ] poor nutritional intake [ ] anasarca [ ] catabolic state Albumin, Serum: 3.2 g/dL (09-16-19 @ 10:00)      REFERRALS:   [ ]Chaplaincy  [ ] Hospice  [ ]Child Life  [ ]Social Work  [ ]Case management [ ]Holistic Therapy   Goals of Care Document:

## 2019-09-18 NOTE — PROGRESS NOTE ADULT - PROBLEM SELECTOR PLAN 9
Diet: Regular  DVT: Lovenox subQ    Gavino Reynaga MD PGY-2  Department of Internal Medicine  Pager: [phone] (NS) /[phone] (PATRICIA) Diet: Regular  DVT: Lovenox subQ

## 2019-09-18 NOTE — PROGRESS NOTE ADULT - SUBJECTIVE AND OBJECTIVE BOX
Patient is a 48y old  Female who presents with a chief complaint of Abdominal pain (17 Sep 2019 15:58)        SUBJECTIVE / OVERNIGHT EVENTS:    no acute events o/n  pt states pain is not adequately controlled w/ Dilaudid PCA   denies other complaints      MEDICATIONS  (STANDING):  ascorbic acid 500 milliGRAM(s) Oral daily  cholecalciferol 400 Unit(s) Oral daily  enoxaparin Injectable 40 milliGRAM(s) SubCutaneous daily  ferrous    sulfate 325 milliGRAM(s) Oral daily  HYDROmorphone PCA (5 mG/mL) 30 milliLiter(s) PCA Continuous PCA Continuous  lidocaine   Patch 2 Patch Transdermal daily  methadone    Tablet 25 milliGRAM(s) Oral every 8 hours  midodrine. 5 milliGRAM(s) Oral three times a day  mirtazapine 15 milliGRAM(s) Oral at bedtime  venlafaxine 50 milliGRAM(s) Oral daily    MEDICATIONS  (PRN):  acetaminophen   Tablet .. 650 milliGRAM(s) Oral every 4 hours PRN Temp greater or equal to 38C (100.4F), Mild Pain (1 - 3)  lactulose Syrup 10 Gram(s) Oral every 4 hours PRN Constipation  LORazepam     Tablet 0.5 milliGRAM(s) Oral three times a day PRN Anxiety  metoclopramide 10 milliGRAM(s) Oral three times a day PRN Nausea  naloxone Injectable 0.1 milliGRAM(s) IV Push every 3 minutes PRN For ANY of the following changes in patient status:  A. RR LESS THAN 10 breaths per minute, B. Oxygen saturation LESS THAN 90%, C. Sedation score of 6      Vital Signs Last 24 Hrs  T(C): 36.8 (18 Sep 2019 11:04), Max: 36.8 (18 Sep 2019 11:04)  T(F): 98.2 (18 Sep 2019 11:04), Max: 98.2 (18 Sep 2019 11:04)  HR: 50 (18 Sep 2019 11:04) (50 - 100)  BP: 109/63 (18 Sep 2019 11:04) (94/45 - 138/70)  BP(mean): --  RR: 15 (18 Sep 2019 11:04) (15 - 16)  SpO2: 100% (18 Sep 2019 11:04) (98% - 100%)  CAPILLARY BLOOD GLUCOSE        I&O's Summary      PHYSICAL EXAM  GENERAL: Chronically ill-appearing  CHEST/LUNG: Decreased BS b/l lower lung fields; No wheeze/rhonchi/rales   HEART: RRR no m/r/g  ABDOMEN: diffusely tender to palpation, no rebound, no guarding   EXTREMITIES:  2+ Peripheral Pulses, No clubbing, cyanosis, or edema  NEUROLOGY: non-focal    LABS:                        7.9    3.47  )-----------( 211      ( 18 Sep 2019 07:35 )             28.2     09-18    143  |  104  |  6<L>  ----------------------------<  88  4.0   |  28  |  0.85    Ca    9.8      18 Sep 2019 07:35  Phos  3.8     09-18  Mg     2.0     09-18                RADIOLOGY & ADDITIONAL TESTS:    Imaging Personally Reviewed:  Consultant(s) Notes Reviewed:    Care Discussed with Consultants/Other Providers:

## 2019-09-18 NOTE — PROGRESS NOTE ADULT - PROBLEM SELECTOR PLAN 2
-baseline SBPs in the 100s-110s. On chronic midodrine  -s/p 4L IVF. Hold off on additional fluids, encourage PO intake  -monitor VS q4h

## 2019-09-18 NOTE — PROGRESS NOTE ADULT - ASSESSMENT
48 YOF with PMHx of clear cell metastatic ovarian cancer (diagnosed in 2017, follows at [facility], on Topetecan since June, previously on Carbotaxol, Keytruda and s/p palliative RT to RLQ peritoneal mets with Dr. Dc on 6/2019) presenting with severe abdominal pain since yesterday concern for progression of disease

## 2019-09-19 NOTE — PROGRESS NOTE ADULT - ASSESSMENT
48f with metastatic clear cell metastatic ovarian cancer (diagnosed in 2017, follows at [facility], currently on Topetecan since June 2019, previously on Carbo/taxol, Keytruda, doxil, gemcitabine with progression of disease, and s/p palliative RT to RLQ peritoneal mets with Dr. Dc on 6/2019) presenting with severe abdominal pain since yesterday.     Abd pain  POD  Cystitis  Anemia    Progression on 5th line of therapy, will need change in treatment, discussed with primary oncologist Dr Cole, options are pemetrexed vs taxotere. Patient will follow with Dr Cole at [facility] to continue treatment.     -  -  -daily CBC  -Palliative care follow up  -Supportive care, pain control, Nutrition, PT, DVT ppx  -Outpatient oncology f/u    Will follow. Please do not hesitate to call back with questions.     Diamond Kamara MD  Medical Oncology Attending  C: [phone] 48f with metastatic clear cell metastatic ovarian cancer (diagnosed in 2017, follows at [facility], currently on Topetecan since June 2019, previously on Carbo/taxol, Keytruda, doxil, gemcitabine with progression of disease, and s/p palliative RT to RLQ peritoneal mets with Dr. Dc on 6/2019) presenting with severe abdominal pain since yesterday.   Progression on 5th line of therapy, will need change in treatment, discussed with primary oncologist Dr Cole, options are pemetrexed vs taxotere.   Pain is "80%" controlled. Has not had BM in 4-5 days.   Given severity of pain and overall picture, plan is for home with hospice. patient and family are hoping that she will be able to come off hospice and receive chemotherapy in the near future.     -Please increase lactulose to TID, which is patient's home dose  -Consent was signed for hospice.   -OK to discharge from onc perspective after BM  -Palliative care input appreciated  -Supportive care, pain control, Nutrition, PT, DVT ppx  -Outpatient oncology f/u as needed    Will follow. Please do not hesitate to call back with questions.     Diamond Kamara MD  Medical Oncology Attending  C: [phone]

## 2019-09-19 NOTE — PROGRESS NOTE ADULT - SUBJECTIVE AND OBJECTIVE BOX
INTERVAL HPI/OVERNIGHT EVENTS: Pain improved     Code Status: Full Code   Allergies    No Known Allergies    Intolerances    MEDICATIONS  (STANDING):  ascorbic acid 500 milliGRAM(s) Oral daily  cholecalciferol 400 Unit(s) Oral daily  enoxaparin Injectable 40 milliGRAM(s) SubCutaneous daily  ferrous    sulfate 325 milliGRAM(s) Oral daily  HYDROmorphone PCA (5 mG/mL) 30 milliLiter(s) PCA Continuous PCA Continuous  lactulose Syrup 10 Gram(s) Oral every 6 hours  lidocaine   Patch 2 Patch Transdermal daily  methadone    Tablet 25 milliGRAM(s) Oral every 8 hours  midodrine. 5 milliGRAM(s) Oral three times a day  mirtazapine 15 milliGRAM(s) Oral at bedtime  venlafaxine 50 milliGRAM(s) Oral daily    MEDICATIONS  (PRN):  acetaminophen   Tablet .. 650 milliGRAM(s) Oral every 4 hours PRN Temp greater or equal to 38C (100.4F), Mild Pain (1 - 3)  LORazepam     Tablet 0.5 milliGRAM(s) Oral three times a day PRN Anxiety  metoclopramide 10 milliGRAM(s) Oral three times a day PRN Nausea  naloxone Injectable 0.1 milliGRAM(s) IV Push every 3 minutes PRN For ANY of the following changes in patient status:  A. RR LESS THAN 10 breaths per minute, B. Oxygen saturation LESS THAN 90%, C. Sedation score of 6      PRESENT SYMPTOMS: [ ]Unable to obtain due to poor mentation   Source if other than patient:  [ ]Family   [ ]Team     Pain (Impact on QOL):  Patient reports pain is much improved - currently 5/10 - diffuse abdominal pain - aggravated by movement and moderately relieved with opioids, sharp/throbbing   Dyspnea:  Yes [ ] No [x ] - [ ]Mild [ ]Moderate [ ]Severe  Anxiety:    Yes [ ] No [x ] - [ ]Mild [ ]Moderate [ ]Severe  Fatigue:    Yes [x ] No [ ] - [x ]Mild [ ]Moderate [ ]Severe  Nausea:    Yes [ ] No [ x] - [ ]Mild [ ]Moderate [ ]Severe                         Loss of appetite: Yes [x ] No [ ] - [x ]Mild [ ]Moderate [ ]Severe             Constipation:  Yes [x ] No [ ] - [ ]Mild [x ]Moderate [ ]Severe  Grief: Yes [x ] No [ ]     PAIN AD Score:	  [email or web address] (Ctrl + left click to view)    Other Symptoms:  [x ]All other review of systems negative     Karnofsky Performance Score/Palliative Performance Status Version 2:    70%    http://palliative.info/resource_material/[email or web address].pdf    PHYSICAL EXAM:  Vital Signs Last 24 Hrs  T(C): 36.7 (19 Sep 2019 07:56), Max: 37.2 (18 Sep 2019 19:08)  T(F): 98.1 (19 Sep 2019 07:56), Max: 99 (18 Sep 2019 19:08)  HR: 62 (19 Sep 2019 12:01) (55 - 71)  BP: 107/58 (19 Sep 2019 12:01) (98/53 - 117/58)  BP(mean): --  RR: 16 (19 Sep 2019 12:01) (15 - 17)  SpO2: 99% (19 Sep 2019 12:01) (96% - 99%) I&O's Summary       GENERAL:  [x ]Alert  [x ]Oriented x4   [ ]Lethargic  [ ]Cachexia  [ ]Unarousable  [x ]Verbal  [ ]Non-Verbal  PULMONARY:   [x ]Clear anteriorly   CARDIOVASCULAR:    [x ]Regular [ ]Irregular [ ]Tachy  [ ]Tian [ ]Murmur [ ]Other  GASTROINTESTINAL:  [x ]Soft  [ ]Distended   [x ]+BS  [ ]Non tender [x ]Tender - mild diffuse  [ ]PEG [ ]OGT/ NGT   Last BM: 9/14/19 (as per patient)     GENITOURINARY:  [x ]Normal [ ] Incontinent   [ ]Oliguria/Anuria   [ ]Valentine  MUSCULOSKELETAL:   [ ]Normal   [x ]Weakness  [ ]Bed/Wheelchair bound [ ]Edema  NEUROLOGIC:   [x ]No focal deficits  [ ] Cognitive impairment  [ ] Dysphagia [ ]Dysarthria [ ] Paresis [ ]Other   SKIN:   [x ]Normal   [ ]Pressure ulcer(s)  [ ]Rash    CRITICAL CARE:  [ ] Shock Present  [ ]Septic [ ]Cardiogenic [ ]Neurologic [ ]Hypovolemic  [ ]  Vasopressors [ ]  Inotropes   [ ] Respiratory failure present  [ ] Acute  [ ] Chronic [ ] Hypoxic  [ ] Hypercarbic [ ] Other  [ ] Other organ failure     LABS:                        8.0    3.02  )-----------( 153      ( 19 Sep 2019 06:00 )             28.8   09-19    144  |  103  |  8   ----------------------------<  97  4.1   |  29  |  0.75    Ca    9.7      19 Sep 2019 06:00  Phos  3.8     09-18  Mg     2.0     09-18          RADIOLOGY & ADDITIONAL STUDIES:    Protein Calorie Malnutrition Present: [ ] yes [ ] no  [ ] PPSV2 < or = 30%  [ ] significant weight loss [ ] poor nutritional intake [ ] anasarca [ ] catabolic state Albumin, Serum: 3.2 g/dL (09-16-19 @ 10:00)      REFERRALS:   [ ]Chaplaincy  [ ] Hospice  [ ]Child Life  [ ]Social Work  [ ]Case management [ ]Holistic Therapy   Goals of Care Document:

## 2019-09-19 NOTE — PROGRESS NOTE ADULT - ASSESSMENT
48 YOF with PMHx of clear cell metastatic ovarian cancer (diagnosed in 2017, follows at [facility], on Topetecan since June, previously on Carbotaxol, Keytruda and s/p palliative RT to RLQ peritoneal mets with Dr. Dc on 6/2019) presenting with severe abdominal pain since yesterday concern for progression of disease

## 2019-09-19 NOTE — PROGRESS NOTE ADULT - SUBJECTIVE AND OBJECTIVE BOX
Patient is a 48y old  Female who presents with a chief complaint of Abdominal pain (19 Sep 2019 10:46)        SUBJECTIVE / OVERNIGHT EVENTS:    wife and sister at bedsides  pt reports abd pain is better controlled now  after discussion with Dr. Cole (primary oncologist) last night, pt wishes to pursue home hospice at this time   if in the future she gets stronger, she would like to resume treatment         MEDICATIONS  (STANDING):  ascorbic acid 500 milliGRAM(s) Oral daily  cholecalciferol 400 Unit(s) Oral daily  enoxaparin Injectable 40 milliGRAM(s) SubCutaneous daily  ferrous    sulfate 325 milliGRAM(s) Oral daily  HYDROmorphone PCA (5 mG/mL) 30 milliLiter(s) PCA Continuous PCA Continuous  lidocaine   Patch 2 Patch Transdermal daily  methadone    Tablet 25 milliGRAM(s) Oral every 8 hours  midodrine. 5 milliGRAM(s) Oral three times a day  mirtazapine 15 milliGRAM(s) Oral at bedtime  venlafaxine 50 milliGRAM(s) Oral daily    MEDICATIONS  (PRN):  acetaminophen   Tablet .. 650 milliGRAM(s) Oral every 4 hours PRN Temp greater or equal to 38C (100.4F), Mild Pain (1 - 3)  lactulose Syrup 10 Gram(s) Oral every 4 hours PRN Constipation  LORazepam     Tablet 0.5 milliGRAM(s) Oral three times a day PRN Anxiety  metoclopramide 10 milliGRAM(s) Oral three times a day PRN Nausea  naloxone Injectable 0.1 milliGRAM(s) IV Push every 3 minutes PRN For ANY of the following changes in patient status:  A. RR LESS THAN 10 breaths per minute, B. Oxygen saturation LESS THAN 90%, C. Sedation score of 6      Vital Signs Last 24 Hrs  T(C): 36.7 (19 Sep 2019 07:56), Max: 37.2 (18 Sep 2019 19:08)  T(F): 98.1 (19 Sep 2019 07:56), Max: 99 (18 Sep 2019 19:08)  HR: 55 (19 Sep 2019 07:56) (55 - 71)  BP: 104/58 (19 Sep 2019 07:56) (98/53 - 119/36)  BP(mean): --  RR: 16 (19 Sep 2019 07:56) (15 - 17)  SpO2: 96% (19 Sep 2019 07:56) (95% - 99%)  CAPILLARY BLOOD GLUCOSE        I&O's Summary      PHYSICAL EXAM  GENERAL: Chronically ill-appearing  CHEST/LUNG: Decreased BS b/l lower lung fields; No wheeze/rhonchi/rales   HEART: RRR no m/r/g  ABDOMEN: diffusely tender to palpation, no rebound, no guarding   EXTREMITIES:  2+ Peripheral Pulses, No clubbing, cyanosis, or edema  NEUROLOGY: non-focal    LABS:                        8.0    3.02  )-----------( 153      ( 19 Sep 2019 06:00 )             28.8     09-19    144  |  103  |  8   ----------------------------<  97  4.1   |  29  |  0.75    Ca    9.7      19 Sep 2019 06:00  Phos  3.8     09-18  Mg     2.0     09-18                RADIOLOGY & ADDITIONAL TESTS:    Imaging Personally Reviewed:  Consultant(s) Notes Reviewed:    Care Discussed with Consultants/Other Providers: Palliative

## 2019-09-19 NOTE — PROGRESS NOTE ADULT - SUBJECTIVE AND OBJECTIVE BOX
INTERVAL HPI/OVERNIGHT EVENTS:  Patient seen at bedside.      VITAL SIGNS:  T(F): 98.1 (09-19-19 @ 07:56)  HR: 55 (09-19-19 @ 07:56)  BP: 104/58 (09-19-19 @ 07:56)  RR: 16 (09-19-19 @ 07:56)  SpO2: 96% (09-19-19 @ 07:56)  Wt(kg): --    PHYSICAL EXAM:    Constitutional: NAD, resting in bed comfortably  Eyes: EOMI, sclera non-icteric  Neck: supple, no LAP  Respiratory: CTA b/l, good air entry b/l, no wheezing, rhonchi or crackels  Cardiovascular: RRR, normal S1S2, no M/R/G  Gastrointestinal: soft, NTND  Extremities: no edema  Neurological: AAOx3, non focal  Skin: Normal temperature    MEDICATIONS  (STANDING):  ascorbic acid 500 milliGRAM(s) Oral daily  cholecalciferol 400 Unit(s) Oral daily  enoxaparin Injectable 40 milliGRAM(s) SubCutaneous daily  ferrous    sulfate 325 milliGRAM(s) Oral daily  HYDROmorphone PCA (5 mG/mL) 30 milliLiter(s) PCA Continuous PCA Continuous  lidocaine   Patch 2 Patch Transdermal daily  methadone    Tablet 25 milliGRAM(s) Oral every 8 hours  midodrine. 5 milliGRAM(s) Oral three times a day  mirtazapine 15 milliGRAM(s) Oral at bedtime  venlafaxine 50 milliGRAM(s) Oral daily    MEDICATIONS  (PRN):  acetaminophen   Tablet .. 650 milliGRAM(s) Oral every 4 hours PRN Temp greater or equal to 38C (100.4F), Mild Pain (1 - 3)  lactulose Syrup 10 Gram(s) Oral every 4 hours PRN Constipation  LORazepam     Tablet 0.5 milliGRAM(s) Oral three times a day PRN Anxiety  metoclopramide 10 milliGRAM(s) Oral three times a day PRN Nausea  naloxone Injectable 0.1 milliGRAM(s) IV Push every 3 minutes PRN For ANY of the following changes in patient status:  A. RR LESS THAN 10 breaths per minute, B. Oxygen saturation LESS THAN 90%, C. Sedation score of 6      Allergies    No Known Allergies    Intolerances        LABS:                        8.0    3.02  )-----------( 153      ( 19 Sep 2019 06:00 )             28.8     09-19    144  |  103  |  8   ----------------------------<  97  4.1   |  29  |  0.75    Ca    9.7      19 Sep 2019 06:00  Phos  3.8     09-18  Mg     2.0     09-18            RADIOLOGY & ADDITIONAL TESTS:  Studies reviewed. INTERVAL HPI/OVERNIGHT EVENTS:  Patient seen at bedside.  She was just seen by the hospice team, pt, wife and her sister are all tearful understandably   Pain is better controlled.   Has not had a BM for 4-5 days.     VITAL SIGNS:  T(F): 98.1 (09-19-19 @ 07:56)  HR: 55 (09-19-19 @ 07:56)  BP: 104/58 (09-19-19 @ 07:56)  RR: 16 (09-19-19 @ 07:56)  SpO2: 96% (09-19-19 @ 07:56)  Wt(kg): --    PHYSICAL EXAM:    Constitutional: NAD, resting in bed comfortably  Eyes: EOMI, sclera non-icteric  Neck: supple, no LAP  Respiratory: CTA b/l, good air entry b/l, no wheezing, rhonchi or crackels  Cardiovascular: RRR, normal S1S2, no M/R/G  Gastrointestinal: soft, NTND  Extremities: no edema  Neurological: AAOx3, non focal  Skin: Normal temperature    MEDICATIONS  (STANDING):  ascorbic acid 500 milliGRAM(s) Oral daily  cholecalciferol 400 Unit(s) Oral daily  enoxaparin Injectable 40 milliGRAM(s) SubCutaneous daily  ferrous    sulfate 325 milliGRAM(s) Oral daily  HYDROmorphone PCA (5 mG/mL) 30 milliLiter(s) PCA Continuous PCA Continuous  lidocaine   Patch 2 Patch Transdermal daily  methadone    Tablet 25 milliGRAM(s) Oral every 8 hours  midodrine. 5 milliGRAM(s) Oral three times a day  mirtazapine 15 milliGRAM(s) Oral at bedtime  venlafaxine 50 milliGRAM(s) Oral daily    MEDICATIONS  (PRN):  acetaminophen   Tablet .. 650 milliGRAM(s) Oral every 4 hours PRN Temp greater or equal to 38C (100.4F), Mild Pain (1 - 3)  lactulose Syrup 10 Gram(s) Oral every 4 hours PRN Constipation  LORazepam     Tablet 0.5 milliGRAM(s) Oral three times a day PRN Anxiety  metoclopramide 10 milliGRAM(s) Oral three times a day PRN Nausea  naloxone Injectable 0.1 milliGRAM(s) IV Push every 3 minutes PRN For ANY of the following changes in patient status:  A. RR LESS THAN 10 breaths per minute, B. Oxygen saturation LESS THAN 90%, C. Sedation score of 6      Allergies    No Known Allergies    Intolerances        LABS:                        8.0    3.02  )-----------( 153      ( 19 Sep 2019 06:00 )             28.8     09-19    144  |  103  |  8   ----------------------------<  97  4.1   |  29  |  0.75    Ca    9.7      19 Sep 2019 06:00  Phos  3.8     09-18  Mg     2.0     09-18            RADIOLOGY & ADDITIONAL TESTS:  Studies reviewed.

## 2019-09-19 NOTE — GOALS OF CARE CONVERSATION - PERSONAL ADVANCE DIRECTIVE - CONVERSATION DETAILS
Referral to palliative care for symptom management and goals of care in the setting of advanced malignancy. Met with pt and spouse at bedside. Provided extensive education and information on the role and philosophy of hospice care. Discussed services available. Pt and spouse understand that pt can not receive DMT while on hospice however can revoke hospice if she wants to pursue DMT. Pt and spouse tearful as they struggle to accept pt's overall medical condition and progression of disease. Pt amenable to a referral to hospice care network. Emotional support provided to pt and family.

## 2019-09-19 NOTE — DISCHARGE NOTE PROVIDER - NSDCFUSCHEDAPPT_GEN_ALL_CORE_FT
JUAN ANTONIO YODER ; 09/19/2019 ; [facility]  JUAN ANTONIO YODER ; 09/19/2019 ; [facility]  JUAN ANTONIO YODER ; 11/05/2019 ; [facility]  JUAN ANTONIO YODER ; 11/05/2019 ; [facility]

## 2019-09-19 NOTE — DISCHARGE NOTE PROVIDER - HOSPITAL COURSE
48 YOF with PMHx of clear cell metastatic ovarian cancer (diagnosed in 2017, follows at [facility], on Topetecan since June, previously on Carbotaxol, Keytruda and s/p palliative RT to RLQ peritoneal mets with Dr. Dc on 6/2019) who initially presented w/ severe abdominal pain. Her HR BP was low and she had a low grade temp of 100.3, Ucx and BCx are NTD, infection unlikely, and SIRS likely related to cancer progression. Palliative team following for pain control. Per Medically attending, jin is medically stable for d/c home w/ Dilaudid PCA to be followed by OP Palliative Care once pain controlled.        1. SIRS (systemic inflammatory response syndrome).      Plan: CT a/p with likely cystitis, however UA negative    Bcx and Ucx NG x 48 hours     Anti-biotics were discontinued as SIRS likely related to progression of cancer.         2. Hypotension.      Plan: baseline SBPs in the 100s-110s. On chronic midodrine.    - s/p 4L IVF. Hold off on additional fluids, encourage PO intake    - monitor VS q4h    - Continue midodrine         3. Malignant neoplasm of ovary, unspecified laterality.      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         4. Abdominal pain.      Plan: Severe abdominal pain 2/2 progression of disease based on CT A/P now with probable liver mets and disease progression in the RLQ    - methadone 25 mg PO TID    - Dilaudid PCA    -lidocaine patch x 2.         5. Anxiety.      Plan: - resumed home Ativan PRN    - Mirtazapine 15mg qHS    - venlafaxine 50mg qD.         6. Anemia.     Plan: Normocytic anemia 2/2 active cancer and chemotherapy.    - CBC qD, transfuse if hb<7    - keep active T&S    - s/p 1 u PRBCS 9/16.        7. Bradycardia.      Plan: History of bradycardia, asymptomatic.     - EKG: Sinus bradycardia    -likely exacerbated by midodrine use. Currently HD stable, continue to monitor.         8. Constipation.      Plan: - prescribed lactulose PRN. 48 YOF with PMHx of clear cell metastatic ovarian cancer (diagnosed in 2017, follows at [facility], on Topetecan since June, previously on Carbotaxol, Keytruda and s/p palliative RT to RLQ peritoneal mets with Dr. Dc on 6/2019) who initially presented w/ severe abdominal pain. Her BP was low and she had a low grade temp of 100.3, Ucx and BCx are NTD, infection unlikely, and SIRS likely related to cancer progression. CT scan of abdomen showed likely cystitis and she was treated w/ antibiotics. UA was negative and UCx NTD so abx were DC'd. Palliative team and Heme/Onc were following. Per Medically attending, patient is medically stable for d/c home w/ Dilaudid PCA to be followed by OP Palliative Care.         1. SIRS (systemic inflammatory response syndrome).      Plan: CT a/p with likely cystitis, however UA negative.    Bcx and Ucx NG x 48 hours     Antibiotics were discontinued as SIRS likely related to progression of cancer.         2. Hypotension.      Plan: baseline SBPs in the 100s-110s. On chronic midodrine.    - s/p 4L IVF. Hold off on additional fluids, encourage PO intake    - monitor VS q4h    - Continue midodrine     - stable         3. Malignant neoplasm of ovary, unspecified laterality.      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         4. Abdominal pain.      Plan: Severe abdominal pain 2/2 progression of disease based on CT A/P now with probable liver mets and disease progression in the RLQ    - methadone 25 mg PO TID    - Dilaudid PCA being adjusted by Palliative team.     -lidocaine patch x 2.         5. Anxiety.      Plan:     - resumed home Ativan PRN    - Mirtazapine 15mg qHS    - venlafaxine 50mg qD.         6. Anemia.     Plan: Normocytic anemia 2/2 active cancer and chemotherapy.    - CBC qD, transfuse if hb<7    - keep active T&S    - s/p 1 u PRBCS 9/16.        7. Bradycardia.      Plan: History of bradycardia, asymptomatic.     - EKG: Sinus bradycardia    -Likely exacerbated by midodrine use. Currently HD stable, continue to monitor.     - Stable 48 YOF with PMHx of clear cell metastatic ovarian cancer (diagnosed in 2017, follows at [facility], on Topetecan since June, previously on Carbotaxol, Keytruda and s/p palliative RT to RLQ peritoneal mets with Dr. Dc on 6/2019) presenting with severe abdominal pain since yesterday concern for progression of disease        Problem/Plan - 1:    ·  Problem: SIRS (systemic inflammatory response syndrome).  Plan: CT a/p with likely cystitis, however UA negative    Bcx and Ucx NG x 48 hours     off abx now     SIRS likely related to progression of disease.         Problem/Plan - 2:    ·  Problem: Hypotension.  Plan: -now back at baseline SBPs in the 100s-110s. On chronic midodrine    -s/p 4L IVF. Hold off on additional fluids, encourage PO intake    -monitor VS q4h.         Problem/Plan - 3:    ·  Problem: Malignant neoplasm of ovary, unspecified laterality.  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 Cole (primary oncologist), pt would now like to pursue home hospice.         Problem/Plan - 4:    ·  Problem: Abdominal pain.  Plan: Severe abdominal pain 2/2 progression of disease based on CT A/P now with probable liver mets and disease progression in the RLQ    - methadone 25 mg PO TID    - Dilaudid PCA    -lidocaine patch x 2.         Problem/Plan - 5:    ·  Problem: Anxiety.  Plan: - resumed home Ativan PRN    - Mirtazapine 15mg qHS    - venlafaxine 50mg qD.         Problem/Plan - 6:    Problem: Anemia. Plan: Normocytic anemia 2/2 active cancer and chemotherapy.    - CBC qD, transfuse if hb<7    - keep active T&S    - s/p 1 u PRBCS 9/16.        Problem/Plan - 7:    ·  Problem: Bradycardia.  Plan: History of bradycardia, asymptomatic.     - EKG: Sinus bradycardia    -likely exacerbated by midodrine use. Currently HD stable, continue to monitor.         Patient was cleared for discharge with home hospice. Home PCA set up for improved comfort. 48 YOF with PMHx of clear cell metastatic ovarian cancer (diagnosed in 2017, follows at [facility], on Topetecan since June, previously on Carbotaxol, Keytruda and s/p palliative RT to RLQ peritoneal mets with Dr. Dc on 6/2019) presenting with severe abdominal pain since yesterday concern for progression of disease        Problem/Plan - 1:    ·  Problem: SIRS (systemic inflammatory response syndrome).  Plan: CT a/p with likely cystitis, however UA negative    Bcx and Ucx NG x 48 hours     off abx now     SIRS likely related to progression of disease.         Problem/Plan - 2:    ·  Problem: Hypotension.  Plan: -now back at baseline SBPs in the 100s-110s. On chronic midodrine    -s/p 4L IVF. Hold off on additional fluids, encourage PO intake        Problem/Plan - 3:    ·  Problem: Malignant neoplasm of ovary, unspecified laterality.  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 Cole (primary oncologist), pt would now like to pursue home hospice.         Problem/Plan - 4:    ·  Problem: Abdominal pain.  Plan: Severe abdominal pain 2/2 progression of disease based on CT A/P now with probable liver mets and disease progression in the RLQ    - methadone 25 mg PO TID    - Dilaudid PCA    -lidocaine patch x 2.         Problem/Plan - 5:    ·  Problem: Anxiety.  Plan: - resumed home Ativan PRN    - Mirtazapine 15mg qHS    - venlafaxine 50mg qD.         Problem/Plan - 6:    Problem: Anemia. Plan: Normocytic anemia 2/2 active cancer and chemotherapy.    - CBC qD, transfuse if hb<7    - keep active T&S    - s/p 1 u PRBCS 9/16.        Problem/Plan - 7:    ·  Problem: Bradycardia.  Plan: History of bradycardia, asymptomatic.     - EKG: Sinus bradycardia    -likely exacerbated by midodrine use. Currently HD stable, continue to monitor.         Patient was cleared for discharge with home hospice. Home PCA set up for improved comfort.

## 2019-09-19 NOTE — PROGRESS NOTE ADULT - ASSESSMENT
48 YOF with PMHx of clear cell metastatic ovarian cancer (diagnosed in 2017, follows at [facility], on Topetecan since June, previously on Carbotaxol, Keytruda and s/p palliative RT to RLQ peritoneal mets with Dr. Dc on 6/2019) presenting with severe abdominal pain since yesterday.  Palliative consulted for pain management.

## 2019-09-19 NOTE — PROGRESS NOTE ADULT - PROBLEM SELECTOR PLAN 2
-now back at baseline SBPs in the 100s-110s. On chronic midodrine  -s/p 4L IVF. Hold off on additional fluids, encourage PO intake  -monitor VS q4h

## 2019-09-19 NOTE — DISCHARGE NOTE PROVIDER - NSDCCPCAREPLAN_GEN_ALL_CORE_FT
PRINCIPAL DISCHARGE DIAGNOSIS  Diagnosis: Ovarian cancer  Assessment and Plan of Treatment: Follow up with Dr. Cole at [facility] for continued cancer treament and management.      SECONDARY DISCHARGE DIAGNOSES  Diagnosis: Pain, neoplasm-related  Assessment and Plan of Treatment: Follow up with Dr. Arellano and Dr. Cole for PCA pump monitoring.    Diagnosis: Hypotension  Assessment and Plan of Treatment: Continue w/ midodrine, f/u with PMD on routine basis.    Diagnosis: Anemia  Assessment and Plan of Treatment: Goal: To treat the underlying cause and restore a normal red blood cells level, to improve  the amount of oxygen that your blood can carry by increasing your hemoglobin and hematocrit level, and to prevent signs and symptoms such as shortness of breath, dizziness, weakness, congestive heart failure and death.   Plan: Continue to take current medications as prescribed.  Follow up your routine physician's appointments.  If you notice any bleeding, please notify your doctor immediately or go to the nearest emergency room.    Diagnosis: Acute cystitis  Assessment and Plan of Treatment: Goal: To be asymptomatic and to prevent reoccurrence.   Plan: Always wipe from front to back after using the bathroom. Never wipe twice with the same tissue. Take showers and avoid prolonged baths. Try to empty the bladder at least every 4 hours during the day while awake, even if the need or urge to void is absent.  Do not try to hold your urine until a more convenient time or place.  Drink plenty of water.

## 2019-09-19 NOTE — PROVIDER CONTACT NOTE (OTHER) - BACKGROUND
Pt admitted for UTI. PMH of anxiety, vertigo, ovarian cyst, ovarian cancer, deviated nasal septum good, to achieve stated therapy goals

## 2019-09-20 NOTE — PROGRESS NOTE ADULT - SUBJECTIVE AND OBJECTIVE BOX
Patient is a 48y old  Female who presents with a chief complaint of Abdominal pain (19 Sep 2019 14:54)        SUBJECTIVE / OVERNIGHT EVENTS:    pt ambulating around room, c/o intense abdominal pain, likely exacerbated by constipation - has not had BM in 5 days   sister at bedside  no cp/sob, or other complaints      MEDICATIONS  (STANDING):  ascorbic acid 500 milliGRAM(s) Oral daily  cholecalciferol 400 Unit(s) Oral daily  enoxaparin Injectable 40 milliGRAM(s) SubCutaneous daily  ferrous    sulfate 325 milliGRAM(s) Oral daily  HYDROmorphone PCA (5 mG/mL) 30 milliLiter(s) PCA Continuous PCA Continuous  lactulose Syrup 10 Gram(s) Oral every 6 hours  lidocaine   Patch 2 Patch Transdermal daily  methadone    Tablet 25 milliGRAM(s) Oral every 8 hours  midodrine. 5 milliGRAM(s) Oral three times a day  mirtazapine 15 milliGRAM(s) Oral at bedtime  venlafaxine 50 milliGRAM(s) Oral daily    MEDICATIONS  (PRN):  acetaminophen   Tablet .. 650 milliGRAM(s) Oral every 4 hours PRN Temp greater or equal to 38C (100.4F), Mild Pain (1 - 3)  LORazepam     Tablet 0.5 milliGRAM(s) Oral three times a day PRN Anxiety  metoclopramide 10 milliGRAM(s) Oral three times a day PRN Nausea  naloxone Injectable 0.1 milliGRAM(s) IV Push every 3 minutes PRN For ANY of the following changes in patient status:  A. RR LESS THAN 10 breaths per minute, B. Oxygen saturation LESS THAN 90%, C. Sedation score of 6      Vital Signs Last 24 Hrs  T(C): 36.8 (20 Sep 2019 07:30), Max: 36.8 (20 Sep 2019 04:00)  T(F): 98.2 (20 Sep 2019 07:30), Max: 98.2 (20 Sep 2019 04:00)  HR: 51 (20 Sep 2019 07:30) (51 - 71)  BP: 100/55 (20 Sep 2019 07:30) (100/55 - 117/52)  BP(mean): --  RR: 16 (20 Sep 2019 07:30) (16 - 18)  SpO2: 97% (20 Sep 2019 07:30) (96% - 100%)  CAPILLARY BLOOD GLUCOSE        I&O's Summary    19 Sep 2019 07:01  -  20 Sep 2019 07:00  --------------------------------------------------------  IN: 118 mL / OUT: 0 mL / NET: 118 mL          PHYSICAL EXAM  GENERAL: Chronically ill-appearing  CHEST/LUNG: Decreased BS b/l lower lung fields; No wheeze/rhonchi/rales   HEART: RRR no m/r/g  ABDOMEN: lower abdominal distention, TTP   EXTREMITIES:  2+ Peripheral Pulses, No clubbing, cyanosis, or edema  NEUROLOGY: non-focal    LABS:                        9.0    3.69  )-----------( 161      ( 20 Sep 2019 07:10 )             31.2     09-20    143  |  101  |  10  ----------------------------<  97  3.8   |  29  |  0.77    Ca    9.9      20 Sep 2019 07:10  Mg     2.2     09-20                RADIOLOGY & ADDITIONAL TESTS:    Imaging Personally Reviewed:  Consultant(s) Notes Reviewed:    Care Discussed with Consultants/Other Providers:

## 2019-09-20 NOTE — PROGRESS NOTE ADULT - SUBJECTIVE AND OBJECTIVE BOX
INTERVAL HPI/OVERNIGHT EVENTS: no acute events     Code Status: Full Code   Allergies    No Known Allergies    Intolerances    MEDICATIONS  (STANDING):  ascorbic acid 500 milliGRAM(s) Oral daily  cholecalciferol 400 Unit(s) Oral daily  enoxaparin Injectable 40 milliGRAM(s) SubCutaneous daily  ferrous    sulfate 325 milliGRAM(s) Oral daily  HYDROmorphone PCA (5 mG/mL) 30 milliLiter(s) PCA Continuous PCA Continuous  lactulose Syrup 10 Gram(s) Oral every 6 hours  lidocaine   Patch 2 Patch Transdermal daily  methadone    Tablet 25 milliGRAM(s) Oral every 8 hours  midodrine. 5 milliGRAM(s) Oral three times a day  mirtazapine 15 milliGRAM(s) Oral at bedtime  venlafaxine 50 milliGRAM(s) Oral daily    MEDICATIONS  (PRN):  acetaminophen   Tablet .. 650 milliGRAM(s) Oral every 4 hours PRN Temp greater or equal to 38C (100.4F), Mild Pain (1 - 3)  LORazepam     Tablet 0.5 milliGRAM(s) Oral three times a day PRN Anxiety  metoclopramide 10 milliGRAM(s) Oral three times a day PRN Nausea  naloxone Injectable 0.1 milliGRAM(s) IV Push every 3 minutes PRN For ANY of the following changes in patient status:  A. RR LESS THAN 10 breaths per minute, B. Oxygen saturation LESS THAN 90%, C. Sedation score of 6      PRESENT SYMPTOMS: [ ]Unable to obtain due to poor mentation   Source if other than patient:  [ ]Family   [ ]Team     Pain (Impact on QOL):  Patient reports abdominal pain 4/10 - throbbing/sharp (acceptable to patient) - aggravated by movement and relieved with opioids     Dyspnea:  Yes [ ] No [x ] - [ ]Mild [ ]Moderate [ ]Severe  Anxiety:    Yes [ ] No [ x] - [ ]Mild [ ]Moderate [ ]Severe  Fatigue:    Yes [x ] No [ ] - [x ]Mild [ ]Moderate [ ]Severe  Nausea:    Yes [ ] No [x ] - [ ]Mild [ ]Moderate [ ]Severe                         Loss of appetite: Yes [x ] No [ ] - [x ]Mild [ ]Moderate [ ]Severe             Constipation:  Yes [x ] No [ ] - [ ]Mild [x ]Moderate [x ]Severe  Grief: Yes [x ] No [ ]     PAIN AD Score:	  [email or web address] (Ctrl + left click to view)    Other Symptoms:  [x ]All other review of systems negative     Karnofsky Performance Score/Palliative Performance Status Version 2:  70%    http://palliative.info/resource_material/[email or web address].pdf    PHYSICAL EXAM:  Vital Signs Last 24 Hrs  T(C): 36.7 (20 Sep 2019 11:30), Max: 36.8 (20 Sep 2019 04:00)  T(F): 98.1 (20 Sep 2019 11:30), Max: 98.2 (20 Sep 2019 04:00)  HR: 61 (20 Sep 2019 11:30) (51 - 71)  BP: 103/65 (20 Sep 2019 11:30) (100/55 - 117/52)  BP(mean): --  RR: 15 (20 Sep 2019 11:30) (15 - 18)  SpO2: 98% (20 Sep 2019 11:30) (96% - 100%) I&O's Summary    19 Sep 2019 07:01  -  20 Sep 2019 07:00  --------------------------------------------------------  IN: 118 mL / OUT: 0 mL / NET: 118 mL         GENERAL:  [x ]Alert  [x ]Oriented x4   [ ]Lethargic  [ ]Cachexia  [ ]Unarousable  [ x]Verbal  [ ]Non-Verbal  PULMONARY:   [x ]Clear anteriorly   CARDIOVASCULAR:    [x ]Regular [ ]Irregular [ ]Tachy  [ ]Tian [ ]Murmur [ ]Other  GASTROINTESTINAL:  [x ]Soft  [ ]Distended   [ x]+BS  [ ]Non tender [ x]Tender - mild diffuse  [ ]PEG [ ]OGT/ NGT   Last BM: 9/14/19     GENITOURINARY:  [x ]Normal [ ] Incontinent   [ ]Oliguria/Anuria   [ ]Valentine  MUSCULOSKELETAL:   [ ]Normal   [x ]Weakness  [ ]Bed/Wheelchair bound [ ]Edema  NEUROLOGIC:   [x ]No focal deficits  [ ] Cognitive impairment  [ ] Dysphagia [ ]Dysarthria [ ] Paresis [ ]Other   SKIN:   [x ]Normal   [ ]Pressure ulcer(s)  [ ]Rash  x  CRITICAL CARE:  [ ] Shock Present  [ ]Septic [ ]Cardiogenic [ ]Neurologic [ ]Hypovolemic  [ ]  Vasopressors [ ]  Inotropes   [ ] Respiratory failure present  [ ] Acute  [ ] Chronic [ ] Hypoxic  [ ] Hypercarbic [ ] Other  [ ] Other organ failure     LABS:                        9.0    3.69  )-----------( 161      ( 20 Sep 2019 07:10 )             31.2   09-20    143  |  101  |  10  ----------------------------<  97  3.8   |  29  |  0.77    Ca    9.9      20 Sep 2019 07:10  Mg     2.2     09-20          RADIOLOGY & ADDITIONAL STUDIES:    Protein Calorie Malnutrition Present: [ ] yes [ ] no  [ ] PPSV2 < or = 30%  [ ] significant weight loss [ ] poor nutritional intake [ ] anasarca [ ] catabolic state Albumin, Serum: 3.2 g/dL (09-16-19 @ 10:00)      REFERRALS:   [ ]Chaplaincy  [ ] Hospice  [ ]Child Life  [ ]Social Work  [ ]Case management [ ]Holistic Therapy   Goals of Care Document: JOSESITO Springer (09-19-19 @ 15:25)  Goals of Care Conversation:   Participants:  · Participants  Patient  · Spouse  Parvin Shetty  · Provider  Dr. Selby; NP Rosie De Luna  ·   Lianet Springer    Advance Directives:  · Does patient have Advance Directive  Yes  · Indicate Type  Health Care Proxy (HCP)  · Agent's Name  lindy Shetty  · Phone #  [phone]  · Are any of the items on the chart  No  · Caregiver:  no    Conversation Discussion:  · Conversation  Diagnosis; Prognosis; Treatment Options; Hospice Referral  · Conversation Details  Referral to palliative care for symptom management and goals of care in the setting of advanced malignancy. Met with pt and spouse at bedside. Provided extensive education and information on the role and philosophy of hospice care. Discussed services available. Pt and spouse understand that pt can not receive DMT while on hospice however can revoke hospice if she wants to pursue DMT. Pt and spouse tearful as they struggle to accept pt's overall medical condition and progression of disease. Pt amenable to a referral to hospice care network. Emotional support provided to pt and family.    What Matters Most To Patient and Family:  · What matters most to patient and family  Pt wants to maximize her quality of life and focus on symptom management.    Personal Advance Directives Treatment Guidelines:   Treatment Guidelines:  · Decision Maker  Patient  · Treatment Guideline Comments  home hospice.      Electronic Signatures:  Lianet Springer ([facility])  (Signed 19-Sep-2019 15:39)  	Authored: Goals of Care Conversation, Personal Advance Directives Treatment Guidelines      Last Updated: 19-Sep-2019 15:39 by Lianet Springer ([facility])

## 2019-09-20 NOTE — PROGRESS NOTE ADULT - ASSESSMENT
48 YOF with PMHx of clear cell metastatic ovarian cancer (diagnosed in 2017, follows at [facility], on Topetecan since June, previously on Carbotaxol, Keytruda and s/p palliative RT to RLQ peritoneal mets with Dr. Dc on 6/2019) presenting with severe abdominal pain since yesterday.  Palliative consulted for pain management.

## 2019-09-20 NOTE — PROGRESS NOTE ADULT - ASSESSMENT
48 YOF with PMHx of clear cell metastatic ovarian cancer (diagnosed in 2017, follows at [facility], on Topetecan since June, previously on Carbotaxol, Keytruda and s/p palliative RT to RLQ peritoneal mets with Dr. Dc on 6/2019) presenting with severe abdominal pain since yesterday concern for progression of disease

## 2019-09-21 NOTE — PROGRESS NOTE ADULT - ATTENDING COMMENTS
Hospice referral   d/c planning to home hospice w/ Dilaudid PCA
Pt seen with NP.  Agree with above.  Increased pain ?psychosocial.  Slight increase in basal to 0.7mg/hr with 3mg prn.  Extensive discussion with pt and wife about discharge plan including hospice option until chemo is resumed.  Dr. Cole to discuss option with pt and wife
Pt seen with NP.  Agree with above.  Continue current settings on pca.  Aggressive bowel regimen. Pt to be discharged home this weekend.  Please call [phone] with any questions.
Pt seen with NP.  Agree with above.  Continue pca at current dose.
Pt seen with Np.  Agree with above.  Continue current regimen.  Goal is for discharge with hospice at home and follow up with Dr. Cole as outpatient.
D/c planning to home hospice w/ Dilaudid PCA tomorrow, equipment to be delivered
Medically stable for d/c home w/ Dilaudid PCA to be followed by OP Palliative Care once pain controlled
AM Hb 6.8, likely dilutional from significant fluids, will repeat in PM  Transfuse if Hb<7
stable for discharge home with home hospice  total time spent on discharge 41min

## 2019-09-21 NOTE — PROGRESS NOTE ADULT - PROBLEM SELECTOR PLAN 1
CT a/p with likely cystitis, however UA negative  Bcx and Ucx NG x 48 hours   off abx now   SIRS likely related to progression of disease
CT a/p with likely cystitis, however UA negative  Bcx and Ucx NG x 48 hours   off abx now   SIRS likely related to progression of disease
As above.  Patient reports pain is well-controlled. Continue current PCA settings - Continuous increased to 0.7mg with 3mg q30 min PRN rescues.  Continue methadone 25mg PO TID.  Using PCA appropriately. Please page [phone] with any questions or concerns.
CT a/p with likely cystitis, however UA negative  Bcx and Ucx NG x 48 hours   d/c abx   SIRS likely related to progression of disease
As above.  Patient reports her pain is not well controlled and that her abdominal pain is persistent.  Patient's wife with concerns that patient drowsy during day.  Patient states she has not been sleeping at night and that is why she is sleeping during the day. Continuous increased to 0.7mg with 3mg q30 min PRN rescues.  Continue methadone 25mg PO TID.  Using PCA appropriately. Please page [phone] with any questions or concerns.
As above.  Patient reports pain is well-controlled.  She was able to eat lunch and is ambulating in the room.  Continue current PCA settings - Continuous increased to 0.7mg with 3mg q30 min PRN rescues.  Continue methadone 25mg PO TID.  Using PCA appropriately. Please page [phone] with any questions or concerns.
As above.  patient reports pain is well controlled on Dilaudid PCA.  Continue PCA on current settings - continuous 0.5mg with 3mg q30 min PRN rescues.  Continue methadone 25mg PO TID.  Using PCA appropriately.
CT a/p with likely cystitis, however UA negative  Bcx and Ucx NG x 24 hours   c/w Vanc/Zosyn- likely d/c tomorrow if cultures remain negative  SIRS likely related to progression of disease
CT a/p with likely cystitis, however UA negative  Bcx and Ucx testing  c/w Vanc/Zosyn for now
CT a/p with likely cystitis, however UA negative  Bcx and Ucx NG x 48 hours   off abx now   SIRS likely related to progression of disease

## 2019-09-21 NOTE — PROGRESS NOTE ADULT - REASON FOR ADMISSION
Abdominal pain

## 2019-09-21 NOTE — PROGRESS NOTE ADULT - SUBJECTIVE AND OBJECTIVE BOX
Patient is a 48y old  Female who presents with a chief complaint of Abdominal pain (20 Sep 2019 15:41)      SUBJECTIVE / OVERNIGHT EVENTS:    No acute event o/n. Pt appears comfortable. Pain controlled on PCA. No new complaint     Review of Systems:    RESPIRATORY: No cough, wheezing, chills or hemoptysis; No shortness of breath  CARDIOVASCULAR: No chest pain, palpitations, dizziness, or leg swelling  GASTROINTESTINAL: No abdominal or epigastric pain. No nausea, vomiting, or hematemesis; No diarrhea or constipation. No melena or hematochezia.      MEDICATIONS  (STANDING):  ascorbic acid 500 milliGRAM(s) Oral daily  cholecalciferol 400 Unit(s) Oral daily  enoxaparin Injectable 40 milliGRAM(s) SubCutaneous daily  ferrous    sulfate 325 milliGRAM(s) Oral daily  HYDROmorphone PCA (5 mG/mL) 30 milliLiter(s) PCA Continuous PCA Continuous  lactulose Syrup 10 Gram(s) Oral every 6 hours  lidocaine   Patch 2 Patch Transdermal daily  methadone    Tablet 25 milliGRAM(s) Oral every 8 hours  midodrine. 5 milliGRAM(s) Oral three times a day  mirtazapine 15 milliGRAM(s) Oral at bedtime  venlafaxine 50 milliGRAM(s) Oral daily    MEDICATIONS  (PRN):  acetaminophen   Tablet .. 650 milliGRAM(s) Oral every 4 hours PRN Temp greater or equal to 38C (100.4F), Mild Pain (1 - 3)  LORazepam     Tablet 0.5 milliGRAM(s) Oral three times a day PRN Anxiety  metoclopramide 10 milliGRAM(s) Oral three times a day PRN Nausea  naloxone Injectable 0.1 milliGRAM(s) IV Push every 3 minutes PRN For ANY of the following changes in patient status:  A. RR LESS THAN 10 breaths per minute, B. Oxygen saturation LESS THAN 90%, C. Sedation score of 6      PHYSICAL EXAM:  T(C): 37.1 (09-21-19 @ 11:30), Max: 37.1 (09-20-19 @ 15:30)  HR: 70 (09-21-19 @ 11:30) (55 - 97)  BP: 115/70 (09-21-19 @ 11:30) (96/57 - 124/55)  RR: 18 (09-21-19 @ 11:30) (15 - 18)  SpO2: 99% (09-21-19 @ 11:30) (97% - 100%)  I&O's Summary    GENERAL: chronically ill appearing female lying in bed in NAD   MENTAL STATUS/PSYCH:  AAO x3   HEAD:  Atraumatic, Normocephalic  EYES: EOMI, PERRLA, conjunctiva and sclera clear  NECK: Supple, No elevated JVD  CHEST/LUNG: Clear to auscultation bilaterally; No wheeze  HEART: Regular rate and rhythm; No murmurs, rubs, or gallops  ABDOMEN: Soft, Nontender, Nondistended; Bowel sounds present  EXTREMITIES:  2+ Peripheral Pulses, No clubbing, cyanosis, or edema  NEUROLOGY: CN II-XII grossly intact, moving all extremities  SKIN: No rashes or lesions    LABS:  CAPILLARY BLOOD GLUCOSE                              8.6    3.76  )-----------( 153      ( 21 Sep 2019 07:15 )             30.3     09-21    142  |  102  |  10  ----------------------------<  92  3.7   |  28  |  0.75    Ca    9.3      21 Sep 2019 07:15  Mg     2.2     09-21                RADIOLOGY & ADDITIONAL TESTS:    Imaging Personally Reviewed:

## 2019-09-21 NOTE — PROGRESS NOTE ADULT - PROBLEM SELECTOR PLAN 7
History of bradycardia, asymptomatic.   - EKG: Sinus bradycardia  -likely exacerbated by midodrine use. Currently HD stable, continue to monitor

## 2019-09-21 NOTE — PROGRESS NOTE ADULT - PROVIDER SPECIALTY LIST ADULT
Heme/Onc
Hospitalist
Palliative Care
Heme/Onc
Palliative Care
Hospitalist

## 2019-09-21 NOTE — PROGRESS NOTE ADULT - PROBLEM SELECTOR PLAN 6
Normocytic anemia 2/2 active cancer and chemotherapy.  - CBC qD, transfuse if hb<7  - keep active T&S  - s/p 1 u PRBCS 9/16
Normocytic anemia 2/2 active cancer and chemotherapy.  - CBC qD, transfuse if hb<7  - keep active T&S
Normocytic anemia 2/2 active cancer and chemotherapy.  - CBC qD, transfuse if hb<7  - keep active T&S  - s/p 1 u PRBCS 9/16
Normocytic anemia 2/2 active cancer and chemotherapy.  - CBC qD, transfuse if hb<7  - keep active T&S  - s/p 1 u PRBCS 9/16

## 2019-09-21 NOTE — PROGRESS NOTE ADULT - PROBLEM SELECTOR PLAN 4
Severe abdominal pain 2/2 progression of disease based on CT A/P now with probable liver mets and disease progression in the RLQ  - methadone 25 mg PO TID  - Dilaudid PCA  -lidocaine patch x 2
Severe abdominal pain 2/2 progression of disease based on CT A/P now with probable liver mets and disease progression in the RLQ  - methadone 25 mg PO TID  - Dilaudid PCA  -lidocaine patch x 2
Severe abdominal pain 2/2 progression of disease based on CT A/P now with probable liver mets and disease progression in the RLQ  - methadone 25 mg PO TID  - Dilaudid IV PRN  -lidocaine patch x 2
As per conversation with Dr. Cole - plan is hospice upon discharge.  Hospice referral made by primary team.
As per conversation with Dr. Cole - plan is hospice upon discharge.  Hospice referral made by primary team.
Patient of Dr. Uriel Cole - as per conversation with Dr. Cole via phone - long discussion between Dr. Cole, patient, and patient's wife this am - patient would like to continue disease modifying therapy.  Dr. Cole states patient and wife are aware that her cancer is aggressive and that treatment is palliative.  They are aware that hospice is an option and that overall prognosis is poor.  Patient would like to continue treatment at this time.  Plan for follow-up as an outpatient.
Severe abdominal pain 2/2 progression of disease based on CT A/P now with probable liver mets and disease progression in the RLQ  - methadone 25 mg PO TID  - Dilaudid IV PRN  -lidocaine patch x 2
Severe abdominal pain 2/2 progression of disease based on CT A/P now with probable liver mets and disease progression in the RLQ  - methadone 25 mg PO TID  - Dilaudid IV PRN  -lidocaine patch x 2
Wife and patient tearful given patient's overall poor prognosis. Psychosocial support provided to patient and wife as they are overwhelmed and having a difficult time coping with the patient's illness.  Will follow-up.
Severe abdominal pain 2/2 progression of disease based on CT A/P now with probable liver mets and disease progression in the RLQ  - methadone 25 mg PO TID  - Dilaudid PCA  -lidocaine patch x 2

## 2019-09-21 NOTE — PROGRESS NOTE ADULT - PROBLEM SELECTOR PROBLEM 4
Abdominal pain
Ovarian cancer
Palliative care encounter
Abdominal pain

## 2019-09-21 NOTE — PROGRESS NOTE ADULT - ASSESSMENT
48 YOF with PMHx of clear cell metastatic ovarian cancer (diagnosed in 2017, follows at [facility], on Topetecan since June, previously on Carbotaxol, Keytruda and s/p palliative RT to RLQ peritoneal mets with Dr. Dc on 6/2019) presenting with severe abdominal pain  concern for progression of disease

## 2019-09-21 NOTE — PROGRESS NOTE ADULT - PROBLEM SELECTOR PROBLEM 2
Hypotension
Constipation
Debility
Hypotension

## 2019-09-21 NOTE — PROGRESS NOTE ADULT - PROBLEM SELECTOR PLAN 5
- resumed home Ativan PRN  - Mirtazapine 15mg qHS  - venlafaxine 50mg qD
- resume home Ativan PRN  - Mirtazapine 15mg qHS  - venlafaxine 50mg qD
- resumed home Ativan PRN  - Mirtazapine 15mg qHS  - venlafaxine 50mg qD
Plan for DC home with PCA and hospice.  Holistic nursing referral made as per patient request.  Psychosocial support provided.  Will follow-up.
Plan for DC home with PCA and hospice.  Holistic nursing referral made as per patient request.  Psychosocial support provided.  Will follow-up.
Plan for home with PCA with management by Dr. Arellano.  Case management and primary team aware.  Follow-up with oncology as an outpatient.  Psychosocial support provided.
- resumed home Ativan PRN  - Mirtazapine 15mg qHS  - venlafaxine 50mg qD

## 2019-09-21 NOTE — PROGRESS NOTE ADULT - PROBLEM SELECTOR PROBLEM 3
Malignant neoplasm of ovary, unspecified laterality
Debility
Malignant neoplasm of ovary, unspecified laterality
Malignant neoplasm of ovary, unspecified laterality
Ovarian cancer
Malignant neoplasm of ovary, unspecified laterality

## 2019-09-21 NOTE — DISCHARGE NOTE NURSING/CASE MANAGEMENT/SOCIAL WORK - PATIENT PORTAL LINK FT
You can access the FollowMyHealth Patient Portal offered by [facility] by registering at the following website: [email or web address]. By joining [facility]’s FollowMyHealth portal, you will also be able to view your health information using other applications (apps) compatible with our system.

## 2019-09-21 NOTE — PROGRESS NOTE ADULT - PROBLEM SELECTOR PLAN 8
- prescribed lactulose PRN
-c/w lactulose ATC until BM  -enema if no BM by later today
-c/w lactulose ATC until BM  -enema if no BM by later today
- prescribed lactulose PRN
- prescribed lactulose 10g q6hrs prn
- prescribed lactulose PRN

## 2019-09-21 NOTE — PROGRESS NOTE ADULT - PROBLEM SELECTOR PLAN 3
Diagnosed with stage 2b clear cell metastatic ovarian carcinoma with treatment course as outlined in HPI  - c/w methadone  - c/w Dilaudid PCA as per Palliative recs  - after discussion w/ Dr. Cole (primary oncologist), pt would now like to pursue home hospice
Diagnosed with stage 2b clear cell metastatic ovarian carcinoma with treatment course as outlined in HPI  - c/w methadone  - c/w Dilaudid PCA as per Palliative recs  - after discussion w/ Dr. Cole (primary oncologist), pt would now like to pursue home hospice
Diagnosed with stage 2b clear cell metastatic ovarian carcinoma with treatment course as outlined in HPI  -apprec Heme/Onc f/u   - c/w methadone  - c/w Dilaudid PCA as per Palliative recs
Assist with ADL's as needed.  Fall precautions.
Diagnosed with stage 2b clear cell metastatic ovarian carcinoma with treatment course as outlined in HPI  -apprec Heme/Onc f/u   - c/w methadone  - c/w Dilaudid PCA as per Palliative recs
Diagnosed with stage 2b clear cell metastatic ovarian carcinoma with treatment course as outlined in HPI  -f/u Heme/Onc recs   - c/w methadone  - Dilaudid IV as per Palliative recs (BP improved)   - metoclopramide 10mg PO TID prn for nausea
Extensive discussion with patient and wife regarding hospice.  Patient states that she will discuss with her wife and Dr. Cole today to determine what the best plan of care is going forward.
Diagnosed with stage 2b clear cell metastatic ovarian carcinoma with treatment course as outlined in HPI  - c/w methadone  - c/w Dilaudid PCA as per Palliative recs  - after discussion w/ Dr. Cole (primary oncologist), pt would now like to pursue home hospice

## 2019-09-21 NOTE — PROGRESS NOTE ADULT - PROBLEM SELECTOR PROBLEM 5
Anxiety
Palliative care encounter
Anxiety

## 2019-10-10 NOTE — ASSESSMENT
[FreeTextEntry1] : She is a 49 y/o F with recurrent clear cell ovarian cancer that has progressed through 5th line chemotherapy and platinum resistant. We reviewed her expectations from supportive care and palliative chemotherapy. We reviewed her Foundation report which had reviewed BRCA mutation which was negative and mutations without drug options. We reviewed the behavior of clear cell carcinoma which is different from serous carcinoma and the difficult journey she has led thus far. We reiterated the potential lower response rate of successive chemotherapy lines. We reviewed docetaxel every 3 weeks with Neulasta support. We reviewed potential side effects including but not limited to: fatigue, nausea, GI upset, arthralgias, myalgias, numbness/ tingling of the hands and feet, increased risk of infection, and allergic reaction. We reviewed supportive measures to decrease side effects. We reviewed goals of care and if treatment works, resistance could form within months as in with the case of topotecan. They understand her option to remain on supportive care/ home hospice. There seems to be influence from other family members who have never come with her to any visit which is swaying Hailey's opinion to keep "trying." We have explained that this is not like prostate cancer and we would explain her cancer's aggressiveness and chemotherapy resistance to her other family members. Questions answered to her and her wife's satisfaction. At this time, they would still want to dis-enroll off hospice and try palliative docetaxel with Dr Coles taking over pain control with pain pump.

## 2019-10-10 NOTE — HISTORY OF PRESENT ILLNESS
[Disease: _____________________] : Disease: [unfilled] [AJCC Stage: ____] : AJCC Stage: [unfilled] [de-identified] : 1/25/2017- CA-125 = 186, CA 19-9 < 1 [de-identified] : clear cell  [de-identified] : Age: 45 y/o diagnosed ovarian cancer. \victoriano Presented to the ED at [facility] on 1/25/17 with a complaints of abdominal pain. Had imaging that showed a suspicious pelvic mass. Taken emergently to the OR by general gyn out of concern for acute abdominal pain. Had a laparoscopy with conversion to ELAP via PFAN for left salpingo-oophorectomy. 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 1/31/2017- CT Abd/pelvis- no bowel obstruction (sent to the ED for evaluation of abdominal pain). Had been on Depo- provera for approximately 4 years, reportedly for management of endometriosis. Had a family history of ovarian cancer. On 2/28/17, she underwent RTLH, RSO, resection left adnexal remnant, omentectomy, P&PA LND, resection of pelvic nodules, staging . Final Pathology: Clear cell ovarian cancer, stage II B. She completed 6 cycles of carboplatin/ paclitaxel in 8/2017 and was in remission until 12/2017 which showed new foci in the rectus sheath. Foundation One sent and had PDL1 1%, MSI stable, TMB low and was started on Keytruda where she felt the neuropathy became worse: pain over the fingers and hands, fatigue, not feeling well. She had progression on immunotherapy and was switched to Doxil which she tolerated better except for rashes over the body and mouth sores. After review at tumor board, consensus was trial of palliative gemcitabine D1, D8 every 3 weeks. CT interval imaging showed interval progression of right lower quadrant peritoneal metastases. She underwent palliative RT to RLQ peritoneal metastases with Dr Dc on 6/2019 (3750 dose). She started on topotecan weekly treatment. CT abd/ pelvis done after C3 treatment and RT showed decrease in size of peritoneal implant and R rectus muscle lesion. She had abdominal pain and fevers and was hospitalized at [facility] where she was placed on pain pump and interval imaging that showed enlarged abdominal mass and hypodensities over the liver. She entered on to home hospice 9/2019.  [de-identified] : DOS: 2/28/17\par Carboplatin and weekly Taxol 4/6/17- 6/1/17(three cycles)\par Carboplatin and Taxol q3w 6/29/17- 8/10/17( three cycles)\par Keytruda 7/9/18 -8/23/18 (3 cycles)\par Doxil: 8/2018 to 1/2019\par Foundation testing was BRCA negative \par gemcitabine 2/2019 to 5/9/19 \par topotecan 6/20/19 (counts were too low for D15 and topotecan changed to D1 and D8 every 3 weeks)  to 9/2019 [de-identified] : Has been on home hospice for the past 3 weeks. She admits the pain pump has been very helpful for pain control: pain 5/10 but without the rescues: pain becomes 10/10: RLQ pain. She has been on pain medication infusion along with as needed: recorded to have received 34 out of 48 tries over 48 hours. She is sleepy on the pain medication: per her wife, was at [facility] and started falling asleep. She is able to have BM: every other day with Magic juice and able to urinate by turning on faucet. She has sent the SCA home. Feels she is sleeping the entire day and not fighting her cancer: she feels her cancer is getting larger and only increasing pain medication every week. She has convinced her wife to come for oncology evaluation.

## 2019-10-10 NOTE — REVIEW OF SYSTEMS
[Fever] : no fever [Night Sweats] : no night sweats [Chills] : no chills [Fatigue] : fatigue [Recent Change In Weight] : ~T no recent weight change [Abdominal Pain] : abdominal pain [Vomiting] : no vomiting [Diarrhea] : no diarrhea [Constipation] : constipation [Negative] : Allergic/Immunologic

## 2019-10-10 NOTE — REASON FOR VISIT
[Spouse] : spouse [Follow-Up Visit] : a follow-up [FreeTextEntry2] : follow up for metastatic ovarian cancer on home hospice
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
## 2019-10-11 NOTE — PHYSICAL EXAM
[General Appearance - In No Acute Distress] : in no acute distress [General Appearance - Alert] : alert [Sclera] : the sclera and conjunctiva were normal [Neck Appearance] : the appearance of the neck was normal [Normal Oral Mucosa] : normal oral mucosa [Heart Rate And Rhythm] : heart rate was normal and rhythm regular [Auscultation Breath Sounds / Voice Sounds] : lungs were clear to auscultation bilaterally [Heart Sounds] : normal S1 and S2 [Bowel Sounds] : normal bowel sounds [Abdomen Tenderness] : non-tender [Abdomen Soft] : soft [No Focal Deficits] : no focal deficits [Oriented To Time, Place, And Person] : oriented to person, place, and time [Affect] : the affect was normal [FreeTextEntry1] : +antalgic gait. +bilateral knee and elbow pain with movement, , no crepitus. no warmth or erythema

## 2019-10-11 NOTE — HISTORY OF PRESENT ILLNESS
[FreeTextEntry1] : 48 F with ovarian cancer Stage IIb s/p p RTLH, RSO, resection left adnexal remnant, omentectomy, P&PA LND (2/28/17), s/p Carboplatin, Taxol with disease recurrence presents for follow up visit.  PMH also significant for vertigo, migraines.  Had POD on Pembrolizumab and on Doxil. Patient now under the care of Dr. Cole.  Patient received palliative RT to RLQ peritoneal metastases with Dr. Dc. Had POD on Topotecan. \par \par Patient was hospitalized last month with worsening pain, found to have progression of peritoneal disease and hepatic hypodensities on CT A/P.  Was seen by inpatient Palliative Care team and pain regimen was tranisitioned to PCA.  She was discharged home with PCA and home hospice services in place through [facility]. Overall she reports that pain is better controlled, she is pressing the button rather frequently. \par \par Patient and her wife are interested in discussing further treatment options with Dr. Cole which they will do at an upcoming appointment. \par \par ROS:\par +loss of appetite--supplementing with ensure x3 per day. \par +anxiety/ outbursts- controlled with lorazepam 0.5mg TID\par +constipation - controlled with daily lactulose\par +fatigue\par Denies nausea, trouble sleeping\par \par I-Stop Ref#: [id number] (10/1)

## 2019-10-11 NOTE — ASSESSMENT
[Completed Healthcare Proxy] : completed healthcare proxy [______] : HCP: [unfilled] [FreeTextEntry1] : 48yoF with:\par \par 1. Ovarian cancer - To discuss treatment options with Dr. Cole. If she pursues treatment for the cancer she understands that hospice benefit will need to be revoked and I will manage the PCA with [facility] company.\par Prognosis is poor given refractory disease; patient and wife are aware of the terminal nature of her diagnosis but hopeful that treatment will extend life as long as possible. \par \par 2. Neoplasm-related pain/CIPN - Hydromorphone PCA, currently being managed by hospice. \par \par 3. Anxiety/depression - C/w Mirtazapine 15mg QHS. C/w Lorazepam 0.5mg TID PRN.  Patient following with psychologist at [facility].\par \par 4. Encounter for Palliative Care - Emotional support provided to patient and her wife. \par HCP previously completed.  Primary- wife, Parvin Shetty ([phone]).  In regard to advance directives, patient states she would not want to have heroic/aggressive interventions done on her if she has "no hope" of recovery.  She has previously communicated this to her wife and other family. Will continue to re-address at follow-up.\par \par Presented MOLST form to patient and wife, discussed CPR vs. DNR in the setting of advanced cancer.  Patient states that she would want CPR in the event of her heart stopping, but would not want to be kept alive with "tubes" \par \par Patient advised to return to office in 1 month.  Instructed to call should any questions or issues arise.

## 2019-10-31 PROBLEM — R32 INCONTINENCE: Status: ACTIVE | Noted: 2019-01-01

## 2019-10-31 PROBLEM — R60.0 BILATERAL EDEMA OF LOWER EXTREMITY: Status: ACTIVE | Noted: 2019-01-01

## 2019-11-01 NOTE — REASON FOR VISIT
[Follow-Up Visit] : a follow-up [Spouse] : spouse [Family Member] : family member [FreeTextEntry2] : follow up for metastatic ovarian cancer s/p C1 Taxotere

## 2019-11-01 NOTE — HISTORY OF PRESENT ILLNESS
[Disease: _____________________] : Disease: [unfilled] [AJCC Stage: ____] : AJCC Stage: [unfilled] [de-identified] : Age: 47 y/o diagnosed ovarian cancer. \victoriano Presented to the ED at [facility] on 1/25/17 with a complaints of abdominal pain. Had imaging that showed a suspicious pelvic mass. Taken emergently to the OR by general gyn out of concern for acute abdominal pain. Had a laparoscopy with conversion to ELAP via PFAN for left salpingo-oophorectomy. 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 1/31/2017- CT Abd/pelvis- no bowel obstruction (sent to the ED for evaluation of abdominal pain). Had been on Depo- provera for approximately 4 years, reportedly for management of endometriosis. Had a family history of ovarian cancer. On 2/28/17, she underwent RTLH, RSO, resection left adnexal remnant, omentectomy, P&PA LND, resection of pelvic nodules, staging . Final Pathology: Clear cell ovarian cancer, stage II B. She completed 6 cycles of carboplatin/ paclitaxel in 8/2017 and was in remission until 12/2017 which showed new foci in the rectus sheath. Foundation One sent and had PDL1 1%, MSI stable, TMB low and was started on Keytruda where she felt the neuropathy became worse: pain over the fingers and hands, fatigue, not feeling well. She had progression on immunotherapy and was switched to Doxil which she tolerated better except for rashes over the body and mouth sores. After review at tumor board, consensus was trial of palliative gemcitabine D1, D8 every 3 weeks. CT interval imaging showed interval progression of right lower quadrant peritoneal metastases. She underwent palliative RT to RLQ peritoneal metastases with Dr Dc on 6/2019 (3750 dose). She started on topotecan weekly treatment. CT abd/ pelvis done after C3 treatment and RT showed decrease in size of peritoneal implant and R rectus muscle lesion. She had abdominal pain and fevers and was hospitalized at [facility] where she was placed on pain pump and interval imaging that showed enlarged abdominal mass and hypodensities over the liver. She entered on to home hospice 9/2019.  [de-identified] : clear cell  [de-identified] : 1/25/2017- CA-125 = 186, CA 19-9 < 1 [de-identified] : DOS: 2/28/17\par Carboplatin and weekly Taxol 4/6/17- 6/1/17(three cycles)\par Carboplatin and Taxol q3w 6/29/17- 8/10/17( three cycles)\par Keytruda 7/9/18 -8/23/18 (3 cycles)\par Doxil: 8/2018 to 1/2019\par Foundation testing was BRCA negative \par gemcitabine 2/2019 to 5/9/19 \par topotecan 6/20/19 (counts were too low for D15 and topotecan changed to D1 and D8 every 3 weeks)  to 9/2019 [de-identified] : Pt presents today with s/p C1 Taxotere with her wife and son, pt was seen by Dr. Arellano yesterday regarding pain management and possible resume home hospice due to worsening perform status and physical symptoms, but today pt stated she still wants to pursue chemo treatment and does not want to return back to home hospice care yet despite Dr. Kearney's  advise during her yesterday's visit. Pts wife and son both support her wishes and stated they want to keep fight the cancer as long as possible. Pt is in constant pain especially when breathing even with PCA usage today, constipation become constant bothersome based on the wife, they are also asking for help to obtain chux to deal with pt's incontinence issue. US of B/L lower extremities on 10/15/19 showed nonocclusive DVT within the right common femoral vein, thrombophlebitis within the right greater saphenous vein at the region of pt's pain. Pt was on Enoxaparin 60 mg twice a day injection for past 2 weeks due to above findings. Pt's appetite is decreased due to constant pain and constipation, lost 3 kg in past 3 weeks. otherwise denies SOB/ Chest pain/ Palpitation/ fever/ chills / vomiting/ or any new symptoms since last visit.\par
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
## 2019-11-01 NOTE — REVIEW OF SYSTEMS
[Fatigue] : fatigue [Abdominal Pain] : abdominal pain [Constipation] : constipation [Negative] : Allergic/Immunologic [SOB on Exertion] : shortness of breath during exertion [Incontinence] : incontinence [Fever] : no fever [Chills] : no chills [Night Sweats] : no night sweats [Recent Change In Weight] : ~T no recent weight change [Vomiting] : no vomiting [Diarrhea] : no diarrhea

## 2019-11-01 NOTE — ASSESSMENT
[FreeTextEntry1] : She is a 49 y/o F with recurrent clear cell ovarian cancer that has progressed through 5th line chemotherapy and platinum resistant. We reviewed her expectations from supportive care and palliative chemotherapy. We reviewed her Foundation report which had reviewed BRCA mutation which was negative and mutations without drug options. We reviewed the behavior of clear cell carcinoma which is different from serous carcinoma and the difficult journey she has led thus far. We reiterated the potential lower response rate of successive chemotherapy lines. We reviewed docetaxel every 3 weeks with Neulasta support. Today pt is s/p C1 Taxotere, BW showed  treading up to 120, ALK still remain above normal range,  CBC steady. Discussed resume home hospic care Vs. continue chemo treatment options with pt and family. Advised pt to f/u with pain management Dr. Bon Hays for better pain control and Enoxaparin usage since the nonocclusive DVT diagnose within the right leg on 10/15/19. consulted with  during visit regarding chux supply, based on social work, since pt no longer has medicaid, pt's current insurance does not covering her chux expense, suggested pt to seek additional help from social service. Advised pt to continue use lactulose and diet regimen to ease opioid induced constipation.  Advised pt and family report any worsening or new symptom, we may have to stop chemo treatment depends on pt's perform status and clinical data. all the all questions answered upon pt's satisfaction.\par

## 2019-11-05 NOTE — HISTORY OF PRESENT ILLNESS
[FreeTextEntry1] : IIB clear cell ovarian cancer. \par \par Initially operated on by her general gynecologist at [facility] where she underwent an ELAP (Pfan) and LSO demonstrating a clear cell ovarian cancer, ruptured. \par \par Returned to the OR on 2/28/17 for RTLH, RSO, omentectomy and staging. \par \par Clear cell ovarian cancer involving right adnexa and pelvic peritoneum. \par \par Adjuvant chemotherapy with Dr. Arreola consisting of \par CB and weekly Taxol 4/6/17 -6/1/17 (3)\par CB and Taxol q3w 6/29/17 - 8/10/17 (3)\par  \par  = 15 11/29/17\par CT A/P 12/16/17 with two new areas of enhancement in the lower rectus musculature and right rectus muscle. \par \par Underwent resection of abdominal wall nodules with biologic mesh reconstruction on 3/9/18. Pathology showed clear cell adenocarcinoma with negative margins. TPC consensus was for observation. \par \par Seen in the [facility] ED 6/17/18 after coming in with pain. CT imaging showed probable recurrence disease again in the anterior abdominal wall as well as a persistent abdominal wall collection. \par \par She has seen medical oncology to discuss treatment options. Foundation testing on the tumor showed low PD1/ PDL 1 expression and immunotherapy with Keytruda was started. \par \par Imaging after 3 cycles of Keytruda shows POD with increase in size of the right peritoneal nodule and right pelvic LN. No new sites of disease. No ascites. \par \par She has subsequently been treated with Doxil and Gemzar with progression.\par She received palliative radiation to a right pelvic mass for pain control. \par She is progressed on topotecan and most recently has received Taxotere.\par \par Seeing Dr. Arellano and is on high doses of methadone and short acting narcotics as well as anxiolytics. Pain is better controlled lately. She has completed her health care proxy paperwork. She has experienced more disease related symptoms over the last month and has worsening pain. She was on a PCa pain pump through home hospice but then withdrew from hospice to receive further chemotherapy.

## 2019-11-05 NOTE — PHYSICAL EXAM
[Absent] : Adnexa(ae): Absent [Normal] : Recto-Vaginal Exam: Normal [Ambulatory and capable of all self care but unable to carry out any work activities] : Status 2- Ambulatory and capable of all self care but unable to carry out any work activities. Up and about more than 50% of waking hours [de-identified] : well healed lsc incisions, palpable mass in right lower abdomen, no palpable abdominal wall masses, no bulge

## 2019-11-08 NOTE — PHYSICAL EXAM
[General Appearance - Alert] : alert [General Appearance - In No Acute Distress] : in no acute distress [Sclera] : the sclera and conjunctiva were normal [Normal Oral Mucosa] : normal oral mucosa [Neck Appearance] : the appearance of the neck was normal [Heart Sounds] : normal S1 and S2 [Heart Rate And Rhythm] : heart rate was normal and rhythm regular [Auscultation Breath Sounds / Voice Sounds] : lungs were clear to auscultation bilaterally [Bowel Sounds] : normal bowel sounds [Abdomen Tenderness] : non-tender [Abdomen Soft] : soft [No Focal Deficits] : no focal deficits [Oriented To Time, Place, And Person] : oriented to person, place, and time [Affect] : the affect was normal [] : no respiratory distress [FreeTextEntry1] : +antalgic gait. +bilateral knee and elbow pain with movement, , no crepitus. no warmth or erythema

## 2019-11-08 NOTE — HISTORY OF PRESENT ILLNESS
[FreeTextEntry1] : 48 F with ovarian cancer Stage IIb s/p p RTLH, RSO, resection left adnexal remnant, omentectomy, P&PA LND (2/28/17), s/p Carboplatin, Taxol with disease recurrence presents for follow up visit.  PMH also significant for vertigo, migraines.  Had POD on Pembrolizumab and on Doxil. Patient now under the care of Dr. Cole.  Patient received palliative RT to RLQ peritoneal metastases with Dr. Dc. Had POD on Topotecan. \par \par Interval history:  Patient is no longer receiving hospice care services and has resumed taxotere.   Her pain is controlled on a dilaudid PCA pump; pressing button infrequently. [facility] is maintaining right CW port; has required cathflo for blood return.   Has had bouts of constipation requiring lactulose, enema and prune juice which has been leading to increased levels of pain.   Continues to have poor appetite.  US of B/L lower extremities on 10/15/19 showed nonocclusive DVT within the right common femoral vein, thrombophlebitis within the right greater saphenous vein at the region of patient's pain. Started on lovenox.  \par \par ROS:\par +poor appetite-continues to supplement with ensure\par +5 lb weight loss in last month\par +anxiety- continues with lorazepam 0.5mg TID\par +constipation- despite bowel regimen\par +fatigue\par +hair loss\par Denies nausea, vomiting, trouble sleeping.\par All other ROS as outlined or negative. \par \par Patient and spouse have limited local social support and are experiencing significant financial difficulties.  Are receiving meal delivery service.  Patient has reconnected with rupa donovan.\par \par I-Stop Ref#: [id number]

## 2019-11-08 NOTE — ASSESSMENT
[Completed Healthcare Proxy] : completed healthcare proxy [______] : HCP: [unfilled] [FreeTextEntry1] : 48yoF with:\par \par 1. Ovarian cancer - currently on Taxotere.  Med Onc follow-up \par \par 2. Neoplasm-related pain/CIPN - C/w Hydromorphone PCA pump, will decrease basal and bolus dose to 11mg/11mg from 12mg/12mg.\par Have previously tried to wean Methadone 25mg TID down to 25/20/25 however this caused patient to have a spike in pain level. Will leave at 25mg TID.  \par NARCAN nasal spray previously prescribed.  \par \par 3. Anxiety/depression - C/w Mirtazapine 15mg QHS. C/w Lorazepam 0.5mg TID PRN.  Patient following with psychologist at [facility].\par \par 4.  Opioid-induced constipation- C/w movantiq and lactulose PRN.\par \par 5. Encounter for Palliative Care/Supportive care-  HCP previously completed.  Primary- wife, Parvin Shetty ([phone]). Prognosis is poor given refractory disease; patient and wife are aware of the terminal nature of her diagnosis but hopeful that treatment will extend life as long as possible. Previously presented with MOL and discussed CPR vs. DNR in the setting of advanced cancer.  Patient continues to state that she would want CPR in the event of her heart stopping, but would not want to be kept alive with "tubes".  Spouse has previously expressed desire to renew their vows.   Will discuss with interdisciplinary team.  Empathetic listening and emotional support provided. \par \par RTO in two weeks, call sooner with questions or issues.

## 2019-11-14 PROBLEM — R19.00 ABDOMINAL MASS: Status: ACTIVE | Noted: 2019-01-01

## 2019-11-14 PROBLEM — R10.32 LEFT LOWER QUADRANT PAIN: Status: ACTIVE | Noted: 2018-12-11

## 2019-11-15 NOTE — HISTORY OF PRESENT ILLNESS
[Disease: _____________________] : Disease: [unfilled] [AJCC Stage: ____] : AJCC Stage: [unfilled] [de-identified] : Age: 45 y/o diagnosed ovarian cancer. \victoriano Presented to the ED at [facility] on 1/25/17 with a complaints of abdominal pain. Had imaging that showed a suspicious pelvic mass. Taken emergently to the OR by general gyn out of concern for acute abdominal pain. Had a laparoscopy with conversion to ELAP via PFAN for left salpingo-oophorectomy. 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 1/31/2017- CT Abd/pelvis- no bowel obstruction (sent to the ED for evaluation of abdominal pain). Had been on Depo- provera for approximately 4 years, reportedly for management of endometriosis. Had a family history of ovarian cancer. On 2/28/17, she underwent RTLH, RSO, resection left adnexal remnant, omentectomy, P&PA LND, resection of pelvic nodules, staging . Final Pathology: Clear cell ovarian cancer, stage II B. She completed 6 cycles of carboplatin/ paclitaxel in 8/2017 and was in remission until 12/2017 which showed new foci in the rectus sheath. [facility] One sent and had PDL1 1%, MSI stable, TMB low and was started on Keytruda where she felt the neuropathy became worse: pain over the fingers and hands, fatigue, not feeling well. She had progression on immunotherapy and was switched to Doxil which she tolerated better except for rashes over the body and mouth sores. After review at tumor board, consensus was trial of palliative gemcitabine D1, D8 every 3 weeks. CT interval imaging showed interval progression of right lower quadrant peritoneal metastases. She underwent palliative RT to RLQ peritoneal metastases with Dr Dc on 6/2019 (3750 dose). She started on topotecan weekly treatment. CT abd/ pelvis done after C3 treatment and RT showed decrease in size of peritoneal implant and R rectus muscle lesion. She had abdominal pain and fevers and was hospitalized at [facility] where she was placed on pain pump and interval imaging that showed enlarged abdominal mass and hypodensities over the liver. She entered on to home hospice 9/2019.  [de-identified] : 1/25/2017- CA-125 = 186, CA 19-9 < 1 [de-identified] : clear cell  [de-identified] : Pt presents today with s/p C2 Taxotere / Onpro with pale look but able to walk without wheelchair assistance anymore. pt reports her chronic abdominal / back pain is currently under control by the PCA pump with frequently adjustment by her pain management. Her opiod-induced constipation is also finally get improved by Movantiq and lactulose combination usage. Pt and her wife will have Vows renewal ceremony on 11/21/19 in our facility which they are very excited about it. Pt mentioned worsening mental problem ( has conversation with herself more often and a lot of times her conversations or body languages are not making any sense based on her wife). Pt and her wife think this new chemo treatment is working well for her disease control, since her pain level is significantly improved. They are looking forward to have as many treatments as possible while are aware of the nature of refractory cancer disease. Pt is able to eat and drink without vomiting or nausea sensation, denies SOB/ Chest pain/ Palpitation/ fever/ chills/ pain or any new symptoms since last visit. \par  [de-identified] : DOS: 2/28/17\par Carboplatin and weekly Taxol 4/6/17- 6/1/17(three cycles)\par Carboplatin and Taxol q3w 6/29/17- 8/10/17( three cycles)\par Keytruda 7/9/18 -8/23/18 (3 cycles)\par Doxil: 8/2018 to 1/2019\par Foundation testing was BRCA negative \par gemcitabine 2/2019 to 5/9/19 \par topotecan 6/20/19 (counts were too low for D15 and topotecan changed to D1 and D8 every 3 weeks)  to 9/2019

## 2019-11-15 NOTE — PHYSICAL EXAM
[Ambulatory and capable of all self care but unable to carry out any work activities] : Status 2- Ambulatory and capable of all self care but unable to carry out any work activities. Up and about more than 50% of waking hours [Normal] : grossly intact [de-identified] : looks pale, but able to talk and walk without SOB, denies pain with PCA [Ulcers] : no ulcers [de-identified] : hyperpigmented scar below umbilicus: linear 3 cm scar from heating blanket burn; abdominal pain on palpation over RLQ: palpable indurated mass; no rebound or guarding [de-identified] : port accessed for pain pump  [de-identified] : pedal edema

## 2019-11-15 NOTE — REASON FOR VISIT
[Spouse] : spouse [Follow-Up Visit] : a follow-up [FreeTextEntry2] : follow up for metastatic ovarian cancer s/p C2 Taxotere

## 2019-11-15 NOTE — ASSESSMENT
[FreeTextEntry1] : She is a 47 y/o F with recurrent clear cell ovarian cancer that has progressed through 5th line chemotherapy and platinum resistant. We reviewed her expectations from supportive care and palliative chemotherapy. We reviewed her Foundation report which had reviewed BRCA mutation which was negative and mutations without drug options. We reviewed the behavior of clear cell carcinoma which is different from serous carcinoma and the difficult journey she has led thus far. We reiterated the potential lower response rate of successive chemotherapy lines. Today pt is s/p C2 Taxotere, BW showed  treading up, ALK still remain above normal range,  CBC steady with low HGB and leukocytosis due to Onpro use. Discussed possible termination of chemo treatment and seeking hospice care if side effects outweigh the benefits or decline of performance status. Advised pt and family members to join support group and seek psycho consultation as needed, and continue to f/u with pain management to achieve long term pain control and boost quality of life. Advised pt to continue with Movantiq and Lactulose for constipation relieve as needed. Advised pt and family report any worsening or new symptom.. all the all questions answered upon pt's satisfaction.\par

## 2019-11-15 NOTE — REVIEW OF SYSTEMS
[Fatigue] : fatigue [SOB on Exertion] : shortness of breath during exertion [Constipation] : constipation [Abdominal Pain] : abdominal pain [Incontinence] : incontinence [Negative] : Endocrine [Fever] : no fever [Chills] : no chills [Night Sweats] : no night sweats [Recent Change In Weight] : ~T no recent weight change [Vomiting] : no vomiting [Diarrhea] : no diarrhea

## 2019-12-05 PROBLEM — R10.9 ABDOMINAL PAIN: Status: ACTIVE | Noted: 2018-12-11

## 2019-12-05 PROBLEM — I82.409 DVT (DEEP VENOUS THROMBOSIS): Status: ACTIVE | Noted: 2019-01-01

## 2019-12-05 NOTE — ASSESSMENT
[FreeTextEntry1] : She is a 47 y/o F with recurrent clear cell ovarian cancer that has progressed through 5th line chemotherapy and platinum resistant. We reviewed her expectations from supportive care and palliative chemotherapy. We reviewed her Foundation report which had reviewed BRCA mutation which was negative and mutations without drug options. We reviewed the behavior of clear cell carcinoma which is different from serous carcinoma and the difficult journey she has led thus far. We reiterated the potential lower response rate of successive chemotherapy lines. Today pt is s/p C3 Taxotere, BW showed steady higher than normal ALK,  low HGB  and leukocytosis due to Onpro use and continue trending up blood calcium level ( 13.0). Arranged Xgeva injection today since pt agrees to start this treatment after been given all the possible side effects profile. Cancelled next cycle of chemo due to pt's performance status and side effects worsening and pt's will for now. Plan to continue with Xgeva injection Q 28 days and routine BW monitoring until pt return back to hospice care when she is ready.  Advised pt and family members to join support group and seek psycho consultation as needed, and continue to f/u with pain management to achieve long term pain control and boost quality of life. Advised pt to continue with Movantiq and Lactulose for constipation relieve as needed. Advised pt and family report any worsening or new symptom.. all the all questions answered upon pt's satisfaction.\par

## 2019-12-05 NOTE — REASON FOR VISIT
[Follow-Up Visit] : a follow-up [Spouse] : spouse [FreeTextEntry2] : follow up for metastatic ovarian cancer s/p C3  Taxotere / Onpro

## 2019-12-05 NOTE — HISTORY OF PRESENT ILLNESS
[Disease: _____________________] : Disease: [unfilled] [AJCC Stage: ____] : AJCC Stage: [unfilled] [de-identified] : clear cell  [de-identified] : 1/25/2017- CA-125 = 186, CA 19-9 < 1 [de-identified] : Age: 47 y/o diagnosed ovarian cancer. \victoriano Presented to the ED at [facility] on 1/25/17 with a complaints of abdominal pain. Had imaging that showed a suspicious pelvic mass. Taken emergently to the OR by general gyn out of concern for acute abdominal pain. Had a laparoscopy with conversion to ELAP via PFAN for left salpingo-oophorectomy. 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 1/31/2017- CT Abd/pelvis- no bowel obstruction (sent to the ED for evaluation of abdominal pain). Had been on Depo- provera for approximately 4 years, reportedly for management of endometriosis. Had a family history of ovarian cancer. On 2/28/17, she underwent RTLH, RSO, resection left adnexal remnant, omentectomy, P&PA LND, resection of pelvic nodules, staging . Final Pathology: Clear cell ovarian cancer, stage II B. She completed 6 cycles of carboplatin/ paclitaxel in 8/2017 and was in remission until 12/2017 which showed new foci in the rectus sheath. Foundation One sent and had PDL1 1%, MSI stable, TMB low and was started on Keytruda where she felt the neuropathy became worse: pain over the fingers and hands, fatigue, not feeling well. She had progression on immunotherapy and was switched to Doxil which she tolerated better except for rashes over the body and mouth sores. After review at tumor board, consensus was trial of palliative gemcitabine D1, D8 every 3 weeks. CT interval imaging showed interval progression of right lower quadrant peritoneal metastases. She underwent palliative RT to RLQ peritoneal metastases with Dr Dc on 6/2019 (3750 dose). She started on topotecan weekly treatment. CT abd/ pelvis done after C3 treatment and RT showed decrease in size of peritoneal implant and R rectus muscle lesion. She had abdominal pain and fevers and was hospitalized at [facility] where she was placed on pain pump and interval imaging that showed enlarged abdominal mass and hypodensities over the liver. She entered on to home hospice 9/2019.  [de-identified] : Pt presents today with s/p C3 Taxotere / Onpro with c/o loss of appetite/ GI upset ( nausea / vomiting)  and  loss of 6 kg body weight since 10/2019. She just had pain management visit and currently her abdominal pain is under control but c/o 3/10 headache. She is able to walk without wheelchair assistance, she and her wife both concern about her recent hypercalcemia issue and willing to take Xgeva injection after discussed all the related side effects profile with us. Pt and her wife also stated that they want to take a break from current chemo therapy until pt's GI side effects are getting improved. denies SOB/ Chest pain/ Palpitation/ fever/ chills since last visit. \par \par  [de-identified] : DOS: 2/28/17\par Carboplatin and weekly Taxol 4/6/17- 6/1/17(three cycles)\par Carboplatin and Taxol q3w 6/29/17- 8/10/17( three cycles)\par Keytruda 7/9/18 -8/23/18 (3 cycles)\par Doxil: 8/2018 to 1/2019\par Foundation testing was BRCA negative \par gemcitabine 2/2019 to 5/9/19 \par topotecan 6/20/19 (counts were too low for D15 and topotecan changed to D1 and D8 every 3 weeks)  to 9/2019

## 2019-12-05 NOTE — PHYSICAL EXAM
[Ambulatory and capable of all self care but unable to carry out any work activities] : Status 2- Ambulatory and capable of all self care but unable to carry out any work activities. Up and about more than 50% of waking hours [Normal] : affect appropriate [Ulcers] : no ulcers [de-identified] : looks pale, feel N/V, tearful when talk about her weight loss,  able to talk and walk without SOB, denies pain with PCA but c/o mild headache on scale 3/10 [de-identified] : hyperpigmented scar below umbilicus: linear 3 cm scar from heating blanket burn; abdominal pain on palpation over RLQ: palpable indurated mass; no rebound or guarding [de-identified] : port accessed for pain pump  [de-identified] : pedal edema

## 2019-12-05 NOTE — REVIEW OF SYSTEMS
[Fatigue] : fatigue [SOB on Exertion] : shortness of breath during exertion [Abdominal Pain] : abdominal pain [Constipation] : constipation [Incontinence] : incontinence [Negative] : Allergic/Immunologic [Vomiting] : vomiting [Night Sweats] : no night sweats [Fever] : no fever [Chills] : no chills [Recent Change In Weight] : ~T no recent weight change [Diarrhea] : no diarrhea

## 2019-12-29 NOTE — HISTORY OF PRESENT ILLNESS
[Opioids for treatment of cancer not in remission, and/or  hospice, palliative care] : Opioids for treatment of cancer not in remission, and/or  hospice, palliative care [FreeTextEntry1] : 49yoF with ovarian cancer Stage IIb s/p p RTLH, RSO, resection left adnexal remnant, omentectomy, P&PA LND (2/28/17), s/p Carboplatin, Taxol with disease recurrence presents for follow up visit.  PMH also significant for vertigo, migraines.  \par \par Presented to the ED at [facility] on 1/25/17 with a complaints of abdominal pain. Had imaging that showed a suspicious pelvic mass. Taken emergently to the OR by general gyn out of concern for acute abdomen. Had a laparoscopy with conversion to ELAP via PFAN for left salpingo-oophorectomy. Final pathology demonstrated  clear cell carcinoma of the left adnexa.  On 2/28/17, she underwent RTLH, RSO, resection left adnexal remnant, omentectomy, P&PA LND, resection of pelvic nodules, staging. Final pathology demonstrated clear cell ovarian cancer, stage II B. She completed 6 cycles of carboplatin/ paclitaxel in 8/2017 and was in remission until 12/2017. Imaging in 12/2017 demonstrated new foci in the rectus sheath. She was on Keytruda and she felt the neuropathy became worse: pain over the fingers and hands, fatigue, not feeling well. She had progression on immunotherapy and was switched to Doxil which she tolerated better except for rashes over the body and mouth sores. After review at tumor board, consensus was trial of palliative gemcitabine D1, D8 every 3 weeks. CT interval imaging showed interval progression of right lower quadrant peritoneal metastases. She underwent palliative RT to RLQ peritoneal metastases with Dr Dc on 6/2019 (3750 dose). She started on topotecan weekly treatment. CT abd/ pelvis done after C3 treatment and RT showed decrease in size of peritoneal implant and R rectus muscle lesion. She had abdominal pain and fevers and was hospitalized at [facility] where she was placed on PCA pump and interval imaging that an enlarged abdominal mass and hypodensities over the liver. She started home hospice 9/2019 but discontinued program.  She resumed re-treatment with taxotere.\par \par Interval history:  Patient has not had treatment in approximately ~3 weeks.  Found to be hypercalcemic to 13.0.  Significant decrease in appetite/intake.   Is now on IVF three days per week as a result.  Her pain is controlled on a dilaudid PCA pump; requiring infrequent boluses.  Continues to report loss of appetite, nausea, dysgeusia.  \par \par ROS:\par +poor appetite-continues to supplement with ensure\par +5 lb weight loss in last month\par +anxiety- continues with lorazepam 0.5mg TID\par +constipation- controlled with bowel regimen\par +fatigue\par +hair loss\par Denies nausea, vomiting, trouble sleeping.\par All other ROS as outlined or negative. \par \par Patient and spouse have limited local social support and are experiencing significant financial difficulties.  Are receiving meal delivery service.  Patient has reconnected with estranged niralion.  Patient and spouse renewed their vows at [facility] on the patient's 49th birthday.  \par \par I-Stop Ref#: [id number]

## 2019-12-29 NOTE — PHYSICAL EXAM
[General Appearance - Alert] : alert [General Appearance - In No Acute Distress] : in no acute distress [Sclera] : the sclera and conjunctiva were normal [Normal Oral Mucosa] : normal oral mucosa [Neck Appearance] : the appearance of the neck was normal [Auscultation Breath Sounds / Voice Sounds] : lungs were clear to auscultation bilaterally [] : no respiratory distress [Heart Rate And Rhythm] : heart rate was normal and rhythm regular [Heart Sounds] : normal S1 and S2 [Bowel Sounds] : normal bowel sounds [Abdomen Soft] : soft [Abdomen Tenderness] : non-tender [No Focal Deficits] : no focal deficits [Oriented To Time, Place, And Person] : oriented to person, place, and time [Affect] : the affect was normal [FreeTextEntry1] : +antalgic gait. +bilateral knee and elbow pain with movement, , no crepitus. no warmth or erythema

## 2019-12-29 NOTE — ASSESSMENT
[______] : HCP: [unfilled] [FreeTextEntry1] : 48yoF with:\par \par 1. Ovarian cancer - currently on break from treatment.  Med Onc follow-up \par \par 2. Neoplasm-related pain/CIPN - Patient's pain is adequately controlled on current regimen.  C/w Hydromorphone PCA pump basal IV 6mg/her and bolus 6mg q 15 min.  Have previously tried to wean Methadone 25mg TID down to 25/20/25 however this caused patient to have a spike in pain level. Will c/w methadone. NARCAN nasal spray previously prescribed.  \par \par 3. Anxiety/depression - C/w Mirtazapine 15mg QHS. C/w Lorazepam 0.5mg TID PRN.  Patient following with psychologist at [facility].\par \par 4.  Opioid-induced constipation- C/w movantiq and lactulose PRN.  \par \par 5.  Nausea- Recommend reinitiation of medical cannabis. C/w home IVF. \par \par 6. Encounter for Palliative Care/Supportive care-  HCP previously completed.  Primary- wife, Parvin Shetty ([phone]). Prognosis is poor given refractory disease; patient and wife are aware of the terminal nature of her diagnosis but hopeful that treatment will extend life as long as possible. Previously presented with FERNANDA and discussed CPR vs. DNR in the setting of advanced cancer.  Patient continues to state that she would want CPR in the event of her heart stopping, but would not want to be kept alive with "tubes". \par \par Again discussed the benefit of re-incorporating home hospice services at this time juncture given patient's declining trajectory.  Patient and spouse would like to continue to pursue cancer-directed treatment.  Empathetic listening and emotional support provided. \par \par RTO 1 month, call sooner with questions or issues.

## 2020-01-01 ENCOUNTER — APPOINTMENT (OUTPATIENT)
Dept: CT IMAGING | Facility: IMAGING CENTER | Age: 50
End: 2020-01-01
Payer: COMMERCIAL

## 2020-01-01 ENCOUNTER — APPOINTMENT (OUTPATIENT)
Dept: HEMATOLOGY ONCOLOGY | Facility: CLINIC | Age: 50
End: 2020-01-01

## 2020-01-01 ENCOUNTER — EMERGENCY (EMERGENCY)
Facility: HOSPITAL | Age: 50
LOS: 1 days | Discharge: HOME | End: 2020-01-01
Attending: EMERGENCY MEDICINE
Payer: COMMERCIAL

## 2020-01-01 ENCOUNTER — OUTPATIENT (OUTPATIENT)
Dept: OUTPATIENT SERVICES | Facility: HOSPITAL | Age: 50
LOS: 1 days | Discharge: HOME | End: 2020-01-01

## 2020-01-01 ENCOUNTER — OUTPATIENT (OUTPATIENT)
Dept: OUTPATIENT SERVICES | Facility: HOSPITAL | Age: 50
LOS: 1 days | End: 2020-01-01
Payer: COMMERCIAL

## 2020-01-01 ENCOUNTER — RESULT REVIEW (OUTPATIENT)
Age: 50
End: 2020-01-01

## 2020-01-01 ENCOUNTER — APPOINTMENT (OUTPATIENT)
Dept: GYNECOLOGIC ONCOLOGY | Facility: CLINIC | Age: 50
End: 2020-01-01

## 2020-01-01 ENCOUNTER — FORM ENCOUNTER (OUTPATIENT)
Age: 50
End: 2020-01-01

## 2020-01-01 ENCOUNTER — APPOINTMENT (OUTPATIENT)
Dept: HEMATOLOGY ONCOLOGY | Facility: CLINIC | Age: 50
End: 2020-01-01
Payer: COMMERCIAL

## 2020-01-01 ENCOUNTER — APPOINTMENT (OUTPATIENT)
Dept: HEMATOLOGY ONCOLOGY | Facility: CLINIC | Age: 50
End: 2020-01-01
Payer: SELF-PAY

## 2020-01-01 ENCOUNTER — APPOINTMENT (OUTPATIENT)
Dept: INFUSION THERAPY | Facility: HOSPITAL | Age: 50
End: 2020-01-01

## 2020-01-01 VITALS
RESPIRATION RATE: 18 BRPM | HEART RATE: 91 BPM | OXYGEN SATURATION: 97 % | DIASTOLIC BLOOD PRESSURE: 61 MMHG | TEMPERATURE: 98 F | SYSTOLIC BLOOD PRESSURE: 101 MMHG

## 2020-01-01 VITALS
RESPIRATION RATE: 16 BRPM | DIASTOLIC BLOOD PRESSURE: 66 MMHG | OXYGEN SATURATION: 98 % | SYSTOLIC BLOOD PRESSURE: 94 MMHG | BODY MASS INDEX: 25.78 KG/M2 | HEART RATE: 84 BPM | WEIGHT: 127.65 LBS | TEMPERATURE: 97.8 F

## 2020-01-01 VITALS
SYSTOLIC BLOOD PRESSURE: 117 MMHG | TEMPERATURE: 97.7 F | OXYGEN SATURATION: 98 % | WEIGHT: 117.51 LBS | BODY MASS INDEX: 23.73 KG/M2 | RESPIRATION RATE: 17 BRPM | DIASTOLIC BLOOD PRESSURE: 82 MMHG | HEART RATE: 99 BPM

## 2020-01-01 VITALS
SYSTOLIC BLOOD PRESSURE: 95 MMHG | TEMPERATURE: 98.1 F | HEART RATE: 85 BPM | OXYGEN SATURATION: 98 % | RESPIRATION RATE: 16 BRPM | WEIGHT: 127.18 LBS | DIASTOLIC BLOOD PRESSURE: 62 MMHG | BODY MASS INDEX: 25.69 KG/M2

## 2020-01-01 VITALS
DIASTOLIC BLOOD PRESSURE: 72 MMHG | OXYGEN SATURATION: 99 % | TEMPERATURE: 98.2 F | SYSTOLIC BLOOD PRESSURE: 117 MMHG | BODY MASS INDEX: 25.11 KG/M2 | WEIGHT: 124.34 LBS | RESPIRATION RATE: 16 BRPM | HEART RATE: 90 BPM

## 2020-01-01 VITALS
SYSTOLIC BLOOD PRESSURE: 96 MMHG | TEMPERATURE: 98 F | WEIGHT: 123.9 LBS | OXYGEN SATURATION: 93 % | HEART RATE: 93 BPM | DIASTOLIC BLOOD PRESSURE: 61 MMHG | RESPIRATION RATE: 16 BRPM | HEIGHT: 59 IN

## 2020-01-01 DIAGNOSIS — Z98.890 OTHER SPECIFIED POSTPROCEDURAL STATES: Chronic | ICD-10-CM

## 2020-01-01 DIAGNOSIS — F43.23 ADJUSTMENT DISORDER WITH MIXED ANXIETY AND DEPRESSED MOOD: ICD-10-CM

## 2020-01-01 DIAGNOSIS — Z95.828 PRESENCE OF OTHER VASCULAR IMPLANTS AND GRAFTS: Chronic | ICD-10-CM

## 2020-01-01 DIAGNOSIS — Z51.5 ENCOUNTER FOR PALLIATIVE CARE: ICD-10-CM

## 2020-01-01 DIAGNOSIS — F41.9 ANXIETY DISORDER, UNSPECIFIED: ICD-10-CM

## 2020-01-01 DIAGNOSIS — G89.3 NEOPLASM RELATED PAIN (ACUTE) (CHRONIC): ICD-10-CM

## 2020-01-01 DIAGNOSIS — G62.0 DRUG-INDUCED POLYNEUROPATHY: ICD-10-CM

## 2020-01-01 DIAGNOSIS — Z86.39 PERSONAL HISTORY OF OTHER ENDOCRINE, NUTRITIONAL AND METABOLIC DISEASE: ICD-10-CM

## 2020-01-01 DIAGNOSIS — T45.1X5A DRUG-INDUCED POLYNEUROPATHY: ICD-10-CM

## 2020-01-01 DIAGNOSIS — C56.9 MALIGNANT NEOPLASM OF UNSPECIFIED OVARY: ICD-10-CM

## 2020-01-01 DIAGNOSIS — R11.0 NAUSEA: ICD-10-CM

## 2020-01-01 DIAGNOSIS — Z90.721 ACQUIRED ABSENCE OF OVARIES, UNILATERAL: Chronic | ICD-10-CM

## 2020-01-01 DIAGNOSIS — Z79.899 OTHER LONG TERM (CURRENT) DRUG THERAPY: ICD-10-CM

## 2020-01-01 DIAGNOSIS — R63.0 ANOREXIA: ICD-10-CM

## 2020-01-01 DIAGNOSIS — R53.83 OTHER FATIGUE: ICD-10-CM

## 2020-01-01 DIAGNOSIS — R33.9 RETENTION OF URINE, UNSPECIFIED: ICD-10-CM

## 2020-01-01 DIAGNOSIS — C56.2 MALIGNANT NEOPLASM OF LEFT OVARY: ICD-10-CM

## 2020-01-01 DIAGNOSIS — F32.9 MAJOR DEPRESSIVE DISORDER, SINGLE EPISODE, UNSPECIFIED: ICD-10-CM

## 2020-01-01 DIAGNOSIS — K59.03 DRUG INDUCED CONSTIPATION: ICD-10-CM

## 2020-01-01 DIAGNOSIS — T40.2X5A DRUG INDUCED CONSTIPATION: ICD-10-CM

## 2020-01-01 LAB
ALBUMIN SERPL ELPH-MCNC: 3.1 G/DL
ALBUMIN SERPL ELPH-MCNC: 3.3 G/DL
ALBUMIN SERPL ELPH-MCNC: 3.3 G/DL
ALP BLD-CCNC: 254 U/L
ALP BLD-CCNC: 291 U/L
ALP BLD-CCNC: 332 U/L
ALT SERPL-CCNC: 44 U/L
ALT SERPL-CCNC: 58 U/L
ALT SERPL-CCNC: 80 U/L
ANION GAP SERPL CALC-SCNC: 12 MMOL/L
ANION GAP SERPL CALC-SCNC: 13 MMOL/L
ANION GAP SERPL CALC-SCNC: 14 MMOL/L
AST SERPL-CCNC: 48 U/L
AST SERPL-CCNC: 56 U/L
AST SERPL-CCNC: 62 U/L
BILIRUB SERPL-MCNC: 0.5 MG/DL
BILIRUB SERPL-MCNC: 0.6 MG/DL
BILIRUB SERPL-MCNC: 0.6 MG/DL
BUN SERPL-MCNC: 6 MG/DL
BUN SERPL-MCNC: 8 MG/DL
BUN SERPL-MCNC: 8 MG/DL
CALCIUM SERPL-MCNC: 10.7 MG/DL
CALCIUM SERPL-MCNC: 8.7 MG/DL
CALCIUM SERPL-MCNC: 9.1 MG/DL
CANCER AG125 SERPL-ACNC: 292 U/ML
CANCER AG125 SERPL-ACNC: 367 U/ML
CHLORIDE SERPL-SCNC: 101 MMOL/L
CHLORIDE SERPL-SCNC: 97 MMOL/L
CHLORIDE SERPL-SCNC: 97 MMOL/L
CO2 SERPL-SCNC: 24 MMOL/L
CO2 SERPL-SCNC: 24 MMOL/L
CO2 SERPL-SCNC: 26 MMOL/L
CREAT SERPL-MCNC: 0.61 MG/DL
CREAT SERPL-MCNC: 0.68 MG/DL
CREAT SERPL-MCNC: 0.77 MG/DL
GLUCOSE SERPL-MCNC: 111 MG/DL
GLUCOSE SERPL-MCNC: 118 MG/DL
GLUCOSE SERPL-MCNC: 120 MG/DL
HCT VFR BLD CALC: 26.1 % — LOW (ref 34.5–45)
HCT VFR BLD CALC: 27.7 % — LOW (ref 34.5–45)
HCT VFR BLD CALC: 27.7 % — LOW (ref 34.5–45)
HGB BLD-MCNC: 8 G/DL — LOW (ref 11.5–15.5)
HGB BLD-MCNC: 8.1 G/DL — LOW (ref 11.5–15.5)
HGB BLD-MCNC: 8.2 G/DL — LOW (ref 11.5–15.5)
MAGNESIUM SERPL-MCNC: 2.3 MG/DL
MCHC RBC-ENTMCNC: 23.9 PG — LOW (ref 27–34)
MCHC RBC-ENTMCNC: 24.9 PG — LOW (ref 27–34)
MCHC RBC-ENTMCNC: 25.9 PG — LOW (ref 27–34)
MCHC RBC-ENTMCNC: 29 G/DL — LOW (ref 32–36)
MCHC RBC-ENTMCNC: 29.8 G/DL — LOW (ref 32–36)
MCHC RBC-ENTMCNC: 30.9 G/DL — LOW (ref 32–36)
MCV RBC AUTO: 82.4 FL — SIGNIFICANT CHANGE UP (ref 80–100)
MCV RBC AUTO: 83.5 FL — SIGNIFICANT CHANGE UP (ref 80–100)
MCV RBC AUTO: 83.6 FL — SIGNIFICANT CHANGE UP (ref 80–100)
PHOSPHATE SERPL-MCNC: 2.7 MG/DL
PLATELET # BLD AUTO: 194 K/UL — SIGNIFICANT CHANGE UP (ref 150–400)
PLATELET # BLD AUTO: 222 K/UL — SIGNIFICANT CHANGE UP (ref 150–400)
PLATELET # BLD AUTO: 272 K/UL — SIGNIFICANT CHANGE UP (ref 150–400)
POTASSIUM SERPL-SCNC: 3.9 MMOL/L
POTASSIUM SERPL-SCNC: 4 MMOL/L
POTASSIUM SERPL-SCNC: 4.5 MMOL/L
PROT SERPL-MCNC: 6.3 G/DL
PROT SERPL-MCNC: 6.8 G/DL
PROT SERPL-MCNC: 7.4 G/DL
RBC # BLD: 3.12 M/UL — LOW (ref 3.8–5.2)
RBC # BLD: 3.31 M/UL — LOW (ref 3.8–5.2)
RBC # BLD: 3.36 M/UL — LOW (ref 3.8–5.2)
RBC # FLD: 17.1 % — HIGH (ref 10.3–14.5)
RBC # FLD: 17.4 % — HIGH (ref 10.3–14.5)
RBC # FLD: 17.6 % — HIGH (ref 10.3–14.5)
SODIUM SERPL-SCNC: 134 MMOL/L
SODIUM SERPL-SCNC: 135 MMOL/L
SODIUM SERPL-SCNC: 139 MMOL/L
WBC # BLD: 10.3 K/UL — SIGNIFICANT CHANGE UP (ref 3.8–10.5)
WBC # BLD: 12.5 K/UL — HIGH (ref 3.8–10.5)
WBC # BLD: 9.2 K/UL — SIGNIFICANT CHANGE UP (ref 3.8–10.5)
WBC # FLD AUTO: 10.3 K/UL — SIGNIFICANT CHANGE UP (ref 3.8–10.5)
WBC # FLD AUTO: 12.5 K/UL — HIGH (ref 3.8–10.5)
WBC # FLD AUTO: 9.2 K/UL — SIGNIFICANT CHANGE UP (ref 3.8–10.5)

## 2020-01-01 PROCEDURE — 73564 X-RAY EXAM KNEE 4 OR MORE: CPT | Mod: 26,LT

## 2020-01-01 PROCEDURE — 73564 X-RAY EXAM KNEE 4 OR MORE: CPT

## 2020-01-01 PROCEDURE — 93970 EXTREMITY STUDY: CPT | Mod: 26

## 2020-01-01 PROCEDURE — 99214 OFFICE O/P EST MOD 30 MIN: CPT

## 2020-01-01 PROCEDURE — 99215 OFFICE O/P EST HI 40 MIN: CPT

## 2020-01-01 PROCEDURE — 99497 ADVNCD CARE PLAN 30 MIN: CPT

## 2020-01-01 PROCEDURE — 74177 CT ABD & PELVIS W/CONTRAST: CPT | Mod: 26

## 2020-01-01 PROCEDURE — 99213 OFFICE O/P EST LOW 20 MIN: CPT

## 2020-01-01 PROCEDURE — 93970 EXTREMITY STUDY: CPT

## 2020-01-01 PROCEDURE — 74177 CT ABD & PELVIS W/CONTRAST: CPT

## 2020-01-01 PROCEDURE — 99284 EMERGENCY DEPT VISIT MOD MDM: CPT

## 2020-01-01 PROCEDURE — 90847 FAMILY PSYTX W/PT 50 MIN: CPT

## 2020-01-01 PROCEDURE — 99284 EMERGENCY DEPT VISIT MOD MDM: CPT | Mod: 25

## 2020-01-01 RX ORDER — LIDOCAINE 5% 700 MG/1
5 PATCH TOPICAL
Qty: 30 | Refills: 3 | Status: ACTIVE | COMMUNITY
Start: 2019-02-26 | End: 1900-01-01

## 2020-01-01 RX ORDER — METHYLPHENIDATE HYDROCHLORIDE 5 MG/1
5 TABLET ORAL
Qty: 60 | Refills: 0 | Status: ACTIVE | COMMUNITY
Start: 2020-01-01 | End: 1900-01-01

## 2020-01-01 RX ORDER — LIDOCAINE HYDROCHLORIDE 20 MG/ML
2 JELLY TOPICAL
Qty: 1 | Refills: 0 | Status: ACTIVE | COMMUNITY
Start: 2020-01-01 | End: 1900-01-01

## 2020-01-01 RX ORDER — PROCHLORPERAZINE MALEATE 10 MG/1
10 TABLET ORAL EVERY 6 HOURS
Qty: 80 | Refills: 2 | Status: ACTIVE | COMMUNITY
Start: 2018-09-27 | End: 1900-01-01

## 2020-01-02 PROBLEM — Z86.39 HISTORY OF HYPERCALCEMIA: Status: RESOLVED | Noted: 2019-01-01 | Resolved: 2020-01-01

## 2020-01-02 PROBLEM — G62.0 CHEMOTHERAPY-INDUCED PERIPHERAL NEUROPATHY: Status: ACTIVE | Noted: 2017-10-09

## 2020-01-02 NOTE — HISTORY OF PRESENT ILLNESS
[Disease: _____________________] : Disease: [unfilled] [AJCC Stage: ____] : AJCC Stage: [unfilled] [de-identified] : Age: 47 y/o diagnosed ovarian cancer. \victoriano Presented to the ED at [facility] on 1/25/17 with a complaints of abdominal pain. Had imaging that showed a suspicious pelvic mass. Taken emergently to the OR by general gyn out of concern for acute abdominal pain. Had a laparoscopy with conversion to ELAP via PFAN for left salpingo-oophorectomy. 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 1/31/2017- CT Abd/pelvis- no bowel obstruction (sent to the ED for evaluation of abdominal pain). Had been on Depo- provera for approximately 4 years, reportedly for management of endometriosis. Had a family history of ovarian cancer. On 2/28/17, she underwent RTLH, RSO, resection left adnexal remnant, omentectomy, P&PA LND, resection of pelvic nodules, staging . Final Pathology: Clear cell ovarian cancer, stage II B. She completed 6 cycles of carboplatin/ paclitaxel in 8/2017 and was in remission until 12/2017 which showed new foci in the rectus sheath. [facility] One sent and had PDL1 1%, MSI stable, TMB low and was started on Keytruda where she felt the neuropathy became worse: pain over the fingers and hands, fatigue, not feeling well. She had progression on immunotherapy and was switched to Doxil which she tolerated better except for rashes over the body and mouth sores. After review at tumor board, consensus was trial of palliative gemcitabine D1, D8 every 3 weeks. CT interval imaging showed interval progression of right lower quadrant peritoneal metastases. She underwent palliative RT to RLQ peritoneal metastases with Dr Dc on 6/2019 (3750 dose). She started on topotecan weekly treatment. CT abd/ pelvis done after C3 treatment and RT showed decrease in size of peritoneal implant and R rectus muscle lesion. She had abdominal pain and fevers and was hospitalized at [facility] where she was placed on pain pump and interval imaging that showed enlarged abdominal mass and hypodensities over the liver. She entered on to home hospice 9/2019.  [de-identified] : clear cell  [de-identified] : 1/25/2017- CA-125 = 186, CA 19-9 < 1 [de-identified] : DOS: 2/28/17\par Carboplatin and weekly Taxol 4/6/17- 6/1/17(three cycles)\par Carboplatin and Taxol q3w 6/29/17- 8/10/17( three cycles)\par Keytruda 7/9/18 -8/23/18 (3 cycles)\par Doxil: 8/2018 to 1/2019\par Foundation testing was BRCA negative \par gemcitabine 2/2019 to 5/9/19 \par topotecan 6/20/19 (counts were too low for D15 and topotecan changed to D1 and D8 every 3 weeks)  to 9/2019 [de-identified] : She has not received docetaxel since November. She has fatigue: feels sleepy all the time and feels QOL is diminished. Her wife feels she sleeps more due to depression. Pt feels she is sad but appropriate for someone with cancer. She is taking Effexor and willing to have dose increased. She has low appetite which stresses her wife who sees her weight loss. Has low appetite and only wants to eat small amounts. Willing to try frozen fruit to help with hydration. Also not drinking as much fluids. Has occasional nausea but no vomiting. She has pain control with PCA pump. She has been trying to drink the Ensure. Wife is asking about fenbendazole which she has seen in personal story of another cancer pt. Wife understands fenbendazole is for dogs as dewormer but feels there is nothing to lose to prolong life in her current situation. Frustrated that hospice is the only option.

## 2020-01-02 NOTE — REVIEW OF SYSTEMS
[Fever] : no fever [Chills] : no chills [Night Sweats] : no night sweats [Recent Change In Weight] : ~T recent weight change [Fatigue] : fatigue [Abdominal Pain] : abdominal pain [Constipation] : no constipation [Vomiting] : no vomiting [Diarrhea] : no diarrhea [Dysuria] : no dysuria [Incontinence] : no incontinence [Vaginal Discharge] : no vaginal discharge [Dysmenorrhea/Abn Vaginal Bleeding] : no dysmenorrhea/abnormal vaginal bleeding [Negative] : Heme/Lymph [FreeTextEntry2] : wt loss due to decreased appetite  [FreeTextEntry8] : urinary retention: has to open faucet for water sounds and pour water over perineal area to stimulate urination

## 2020-01-02 NOTE — PHYSICAL EXAM
[Capable of only limited self care, confined to bed or chair more than 50% of waking hours] : Status 3- Capable of only limited self care, confined to bed or chair more than 50% of waking hours [Thin] : thin [Ulcers] : no ulcers [Normal] : affect appropriate [de-identified] : clothes loose on pt  [de-identified] : port accessed for pain pump  [de-identified] : tenderness on palpation of the RUQ, RLQ and LLQ; no rebound or guarding  [de-identified] : pt drowsy during visit; closing eyes and needs to move around in order to stay awake

## 2020-01-02 NOTE — ASSESSMENT
[FreeTextEntry1] : She is a 49 y/o F with recurrent clear cell ovarian cancer that has progressed through 5th line chemotherapy and platinum resistant. We reviewed her expectations from supportive care and palliative chemotherapy. We reviewed her wish to have more energy. We will s/w Dr Niels Hays regarding Provigil since the drowsiness is worsened with the pain medication. We reviewed potential titration of Effexor from 50 to 75 to see if there will be improvement in mood. We will hold Xgeva since hypercalcemia resolved with dose last month and pt not eating which may cause hypocalcemia. She is getting fluid hydration 3/ week at home. She will continue with supportive care. We explained we do not have clinical experience with fenbendazole for prolonging life for her ovarian cancer. We reviewed pt's wishes. We reviewed her blood work from December and will continue with supportive care.  [Palliative] : Goals of care discussed with patient: Palliative

## 2020-01-03 NOTE — ASSESSMENT
[______] : HCP: [unfilled] [FreeTextEntry1] : 49yoF with:\par \par 1. Ovarian cancer - Has progressed through 5th line therapy, disease is platinum-resistant. Currently receiving best supportive care. Med Onc follow-up. \par \par 2. Neoplasm-related pain/CIPN - Patient's pain is adequately controlled on current regimen.  C/w Hydromorphone PCA pump basal IV 7mg/her and bolus 7mg q 15 min.  Have previously tried to wean Methadone 25mg TID down to 25/20/25 however this caused patient to have a spike in pain level. Will c/w methadone at current dose of 25mg TID. \par \par 3. Anxiety/depression -  Recommend taper of mirtazpine at this time to help with medication simplification and in light of initiation of a stimulant.  c/w venlafaxine at current dose for now. C/w Lorazepam 0.5mg TID PRN.  Recommend short-interval follow up with psychologist at [facility]. \par \par 4.  Opioid-induced constipation- C/w movantiq and lactulose PRN.  \par \par 5.  Cancer-related fatigue - Explained the role of a stimulant in this setting, patient and wife are interested in starting this. Start Methylphenidate 5mg BID. Cautioned on adverse effect of palpitations, heightened anxiety.\par \par 6. Nausea - controlled on prochlorperazine. \par \par 7. Urinary retention - Opioids could be the etiology. Recent U/A was clear, wnl.  c/w supportive measures. \par \par 8. Encounter for Palliative Care/Supportive care-  HCP previously completed.  Primary- wife, Parvin Shetty ([phone]). Prognosis is poor given refractory disease; patient and wife are aware of the terminal nature of her diagnosis but hopeful that treatment will extend life as long as possible. Previously presented with MOLST and discussed CPR vs. DNR in the setting of advanced cancer.  Patient continues to state that she would want CPR in the event of her heart stopping, but would not want to be kept alive with "tubes". \par \par Patient and spouse are very much aware that patient is in the dying process. Patient's wife is having a very hard time processing her emotions and coping with this. Discussed ways that she can vent off her sadness outside of patient's presence as patient expresses exasperation with her wife's constant crying.  Advised them to focus on the little things, the small moments they share and value the time they have together. They are not interested in re-initiation of hospice services at this time. \par Empathetic listening and emotional support provided. \par \par RTO 1 month, call sooner with questions or issues.

## 2020-01-03 NOTE — HISTORY OF PRESENT ILLNESS
[Opioids for treatment of cancer not in remission, and/or  hospice, palliative care] : Opioids for treatment of cancer not in remission, and/or  hospice, palliative care [FreeTextEntry1] : 49yoF with ovarian cancer Stage IIb s/p p RTLH, RSO, resection left adnexal remnant, omentectomy, P&PA LND (2/28/17), s/p Carboplatin, Taxol with disease recurrence presents for follow up visit.  PMH also significant for vertigo, migraines.  \par \par Presented to the ED at [facility] on 1/25/17 with a complaints of abdominal pain. Had imaging that showed a suspicious pelvic mass. Taken emergently to the OR by general gyn out of concern for acute abdomen. Had a laparoscopy with conversion to ELAP via PFAN for left salpingo-oophorectomy. Final pathology demonstrated  clear cell carcinoma of the left adnexa.  On 2/28/17, she underwent RTLH, RSO, resection left adnexal remnant, omentectomy, P&PA LND, resection of pelvic nodules, staging. Final pathology demonstrated clear cell ovarian cancer, stage II B. She completed 6 cycles of carboplatin/ paclitaxel in 8/2017 and was in remission until 12/2017. Imaging in 12/2017 demonstrated new foci in the rectus sheath. She was on Keytruda and she felt the neuropathy became worse: pain over the fingers and hands, fatigue, not feeling well. She had progression on immunotherapy and was switched to Doxil which she tolerated better except for rashes over the body and mouth sores. After review at tumor board, consensus was trial of palliative gemcitabine D1, D8 every 3 weeks. CT interval imaging showed interval progression of right lower quadrant peritoneal metastases. She underwent palliative RT to RLQ peritoneal metastases with Dr Dc on 6/2019 (3750 dose). She started on topotecan weekly treatment. CT abd/ pelvis done after C3 treatment and RT showed decrease in size of peritoneal implant and R rectus muscle lesion. She had abdominal pain and fevers and was hospitalized at [facility] where she was placed on PCA pump and interval imaging that an enlarged abdominal mass and hypodensities over the liver. She started home hospice 9/2019 but discontinued program.  She resumed re-treatment with taxotere and was found to have POD. \par \par Interval History: Patient has been off of chemotherapy since 11/2019. Recently was found to have low calcium after Xgeva, now taking calcium supplementation. \par \par Pain is controlled on the following regimen - \par Hydromorphone IV 7mg/hour infusion with 7mg demand dosing Q15min lockout. Uses a demand dose very sparingly. (pain level increased when \par Methadone 25mg TID\par \par ROS:\par +very low appetite-continues to supplement with ensure\par +6 lb weight loss in last month\par +anxiety- controlled on lorazepam 0.5mg TID\par +constipation- controlled with bowel regimen, has a BM ~ every other day\par +fatigue - most of the day is spent in bed sleeping. Inquiring about stimulant. \par +nausea has been controlled on prochlorperazine.\par +urinary retention - spends a long time sitting on toilet to initiate a urine stream. Recent u/a within normal limits. \par \par Patient's wife has been researching the potential benefit of fenbendazole's purported anti-neoplastic effects and they are inquiring about starting it. \par \par I-Stop Ref#: [id number]

## 2020-01-03 NOTE — PHYSICAL EXAM
[General Appearance - Alert] : alert [General Appearance - In No Acute Distress] : in no acute distress [Sclera] : the sclera and conjunctiva were normal [Normal Oral Mucosa] : normal oral mucosa [Neck Appearance] : the appearance of the neck was normal [] : no respiratory distress [Auscultation Breath Sounds / Voice Sounds] : lungs were clear to auscultation bilaterally [Heart Rate And Rhythm] : heart rate was normal and rhythm regular [Heart Sounds] : normal S1 and S2 [Bowel Sounds] : normal bowel sounds [Abdomen Soft] : soft [Abdomen Tenderness] : non-tender [Abnormal Walk] : normal gait [No Focal Deficits] : no focal deficits [Oriented To Time, Place, And Person] : oriented to person, place, and time [Affect] : the affect was normal [FreeTextEntry1] : Pedal edema

## 2020-01-10 PROBLEM — F43.23 ADJUSTMENT DISORDER WITH MIXED ANXIETY AND DEPRESSED MOOD: Status: ACTIVE | Noted: 2019-01-01

## 2020-01-14 PROBLEM — G89.3 PAIN, NEOPLASM-RELATED: Status: ACTIVE | Noted: 2018-12-17

## 2020-01-14 PROBLEM — R53.83 FATIGUE: Status: ACTIVE | Noted: 2019-01-01

## 2020-01-14 PROBLEM — F41.9 ANXIETY: Status: ACTIVE | Noted: 2017-02-07

## 2020-01-14 PROBLEM — R11.0 NAUSEA: Status: ACTIVE | Noted: 2019-01-01

## 2020-01-14 PROBLEM — R33.9 URINARY RETENTION: Status: ACTIVE | Noted: 2020-01-01

## 2020-01-14 PROBLEM — K59.03 CONSTIPATION DUE TO OPIOID THERAPY: Status: ACTIVE | Noted: 2018-07-30

## 2020-01-14 PROBLEM — F32.9 DEPRESSION: Status: ACTIVE | Noted: 2018-11-08

## 2020-01-14 PROBLEM — C56.9 OVARIAN CANCER: Status: ACTIVE | Noted: 2019-01-01

## 2020-01-14 PROBLEM — R63.0 LOSS OF APPETITE: Status: ACTIVE | Noted: 2019-01-01

## 2020-01-14 PROBLEM — Z51.5 ENCOUNTER FOR PALLIATIVE CARE: Status: ACTIVE | Noted: 2018-08-02

## 2020-01-14 NOTE — PHYSICAL EXAM
[General Appearance - Alert] : alert [General Appearance - In No Acute Distress] : in no acute distress [Sclera] : the sclera and conjunctiva were normal [Normal Oral Mucosa] : normal oral mucosa [Neck Appearance] : the appearance of the neck was normal [Auscultation Breath Sounds / Voice Sounds] : lungs were clear to auscultation bilaterally [] : no respiratory distress [Heart Sounds] : normal S1 and S2 [Heart Rate And Rhythm] : heart rate was normal and rhythm regular [Bowel Sounds] : normal bowel sounds [Abdomen Soft] : soft [Abdomen Tenderness] : non-tender [Abnormal Walk] : normal gait [No Focal Deficits] : no focal deficits [Affect] : the affect was normal [Oriented To Time, Place, And Person] : oriented to person, place, and time [FreeTextEntry1] : Pedal edema

## 2020-01-14 NOTE — ASSESSMENT
[______] : HCP: [unfilled] [FreeTextEntry1] : 49yoF with:\par \par 1. Ovarian cancer - Has progressed through 5th line therapy, disease is platinum-resistant. Patient has decided to initiate fenbendazole for its purported anti-neoplastic properties.. Currently receiving best supportive care. Last scan in August 2019.  Scans and labs pending.\par \par 2. Neoplasm-related pain/CIPN - Patient's pain is adequately controlled on current regimen.  C/w Hydromorphone PCA pump basal IV 7mg/her and bolus 7mg q 15 min.  Have previously tried to wean Methadone 25mg TID down to 25/20/25 however this caused patient to have a spike in pain level. Will c/w methadone at current dose of 25mg TID. \par \par 3. Anxiety/depression -  Recommend taper of mirtazapine at this time to help with medication simplification and in light of initiation of a stimulant.  c/w venlafaxine at current dose for now. C/w Lorazepam 0.5mg TID PRN.  Scheduled follow-up with psycho-oncology tomorrow AM.\par \par 4. Appetite loss- Continued weight loss. Both patient and spouse are aware that this is part of the dying process.   C/w medical cannabis\par \par 5.  Opioid-induced constipation- C/w Movantik and lactulose PRN.  \par \par 6.  Cancer-related fatigue - Re-encouraged initiation of low-dose methylphenidate.  \par \par 7. Nausea - controlled on prochlorperazine. \par \par 8. Urinary retention - Opioids could be the etiology. Recent U/A was clear, wnl. Indwelling urinary catheter inserted using sterile technique for palliative purposes. D/w'd hospice.\par \par 9. Encounter for Palliative Care/Advance Care Planning-  HCP previously completed.  Primary- wife, Parvin Shetty ([phone]).    Goals of care re-addressed during visit.  MOLST form completed in office today delineating wishes: DNR/DNI. Patient and spouse have original copy.  Additional copy placed for EMR.  Greater than 16 minutes spent discussing ACP and completing ACP documents. \par \par Patient and spouse are very much aware that patient has entered the dying process. Patient's wife is having a very hard time processing her emotions and coping with this. Discussed ways that she can vent off her sadness outside of patient's presence as patient expresses exasperation with her wife's constant crying.  Spouse provided with gratitude journal.  Despite patient's continued decline, she will write one memory each day of her spouse for which she is grateful for.  \par \par Patient and spouse are now amenable to re-initiating home hospice care services.  Both are aware that we will remain available throughout.  \par \par RTO 1 month, call sooner with questions or issues.

## 2020-01-14 NOTE — HISTORY OF PRESENT ILLNESS
[Opioids for treatment of cancer not in remission, and/or  hospice, palliative care] : Opioids for treatment of cancer not in remission, and/or  hospice, palliative care [FreeTextEntry1] : 49yoF with ovarian cancer Stage IIb s/p p RTLH, RSO, resection left adnexal remnant, omentectomy, P&PA LND (2/28/17), s/p Carboplatin, Taxol with disease recurrence presents for follow up visit.  PMH also significant for vertigo, migraines.  \par \par Presented to the ED at [facility] on 1/25/17 with a complaints of abdominal pain. Had imaging that showed a suspicious pelvic mass. Taken emergently to the OR by general gyn out of concern for acute abdomen. Had a laparoscopy with conversion to ELAP via PFAN for left salpingo-oophorectomy. Final pathology demonstrated  clear cell carcinoma of the left adnexa.  On 2/28/17, she underwent RTLH, RSO, resection left adnexal remnant, omentectomy, P&PA LND, resection of pelvic nodules, staging. Final pathology demonstrated clear cell ovarian cancer, stage II B. She completed 6 cycles of carboplatin/ paclitaxel in 8/2017 and was in remission until 12/2017. Imaging in 12/2017 demonstrated new foci in the rectus sheath. She was on Keytruda and she felt the neuropathy became worse: pain over the fingers and hands, fatigue, not feeling well. She had progression on immunotherapy and was switched to Doxil which she tolerated better except for rashes over the body and mouth sores. After review at tumor board, consensus was trial of palliative gemcitabine D1, D8 every 3 weeks. CT interval imaging showed interval progression of right lower quadrant peritoneal metastases. She underwent palliative RT to RLQ peritoneal metastases with Dr Dc on 6/2019 (3750 dose). She started on topotecan weekly treatment. CT abd/ pelvis done after C3 treatment and RT showed decrease in size of peritoneal implant and R rectus muscle lesion. She had abdominal pain and fevers and was hospitalized at [facility] where she was placed on PCA pump and interval imaging that an enlarged abdominal mass and hypodensities over the liver. She started home hospice 9/2019 but discontinued program.  She resumed re-treatment with taxotere and was found to have POD. \par \par Interval History: Patient has been off of chemotherapy since 11/2019. Reports continued low fatigue and poor appetite.  +3 lb weight loss in one week. Continues to receive IVF at home.  Continues to experience urinary retention.  Patient spends significant time trying to urinate.  Now requires being the shower to urinate.  \par \par Current pain regimen via PCA:\par Hydromorphone IV 7mg/hour infusion with 7mg demand dosing Q15min lockout. Has began to require demand dose more frequently 15/16 attempts since 1/3/20.\par Methadone 25mg TID  (attempted to wean but pain became uncontrolled)\par \par ROS:\par +very low appetite-continues to supplement with ensure\par +10 lb weight loss in last month\par +anxiety- controlled on lorazepam 0.5mg TID\par +constipation- controlled with bowel regimen, has a BM ~ every other day\par +fatigue - most of the day is spent in bed sleeping. Inquiring about stimulant. \par +nausea has been controlled on prochlorperazine.\par +urinary retention - spends a long time sitting on toilet to initiate a urine stream. Recent u/a within normal limits. \par All other ROS as outlined or noncontributory. \par \par Patient has initiated fenbendazole for its purported anti-neoplastic effects.  \par \par I-Stop Ref#: [id number]

## 2020-01-31 PROBLEM — C56.2 MALIGNANT NEOPLASM OF LEFT OVARY: Status: ACTIVE | Noted: 2017-02-07

## 2020-01-31 PROBLEM — Z79.899 ON ANTINEOPLASTIC CHEMOTHERAPY: Status: RESOLVED | Noted: 2019-01-01 | Resolved: 2020-01-01

## 2020-01-31 NOTE — ASSESSMENT
[FreeTextEntry1] : She is a 49 y/o F with recurrent clear cell ovarian cancer that has progressed through 5th line chemotherapy and platinum resistant. She is on home hospice and has good pain control along with help at home. We reviewed with her and her wife regarding family stress. We validated her wife's frustrations. She is willing for us to talk with her mother if her mother was willing to understand her poor prognosis.

## 2020-01-31 NOTE — REVIEW OF SYSTEMS
[Fever] : no fever [Chills] : no chills [Fatigue] : fatigue [Night Sweats] : no night sweats [Abdominal Pain] : abdominal pain [Recent Change In Weight] : ~T no recent weight change [Constipation] : constipation [Vomiting] : no vomiting [Diarrhea] : no diarrhea [Joint Pain] : joint pain [Joint Stiffness] : no joint stiffness [Muscle Weakness] : no muscle weakness [Muscle Pain] : no muscle pain [Negative] : Allergic/Immunologic [FreeTextEntry9] : pain over the ribs

## 2020-01-31 NOTE — PHYSICAL EXAM
[Capable of only limited self care, confined to bed or chair more than 50% of waking hours] : Status 3- Capable of only limited self care, confined to bed or chair more than 50% of waking hours [Thin] : thin [Ulcers] : no ulcers [Normal] : affect appropriate [de-identified] : clothes loose on pt  [de-identified] : tenderness on palpation of the RUQ, RLQ and LLQ; no rebound or guarding  [de-identified] : port accessed for pain pump  [de-identified] : pt drowsy during visit; closing eyes and needs to move around in order to stay awake

## 2020-01-31 NOTE — HISTORY OF PRESENT ILLNESS
[Disease: _____________________] : Disease: [unfilled] [AJCC Stage: ____] : AJCC Stage: [unfilled] [de-identified] : Age: 45 y/o diagnosed ovarian cancer. \victoriano Presented to the ED at [facility] on 1/25/17 with a complaints of abdominal pain. Had imaging that showed a suspicious pelvic mass. Taken emergently to the OR by general gyn out of concern for acute abdominal pain. Had a laparoscopy with conversion to ELAP via PFAN for left salpingo-oophorectomy. 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 1/31/2017- CT Abd/pelvis- no bowel obstruction (sent to the ED for evaluation of abdominal pain). Had been on Depo- provera for approximately 4 years, reportedly for management of endometriosis. Had a family history of ovarian cancer. On 2/28/17, she underwent RTLH, RSO, resection left adnexal remnant, omentectomy, P&PA LND, resection of pelvic nodules, staging . Final Pathology: Clear cell ovarian cancer, stage II B. She completed 6 cycles of carboplatin/ paclitaxel in 8/2017 and was in remission until 12/2017 which showed new foci in the rectus sheath. [facility] sent and had PDL1 1%, MSI stable, TMB low and was started on Keytruda where she felt the neuropathy became worse: pain over the fingers and hands, fatigue, not feeling well. She had progression on immunotherapy and was switched to Doxil which she tolerated better except for rashes over the body and mouth sores. After review at tumor board, consensus was trial of palliative gemcitabine D1, D8 every 3 weeks. CT interval imaging showed interval progression of right lower quadrant peritoneal metastases. She underwent palliative RT to RLQ peritoneal metastases with Dr Dc on 6/2019 (3750 dose). She started on topotecan weekly treatment. CT abd/ pelvis done after C3 treatment and RT showed decrease in size of peritoneal implant and R rectus muscle lesion. She had abdominal pain and fevers and was hospitalized at [facility] where she was placed on pain pump and interval imaging that showed enlarged abdominal mass and hypodensities over the liver. She entered on to home hospice 9/2019 and disenrolled to start on docetaxel. She continued to have abdominal pain requiring pain pump. She had more weakness and had docetaxel held and one dose of denosumab for hypercalcemia. She went on hospice 12/2019.  [de-identified] : 1/25/2017- CA-125 = 186, CA 19-9 < 1 [de-identified] : clear cell  [de-identified] : DOS: 2/28/17\par Carboplatin and weekly Taxol 4/6/17- 6/1/17(three cycles)\par Carboplatin and Taxol q3w 6/29/17- 8/10/17( three cycles)\par Keytruda 7/9/18 -8/23/18 (3 cycles)\par Doxil: 8/2018 to 1/2019\par Foundation testing was BRCA negative \par gemcitabine 2/2019 to 5/9/19 \par topotecan 6/20/19 (counts were too low for D15 and topotecan changed to D1 and D8 every 3 weeks)  to 9/2019 [de-identified] : They tried to decrease pain medication but had worsening pain over ribs and abdomen at level of 6. She is comfortable at level of 7. There has been friction between her wife and her mother. Her mother has been visiting and has stayed in the house to help pt. Her mother has not been through the entire process and has been encouraging pt to eat more with result of more vomiting and to go to healer in [place]. Her wife feels she is being attacked by pt's family due to decisions made for her care and very upset. Her mother has not agreed to come to center to speak with us and review questions. She feels more distension and rib pain. She had loose stool today after magic juice. She is sleeping and likes new aide from hospice. Aide is helping change her and to clean the house.

## 2020-02-18 NOTE — ED PROVIDER NOTE - NSFOLLOWUPINSTRUCTIONS_ED_ALL_ED_FT
Please follow up with your hospice care provider.    Manage pain as needed with PCA pump.    Continue all other home medications as prescribed.    Return to the ED for any worsening symptoms of chest pain, difficulty breathing, or any new or concerning symptoms.    Please read all attached.

## 2020-02-18 NOTE — ED ADULT NURSE NOTE - OBJECTIVE STATEMENT
50 y/o F, PMH Ovarian CA on home Hospice (stopped chemo in november when she went to home hospice), here for L knee pain s/p injury, worse with walking, no decreased ROM. Pt recently had a DVT in her right lower extremity for which she is prescribed Lovenox. No fevers, chills, chest pain, palpitations, SOB at rest. Wife at bedside, safety maintained.

## 2020-02-18 NOTE — ED PROVIDER NOTE - CLINICAL SUMMARY MEDICAL DECISION MAKING FREE TEXT BOX
The patient is a 49-year-old woman with a history of ovarian cancer who is on home hospice who is presenting to the emergency department today complaining of left knee pain following a traumatic injury at home. Her pain is most likely mechanical in origin. Will order an x-ray of the left knee to rule out fracture. Will also order an ultrasound of the left lower extremity to rule out new DVT given the patient's history. The patient is a 49-year-old woman with a history of ovarian cancer who is on home hospice who is presenting to the emergency department today complaining of left knee pain following a traumatic injury at home. Her pain is most likely mechanical in origin. on Dilaudid iv at home ,l knee and calf is swollen and tender  Will order an x-ray of the left knee to rule out fracture. Will also order an ultrasound of the left lower extremity to rule out new DVT given the patient's history.ZR

## 2020-02-18 NOTE — ED PROVIDER NOTE - OBJECTIVE STATEMENT
The patient is a 49-year-old woman with a past medical history of ovarian cancer on home hospice who is presenting to the emergency department today with a chief complaint of left knee pain. Per her wife at bedside, the patient's began on Saturday following a traumatic injury. It is stabbing in nature. Nothing makes the pain better. The pain is worst when the patient tries to walk, but she does not have any restrictions in her range of motion. She has felt no stiffness in the morning. The patient does not have a personal or family history of arthritis or of joint pain. The patient has not had any fevers, and she is no longer taking any chemotherapy (since November) following transfer to home hospice. She has had urinary frequency following catheter placement, but she has not had any changes in vision. She has no known medical history other than her ovarian cancer. The patient does have a recent history of DVT in her right lower extremity for which she is prescribed lovenox. The patient is a 49-year-old woman with a past medical history of DVT in right leg on lovenox, ovarian cancer on home hospice who is presenting to the emergency department today with a chief complaint of left knee pain. Per her wife at bedside, the patient's began on Saturday following a traumatic injury. It is stabbing in nature. Nothing makes the pain better. The pain is worst when the patient tries to walk, but she does not have any restrictions in her range of motion. She has felt no stiffness in the morning. The patient does not have a personal or family history of arthritis or of joint pain. The patient has not had any fevers, and she is no longer taking any chemotherapy (since November) following transfer to home hospice. She has had urinary frequency following catheter placement, but she has not had any changes in vision. She has been managing pain with dilaudid PCA pump as well as methadone.

## 2020-02-18 NOTE — ED CLERICAL - NS ED CLERK NOTE PRE-ARRIVAL INFORMATION; ADDITIONAL PRE-ARRIVAL INFORMATION
CC/Reason For referral: hospice patient with left knee pain and hot to touch. On PCA Dilaudid 8 mg Q hour and methadone. Hx ovarian and peritoneal Ca with mets to liver/ anxiety and depressive disease  Preferred Consultant(if applicable):  Who admits for you (if needed):  Do you have documents you would like to fax over?   Would you still like to speak to an ED attending? yes please

## 2020-02-18 NOTE — ED PROVIDER NOTE - PATIENT PORTAL LINK FT
You can access the FollowMyHealth Patient Portal offered by [facility] by registering at the following website: [email or web address]. By joining [facility]’s FollowMyHealth portal, you will also be able to view your health information using other applications (apps) compatible with our system.

## 2020-02-18 NOTE — ED ADULT NURSE REASSESSMENT NOTE - NS ED NURSE REASSESS COMMENT FT1
pt assisted to BR To stretcher Oxygen given 2l N/C. Pt uses oxygen at home. Hospice nurse assessed pt
Pt AAOx3, NAD, resp nonlabored, resting comfortably in bed with family at bedside. Pt c/o same L knee pain with movement. Pt denies chest pain, palpitations, SOB at this time. Pt awaiting results and dispo. Safety maintained.

## 2020-02-18 NOTE — ED PROVIDER NOTE - PROGRESS NOTE DETAILS
Chetna Mills, resident MD: DVT studies reveal bilateral DVTs, pt already on anticoagulation, no further interventions offered based upon hospice status. pt's partner would like to take her home. will discharge patient home at this time. printed out copies of results for patient to take home. discussed return precautions and need for outpatient follow up. Chetna Mills, resident MD: DVT studies reveal bilateral DVTs, pt already on anticoagulation, discussed with oncology regarding filter  no further interventions offered based upon hospice status. pt's partner would like to take her home. will discharge patient home at this time. printed out copies of results for patient to take home. discussed return precautions and need for outpatient follow up.

## 2020-02-18 NOTE — ED PROVIDER NOTE - PHYSICAL EXAMINATION
GENERAL: Patient is very ill-appearing and not very alert   HEAD:  Atraumatic, Normocephalic  EYES: EOMI, PERRLA, conjunctiva and sclera clear  NECK: Supple, No JVD  CHEST/LUNG: Pain pump visualized in right anterior chest, clear to auscultation bilaterally; No wheeze  HEART: Regular rate and rhythm; No murmurs, rubs, or gallops  ABDOMEN: Soft, Nontender, Nondistended; Bowel sounds present  EXTREMITIES:  Non-swollen left knee, feels slightly warm to palpation, no redness or discoloration, no restrictions in active or passive range of motion; 2+ Peripheral Pulses, No clubbing, cyanosis, or edema  PSYCH: AAOx3  NEUROLOGY: non-focal  SKIN: No rashes or lesions GENERAL: Patient is chronically ill-appearing  HEAD:  Atraumatic, Normocephalic, loss of hair  EYES: EOMI, PERRLA, conjunctiva and sclera clear  NECK: Supple, No JVD  CHEST/LUNG: Pain pump visualized in right anterior chest, clear to auscultation bilaterally; No wheeze  HEART: Regular rate and rhythm; No murmurs, rubs, or gallops  ABDOMEN: Soft, Nontender, Nondistended; Bowel sounds present  EXTREMITIES:  Non-swollen left knee, feels slightly warm to palpation, no redness or discoloration, no restrictions in active or passive range of motion; 2+ Peripheral Pulses in bilateral LE, No clubbing, cyanosis, or edema  PSYCH: AAOx3  NEUROLOGY: non-focal, moving all extremities without difficulty  SKIN: No rashes or lesions of lower extremities

## 2020-02-18 NOTE — ED PROVIDER NOTE - NS ED ROS FT
CONSTITUTIONAL: No fevers or chills  EYES: No visual changes;   ENT: No vertigo or throat pain   NECK: No pain or stiffness  RESPIRATORY: No cough, wheezing, hemoptysis; No shortness of breath  CARDIOVASCULAR: No chest pain or palpitations  GASTROINTESTINAL: No abdominal or epigastric pain. No nausea, vomiting, or hematemesis; No diarrhea or constipation. No melena or hematochezia.  GENITOURINARY: Endorses urinary frequency, no hematuria  MSK: Endorses pain in her left knee, no weakness, no restrictions in mobility   SKIN: No itching, rashes

## 2020-05-15 NOTE — ED ADULT NURSE NOTE - NS ED NOTE ABUSE RESPONSE YN
Yes Inflammation Suggestive Of Cancer Camouflage Histology Text: There was a dense lymphocytic infiltrate which prevented adequate histologic evaluation of adjacent structures.

## 2020-06-05 NOTE — PHYSICAL THERAPY INITIAL EVALUATION ADULT - PATIENT/FAMILY AGREES WITH PLAN
Pt presents to ED with her dad c/o multiple small cuts to left hand.  Pt is autistic and per father thinks she had a tic next to a glass window and accidentally slammed her left hand into the window breaking glass and cutting her hand.  Father doesn't remember last time pt had a tetanus shot.  Pt was given diazepam 10mg and advil at home before coming to the ER.  Pt is able to squeeze that hand and wiggle her fingers.    yes

## 2020-07-06 NOTE — ED ADULT NURSE NOTE - PMH
Continue methocarbamol and meloxicam  Will add Ultram 50 mg maximum 3 times a day as needed  Supplement with Tylenol 650 mg 3 times a day  Return to clinic after EMG-NCV  Take gabapentin 300 mg in the morning and 600 mg at night  Referral to pain management  Next visit obtain x-ray left knee  
Anxiety    Deviated Nasal Septum    Ovarian cancer    Ovarian cyst  (L) 1/2017  Vertigo

## 2020-07-29 NOTE — CONSULT NOTE ADULT - CONSULT REQUESTED DATE/TIME
Pneumonia  DROP oxygen to 80%  When walking     Chills cough fatigue expsoure covid     T\R spoke  Lester Laird  pm   He will drive pt to [facility] ED     TR called YOSSI tate    23-Apr-2019 13:59 01-Dec-2018

## 2020-08-09 NOTE — ED PROVIDER NOTE - ATTENDING CONTRIBUTION TO CARE
Yes
agree with resident note  "48 YOF pmh ovarian ca, last chemo 5/9 p/w fever x few days and chronic right lower quadrant abdominal pain where her mass is. Pt denies URI symptoms, denies cough, denies chest pain, denies n/v/d. No dysuria, no frequency. "      PE: febrile, tachycardic, not toxic appearing; CTAB/L; s1 s2 no m/r/g abd: TTP RLQ (chronic), no rebound, no guarding ext: no edema    sepsis in chemo pt; cultures, abx, admission

## 2020-12-21 PROBLEM — Z87.440 HISTORY OF URINARY TRACT INFECTION: Status: RESOLVED | Noted: 2019-02-12 | Resolved: 2020-12-21

## 2020-12-21 NOTE — ED PROVIDER NOTE - OBJECTIVE STATEMENT
Pts appt was cancelled for post op f/u .  Pt wants to know can she wait until next available with Dr. Luque for post op. Visit.  Nothing available in ccv . Please advise.      47 y/o female hx ovarian CA (dx jan '17 - currently undergoing chemotherapy) presents to ED c/o cough fever sob x 2 days. Pt. states 3-4 days ago started off with uri symptoms - nasal congestion, sore throat, runny nose which has progressed to prodcutive cough with yellow sputum and worsening shortness of breath that she describes as worsening when she coughs a lot or when she walks or exerts herself. Wife at home was recently sick with URI/bronchitis - now better. Pt. c/o subjective fevers. Denies nausea vomit cp loc palpitations.

## 2021-03-06 NOTE — PROGRESS NOTE ADULT - ASSESSMENT
48f with metastatic clear cell metastatic ovarian cancer (diagnosed in 2017, follows at [facility], currently on Topetecan since June 2019, previously on Carbo/taxol, Keytruda, doxil, gemcitabine with progression of disease, and s/p palliative RT to RLQ peritoneal mets with Dr. Dc on 6/2019) presenting with severe abdominal pain since yesterday.     Abd pain  POD  Cystitis  Anemia    Progression on 5th line of therapy, will need change in treatment, discussed with primary oncologist Dr Cole, options are pemetrexed vs taxotere. Patient will follow with Dr Cole at [facility] to continue treatment.     -daily CBC  -Maintain active type and screen. Transfuse hgb<7 or in case of symptoms.  -Agree with treatment of cystitis  -Palliative care follow up  -Supportive care, pain control, Nutrition, PT, DVT ppx  -Outpatient oncology f/u    Will follow. Please do not hesitate to call back with questions.     Diamond Kamara MD  Medical Oncology Attending  C: [phone] no chest pain, no cough, and no shortness of breath.

## 2021-06-03 NOTE — H&P PST ADULT - IF HCP IS NOT ON CHART, IDENTIFY THE PERSONS NAME AND PHONE NUMBER CONTACTED TO BRING IN DOCUMENT
From: Kiarra Pond  To: Rei Hernández MD  Sent: 6/2/2021 6:10 PM EDT  Subject: Prescription Question    This message is being sent by Yao Bran on behalf of Kiarra Pond.     Request for refill on adderall 15 mg instructed to bring in copy

## 2021-06-21 NOTE — HISTORY REVIEWED
[History reviewed] : History reviewed. [Medications and Allergies reviewed] : Medications and allergies reviewed. FEVER

## 2021-10-25 NOTE — H&P ADULT - NSHPLABSRESULTS_GEN_ALL_CORE
CBC Full  -  ( 19 May 2019 12:40 )  WBC Count : 7.14 K/uL  Hemoglobin : 8.7 g/dL  Hematocrit : 29.2 %  Platelet Count - Automated : 127 K/uL  Mean Cell Volume : 91.5 fL  Mean Cell Hemoglobin : 27.3 pg  Mean Cell Hemoglobin Concentration : 29.8 %  Auto Neutrophil # : 5.38 K/uL  Auto Lymphocyte # : 0.57 K/uL  Auto Monocyte # : 1.01 K/uL  Auto Eosinophil # : 0.07 K/uL  Auto Basophil # : 0.01 K/uL  Auto Neutrophil % : 75.4 %  Auto Lymphocyte % : 8.0 %  Auto Monocyte % : 14.1 %  Auto Eosinophil % : 1.0 %  Auto Basophil % : 0.1 %        134<L>  |  95<L>  |  6<L>  ----------------------------<  96  3.7   |  26  |  0.78    Ca    9.3      19 May 2019 12:40    TPro  6.5  /  Alb  3.4  /  TBili  0.6  /  DBili  x   /  AST  47<H>  /  ALT  71<H>  /  AlkPhos  205<H>      LIVER FUNCTIONS - ( 19 May 2019 12:40 )  Alb: 3.4 g/dL / Pro: 6.5 g/dL / ALK PHOS: 205 u/L / ALT: 71 u/L / AST: 47 u/L / GGT: x           Urinalysis Basic - ( 19 May 2019 14:00 )    Color: LIGHT YELLOW / Appearance: CLEAR / S.008 / pH: 6.5  Gluc: NEGATIVE / Ketone: NEGATIVE  / Bili: NEGATIVE / Urobili: NORMAL   Blood: NEGATIVE / Protein: NEGATIVE / Nitrite: NEGATIVE   Leuk Esterase: NEGATIVE / RBC: x / WBC x   Sq Epi: x / Non Sq Epi: x / Bacteria: x      Imaging:    < from: Xray Chest 1 View- PORTABLE-Urgent (19 @ 14:34) >    Clear lungs  Right chest wall mediport with tip in SVC    < end of copied text > No

## 2022-05-02 NOTE — ED ADULT NURSE NOTE - NSSUHOSCREENINGYN_ED_ALL_ED
"It was a pleasure to see Connor Emerson today in Neurosurgery Clinic. He is a 44 year old male who underwent radiosurgery to multiple brain metastasis in February 2022.  He is here for routine follow-up.    He describes some changes in his vision and is seeing the ophthalmologist for this and sounds like he has a worsening prescription but no other obvious neurologic cause for the problem.  Otherwise he is doing well.    Vitals:    05/02/22 0916   BP: 135/87   Pulse: 78   SpO2: 96%     Estimated body mass index is 31.13 kg/m  as calculated from the following:    Height as of 4/2/22: 1.753 m (5' 9\").    Weight as of 4/11/22: 95.6 kg (210 lb 12.8 oz).  Data Unavailable    Awake alert and oriented.  Neurologically intact.    Imaging: MRI of the brain today shows positive response to treatment with radiosurgery of his brain metastasis.  The larger cystic metastases have not changed much in side but clearly have not grown.  The imaging was reviewed with the patient shown to the patient in clinic today.    Assessment: Status post radiosurgery for brain metastasis, stable.    Plan: I would like to see him back in 3 months with a repeat MRI of the brain.     " Yes - the patient is able to be screened

## 2022-05-12 NOTE — ED ADULT NURSE NOTE - PAIN RATING/NUMBER SCALE (0-10): REST
10 Detail Level: Simple Instructions: This plan will send the code FBSE to the PM system.  DO NOT or CHANGE the price. Price (Do Not Change): 0.00

## 2022-06-09 NOTE — ED ADULT TRIAGE NOTE - NSPATIENTFLAG_GEN_A_ER
Red (Hem/Onc) [Arrhythmia/ECG Abnorrmalities] : arrhythmia/ECG abnormalities [Hyperlipidemia] : hyperlipidemia [Carotid, Aortic and Peripheral Vascular Disease] : carotid, aortic and peripheral vascular disease [FreeTextEntry3] : Jorge Garcia

## 2022-06-20 NOTE — ASSESSMENT
[FreeTextEntry1] : She is a 47 y/o F with recurrent clear cell ovarian cancer that has progressed through 4th line chemotherapy and platinum resistant. We reviewed goals of palliative chemotherapy. She has completed palliative RT to the abdominal wall. We reviewed continued support for symptom control. We reviewed abdominal pain: continuation of hydromorphone and methadone. We reviewed chemotherapy options: topotecan. We reviewed D1,8,15 every 28 days. We reviewed potential side effects including but not limited to: fatigue, low blood counts, infection risk, nausea/ vomiting, decreased appetite, and arthralgias. We reviewed supportive care options and explained treatment will be modified or stopped for any intolerable side effect. Questions answered to their satisfaction. They consented to topotecan therapy. \par \par Wt loss: reviewed continued follow up with nutritionist and adjusted marinol to 1/2 hour before lunch or dinner. Metoclopramide 10mg three times a day prior to meals to decrease nausea and improve GI motility. \par \par Constipation: continue with bowel regimen\par \par Decreased performance status: reviewed with her wife her increasing weakness and expectation that this will get worse over time due to her disease. We reviewed getting wheelchair to help with ease of caregiving and moving patient in the home.  98

## 2022-11-18 NOTE — ED ADULT NURSE NOTE - PMH
Physical Therapist to visit the day after hospital discharge; Registered Nurse to follow if there is a need. Please contact the home care agency at the above phone number if you have not heard from them by 12 noon on the day after your hospital discharge  Deviated Nasal Septum    Ovarian cancer    Ovarian cyst  (L) 1/2017  Vertigo

## 2022-11-29 NOTE — PATIENT PROFILE ADULT - INFORMATION PROVIDED TO:
Chief Complaint   Patient presents with   • Abdominal Pain     Patient reports upper abdominal discomfort/pressure, sometimes mid-lower back pain. Started approx. 1 week ago, hasn't been worsening but isn't getting better. Decreased appetite.        HISTORY OF PRESENT ILLNESS:  Lynette a 40 year old female, presents with burning with urination and frequency for the past 7 days and denies any fever and back pain. The patient also has previous history of UTI.     REVIEW OF SYSTEMS:   Constitutional: Negative for fever and chills.  Skin: Negative for rash.  HEENT: Negative for eye drainage, rhinorrhea, ear pain or sore throat.  Respiratory: Negative cough, wheezing or shortness of breath.  Cardiovascular: Negative for chest pain, chest pressure, palpitations or diaphoresis.  Gastrointestinal: Negative for nausea, vomiting, diarrhea or abdominal pain. abd presure and mild back pain   Genitourinary: Negative for dysuria, urgency, frequency, hematuria mild flank pain.  Extremities:  Negative for joint swelling or joint pain.  Neurologic:  Negative for change in sensory or motor function.  Negative for headache.  Endocrine: Negative for heat or cold intolerance, weight loss or gain.  Hematological: Negative for bleeding, bruising or adenopathy.  Psychiatric: Negative for change in affect, change in mentation or sleep disturbance.    Current Outpatient Medications   Medication Sig   • nitrofurantoin, macrocrystal-monohydrate, (MACROBID) 100 MG capsule Take 1 capsule by mouth in the morning and 1 capsule in the evening. Do all this for 7 days.   • IBUPROFEN PO Take 400 mg by mouth as needed.      No current facility-administered medications for this visit.       Vitals:    11/28/22 1640   BP: (!) 154/110   Pulse:    Resp:    Temp:      Visit Vitals  BP (!) 154/110   Pulse 90   Temp 98.4 °F (36.9 °C) (Skin)   Resp 18   Ht 5' 2\" (1.575 m)   Wt 90.7 kg (200 lb)   SpO2 100%   BMI 36.58 kg/m²        PHYSICAL EXAMINATION:  GENERAL:   Patient is a 40 year old female  who is afebrile. Patient is well developed, well nourished, alert and oriented x 3.  CHEST: CTA bilaterally, no wheeze, no rales  BACK: No CVA tenderness to percussion  CARDIOVASCULAR: RRR, no murmur  ABDOMEN: Soft, mild suprapubic tenderness to palpation, ND, BS(+)    Urine Dip     Component      Latest Ref Rng & Units 11/28/2022   Urine Color       Yellow   Urine Character       Cloudy   Urine Glucose      Negative mg/dL Negative   Urine Bilirubin      Negative Negative   URINE KETONES      Negative mg/dL Negative   Urine Specific Gravity      1.005 - 1.030 1.025   Urine Blood      Negative Trace - Intact (A)   Urine PH      5 - 7 7.0   Urine Protein      Negative mg/dL Trace (A)   POCT Urobilinogen      0.0 - 1.0 mg/dL 0.2   Urine Nitrite      Negative Negative   WBC (Leukocyte) Esterase POC      Negative Moderate (A)     A urine culture will  be done.    ASSESSMENT/PLAN:  1. Abdominal discomfort    - POCT URINE DIP NON-AUTO  - nitrofurantoin, macrocrystal-monohydrate, (MACROBID) 100 MG capsule; Take 1 capsule by mouth in the morning and 1 capsule in the evening. Do all this for 7 days.  Dispense: 14 capsule; Refill: 0    2. Acute cystitis without hematuria    - nitrofurantoin, macrocrystal-monohydrate, (MACROBID) 100 MG capsule; Take 1 capsule by mouth in the morning and 1 capsule in the evening. Do all this for 7 days.  Dispense: 14 capsule; Refill: 0      Preventative measures were discussed including staying well hydrated, personal hygiene and voiding after intercourse.  RTC if symptoms seem to persist or do not improve with treatment.  OTC pyridium prn dysuria, x 2-3 days.    I spent a total of 30 minutes on the day of the visit.This includes chart review and documenting.    Mayra Farooq NP   patient

## 2022-12-24 NOTE — ED ADULT NURSE NOTE - ISOLATION TYPE:
[facility] CONSULTATION    Type of Consult: inpatient  Place of Service: [facility]   Reason for consult: Urinary retention, gross heamturia  Request for consult by: Dr. Alves    History of present illness:   Alphonso Dahl is a 74 History     Socioeconomic History     Marital Alphonso Dahl is being seen by [facility] for urinary retention and gross hematuria    - Recurrent large volume urinary retention. Maintain conway catheter. Recommend keeping conway Kenrick Bae PA-C  [facility]   [phone]         None

## 2023-02-10 NOTE — ED PROVIDER NOTE - NS ED MD DISPO ISOLATION TYPES
[FreeTextEntry1] : 83 None

## 2023-03-13 NOTE — ED ADULT TRIAGE NOTE - TEMPERATURE IN FAHRENHEIT (DEGREES F)
Telma Patel  is a 18-year-old male with forgetfulness and most difficulty with recalling names (not of family or friends, more just associates)  He has no day-to-day difficulty and is independent of all ADLs  In further conversation he appears to actually be quite high functioning  No red flags on exam or history with speaking to him today  His Vincent cognitive assessment was 24/30, just one-point outside of "normal"  We did discuss today that forgetfulness, especially such as losing a train of thought, forgetting what you went into her room for, or having to think momentarily for the name of someone that you do not necessarily speak to on a daily basis is not unusual, especially as we age  This could be considered a part of normal aging  We did discuss measures to improve concentration and attention, which can also contribute to this difficulty  Nonetheless due to his concerns, we will check a Lyme antibody, TSH, and vitamin B12 level  We also discussed that he could proceed with a one-time MRI brain neuro quant analysis to establish a baseline as well as look for any intracranial abnormalities that could cause difficulty with recall such as stroke, or atrophy concerning for a neurodegenerative process  We also discussed that there is concern that he may have obstructive sleep apnea, which may also affect his memory as well as contribute to his daytime fatigue, a m  headaches, and loud snoring  I recommended he consider a sleep study and treatment with CPAP should NOEMÍ be confirmed  However, he is adamant that he will not sleep with a CPAP and therefore is not interested in testing at this time  We did review the risks of untreated NOEMÍ including increased risk for stroke and MI, however he continues to decline  We did discuss other lifestyle modifications to help support a healthy memory    He was offered to follow-up after testing, however he would prefer to follow-up only if testing is abnormal   Otherwise he will continue measures to support a healthy memory and return to neurology on an as-needed basis  Plan:    Complete Labs (Lyme antibody, TSH, vitamin B12)    Complete MRI Brain neuroquant analysis    Follow up as needed; unless testing is abnormal then return to review    Patient was encouraged to increase mind stimulating activities such as reading, crosswords, word searches, puzzles, Soduku, solitaire, coloring and other brain games  We also discussed the importance of staying physically active and eating a health diet such as the Mediterranean or MIND diet  Things that we know are helpful for thinking and memory   1  Exercise program: gradually increase your physical activity over time  Start small and be patient  Aerobic (cardio) activity is best but incorporate balance, strength and flexibility training as well    a  Try to get at least 30min 3 times per week   2  Diet: Mediterranean diet (colorful fruits and vegetables, olive oil, fish, whole grains, very little red meet is any), MIND diet, anti-inflammatory diet  a  Stay well hydrated: drink 6-8 glasses of water per day   b  What's good for your heart is good for your brain  c  Avoid high-salt foods   3  Sleep: aim for at least 7-8 hours per night   a  Avoid alcohol and medications like Benadryl (Tylenol PM) or other sedating drugs  4  Stress management/mindfulness practice:   a  Talk with our social workers about finding a cognitive behavioral therapists  b  Try a smart phone keegan like “Exelis” or “mindfulness for beginners”   i  Try “curable” for pain    c  Take a course in Mindfulness Based Stress Reduction (MBSR)   i  Collin mercado  Read or listen to an audiobook about it:  i  Mindfulness for beginners   ii  10% happier  iii  The happiness advantage   5   Social engagement:   a  Stay in touch with family and friends   b  Plan a few specific activities for your social health every week   c  Ahsan Can a local support group   d  Volunteer! www [email or web address] or call [phone]     MIND diet score:  1 point for each component      · Green leafy vegetables: at least 6 per week  · Other vegetable: at least 1 per day   · Berries: at least 2 per week  · Red meat: fewer than 4 per week   · Fish: at least 1 per week   · Poultry: at least 2 per week  · Beans: at least 3 per week  · Nuts: at least 5 per week  · Fast or fried food: less than 1 per week  · Olive oil   · Butter less: less than 1 table-spoon per day   · Cheese: less than 1 serving per week   · Pastries/sweets: less than 5 servings per week  · Alcohol: no more than 1 serving/ day 98.5

## 2023-04-11 NOTE — H&P PST ADULT - VENOUS THROMBOEMBOLISM SCORE
Anesthesia Evaluation     Patient summary reviewed and Nursing notes reviewed   no history of anesthetic complications:  NPO Solid Status: > 8 hours  NPO Liquid Status: > 2 hours           Airway   Mallampati: II  TM distance: >3 FB  Neck ROM: full  No difficulty expected  Dental      Pulmonary - normal exam   (+) sleep apnea,   Cardiovascular     ECG reviewed  PT is on anticoagulation therapy  Patient on routine beta blocker    (+) hypertension less than 2 medications, dysrhythmias, hyperlipidemia,     ROS comment: TTE 1/2020  ? The quality of the study is limited due to poor acoustic windows related to patient body habitus. Contrast was not used for this study.  ? Left ventricle systolic function is normal in the range of 61-65%. Diastolic function is normal for age.  ? Right ventricle systolic function is normal.  ? The aortic valve is calcified and thickened with normal gradients.  ? Small pericardial effusion adjacent to the right ventricle.      Neuro/Psych  (+) headaches, dizziness/light headedness,    GI/Hepatic/Renal/Endo    (+)  GERD,  thyroid problem hypothyroidism    Musculoskeletal     (+) back pain, chronic pain, neck pain,   Abdominal   (+) obese,    Substance History      OB/GYN          Other   arthritis,    history of cancer                    Anesthesia Plan    ASA 3     general   total IV anesthesia  intravenous induction     Anesthetic plan, risks, benefits, and alternatives have been provided, discussed and informed consent has been obtained with: patient.      
12

## 2023-05-08 NOTE — REASON FOR VISIT
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

## 2023-07-18 NOTE — ED ADULT TRIAGE NOTE - AS TEMP SITE
Hypertension Hypertension Hypertension oral Hypertension Hypertension Hypertension Hypertension Hypertension

## 2023-10-01 PROBLEM — L30.8 PRURITIC DERMATITIS: Status: ACTIVE | Noted: 2018-11-08

## 2023-11-07 NOTE — H&P PST ADULT - NEGATIVE HEMATOLOGY SYMPTOMS
Premeds given. Pt received Gamunex-C 90 g infusion per order. Pt tolerated well. no nose bleeding/no gum bleeding

## 2024-01-01 NOTE — DISCHARGE NOTE PROVIDER - CARE PROVIDER_API CALL
Louisa Coles)  [facility] Medicine  [address] [place] [address]  Phone: [phone]  Fax: [phone]  Follow Up Time: 1-3 days    Uriel Cole)  Hematology; Internal Medicine; Medical Oncology  [address] [place] [address]  Phone: [phone]  Fax: [phone]  Follow Up Time: 1-3 days    Kenia Dc)  Radiation Oncology  [address] [place] [address], [facility]  [place] [address]  Phone: [phone]  Fax: [phone]  Follow Up Time: 1-3 days (2) good, crying

## 2024-01-24 NOTE — DISCHARGE NOTE NURSING/CASE MANAGEMENT/SOCIAL WORK - NSTRANSFERBELONGINGSDISPO_GEN_A_NUR
Spoke with patient.   I helped schedule an appointment for today.   She will get her transplant labs done.   Standing orders are in place.   
with patient

## 2024-01-29 NOTE — PATIENT PROFILE ADULT - NSPROHMONCTXGIVEN_GEN_A_NUR
Prior authorization is approved. Will have scheduling call her.  Yareli Bardales RN...........1/29/2024 1:47 PM    
The patient is waiting on insurance to receive infusions.  Was to get a call back from nurse to set up infusions.  Seen on 1/23/2024.  Wondering where this is at .   
chemotherapy

## 2024-02-27 NOTE — H&P PST ADULT - NEGATIVE GASTROINTESTINAL SYMPTOMS
Endocrine refill protocol for rapid acting, regular, intermediate, and mixed insulin:      Protocol Criteria: Passed  Appointment with Endocrinology completed in the last 6 months or scheduled in the next 3 months     Verify appointment has been completed or scheduled in the appropriate timeline. If so can send a 90 day supply with 1 refill.   Verify A1C has been completed in the last 6 months and is <8.5%    -May substitute prescriptions for Novolog and Humalog unless documented allergy (pens and vials) at the same dose and concentration per insurance preference and provider protocol.   -May substitute prescriptions for Novolin R and Humulin R unless documented allergy (pens and vials) at the same dose and concentration per insurance preference and provider protocol.   -May substitute prescriptions for Novolin N and Humulin N unless documented allergy (pens and vials) at the same dose and concentration per insurance preference and provider protocol.   -May substitute prescriptions for Humulin and Novolin 70/30 insulin unless documented allergy at the same dose and concentration per insurance preference and provider protocol.        Last completed office visit: 1/16/24  Next scheduled Follow up:  none scheduled  90 day supply with 1 refill sent   Breath sounds clear and equal bilaterally. no vomiting/no diarrhea/no nausea

## 2024-03-13 NOTE — H&P PST ADULT - PRO INTERPRETER NEED 2
--EXAM--  VITAL SIGNS: I have reviewed vs documented at present.  CONSTITUTIONAL: Well-developed; well-nourished; in no acute distress.   SKIN: There is an abrasion noted to the right side of face no laceration no active bleeding.  There is also an abrasion noted to the left knee.  HEAD: Normocephalic; No lacerations noted.  Patient does have abrasions to the right side of face there is no active lacerations no active bleeding  EYES: PERRL, EOM intact; conjunctiva and sclera clear. No nystagmus.    CARD: S1, S2, Regular rate and rhythm.   RESP: No wheezes, rales or rhonchi.  ABD: Normal bowel sounds; soft; non-distended; non-tender.  EXT: Left knee positive abrasion noted mild swelling noted to the left knee full range of motion limited secondary to  pain  NEURO: Alert, oriented, grossly unremarkable. Strength 5/5 in all extremities. Sensation intact throughout.  PSYCH: Cooperative, appropriate. English

## 2024-03-15 NOTE — CONSULT NOTE ADULT - PROBLEM SELECTOR RECOMMENDATION 3
Patient of Dr. Cole, currently undergoing disease modifying therapies.  Plan for follow-up with Dr. Cole upon discharge. c/o chest pain and neck pain x 2 days

## 2024-05-23 NOTE — DISCHARGE NOTE ADULT - NURSING SECTION COMPLETE
Problem: Moderate/High Risk for Postpartum Hemorrhage (PPH)  Goal: Hemodynamic stability is maintained  Outcome: Met, complete goal     Problem: Pain (Non-Labor and/or Postpartum)  Goal: Acceptable pain/ comfort level is achieved/maintained at rest and with activity without oversedation (based on self-reporting using NRS / Faces)  Outcome: Met, complete goal  Goal: Verbalizes understanding of pain management  Description: Document on Patient Education Activity  Outcome: Met, complete goal  Goal: Verbalizes understanding of effective use of Patient Controlled Analgesia (PCA)  Description: Document on Patient Education Activity  Outcome: Met, complete goal     Problem: Postpartum  Goal: Demonstrates progression toward physical  / emotional / psychosocial postpartum recovery  Outcome: Met, complete goal  Goal: Demonstrates positive reciprocal attachment with infant  Outcome: Met, complete goal  Goal: Verbalizes understanding of normal physical and emotional alterations associated with puerperium  Description: Document on Patient Education Activity  Outcome: Met, complete goal  Goal: Begins to establish milk supply to meet personal breastfeeding goals  Outcome: Met, complete goal     
  Problem: Moderate/High Risk for Postpartum Hemorrhage (PPH)  Goal: Hemodynamic stability is maintained  Outcome: Monitoring/Evaluating progress     Problem: Pain (Non-Labor and/or Postpartum)  Goal: Acceptable pain/ comfort level is achieved/maintained at rest and with activity without oversedation (based on self-reporting using NRS / Faces)  Outcome: Monitoring/Evaluating progress  Goal: Verbalizes understanding of pain management  Description: Document on Patient Education Activity  Outcome: Monitoring/Evaluating progress  Goal: Verbalizes understanding of effective use of Patient Controlled Analgesia (PCA)  Description: Document on Patient Education Activity  Outcome: Monitoring/Evaluating progress     Problem: Postpartum  Goal: Demonstrates progression toward physical  / emotional / psychosocial postpartum recovery  Outcome: Monitoring/Evaluating progress  Goal: Demonstrates positive reciprocal attachment with infant  Outcome: Monitoring/Evaluating progress  Goal: Verbalizes understanding of normal physical and emotional alterations associated with puerperium  Description: Document on Patient Education Activity  Outcome: Monitoring/Evaluating progress  Goal: Begins to establish milk supply to meet personal breastfeeding goals  Outcome: Monitoring/Evaluating progress     
Patient/Caregiver provided printed discharge information.

## 2024-06-01 NOTE — PROGRESS NOTE ADULT - PROBLEM/PLAN-8
DISPLAY PLAN FREE TEXT
Alert-The patient is alert, awake and responds to voice. The patient is oriented to time, place, and person. The triage nurse is able to obtain subjective information.

## 2024-08-02 NOTE — ED ADULT TRIAGE NOTE - SOURCE OF INFORMATION
Elizabeth Tobias, RN  P Bmt Adult  [facility]  Cc: Sary Yang RN; Ct Velasquez RN Hey,    This patient is ready to schedule for BAN visits.    Admitting WANDA: joaquim ness LT RNCC: Jeannine Fofana while she is leave.  MD: Scott    Bmmunirax: In clinic  Weekly lab day preference: unknown at this time    Other info:  n/a    Clonoseq: yes    Restage per protocol. Orders are scanned into the media tab.  For allos only: please schedule with primary MD at D28, D60, D100, and D180.    Thanks!     Elizabeth  
Patient

## 2024-09-06 NOTE — PATIENT PROFILE ADULT. - AS SC [facility]

## 2024-09-24 NOTE — ED PROVIDER NOTE - SKIN COLOR
Called [facility] to inquire further about this patients ZnT8 lab from a month ago.  , Emmanuel, stated he would need to make a few calls to the send-out lab in [place] to get an answer.      Initial call took place around 9am.  Will update when more information is available.     PALE

## 2024-10-04 NOTE — ED PROVIDER NOTE - PHYSICAL EXAMINATION
Abdomen , soft, nontender, nondistended , no guarding or rigidity , no masses palpable , normal bowel sounds , Liver and Spleen , no hepatomegaly present , no hepatosplenomegaly , liver nontender , spleen not palpable Gen: AAOx3, NAD   Head: NCAT  ENT: Airway patent, moist mucous membranes  Cardiac: Normal rate, normal rhythm, no murmurs/rubs/gallops appreciated  Respiratory: Lungs CTA B/L  Gastrointestinal: Abdomen soft, nontender, nondistended, no rebound, no guarding  MSK: No gross abnormalities, FROM of all four extremities, + nonpitting edema of right leg > left leg   HEME: Extremities warm, pulses intact and symmetrical in all four extremities  Skin: No rashes, no lesions  Neuro: No gross neurologic deficits

## 2025-04-20 NOTE — PHYSICAL THERAPY INITIAL EVALUATION ADULT - STANDING BALANCE: STATIC
Discuss rhinorrhea.  May give Zyrtec as needed.  Push fluids, humidity.  Re contact clinic ASAP for fever or worsening.   good balance